# Patient Record
Sex: MALE | Race: WHITE | NOT HISPANIC OR LATINO | URBAN - METROPOLITAN AREA
[De-identification: names, ages, dates, MRNs, and addresses within clinical notes are randomized per-mention and may not be internally consistent; named-entity substitution may affect disease eponyms.]

---

## 2019-06-04 ENCOUNTER — EMERGENCY (EMERGENCY)
Facility: HOSPITAL | Age: 59
LOS: 1 days | Discharge: HOME | End: 2019-06-04
Attending: EMERGENCY MEDICINE | Admitting: EMERGENCY MEDICINE
Payer: COMMERCIAL

## 2019-06-04 VITALS
DIASTOLIC BLOOD PRESSURE: 96 MMHG | SYSTOLIC BLOOD PRESSURE: 162 MMHG | OXYGEN SATURATION: 97 % | HEIGHT: 71 IN | HEART RATE: 64 BPM | RESPIRATION RATE: 18 BRPM | TEMPERATURE: 98 F | WEIGHT: 210.1 LBS

## 2019-06-04 VITALS
RESPIRATION RATE: 16 BRPM | SYSTOLIC BLOOD PRESSURE: 137 MMHG | OXYGEN SATURATION: 97 % | HEART RATE: 71 BPM | TEMPERATURE: 98 F | DIASTOLIC BLOOD PRESSURE: 101 MMHG

## 2019-06-04 DIAGNOSIS — Z98.890 OTHER SPECIFIED POSTPROCEDURAL STATES: Chronic | ICD-10-CM

## 2019-06-04 PROCEDURE — 71250 CT THORAX DX C-: CPT | Mod: 26

## 2019-06-04 PROCEDURE — 71250 CT THORAX DX C-: CPT

## 2019-06-04 PROCEDURE — 99284 EMERGENCY DEPT VISIT MOD MDM: CPT

## 2019-06-04 PROCEDURE — 99284 EMERGENCY DEPT VISIT MOD MDM: CPT | Mod: 25

## 2019-06-04 RX ORDER — OXYCODONE AND ACETAMINOPHEN 5; 325 MG/1; MG/1
2 TABLET ORAL ONCE
Refills: 0 | Status: DISCONTINUED | OUTPATIENT
Start: 2019-06-04 | End: 2019-06-04

## 2019-06-04 RX ADMIN — OXYCODONE AND ACETAMINOPHEN 2 TABLET(S): 5; 325 TABLET ORAL at 02:48

## 2019-06-04 NOTE — ED PROVIDER NOTE - CROS ED [facility] ALL NEG
TRANSFER - OUT REPORT:    Verbal report given to eKndra Murillo RN(name) on [employer]  being transferred to 632(unit) for ordered procedure       Report consisted of patients Situation, Background, Assessment and   Recommendations(SBAR). Time Pre op antibiotic given:none  Anesthesia Stop time: 0760  Juárez Present on Transfer to floor:no  Order for Juárez on Chart:no    Information from the following report(s) Procedure Summary was reviewed with the receiving nurse. Opportunity for questions and clarification was provided. Is the patient on 02? NO       L/Min none       Other none    Is the patient on a monitor? NO    Is the nurse transporting with the patient? YES    Surgical Waiting Area notified of patient's transfer from PACU? YES      The following personal items collected during your admission accompanied patient upon transfer:   Dental Appliance: Dental Appliances: None  Vision: Visual Aid: Contacts, Glasses  Hearing Aid:    Jewelry: Jewelry: Earrings  Clothing: Clothing: None  Other Valuables:  Other Valuables: Contact Lenses, Eyeglasses  Valuables sent to safe:
- - -

## 2019-06-04 NOTE — ED ADULT NURSE NOTE - NSIMPLEMENTINTERV_GEN_ALL_ED
Implemented All Fall Risk Interventions:  [place] to call system. Call bell, personal items and telephone within reach. Instruct patient to call for assistance. Room bathroom lighting operational. Non-slip footwear when patient is off stretcher. Physically safe environment: no spills, clutter or unnecessary equipment. Stretcher in lowest position, wheels locked, appropriate side rails in place. Provide visual cue, wrist band, yellow gown, etc. Monitor gait and stability. Monitor for mental status changes and reorient to person, place, and time. Review medications for side effects contributing to fall risk. Reinforce activity limits and safety measures with patient and family.

## 2019-06-04 NOTE — ED PROVIDER NOTE - OBJECTIVE STATEMENT
60yo male who presents with left rib pain s/p fall. pt tripped and fell on his left side, no head trauma no LOC< pt c/o diffuse left sided pain, worse with breathing no sob, pt took 2 tylenol with no rielief

## 2019-06-04 NOTE — ED ADULT TRIAGE NOTE - CHIEF COMPLAINT QUOTE
patient states" my left ribs- I think I broke them" slipped and fell 3 days ago, c/o pain to the left side of body

## 2019-06-04 NOTE — ED ADULT NURSE NOTE - OBJECTIVE STATEMENT
received pt ambulatory c/o fall denies loc pt evaluated and medicated with percocet 2 tabs po for pain  and taken to ct scan

## 2020-12-29 NOTE — ED ADULT TRIAGE NOTE - BSA (M2)
SW continues to follow Pt's progress. Pt's tentative DC date is for 1/8/21. Team informed that Pt will need a wheelchair at discharge as well as skilled Home Care. Will continue to follow.      Mary Jane Milligan [place]  Case Management  [phone] 2.15

## 2021-11-10 ENCOUNTER — INPATIENT (INPATIENT)
Facility: HOSPITAL | Age: 61
LOS: 1 days | Discharge: HOME | End: 2021-11-12
Payer: COMMERCIAL

## 2021-11-10 ENCOUNTER — OUTPATIENT (OUTPATIENT)
Dept: OUTPATIENT SERVICES | Facility: HOSPITAL | Age: 61
LOS: 1 days | End: 2021-11-10

## 2021-11-10 DIAGNOSIS — Z98.890 OTHER SPECIFIED POSTPROCEDURAL STATES: Chronic | ICD-10-CM

## 2021-11-10 PROCEDURE — 99254 IP/OBS CNSLTJ NEW/EST MOD 60: CPT

## 2021-11-10 PROCEDURE — 74177 CT ABD & PELVIS W/CONTRAST: CPT | Mod: 26

## 2021-11-10 PROCEDURE — 99285 EMERGENCY DEPT VISIT HI MDM: CPT

## 2021-11-10 PROCEDURE — 71045 X-RAY EXAM CHEST 1 VIEW: CPT | Mod: 26

## 2021-11-10 PROCEDURE — 99222 1ST HOSP IP/OBS MODERATE 55: CPT

## 2021-11-10 PROCEDURE — 99053 MED SERV 10PM-8AM 24 HR FAC: CPT

## 2021-11-11 ENCOUNTER — OUTPATIENT (OUTPATIENT)
Dept: OUTPATIENT SERVICES | Facility: HOSPITAL | Age: 61
LOS: 1 days | End: 2021-11-11

## 2021-11-11 DIAGNOSIS — Z98.890 OTHER SPECIFIED POSTPROCEDURAL STATES: Chronic | ICD-10-CM

## 2021-11-11 PROCEDURE — 74018 RADEX ABDOMEN 1 VIEW: CPT | Mod: 26

## 2021-11-11 PROCEDURE — 70030 X-RAY EYE FOR FOREIGN BODY: CPT | Mod: 26,50

## 2021-11-11 PROCEDURE — 74182 MRI ABDOMEN W/CONTRAST: CPT | Mod: 26

## 2021-11-11 PROCEDURE — 71260 CT THORAX DX C+: CPT | Mod: 26

## 2021-11-11 PROCEDURE — 99223 1ST HOSP IP/OBS HIGH 75: CPT

## 2021-11-12 PROCEDURE — 99232 SBSQ HOSP IP/OBS MODERATE 35: CPT

## 2021-11-12 PROCEDURE — 74018 RADEX ABDOMEN 1 VIEW: CPT | Mod: 26

## 2021-11-15 DIAGNOSIS — Z01.818 ENCOUNTER FOR OTHER PREPROCEDURAL EXAMINATION: ICD-10-CM

## 2021-11-15 PROBLEM — Z00.00 ENCOUNTER FOR PREVENTIVE HEALTH EXAMINATION: Noted: 2021-11-15

## 2021-12-03 ENCOUNTER — APPOINTMENT (OUTPATIENT)
Dept: CARDIOLOGY | Facility: CLINIC | Age: 61
End: 2021-12-03

## 2021-12-03 PROBLEM — Z00.00 ENCOUNTER FOR PREVENTIVE HEALTH EXAMINATION: Status: ACTIVE | Noted: 2021-12-03

## 2021-12-08 ENCOUNTER — OUTPATIENT (OUTPATIENT)
Dept: OUTPATIENT SERVICES | Facility: HOSPITAL | Age: 61
LOS: 1 days | End: 2021-12-08
Payer: COMMERCIAL

## 2021-12-08 DIAGNOSIS — C25.9 MALIGNANT NEOPLASM OF PANCREAS, UNSPECIFIED: ICD-10-CM

## 2021-12-08 DIAGNOSIS — Z98.890 OTHER SPECIFIED POSTPROCEDURAL STATES: Chronic | ICD-10-CM

## 2021-12-09 ENCOUNTER — APPOINTMENT (OUTPATIENT)
Dept: HEMATOLOGY ONCOLOGY | Facility: CLINIC | Age: 61
End: 2021-12-09

## 2021-12-09 ENCOUNTER — RESULT REVIEW (OUTPATIENT)
Age: 61
End: 2021-12-09

## 2021-12-09 ENCOUNTER — APPOINTMENT (OUTPATIENT)
Dept: HEMATOLOGY ONCOLOGY | Facility: CLINIC | Age: 61
End: 2021-12-09
Payer: COMMERCIAL

## 2021-12-09 VITALS
BODY MASS INDEX: 36.38 KG/M2 | HEART RATE: 63 BPM | DIASTOLIC BLOOD PRESSURE: 72 MMHG | OXYGEN SATURATION: 97 % | WEIGHT: 229.06 LBS | SYSTOLIC BLOOD PRESSURE: 106 MMHG | TEMPERATURE: 97.5 F | HEIGHT: 66.5 IN

## 2021-12-09 DIAGNOSIS — R59.1 GENERALIZED ENLARGED LYMPH NODES: ICD-10-CM

## 2021-12-09 DIAGNOSIS — R53.83 OTHER FATIGUE: ICD-10-CM

## 2021-12-09 DIAGNOSIS — Z85.07 PERSONAL HISTORY OF MALIGNANT NEOPLASM OF PANCREAS: ICD-10-CM

## 2021-12-09 PROBLEM — I10 ESSENTIAL (PRIMARY) HYPERTENSION: Chronic | Status: ACTIVE | Noted: 2019-06-04

## 2021-12-09 PROBLEM — F41.9 ANXIETY DISORDER, UNSPECIFIED: Chronic | Status: ACTIVE | Noted: 2019-06-04

## 2021-12-09 LAB
ALBUMIN SERPL ELPH-MCNC: 3.8 G/DL — SIGNIFICANT CHANGE UP (ref 3.3–5)
ALP SERPL-CCNC: 116 U/L — SIGNIFICANT CHANGE UP (ref 40–120)
ALT FLD-CCNC: 35 U/L — SIGNIFICANT CHANGE UP (ref 10–45)
ANION GAP SERPL CALC-SCNC: 11 MMOL/L — SIGNIFICANT CHANGE UP (ref 5–17)
APTT BLD: 33.1 SEC — SIGNIFICANT CHANGE UP (ref 27.5–35.5)
AST SERPL-CCNC: 56 U/L — HIGH (ref 10–40)
BASOPHILS # BLD AUTO: 0.14 K/UL — SIGNIFICANT CHANGE UP (ref 0–0.2)
BASOPHILS NFR BLD AUTO: 1.8 % — SIGNIFICANT CHANGE UP (ref 0–2)
BILIRUB SERPL-MCNC: 0.4 MG/DL — SIGNIFICANT CHANGE UP (ref 0.2–1.2)
BUN SERPL-MCNC: 7 MG/DL — SIGNIFICANT CHANGE UP (ref 7–23)
CALCIUM SERPL-MCNC: 9.3 MG/DL — SIGNIFICANT CHANGE UP (ref 8.4–10.5)
CANCER AG19-9 SERPL-ACNC: 13 U/ML — SIGNIFICANT CHANGE UP
CEA SERPL-MCNC: 3.2 NG/ML — SIGNIFICANT CHANGE UP (ref 0–3.8)
CHLORIDE SERPL-SCNC: 100 MMOL/L — SIGNIFICANT CHANGE UP (ref 96–108)
CO2 SERPL-SCNC: 30 MMOL/L — SIGNIFICANT CHANGE UP (ref 22–31)
CREAT SERPL-MCNC: 0.93 MG/DL — SIGNIFICANT CHANGE UP (ref 0.5–1.3)
EOSINOPHIL # BLD AUTO: 0.46 K/UL — SIGNIFICANT CHANGE UP (ref 0–0.5)
EOSINOPHIL NFR BLD AUTO: 6 % — SIGNIFICANT CHANGE UP (ref 0–6)
FERRITIN SERPL-MCNC: 160 NG/ML — SIGNIFICANT CHANGE UP (ref 30–400)
FOLATE SERPL-MCNC: 6 NG/ML — SIGNIFICANT CHANGE UP
GLUCOSE SERPL-MCNC: 99 MG/DL — SIGNIFICANT CHANGE UP (ref 70–99)
HCT VFR BLD CALC: 38.8 % — LOW (ref 39–50)
HGB BLD-MCNC: 13.5 G/DL — SIGNIFICANT CHANGE UP (ref 13–17)
IMM GRANULOCYTES NFR BLD AUTO: 0.4 % — SIGNIFICANT CHANGE UP (ref 0–1.5)
INR BLD: 1.13 RATIO — SIGNIFICANT CHANGE UP (ref 0.88–1.16)
IRON SATN MFR SERPL: 32 % — SIGNIFICANT CHANGE UP (ref 16–55)
IRON SATN MFR SERPL: 83 UG/DL — SIGNIFICANT CHANGE UP (ref 45–165)
LYMPHOCYTES # BLD AUTO: 1.8 K/UL — SIGNIFICANT CHANGE UP (ref 1–3.3)
LYMPHOCYTES # BLD AUTO: 23.4 % — SIGNIFICANT CHANGE UP (ref 13–44)
MCHC RBC-ENTMCNC: 31.5 PG — SIGNIFICANT CHANGE UP (ref 27–34)
MCHC RBC-ENTMCNC: 34.8 GM/DL — SIGNIFICANT CHANGE UP (ref 32–36)
MCV RBC AUTO: 90.4 FL — SIGNIFICANT CHANGE UP (ref 80–100)
MONOCYTES # BLD AUTO: 0.67 K/UL — SIGNIFICANT CHANGE UP (ref 0–0.9)
MONOCYTES NFR BLD AUTO: 8.7 % — SIGNIFICANT CHANGE UP (ref 2–14)
NEUTROPHILS # BLD AUTO: 4.58 K/UL — SIGNIFICANT CHANGE UP (ref 1.8–7.4)
NEUTROPHILS NFR BLD AUTO: 59.7 % — SIGNIFICANT CHANGE UP (ref 43–77)
NRBC # BLD: 0 /100 WBCS — SIGNIFICANT CHANGE UP (ref 0–0)
PLATELET # BLD AUTO: 172 K/UL — SIGNIFICANT CHANGE UP (ref 150–400)
POTASSIUM SERPL-MCNC: 4.5 MMOL/L — SIGNIFICANT CHANGE UP (ref 3.5–5.3)
POTASSIUM SERPL-SCNC: 4.5 MMOL/L — SIGNIFICANT CHANGE UP (ref 3.5–5.3)
PROT SERPL-MCNC: 6.9 G/DL — SIGNIFICANT CHANGE UP (ref 6–8.3)
PROTHROM AB SERPL-ACNC: 13.4 SEC — SIGNIFICANT CHANGE UP (ref 10.6–13.6)
PSA FREE FLD-MCNC: 0.2 NG/ML — SIGNIFICANT CHANGE UP
PSA FREE FLD-MCNC: 38 % — SIGNIFICANT CHANGE UP
PSA SERPL-MCNC: 0.52 NG/ML — SIGNIFICANT CHANGE UP (ref 0–4)
RBC # BLD: 4.29 M/UL — SIGNIFICANT CHANGE UP (ref 4.2–5.8)
RBC # FLD: 12.2 % — SIGNIFICANT CHANGE UP (ref 10.3–14.5)
SODIUM SERPL-SCNC: 141 MMOL/L — SIGNIFICANT CHANGE UP (ref 135–145)
TIBC SERPL-MCNC: 261 UG/DL — SIGNIFICANT CHANGE UP (ref 220–430)
TRANSFERRIN SERPL-MCNC: 216 MG/DL — SIGNIFICANT CHANGE UP (ref 200–360)
UIBC SERPL-MCNC: 178 UG/DL — SIGNIFICANT CHANGE UP (ref 110–370)
VIT B12 SERPL-MCNC: 522 PG/ML — SIGNIFICANT CHANGE UP (ref 232–1245)
WBC # BLD: 7.68 K/UL — SIGNIFICANT CHANGE UP (ref 3.8–10.5)
WBC # FLD AUTO: 7.68 K/UL — SIGNIFICANT CHANGE UP (ref 3.8–10.5)

## 2021-12-09 PROCEDURE — 85610 PROTHROMBIN TIME: CPT

## 2021-12-09 PROCEDURE — 84153 ASSAY OF PSA TOTAL: CPT

## 2021-12-09 PROCEDURE — 36415 COLL VENOUS BLD VENIPUNCTURE: CPT

## 2021-12-09 PROCEDURE — 85027 COMPLETE CBC AUTOMATED: CPT

## 2021-12-09 PROCEDURE — 86301 IMMUNOASSAY TUMOR CA 19-9: CPT

## 2021-12-09 PROCEDURE — 82728 ASSAY OF FERRITIN: CPT

## 2021-12-09 PROCEDURE — 82378 CARCINOEMBRYONIC ANTIGEN: CPT

## 2021-12-09 PROCEDURE — 82746 ASSAY OF FOLIC ACID SERUM: CPT

## 2021-12-09 PROCEDURE — 84154 ASSAY OF PSA FREE: CPT

## 2021-12-09 PROCEDURE — 83550 IRON BINDING TEST: CPT

## 2021-12-09 PROCEDURE — 99215 OFFICE O/P EST HI 40 MIN: CPT

## 2021-12-09 PROCEDURE — 82607 VITAMIN B-12: CPT

## 2021-12-09 PROCEDURE — 80053 COMPREHEN METABOLIC PANEL: CPT

## 2021-12-09 PROCEDURE — 83540 ASSAY OF IRON: CPT

## 2021-12-09 PROCEDURE — 85730 THROMBOPLASTIN TIME PARTIAL: CPT

## 2021-12-09 PROCEDURE — 84466 ASSAY OF TRANSFERRIN: CPT

## 2021-12-09 NOTE — RESULTS/DATA
[FreeTextEntry1] : Mr. Staples presented at age 61 in December 2021 for evaluation of history of pancreatic cancer and new enlarged LN. \par The patient has a medical history of pancreatic ca s/p whipple in 2015 and subsequent ventral hernia repair in 2020, etoh misuse disorder, HTN, anxiety (on lexapro). \par \par Refer to Dr. Muñoz for additional workup of enlarged para-esophageal LN. \par

## 2021-12-09 NOTE — PHYSICAL EXAM
[Fully active, able to carry on all pre-disease performance without restriction] : Status 0 - Fully active, able to carry on all pre-disease performance without restriction [Normal] : affect appropriate [de-identified] : generally well appearing male, pleasant

## 2021-12-09 NOTE — CONSULT LETTER
[Dear  ___] : Dear  [unfilled], [Referral Letter:] : I am referring [unfilled] to you for further evaluation.  My most recent evaluation follows. [Please see my note below.] : Please see my note below. [Referral Closing:] : Thank you very much for seeing this patient.  If you have any questions, please do not hesitate to contact me. [Sincerely,] : Sincerely, [FreeTextEntry2] : Dr. Senthil Muñoz [FreeTextEntry3] : Dr. Lauren Murcia\par

## 2021-12-10 ENCOUNTER — NON-APPOINTMENT (OUTPATIENT)
Age: 61
End: 2021-12-10

## 2021-12-26 ENCOUNTER — EMERGENCY (EMERGENCY)
Facility: HOSPITAL | Age: 61
LOS: 1 days | End: 2021-12-26
Admitting: EMERGENCY MEDICINE
Payer: COMMERCIAL

## 2021-12-26 DIAGNOSIS — Z98.890 OTHER SPECIFIED POSTPROCEDURAL STATES: Chronic | ICD-10-CM

## 2021-12-26 PROCEDURE — 93010 ELECTROCARDIOGRAM REPORT: CPT

## 2021-12-26 PROCEDURE — 99285 EMERGENCY DEPT VISIT HI MDM: CPT

## 2021-12-26 PROCEDURE — 74177 CT ABD & PELVIS W/CONTRAST: CPT | Mod: 26

## 2022-01-17 ENCOUNTER — OUTPATIENT (OUTPATIENT)
Dept: OUTPATIENT SERVICES | Facility: HOSPITAL | Age: 62
LOS: 1 days | End: 2022-01-17

## 2022-01-17 DIAGNOSIS — C25.9 MALIGNANT NEOPLASM OF PANCREAS, UNSPECIFIED: ICD-10-CM

## 2022-01-17 DIAGNOSIS — Z98.890 OTHER SPECIFIED POSTPROCEDURAL STATES: Chronic | ICD-10-CM
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
## 2022-01-18 ENCOUNTER — APPOINTMENT (OUTPATIENT)
Dept: HEMATOLOGY ONCOLOGY | Facility: CLINIC | Age: 62
End: 2022-01-18
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
## 2022-01-24 ENCOUNTER — APPOINTMENT (OUTPATIENT)
Dept: HEMATOLOGY ONCOLOGY | Facility: CLINIC | Age: 62
End: 2022-01-24

## 2022-01-28 ENCOUNTER — APPOINTMENT (OUTPATIENT)
Dept: HEMATOLOGY ONCOLOGY | Facility: CLINIC | Age: 62
End: 2022-01-28

## 2022-07-14 ENCOUNTER — EMERGENCY (EMERGENCY)
Facility: HOSPITAL | Age: 62
LOS: 1 days | Discharge: HOME | End: 2022-07-14
Admitting: EMERGENCY MEDICINE

## 2022-07-14 DIAGNOSIS — F10.129 ALCOHOL ABUSE WITH INTOXICATION, UNSPECIFIED: ICD-10-CM

## 2022-07-14 DIAGNOSIS — Z98.890 OTHER SPECIFIED POSTPROCEDURAL STATES: Chronic | ICD-10-CM

## 2022-07-14 PROCEDURE — 99284 EMERGENCY DEPT VISIT MOD MDM: CPT

## 2022-10-09 ENCOUNTER — INPATIENT (INPATIENT)
Facility: HOSPITAL | Age: 62
LOS: 2 days | Discharge: HOME | DRG: 392 | End: 2022-10-12
Attending: STUDENT IN AN ORGANIZED HEALTH CARE EDUCATION/TRAINING PROGRAM | Admitting: STUDENT IN AN ORGANIZED HEALTH CARE EDUCATION/TRAINING PROGRAM
Payer: COMMERCIAL

## 2022-10-09 VITALS
WEIGHT: 214.95 LBS | TEMPERATURE: 98 F | OXYGEN SATURATION: 98 % | HEIGHT: 71 IN | RESPIRATION RATE: 18 BRPM | DIASTOLIC BLOOD PRESSURE: 91 MMHG | SYSTOLIC BLOOD PRESSURE: 143 MMHG | HEART RATE: 71 BPM

## 2022-10-09 DIAGNOSIS — Z98.890 OTHER SPECIFIED POSTPROCEDURAL STATES: Chronic | ICD-10-CM

## 2022-10-09 LAB
ALBUMIN SERPL ELPH-MCNC: 3.2 G/DL — LOW (ref 3.3–5)
ALP SERPL-CCNC: 83 U/L — SIGNIFICANT CHANGE UP (ref 40–120)
ALT FLD-CCNC: 46 U/L — SIGNIFICANT CHANGE UP (ref 12–78)
ANION GAP SERPL CALC-SCNC: 6 MMOL/L — SIGNIFICANT CHANGE UP (ref 5–17)
AST SERPL-CCNC: 75 U/L — HIGH (ref 15–37)
BASOPHILS # BLD AUTO: 0.11 K/UL — SIGNIFICANT CHANGE UP (ref 0–0.2)
BASOPHILS NFR BLD AUTO: 2.1 % — HIGH (ref 0–2)
BILIRUB SERPL-MCNC: 0.8 MG/DL — SIGNIFICANT CHANGE UP (ref 0.2–1.2)
BUN SERPL-MCNC: 10 MG/DL — SIGNIFICANT CHANGE UP (ref 7–23)
CALCIUM SERPL-MCNC: 8.6 MG/DL — SIGNIFICANT CHANGE UP (ref 8.5–10.1)
CHLORIDE SERPL-SCNC: 112 MMOL/L — HIGH (ref 96–108)
CK MB CFR SERPL CALC: 1.9 NG/ML — SIGNIFICANT CHANGE UP (ref 0–3.6)
CO2 SERPL-SCNC: 31 MMOL/L — SIGNIFICANT CHANGE UP (ref 22–31)
CREAT SERPL-MCNC: 0.85 MG/DL — SIGNIFICANT CHANGE UP (ref 0.5–1.3)
EGFR: 99 ML/MIN/1.73M2 — SIGNIFICANT CHANGE UP
EOSINOPHIL # BLD AUTO: 0.25 K/UL — SIGNIFICANT CHANGE UP (ref 0–0.5)
EOSINOPHIL NFR BLD AUTO: 4.8 % — SIGNIFICANT CHANGE UP (ref 0–6)
ETHANOL SERPL-MCNC: 243 MG/DL — HIGH (ref 0–10)
GLUCOSE SERPL-MCNC: 103 MG/DL — HIGH (ref 70–99)
HCT VFR BLD CALC: 36.9 % — LOW (ref 39–50)
HGB BLD-MCNC: 12.2 G/DL — LOW (ref 13–17)
IMM GRANULOCYTES NFR BLD AUTO: 0.4 % — SIGNIFICANT CHANGE UP (ref 0–0.9)
LIDOCAIN IGE QN: 16 U/L — LOW (ref 73–393)
LYMPHOCYTES # BLD AUTO: 1.63 K/UL — SIGNIFICANT CHANGE UP (ref 1–3.3)
LYMPHOCYTES # BLD AUTO: 31.2 % — SIGNIFICANT CHANGE UP (ref 13–44)
MCHC RBC-ENTMCNC: 31.3 PG — SIGNIFICANT CHANGE UP (ref 27–34)
MCHC RBC-ENTMCNC: 33.1 GM/DL — SIGNIFICANT CHANGE UP (ref 32–36)
MCV RBC AUTO: 94.6 FL — SIGNIFICANT CHANGE UP (ref 80–100)
MONOCYTES # BLD AUTO: 0.36 K/UL — SIGNIFICANT CHANGE UP (ref 0–0.9)
MONOCYTES NFR BLD AUTO: 6.9 % — SIGNIFICANT CHANGE UP (ref 2–14)
NEUTROPHILS # BLD AUTO: 2.86 K/UL — SIGNIFICANT CHANGE UP (ref 1.8–7.4)
NEUTROPHILS NFR BLD AUTO: 54.6 % — SIGNIFICANT CHANGE UP (ref 43–77)
NRBC # BLD: 0 /100 WBCS — SIGNIFICANT CHANGE UP (ref 0–0)
PLATELET # BLD AUTO: 84 K/UL — LOW (ref 150–400)
POTASSIUM SERPL-MCNC: 3.8 MMOL/L — SIGNIFICANT CHANGE UP (ref 3.5–5.3)
POTASSIUM SERPL-SCNC: 3.8 MMOL/L — SIGNIFICANT CHANGE UP (ref 3.5–5.3)
PROT SERPL-MCNC: 7.4 G/DL — SIGNIFICANT CHANGE UP (ref 6–8.3)
RBC # BLD: 3.9 M/UL — LOW (ref 4.2–5.8)
RBC # FLD: 13.2 % — SIGNIFICANT CHANGE UP (ref 10.3–14.5)
SARS-COV-2 RNA SPEC QL NAA+PROBE: SIGNIFICANT CHANGE UP
SODIUM SERPL-SCNC: 149 MMOL/L — HIGH (ref 135–145)
TROPONIN I, HIGH SENSITIVITY RESULT: 15.8 NG/L — SIGNIFICANT CHANGE UP
WBC # BLD: 5.23 K/UL — SIGNIFICANT CHANGE UP (ref 3.8–10.5)
WBC # FLD AUTO: 5.23 K/UL — SIGNIFICANT CHANGE UP (ref 3.8–10.5)

## 2022-10-09 PROCEDURE — 93010 ELECTROCARDIOGRAM REPORT: CPT

## 2022-10-09 PROCEDURE — 99285 EMERGENCY DEPT VISIT HI MDM: CPT

## 2022-10-09 RX ORDER — MORPHINE SULFATE 50 MG/1
4 CAPSULE, EXTENDED RELEASE ORAL ONCE
Refills: 0 | Status: DISCONTINUED | OUTPATIENT
Start: 2022-10-09 | End: 2022-10-09

## 2022-10-09 RX ORDER — FAMOTIDINE 10 MG/ML
20 INJECTION INTRAVENOUS ONCE
Refills: 0 | Status: COMPLETED | OUTPATIENT
Start: 2022-10-09 | End: 2022-10-09

## 2022-10-09 RX ORDER — SODIUM CHLORIDE 9 MG/ML
1000 INJECTION INTRAMUSCULAR; INTRAVENOUS; SUBCUTANEOUS ONCE
Refills: 0 | Status: COMPLETED | OUTPATIENT
Start: 2022-10-09 | End: 2022-10-09

## 2022-10-09 RX ORDER — ONDANSETRON 8 MG/1
4 TABLET, FILM COATED ORAL ONCE
Refills: 0 | Status: COMPLETED | OUTPATIENT
Start: 2022-10-09 | End: 2022-10-09

## 2022-10-09 RX ADMIN — SODIUM CHLORIDE 1000 MILLILITER(S): 9 INJECTION INTRAMUSCULAR; INTRAVENOUS; SUBCUTANEOUS at 20:48

## 2022-10-09 RX ADMIN — MORPHINE SULFATE 4 MILLIGRAM(S): 50 CAPSULE, EXTENDED RELEASE ORAL at 20:48

## 2022-10-09 RX ADMIN — ONDANSETRON 4 MILLIGRAM(S): 8 TABLET, FILM COATED ORAL at 20:49

## 2022-10-09 RX ADMIN — FAMOTIDINE 20 MILLIGRAM(S): 10 INJECTION INTRAVENOUS at 21:26

## 2022-10-09 NOTE — ED ADULT NURSE NOTE - OBJECTIVE STATEMENT
received hand patient at 19:53H. received hand patient at 19:53H.  patient came in ED from home with c/o left rib cage pain / abdominal pain X few weeks. patient reported he had a whipple procedure in one of the hospital in [facility] 2016 and since then it has given him abdominal discomfort. patient denies recent trauma or fall. afebrile. denies nausea and vomiting. non-labored respiration noted. abdomen nondistended but tender on the left upper quadrant area. patient admit to drinking alcohol every day.
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
## 2022-10-09 NOTE — ED PROVIDER NOTE - CHPI ED SYMPTOMS NEG
Problem: Adult Inpatient Plan of Care  Goal: Plan of Care Review  Flowsheets (Taken 12/8/2020 3445)  Progress: improving  Plan of Care Reviewed With: patient  Outcome Summary: PT eval complete. Pt ambulated 15 feet using RW and CGA x2 for safety. Pt able to maintaining NWB on L LE, with gait limited by fatigue. Bed mobility performed with supervision, STS and toilet transfers with CGA x2. Reviewed strict NWB order placed by Dr. Osborn. Pt bleeding through dressing at surgical site, RN notified and present. Recommend pt d/c home with assist when appropriate.   Goal Outcome Evaluation:  Plan of Care Reviewed With: patient  Progress: improving  Outcome Summary: PT eval complete. Pt ambulated 15 feet using RW and CGA x2 for safety. Pt able to maintaining NWB on L LE, with gait limited by fatigue. Bed mobility performed with supervision, STS and toilet transfers with CGA x2. Reviewed strict NWB order placed by Dr. Osborn. Pt bleeding through dressing at surgical site, RN notified and present. Recommend pt d/c home with assist when appropriate.   no abdominal distension

## 2022-10-09 NOTE — ED ADULT NURSE NOTE - SUICIDE SCREENING DEPRESSION
Negative Consent (Nose)/Introductory Paragraph: The rationale for Mohs was explained to the patient and consent was obtained. The risks, benefits and alternatives to therapy were discussed in detail. Specifically, the risks of nasal deformity, changes in the flow of air through the nose, infection, scarring, bleeding, prolonged wound healing, incomplete removal, allergy to anesthesia, nerve injury and recurrence were addressed. Prior to the procedure, the treatment site was clearly identified and confirmed by the patient.
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
## 2022-10-09 NOTE — ED PROVIDER NOTE - NS ED ATTENDING STATEMENT MOD
This was a shared visit with the LOIS. I reviewed and verified the documentation and independently performed the documented:

## 2022-10-09 NOTE — ED PROVIDER NOTE - CONSTITUTIONAL, MLM
Well appearing, awake, alert, oriented to person, place, time/situation and in mod apparent distress. normal...

## 2022-10-10 ENCOUNTER — TRANSCRIPTION ENCOUNTER (OUTPATIENT)
Age: 62
End: 2022-10-10

## 2022-10-10 DIAGNOSIS — R10.9 UNSPECIFIED ABDOMINAL PAIN: ICD-10-CM

## 2022-10-10 DIAGNOSIS — F41.9 ANXIETY DISORDER, UNSPECIFIED: ICD-10-CM

## 2022-10-10 DIAGNOSIS — C25.9 MALIGNANT NEOPLASM OF PANCREAS, UNSPECIFIED: ICD-10-CM

## 2022-10-10 DIAGNOSIS — I10 ESSENTIAL (PRIMARY) HYPERTENSION: ICD-10-CM

## 2022-10-10 DIAGNOSIS — R93.89 ABNORMAL FINDINGS ON DIAGNOSTIC IMAGING OF OTHER SPECIFIED BODY STRUCTURES: ICD-10-CM

## 2022-10-10 DIAGNOSIS — Z29.9 ENCOUNTER FOR PROPHYLACTIC MEASURES, UNSPECIFIED: ICD-10-CM

## 2022-10-10 DIAGNOSIS — Z98.890 OTHER SPECIFIED POSTPROCEDURAL STATES: Chronic | ICD-10-CM

## 2022-10-10 DIAGNOSIS — F10.20 ALCOHOL DEPENDENCE, UNCOMPLICATED: ICD-10-CM

## 2022-10-10 LAB
ALBUMIN SERPL ELPH-MCNC: 3.2 G/DL — LOW (ref 3.3–5)
ALBUMIN SERPL ELPH-MCNC: 3.4 G/DL — SIGNIFICANT CHANGE UP (ref 3.3–5)
ALP SERPL-CCNC: 79 U/L — SIGNIFICANT CHANGE UP (ref 40–120)
ALP SERPL-CCNC: 83 U/L — SIGNIFICANT CHANGE UP (ref 40–120)
ALT FLD-CCNC: 42 U/L — SIGNIFICANT CHANGE UP (ref 12–78)
ALT FLD-CCNC: 45 U/L — SIGNIFICANT CHANGE UP (ref 12–78)
ANION GAP SERPL CALC-SCNC: 12 MMOL/L — SIGNIFICANT CHANGE UP (ref 5–17)
ANION GAP SERPL CALC-SCNC: 13 MMOL/L — SIGNIFICANT CHANGE UP (ref 5–17)
APPEARANCE UR: CLEAR — SIGNIFICANT CHANGE UP
AST SERPL-CCNC: 73 U/L — HIGH (ref 15–37)
AST SERPL-CCNC: 73 U/L — HIGH (ref 15–37)
BILIRUB DIRECT SERPL-MCNC: 0.4 MG/DL — HIGH (ref 0–0.3)
BILIRUB INDIRECT FLD-MCNC: 0.7 MG/DL — SIGNIFICANT CHANGE UP (ref 0.2–1)
BILIRUB SERPL-MCNC: 1.1 MG/DL — SIGNIFICANT CHANGE UP (ref 0.2–1.2)
BILIRUB SERPL-MCNC: 1.3 MG/DL — HIGH (ref 0.2–1.2)
BILIRUB UR-MCNC: NEGATIVE — SIGNIFICANT CHANGE UP
BUN SERPL-MCNC: 8 MG/DL — SIGNIFICANT CHANGE UP (ref 7–23)
BUN SERPL-MCNC: 8 MG/DL — SIGNIFICANT CHANGE UP (ref 7–23)
CALCIUM SERPL-MCNC: 8.2 MG/DL — LOW (ref 8.5–10.1)
CALCIUM SERPL-MCNC: 8.3 MG/DL — LOW (ref 8.5–10.1)
CANCER AG19-9 SERPL-ACNC: 45 U/ML — HIGH
CEA SERPL-MCNC: 3.8 NG/ML — SIGNIFICANT CHANGE UP (ref 0–3.8)
CHLORIDE SERPL-SCNC: 107 MMOL/L — SIGNIFICANT CHANGE UP (ref 96–108)
CHLORIDE SERPL-SCNC: 108 MMOL/L — SIGNIFICANT CHANGE UP (ref 96–108)
CO2 SERPL-SCNC: 23 MMOL/L — SIGNIFICANT CHANGE UP (ref 22–31)
CO2 SERPL-SCNC: 24 MMOL/L — SIGNIFICANT CHANGE UP (ref 22–31)
COLOR SPEC: SIGNIFICANT CHANGE UP
CREAT SERPL-MCNC: 0.71 MG/DL — SIGNIFICANT CHANGE UP (ref 0.5–1.3)
CREAT SERPL-MCNC: 0.79 MG/DL — SIGNIFICANT CHANGE UP (ref 0.5–1.3)
DIFF PNL FLD: ABNORMAL
EGFR: 101 ML/MIN/1.73M2 — SIGNIFICANT CHANGE UP
EGFR: 104 ML/MIN/1.73M2 — SIGNIFICANT CHANGE UP
GLUCOSE SERPL-MCNC: 113 MG/DL — HIGH (ref 70–99)
GLUCOSE SERPL-MCNC: 130 MG/DL — HIGH (ref 70–99)
GLUCOSE UR QL: NEGATIVE — SIGNIFICANT CHANGE UP
HCT VFR BLD CALC: 36 % — LOW (ref 39–50)
HGB BLD-MCNC: 11.9 G/DL — LOW (ref 13–17)
KETONES UR-MCNC: NEGATIVE — SIGNIFICANT CHANGE UP
LEUKOCYTE ESTERASE UR-ACNC: NEGATIVE — SIGNIFICANT CHANGE UP
MAGNESIUM SERPL-MCNC: 1.4 MG/DL — LOW (ref 1.6–2.6)
MCHC RBC-ENTMCNC: 31.7 PG — SIGNIFICANT CHANGE UP (ref 27–34)
MCHC RBC-ENTMCNC: 33.1 GM/DL — SIGNIFICANT CHANGE UP (ref 32–36)
MCV RBC AUTO: 96 FL — SIGNIFICANT CHANGE UP (ref 80–100)
NITRITE UR-MCNC: NEGATIVE — SIGNIFICANT CHANGE UP
NRBC # BLD: 0 /100 WBCS — SIGNIFICANT CHANGE UP (ref 0–0)
PCP SPEC-MCNC: SIGNIFICANT CHANGE UP
PH UR: 5 — SIGNIFICANT CHANGE UP (ref 5–8)
PHOSPHATE SERPL-MCNC: 2.7 MG/DL — SIGNIFICANT CHANGE UP (ref 2.5–4.5)
PLATELET # BLD AUTO: 71 K/UL — LOW (ref 150–400)
POTASSIUM SERPL-MCNC: 3.7 MMOL/L — SIGNIFICANT CHANGE UP (ref 3.5–5.3)
POTASSIUM SERPL-MCNC: 3.9 MMOL/L — SIGNIFICANT CHANGE UP (ref 3.5–5.3)
POTASSIUM SERPL-SCNC: 3.7 MMOL/L — SIGNIFICANT CHANGE UP (ref 3.5–5.3)
POTASSIUM SERPL-SCNC: 3.9 MMOL/L — SIGNIFICANT CHANGE UP (ref 3.5–5.3)
PROT SERPL-MCNC: 7 G/DL — SIGNIFICANT CHANGE UP (ref 6–8.3)
PROT SERPL-MCNC: 7.6 G/DL — SIGNIFICANT CHANGE UP (ref 6–8.3)
PROT UR-MCNC: NEGATIVE — SIGNIFICANT CHANGE UP
RBC # BLD: 3.75 M/UL — LOW (ref 4.2–5.8)
RBC # FLD: 13.2 % — SIGNIFICANT CHANGE UP (ref 10.3–14.5)
SODIUM SERPL-SCNC: 143 MMOL/L — SIGNIFICANT CHANGE UP (ref 135–145)
SODIUM SERPL-SCNC: 144 MMOL/L — SIGNIFICANT CHANGE UP (ref 135–145)
SP GR SPEC: 1.02 — SIGNIFICANT CHANGE UP (ref 1.01–1.02)
UROBILINOGEN FLD QL: NEGATIVE — SIGNIFICANT CHANGE UP
WBC # BLD: 4.7 K/UL — SIGNIFICANT CHANGE UP (ref 3.8–10.5)
WBC # FLD AUTO: 4.7 K/UL — SIGNIFICANT CHANGE UP (ref 3.8–10.5)

## 2022-10-10 PROCEDURE — 71260 CT THORAX DX C+: CPT | Mod: 26

## 2022-10-10 PROCEDURE — 99222 1ST HOSP IP/OBS MODERATE 55: CPT | Mod: GC

## 2022-10-10 PROCEDURE — 74177 CT ABD & PELVIS W/CONTRAST: CPT | Mod: 26,MA

## 2022-10-10 PROCEDURE — 99221 1ST HOSP IP/OBS SF/LOW 40: CPT

## 2022-10-10 RX ORDER — MORPHINE SULFATE 50 MG/1
2 CAPSULE, EXTENDED RELEASE ORAL EVERY 6 HOURS
Refills: 0 | Status: DISCONTINUED | OUTPATIENT
Start: 2022-10-10 | End: 2022-10-12

## 2022-10-10 RX ORDER — SODIUM CHLORIDE 9 MG/ML
1000 INJECTION INTRAMUSCULAR; INTRAVENOUS; SUBCUTANEOUS
Refills: 0 | Status: DISCONTINUED | OUTPATIENT
Start: 2022-10-10 | End: 2022-10-12

## 2022-10-10 RX ORDER — LANOLIN ALCOHOL/MO/W.PET/CERES
3 CREAM (GRAM) TOPICAL AT BEDTIME
Refills: 0 | Status: DISCONTINUED | OUTPATIENT
Start: 2022-10-10 | End: 2022-10-12

## 2022-10-10 RX ORDER — ESCITALOPRAM OXALATE 10 MG/1
0 TABLET, FILM COATED ORAL
Qty: 0 | Refills: 0 | DISCHARGE

## 2022-10-10 RX ORDER — ONDANSETRON 8 MG/1
4 TABLET, FILM COATED ORAL ONCE
Refills: 0 | Status: COMPLETED | OUTPATIENT
Start: 2022-10-10 | End: 2022-10-10

## 2022-10-10 RX ORDER — MAGNESIUM SULFATE 500 MG/ML
1 VIAL (ML) INJECTION ONCE
Refills: 0 | Status: COMPLETED | OUTPATIENT
Start: 2022-10-10 | End: 2022-10-10

## 2022-10-10 RX ORDER — IRBESARTAN 75 MG/1
0 TABLET ORAL
Qty: 0 | Refills: 0 | DISCHARGE

## 2022-10-10 RX ORDER — MORPHINE SULFATE 50 MG/1
4 CAPSULE, EXTENDED RELEASE ORAL EVERY 6 HOURS
Refills: 0 | Status: DISCONTINUED | OUTPATIENT
Start: 2022-10-10 | End: 2022-10-12

## 2022-10-10 RX ORDER — LOSARTAN POTASSIUM 100 MG/1
50 TABLET, FILM COATED ORAL DAILY
Refills: 0 | Status: DISCONTINUED | OUTPATIENT
Start: 2022-10-10 | End: 2022-10-10

## 2022-10-10 RX ORDER — MORPHINE SULFATE 50 MG/1
4 CAPSULE, EXTENDED RELEASE ORAL ONCE
Refills: 0 | Status: DISCONTINUED | OUTPATIENT
Start: 2022-10-10 | End: 2022-10-10

## 2022-10-10 RX ORDER — ACETAMINOPHEN 500 MG
1000 TABLET ORAL ONCE
Refills: 0 | Status: COMPLETED | OUTPATIENT
Start: 2022-10-10 | End: 2022-10-10

## 2022-10-10 RX ORDER — ONDANSETRON 8 MG/1
4 TABLET, FILM COATED ORAL EVERY 8 HOURS
Refills: 0 | Status: DISCONTINUED | OUTPATIENT
Start: 2022-10-10 | End: 2022-10-12

## 2022-10-10 RX ORDER — ACETAMINOPHEN 500 MG
650 TABLET ORAL EVERY 6 HOURS
Refills: 0 | Status: DISCONTINUED | OUTPATIENT
Start: 2022-10-10 | End: 2022-10-12

## 2022-10-10 RX ORDER — FOLIC ACID 0.8 MG
1 TABLET ORAL DAILY
Refills: 0 | Status: DISCONTINUED | OUTPATIENT
Start: 2022-10-10 | End: 2022-10-12

## 2022-10-10 RX ORDER — LOSARTAN POTASSIUM 100 MG/1
50 TABLET, FILM COATED ORAL DAILY
Refills: 0 | Status: DISCONTINUED | OUTPATIENT
Start: 2022-10-10 | End: 2022-10-12

## 2022-10-10 RX ADMIN — MORPHINE SULFATE 4 MILLIGRAM(S): 50 CAPSULE, EXTENDED RELEASE ORAL at 10:35

## 2022-10-10 RX ADMIN — ONDANSETRON 4 MILLIGRAM(S): 8 TABLET, FILM COATED ORAL at 09:26

## 2022-10-10 RX ADMIN — MORPHINE SULFATE 4 MILLIGRAM(S): 50 CAPSULE, EXTENDED RELEASE ORAL at 16:50

## 2022-10-10 RX ADMIN — MORPHINE SULFATE 2 MILLIGRAM(S): 50 CAPSULE, EXTENDED RELEASE ORAL at 21:12

## 2022-10-10 RX ADMIN — Medication 3 MILLIGRAM(S): at 23:37

## 2022-10-10 RX ADMIN — ONDANSETRON 4 MILLIGRAM(S): 8 TABLET, FILM COATED ORAL at 14:40

## 2022-10-10 RX ADMIN — SODIUM CHLORIDE 150 MILLILITER(S): 9 INJECTION INTRAMUSCULAR; INTRAVENOUS; SUBCUTANEOUS at 21:14

## 2022-10-10 RX ADMIN — MORPHINE SULFATE 4 MILLIGRAM(S): 50 CAPSULE, EXTENDED RELEASE ORAL at 05:35

## 2022-10-10 RX ADMIN — MORPHINE SULFATE 4 MILLIGRAM(S): 50 CAPSULE, EXTENDED RELEASE ORAL at 16:35

## 2022-10-10 RX ADMIN — ONDANSETRON 4 MILLIGRAM(S): 8 TABLET, FILM COATED ORAL at 22:01

## 2022-10-10 RX ADMIN — MORPHINE SULFATE 4 MILLIGRAM(S): 50 CAPSULE, EXTENDED RELEASE ORAL at 10:06

## 2022-10-10 RX ADMIN — ONDANSETRON 4 MILLIGRAM(S): 8 TABLET, FILM COATED ORAL at 05:35

## 2022-10-10 RX ADMIN — Medication 100 GRAM(S): at 13:27

## 2022-10-10 RX ADMIN — MORPHINE SULFATE 2 MILLIGRAM(S): 50 CAPSULE, EXTENDED RELEASE ORAL at 21:30

## 2022-10-10 RX ADMIN — Medication 400 MILLIGRAM(S): at 01:50

## 2022-10-10 RX ADMIN — Medication 2 MILLIGRAM(S): at 09:29

## 2022-10-10 NOTE — CONSULT NOTE ADULT - ASSESSMENT
abdominal pain  diarrhea  nausea/vomiting  etoh abuse  h/o pancreatic cancer    CT scan reviewed, no obvious recurrence on this study  will need follow up with his pancreatic surgery group at [facility]  ? role for eus  check GI pcr if diarrhea persist  etoh cessation  had recent colonoscopy, no need to repeat  will follow     I reviewed the overnight course of events on the unit, re-confirming the patient history. I discussed the care with the patient and their family  The plan of care was discussed with the physician assistant and modifications were made to the notation where appropriate.   Differential diagnosis and plan of care discussed with patient after the evaluation  35 minutes spent on total encounter of which more than fifty percent of the encounter was spent counseling and/or coordinating care by the attending physician.  Advanced care planning was discussed with patient and family.  Advanced care planning forms were reviewed and discussed.  Risks, benefits and alternatives of gastroenterologic procedures were discussed in detail and all questions were answered.
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
## 2022-10-10 NOTE — H&P ADULT - ASSESSMENT
63 yo M with PMHx of pancreatic ca s/p whipple in 2015 and subsequent ventral hernia repair in 2020, etoh misuse disorder, HTN, anxiety (on lexapro) presents to the ER complaining of worsening upper abdominal pain for past few weeks, admitted for intractable abd pain and abnormal CT finding   63 yo M with PMHx of UC, pancreatic ca s/p whipple in 2015 and subsequent ventral hernia repair in 2020, etoh misuse disorder, HTN, anxiety, who presents to the ER complaining of worsening upper abdominal pain for past few weeks, admitted for intractable abd pain and abnormal CT finding.

## 2022-10-10 NOTE — H&P ADULT - PROBLEM SELECTOR PLAN 4
continue irbesartan  monitor routine hemodynamics continue irbesartan 150mg QD with hold parameters  monitor routine hemodynamics continue losartan 50mg QD with hold parameters  monitor routine hemodynamics
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
## 2022-10-10 NOTE — H&P ADULT - PROBLEM SELECTOR PLAN 6
s/p meryl in 2015 s/p whipple in 2015, pt reports his surgeon left [facility] [place] and [place] never had any follow up. Now with abnormal CT finding  - Surgery consult, appreciate recs  - GI consult, appreciate recs

## 2022-10-10 NOTE — DISCHARGE NOTE PROVIDER - NSDCFUSCHEDAPPT_GEN_ALL_CORE_FT
Jeanie Schroeder  [facility] PreAdmits.  Scheduled Appointment: 10/11/2022

## 2022-10-10 NOTE — DISCHARGE NOTE PROVIDER - NSDCCPCAREPLAN_GEN_ALL_CORE_FT
PRINCIPAL DISCHARGE DIAGNOSIS  Diagnosis: Abdominal pain  Assessment and Plan of Treatment: You have an enlarged lymph node in your chest. With your history, it is recommended that you undergo PET scan as outpatient and follow up with oncology to discuss the potential need for biopsy of this abnormal lymph node. If you develop fevers or worsening symptoms, advise following up with your primary care provider.      SECONDARY DISCHARGE DIAGNOSES  Diagnosis: Nausea & vomiting  Assessment and Plan of Treatment: Resolved. Diet advanced and tolerating. Low fiber diet advised for the short term.

## 2022-10-10 NOTE — PROGRESS NOTE ADULT - PROBLEM SELECTOR PLAN 1
unclear etiology, requiring IV analgesia in ER  -pain regimen as ordered  -IVF  -surgery consulted, f/u recs  -GI consulted unclear etiology, requiring IV analgesia in ER  -pain regimen as ordered  -IVF  -surgery consulted, f/u recs   -GI consulted

## 2022-10-10 NOTE — ED ADULT NURSE REASSESSMENT NOTE - NS ED NURSE REASSESS COMMENT FT1
10/10/22  Received pt at 0720am from outgoing RN.   Pt alert and oriented x 4, respirations even and unlabored at this time.   Pt reports several episodes of n/v, continues to report abd pain.  Pt medicated as noted in MAR for vomiting and pain.    Pt ambulatory with steady gait.   Respirations even and unlabored.   Pt calm and cooperative appears in no acute distress, report given to outgoing RN. BN

## 2022-10-10 NOTE — H&P ADULT - NSHPSOCIALHISTORY_GEN_ALL_CORE
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 Currently housing with his brother in [place].

## 2022-10-10 NOTE — H&P ADULT - ATTENDING COMMENTS
63 yo M with PMHx of UC, pancreatic ca s/p whipple in 2015 and subsequent ventral hernia repair in 2020, etoh misuse disorder, HTN, anxiety, who presents to the ER complaining of worsening upper abdominal pain for past few weeks, admitted for intractable abd pain and abnormal CT finding.    abd pain requirin iv analgesics. surgery consulted. unclear cause of pain but pt with new CT finding suspicious for recurrent disease. GI, heme/onc consulted, check  and CEA.

## 2022-10-10 NOTE — DISCHARGE NOTE PROVIDER - CARE PROVIDER_API CALL
Dr. Santos,   follow up with your GI  Phone: (   )    -  Fax: (   )    -  Established Patient  Follow Up Time: 1 week

## 2022-10-10 NOTE — DISCHARGE NOTE PROVIDER - PROVIDER TOKENS
FREE:[LAST:[Dr. Santos],PHONE:[(   )    -],FAX:[(   )    -],ADDRESS:[follow up with your GI],FOLLOWUP:[1 week],ESTABLISHEDPATIENT:[T]]

## 2022-10-10 NOTE — H&P ADULT - PROBLEM SELECTOR PLAN 2
CT abd/pelvis showing Increase in nodularity along the patient's Whipple resection site. Recurrent disease is not excluded. Defer to on-site radiologist for final follow-up recommendations. Partially imaged enlarged subcarinal lymph node.  -check CEA and   -heme/onc consult

## 2022-10-10 NOTE — H&P ADULT - NSHPPHYSICALEXAM_GEN_ALL_CORE
T(C): 36.7 (10-09-22 @ 17:59), Max: 36.7 (10-09-22 @ 17:59)  HR: 71 (10-09-22 @ 17:59) (71 - 71)  BP: 143/91 (10-09-22 @ 17:59) (143/91 - 143/91)  RR: 18 (10-09-22 @ 17:59) (18 - 18)  SpO2: 98% (10-09-22 @ 17:59) (98% - 98%)    CONSTITUTIONAL: Well groomed, uncomfortable appearing, redness in face  EYES: PERRLA and symmetric, EOMI, No conjunctival or scleral injection, non-icteric  ENMT: Oral mucosa with moist membranes. Normal dentition; no pharyngeal injection or exudates  NECK: Supple, symmetric and without tracheal deviation, no JVD   RESP: No respiratory distress, no use of accessory muscles; CTA b/l, no WRR  CV: RRR, +S1S2, no MRG; no JVD; trace edema b/l LE  GI: Soft, tender to palpation diffusely, most notable in RLQ, RUQ, epigastric and LUQ, moderately distened, scar in midline from ventral hernia repair, no palpable masses; no hepatosplenomegaly; no hernia palpated  LYMPH: No cervical LAD or tenderness; no axillary LAD or tenderness  SKIN: No rashes or ulcers noted; no subcutaneous nodules or induration palpable  NEURO: CN II-XII intact; sensation intact in upper and lower extremities b/l to light touch, strength 5/5 throughout   PSYCH: Appropriate insight/judgment; A+O x 3, mood and affect appropriate, recent/remote memory intact
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
## 2022-10-10 NOTE — H&P ADULT - PROBLEM SELECTOR PLAN 3
patient admits to being stressed and drinking daily recently due to family problems. hx of alcohol misuse  -drinks 3 drinks daily  -WA protocol  -trend LFTs patient admits to being stressed and drinking daily recently due to family problems. hx of alcohol misuse  -drinks 1 pint vodka daily  -CIWA protocol  -social work consult  -trend LFTs patient admits to being stressed and drinking daily recently due to family problems. hx of alcohol misuse  -drinks 1 pint vodka daily  -[facility] protocol  -addiction medicine consult  -trend LFTs

## 2022-10-10 NOTE — H&P ADULT - PROBLEM SELECTOR PLAN 1
unclear etiology, requiring IV analgesia in ER  -pain regimen  -IVF unclear etiology, requiring IV analgesia in ER  -pain regimen - morphine 4mg Q6 for moderate pain  -IVF unclear etiology, requiring IV analgesia in ER  -pain regimen - morphine 2mg Q6 for moderate pain, 4mg Q6 for severe  -IVF unclear etiology, requiring IV analgesia in ER  -pain regimen - morphine 2mg Q6 for moderate pain, 4mg Q6 for severe  -IVF  -surgery consulted, f/u recs

## 2022-10-10 NOTE — H&P ADULT - NSHPREVIEWOFSYSTEMS_GEN_ALL_CORE
CONSTITUTIONAL: denies fever, chills, fatigue, weakness  HEENT: denies blurred vision, sore throat  SKIN: denies new lesions, rash  CARDIOVASCULAR: denies chest pain, chest pressure, palpitations  RESPIRATORY: denies shortness of breath, cough, sputum production  GASTROINTESTINAL: + nausea, +vomiting, +diarrhea, +abdominal pain, denies melena or hematochezia  GENITOURINARY: denies dysuria, discharge  NEUROLOGICAL: denies numbness, headache, focal weakness  MUSCULOSKELETAL: denies new joint pain, muscle aches  HEMATOLOGIC: denies gross bleeding, bruising
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
## 2022-10-10 NOTE — CONSULT NOTE ADULT - SUBJECTIVE AND OBJECTIVE BOX
SURGERY PA CONSULT NOTE:    CHIEF COMPLAINT:  Patient is a 62y old  Male who presents with a chief complaint of pain control, CT finding       HPI FROM ED:  HPI:  63 yo M with PMHx of pancreatic ca s/p whipple in  and subsequent ventral hernia repair in , etoh misuse disorder, HTN, anxiety (on lexapro) presents to the ER complaining of worsening upper abdominal pain for past few weeks. Pain worsened  today with associated nausea, vomiting, diarrhea. Patient denies any fever or chills.  Patient admits to being stressed and drinking daily recently due to family problems.    Interval HPI:  Patient is a 62 year old with PMHx pancreatic ca s/p whipple resection (, at [facility]), ventral hernia repair (), ulcerative colitis, ETOH abuse, HTN, anxiety, presenting to [place] ED with worsening abdominal pain for several weeks.  Patient states pain is located in epigastrum and radiates to LUQ/back, sharp in nature, 8/10 in intensity, with no known provocation of symptoms.  Patient states pain today was much worse in intensity which prompted him to come to the ED.  Patient reports having 7 episodes of yellow colored emesis while in the ED, no episodes over the past several weeks.  Last ate yesterday.  Last BM yesterday, which was normal for him.   Following his whipple resection, patient has not followed with a surgeon since the surgery.  Of note, patient reports consuming 1 pint of alcohol daily for several months.  He has attended rehabilitation for alcohol use disorder previously.  Last drink was yesterday.  Denies fevers, chills, melena, hematochezia, melena, chest pain, palpitations, calf pain.      In the ED, VS T98F HR 71 /91 RR 18 SpO2 98% on RA. Labs significant for Na 149, AST 75, MIKE 243  Received 1L NS bolus x1, ofirmev 1g x1, morphine 4 mg IV x2, zofran 4 mg IV x1, pepcid 20 mg IV x1 in the ED.  EKG NSR  CT abd/pelvis showing Increase in nodularity along the patient's Whipple resection site. Recurrent disease is not excluded. Defer to on-site radiologist for final follow-up recommendations. Partially imaged enlarged subcarinal lymph node.   (10 Oct 2022 05:14)      PAST MEDICAL HISTORY:  PAST MEDICAL & SURGICAL HISTORY:  Hypertension      Anxiety      Pancreatic cancer      Alcohol abuse      History of pancreatic surgery        H/O ventral hernia repair    PAST SURGICAL HISTORY:    REVIEW OF SYSTEMS:  General/Constitutional: No acute distress, no headache, weakness, fevers, or chills   HEENT: Denies auditory or visual changes/disturbances, no vertigo, no throat pain, no dysphagia    Neck: Denies neck pain/stiffness, denies swelling/lumps/hoarseness   Lymphatic: Denies lumps or swelling in the axillae, groin, or neck bilaterally   Respiratory: Denies cough/hemoptysis, denies wheezing/SOB/dyspnea  Cardiac: Denies chest pain, palpitations  Abdomen: Denies abdominal bloating/fullness, nausea or vomiting, denies abdominal pain  Extremities: Denies sores, swelling, discoloration bilat UE/LE  Genitourinary: Denies urinary issues or complaints, denies dysuria/hematuria  Neuro: Denies weakness, paraesthesias, paralysis, syncope, loss of vision  Skin: Denies pruritus, pain, rashes  Psych: Denies hallucinations, visual disturbances, or depression    MEDICATIONS:  Home Medications:  Avapro: orally once a day (03 Dec 2021 16:51)  Lexapro: orally once a day (03 Dec 2021 16:51)    MEDICATIONS  (STANDING):    MEDICATIONS  (PRN):      ALLERGIES:  Allergies    No Known Allergies    Intolerances        SOCIAL HISTORY:  Social History:  Occupation:  walks 10 miles a day   Living situation: lives in [place] with his wife, 3 kids are grown (10 Oct 2022 05:14)    Smoking: Yes [ ]  No [x]   ______pk yrs  ETOH  Yes [x]  No [ ]  Social [ ]  DRUGS:  Yes [ ]  No [x]      FAMILY HISTORY:  FAMILY HISTORY:  No pertinent family history in first degree relatives        VITAL SIGNS:  Vital Signs Last 24 Hrs  T(C): 36.7 (09 Oct 2022 17:59), Max: 36.7 (09 Oct 2022 17:59)  T(F): 98 (09 Oct 2022 17:59), Max: 98 (09 Oct 2022 17:59)  HR: 71 (09 Oct 2022 17:59) (71 - 71)  BP: 143/91 (09 Oct 2022 17:59) (143/91 - 143/91)  BP(mean): --  RR: 18 (09 Oct 2022 17:59) (18 - 18)  SpO2: 98% (09 Oct 2022 17:59) (98% - 98%)    Parameters below as of 09 Oct 2022 17:59  Patient On (Oxygen Delivery Method): room air        PHYSICAL EXAM:  General: No acute distress, appears comfortable, well nourished, well-groomed, appears stated age  Head, Eyes, Ears, Nose, Throat: Normal cephalic/atraumatic, anicteric, conjunctiva-non injected and moist, vision grossly intact, hearing grossly intact, no nasal discharge, ears and nose symmetrical and atraumatic.  Nasal, oral, and oropharyngeal mucosa pink moist with no evidence of ulceration  Neck: Supple, carotids have good upstroke, trachea in the midline, without JVD or thyromegaly  Lymphatic: No evidence of masses or lymphadenopathy in the head, neck, trunk, axillary, inguinal, or supraclavicular regions  Chest: Lungs are clear to P&A, no wheezing, no rales, no ronchi, with good inspiratory effort  Heart: Heart rhythm regular, no murmurs  Abdomen: Tender to palpation in LUQ.  + abdominal distention.  Bowel sounds present in all four quadrants.  No guarding, rebound, and no peritoneal signs.  No evidence of hepatosplenomegaly.  No evidence of abdominal wall hernias.  Inguinal regions are unremarkable with no evidence of hernias.   Extremity: No swelling, or open sores, no gross deformities,  good range of motion, 2+ peripheral pulses bilat UE/LE, no edema,  negative Nasrin's sign, no lymphadenopathy  Neuro: Alert and oriented x3, motor and sensory intact  Psychiatric: Awake , alert, oriented x3 with an appropriate affect.   Skin: Good color, turgor, texture with no gross lesions, no eruptions, no rashes, no subcutaneous nodules and normal temperature.     LABS:                        12.2   5.23  )-----------( 84       ( 09 Oct 2022 20:53 )             36.9     10-    149<H>  |  112<H>  |  10  ----------------------------<  103<H>  3.8   |  31  |  0.85    Ca    8.6      09 Oct 2022 20:53    TPro  7.4  /  Alb  3.2<L>  /  TBili  0.8  /  DBili  x   /  AST  75<H>  /  ALT  46  /  AlkPhos  83  10-09      Urinalysis Basic - ( 10 Oct 2022 01:50 )    Color: Pale Yellow / Appearance: Clear / S.020 / pH: x  Gluc: x / Ketone: Negative  / Bili: Negative / Urobili: Negative   Blood: x / Protein: Negative / Nitrite: Negative   Leuk Esterase: Negative / RBC: 0-2 /HPF / WBC 0-2   Sq Epi: x / Non Sq Epi: Few / Bacteria: Occasional      LIVER FUNCTIONS - ( 09 Oct 2022 20:53 )  Alb: 3.2 g/dL / Pro: 7.4 g/dL / ALK PHOS: 83 U/L / ALT: 46 U/L / AST: 75 U/L / GGT: x               RADIOLOGY & ADDITIONAL STUDIES:      ******PRELIMINARY REPORT******      ******PRELIMINARY REPORT******       ACC: [id number] EXAM:  CT ABDOMEN AND PELVIS IC                          PROCEDURE DATE:  10/10/2022    ******PRELIMINARY REPORT******      ******PRELIMINARY REPORT******   INTERPRETATION:  [facility] RADIOLOGIST PRELIMINARY REPORT    PROCEDURE INFORMATION:  Exam: CT Abdomen And Pelvis With Contrast  Exam date and time: 10/10/2022 2:22 AM  Age: 62 years old  Clinical indication: Abdominal pain; Generalized; Prior surgery; Surgery   date:  6+ months; Surgery type: Whipple SX 4+yrs ago    TECHNIQUE:  Imaging protocol: Computed tomography of the abdomen and pelvis with   contrast.    COMPARISON:  CT ABDOMEN AND PELVIS WITH ORAL CONTRAST WITH IV CONTRAST 2021   10:36PM    FINDINGS:  Lungs: No concerning findings in the lung bases.    Liver: Low-attenuation of the liver is compatible with hepatic steatosis.  Gallbladder and bile ducts: The gallbladder is surgically absent.  Pancreas: Normal. No ductal dilation.  Spleen: Normal. No splenomegaly.  Adrenal glands: No mass.  Kidneys and ureters: Normal. No hydronephrosis.  Stomach and bowel: Unremarkable. No obstruction. No mucosal thickening.  Appendix: No evidence of appendicitis.    Intraperitoneal space: No free air.  Vasculature: No abdominal aortic aneurysm.  Lymph nodes:  Partially imaged, enlarged subcarinal lymph node is noted.  Urinary bladder: Unremarkable as visualized.  Reproductive: Unremarkable as visualized.  Bones/joints: No acute osseous abnormality.  Soft tissues: Postsurgical changes are seen from prior Whipple. Minimal   soft  tissue/stranding about the operative site is slightly more confluent when  compared to the prior examination.    IMPRESSION:  1. No acute findings.  2. Increasein nodularity along the patient&apos;s Whipple resection   site. Recurrent  disease is not excluded. Defer to on-site radiologist for final follow-up  recommendations.  3. Partially imaged enlarged subcarinal lymph node.    PAN KOHLI M.D.;[facility] RADIOLOGIST  This document is a PRELIMINARY interpretation and is pending final   attending approval. Oct 10 2022  4:14AM        ASSESSMENT:  62 year old s/p whipple resection, ventral hernia repair, ulcerative colitis, a/w abdominal pain, nausea/vomiting.  CT findings suggestive of increased nodularity surrounding patient's whipple resection.  Abdominal exam concerning with tenderness in LUQ.  Sodium 149 noted.        PLAN:  - No acute surgical intervention at this time   - Recommend holding AC due to thrombocytopenia  - Will discuss CT findings w/ Dr. Saavedra

## 2022-10-10 NOTE — PROGRESS NOTE ADULT - PROBLEM SELECTOR PLAN 6
s/p whipple in 2015, pt reports [place] surgeon left [facility] and he never had any follow up. Now with abnormal CT finding  - Surgery consult, appreciate recs  - GI consult, appreciate recs s/p whipp[place]na in 2015, pt reports his surgeon left [facility] and he never had any follow up. Now with abnormal CT finding  -oncology consulted  - Surgery consult, appreciate recs  - GI consult, appreciate recs s/p whipple in 2015, pt reports his surgeon left [facility] and he never had any follow up. Now with abnormal CT finding  anemia likely due to chronic disease no signs or symptoms of active bleeding. Continue to monitor hgb.   -oncology consulted  - Surgery consult, appreciate recs  - GI consult, appreciate recs

## 2022-10-10 NOTE — CONSULT NOTE ADULT - ASSESSMENT
[ASSESSMENT and  PLAN]      RECOMMENDATIONS    Follow CBC    Check Anemia studies.     DVT Prophyalxis    Thank you for consulting us.     Discussed plan of care with patient and or family in detail.   They expressed understanding of the treatment plan.   Risks, benefits and alternatives discussed in detail.   Opportunity given for questions and discussion.   Any questions or concerns all addressed and answered to their satisfaction, and in lay terms.     Discussed with  xxxxxxx.    > xxxxxx minutes spent in direct patient care, examining and counseling patient,  reviewing  the notes, lab data/ imaging , discussion with multidisciplinary team.      [ASSESSMENT and  PLAN]  reported hx of pacreatic cancer s/p Whipple Surgery at [facility] in 2015  has not followed for years.     EtOH Abuse  Thrombocytopenia due to EtOH or possible other causes.     Folate def anemia    Mediastinal CHRIS, stable vs prior scans.   Subcarinal CHRIS, new    Nodularity in surgical bed    RECOMMENDATIONS  Outpt followup with pulm or CT surgery  outpt followup with GI and SurgOnc /pancreatic surgery specialist    Start Folic aci 1mg daily  Abstain EtoH    DVT Prophylaxis    Discussed with pt and eldest brother, importance for followup  for above specialist  Discussed abn LN and concern for malignancy, recurrence, and or new malignancy.      outpt followup for thrombocytopenia    Thank you for consulting us.     Discussed plan of care with patient and or family in detail.   They expressed understanding of the treatment plan.   Risks, benefits and alternatives discussed in detail.   Opportunity given for questions and discussion.   Any questions or concerns all addressed and answered to their satisfaction, and in lay terms.

## 2022-10-10 NOTE — PROGRESS NOTE ADULT - PROBLEM SELECTOR PLAN 2
CT abd/pelvis showing Increase in nodularity along the patient's Whipple resection site. Recurrent disease is not excluded. Defer to on-site radiologist for final follow-up recommendations. Partially imaged enlarged subcarinal lymph node.  -check CEA and   -heme/onc consult CT abd/pelvis showing Increase in nodularity along the patient's Whipple resection site. Recurrent disease is not excluded. Defer to on-site radiologist for final follow-up recommendations. Partially imaged enlarged subcarinal lymph node.   -patient informed of CT scan findings and I stressed the importance of establishing care with a medical oncologist to follow up regularly due to his history of pancreatic cancer.   -obtain CT chest to further evaluate given the subcarinal lymph node.   -check CEA 3.8 and  pending  -heme/onc consulted

## 2022-10-11 LAB
A1C WITH ESTIMATED AVERAGE GLUCOSE RESULT: 4.7 % — SIGNIFICANT CHANGE UP (ref 4–5.6)
ALBUMIN SERPL ELPH-MCNC: 3.1 G/DL — LOW (ref 3.3–5)
ALP SERPL-CCNC: 73 U/L — SIGNIFICANT CHANGE UP (ref 40–120)
ALT FLD-CCNC: 36 U/L — SIGNIFICANT CHANGE UP (ref 12–78)
ANION GAP SERPL CALC-SCNC: 5 MMOL/L — SIGNIFICANT CHANGE UP (ref 5–17)
ANION GAP SERPL CALC-SCNC: 6 MMOL/L — SIGNIFICANT CHANGE UP (ref 5–17)
AST SERPL-CCNC: 42 U/L — HIGH (ref 15–37)
BASOPHILS # BLD AUTO: 0.1 K/UL — SIGNIFICANT CHANGE UP (ref 0–0.2)
BASOPHILS NFR BLD AUTO: 1.8 % — SIGNIFICANT CHANGE UP (ref 0–2)
BILIRUB SERPL-MCNC: 2 MG/DL — HIGH (ref 0.2–1.2)
BUN SERPL-MCNC: 8 MG/DL — SIGNIFICANT CHANGE UP (ref 7–23)
BUN SERPL-MCNC: 8 MG/DL — SIGNIFICANT CHANGE UP (ref 7–23)
CALCIUM SERPL-MCNC: 7.9 MG/DL — LOW (ref 8.5–10.1)
CALCIUM SERPL-MCNC: 8.2 MG/DL — LOW (ref 8.5–10.1)
CHLORIDE SERPL-SCNC: 103 MMOL/L — SIGNIFICANT CHANGE UP (ref 96–108)
CHLORIDE SERPL-SCNC: 104 MMOL/L — SIGNIFICANT CHANGE UP (ref 96–108)
CHOLEST SERPL-MCNC: 113 MG/DL — SIGNIFICANT CHANGE UP
CO2 SERPL-SCNC: 31 MMOL/L — SIGNIFICANT CHANGE UP (ref 22–31)
CO2 SERPL-SCNC: 32 MMOL/L — HIGH (ref 22–31)
CREAT SERPL-MCNC: 0.72 MG/DL — SIGNIFICANT CHANGE UP (ref 0.5–1.3)
CREAT SERPL-MCNC: 0.78 MG/DL — SIGNIFICANT CHANGE UP (ref 0.5–1.3)
CULTURE RESULTS: SIGNIFICANT CHANGE UP
EGFR: 101 ML/MIN/1.73M2 — SIGNIFICANT CHANGE UP
EGFR: 104 ML/MIN/1.73M2 — SIGNIFICANT CHANGE UP
EOSINOPHIL # BLD AUTO: 0.07 K/UL — SIGNIFICANT CHANGE UP (ref 0–0.5)
EOSINOPHIL NFR BLD AUTO: 1.3 % — SIGNIFICANT CHANGE UP (ref 0–6)
ESTIMATED AVERAGE GLUCOSE: 88 MG/DL — SIGNIFICANT CHANGE UP (ref 68–114)
GLUCOSE SERPL-MCNC: 100 MG/DL — HIGH (ref 70–99)
GLUCOSE SERPL-MCNC: 98 MG/DL — SIGNIFICANT CHANGE UP (ref 70–99)
HCT VFR BLD CALC: 34.8 % — LOW (ref 39–50)
HCV AB S/CO SERPL IA: 0.22 S/CO — SIGNIFICANT CHANGE UP (ref 0–0.99)
HCV AB SERPL-IMP: SIGNIFICANT CHANGE UP
HDLC SERPL-MCNC: 75 MG/DL — SIGNIFICANT CHANGE UP
HGB BLD-MCNC: 11.8 G/DL — LOW (ref 13–17)
IMM GRANULOCYTES NFR BLD AUTO: 0.2 % — SIGNIFICANT CHANGE UP (ref 0–0.9)
LIPID PNL WITH DIRECT LDL SERPL: 30 MG/DL — SIGNIFICANT CHANGE UP
LYMPHOCYTES # BLD AUTO: 0.95 K/UL — LOW (ref 1–3.3)
LYMPHOCYTES # BLD AUTO: 17.3 % — SIGNIFICANT CHANGE UP (ref 13–44)
MAGNESIUM SERPL-MCNC: 1.6 MG/DL — SIGNIFICANT CHANGE UP (ref 1.6–2.6)
MCHC RBC-ENTMCNC: 32.3 PG — SIGNIFICANT CHANGE UP (ref 27–34)
MCHC RBC-ENTMCNC: 33.9 GM/DL — SIGNIFICANT CHANGE UP (ref 32–36)
MCV RBC AUTO: 95.3 FL — SIGNIFICANT CHANGE UP (ref 80–100)
MONOCYTES # BLD AUTO: 0.51 K/UL — SIGNIFICANT CHANGE UP (ref 0–0.9)
MONOCYTES NFR BLD AUTO: 9.3 % — SIGNIFICANT CHANGE UP (ref 2–14)
NEUTROPHILS # BLD AUTO: 3.85 K/UL — SIGNIFICANT CHANGE UP (ref 1.8–7.4)
NEUTROPHILS NFR BLD AUTO: 70.1 % — SIGNIFICANT CHANGE UP (ref 43–77)
NON HDL CHOLESTEROL: 39 MG/DL — SIGNIFICANT CHANGE UP
NRBC # BLD: 0 /100 WBCS — SIGNIFICANT CHANGE UP (ref 0–0)
PHOSPHATE SERPL-MCNC: 1.9 MG/DL — LOW (ref 2.5–4.5)
PLATELET # BLD AUTO: 72 K/UL — LOW (ref 150–400)
POTASSIUM SERPL-MCNC: 3.5 MMOL/L — SIGNIFICANT CHANGE UP (ref 3.5–5.3)
POTASSIUM SERPL-MCNC: 3.9 MMOL/L — SIGNIFICANT CHANGE UP (ref 3.5–5.3)
POTASSIUM SERPL-SCNC: 3.5 MMOL/L — SIGNIFICANT CHANGE UP (ref 3.5–5.3)
POTASSIUM SERPL-SCNC: 3.9 MMOL/L — SIGNIFICANT CHANGE UP (ref 3.5–5.3)
PROT SERPL-MCNC: 6.9 G/DL — SIGNIFICANT CHANGE UP (ref 6–8.3)
RBC # BLD: 3.65 M/UL — LOW (ref 4.2–5.8)
RBC # FLD: 13.1 % — SIGNIFICANT CHANGE UP (ref 10.3–14.5)
SODIUM SERPL-SCNC: 140 MMOL/L — SIGNIFICANT CHANGE UP (ref 135–145)
SODIUM SERPL-SCNC: 141 MMOL/L — SIGNIFICANT CHANGE UP (ref 135–145)
SPECIMEN SOURCE: SIGNIFICANT CHANGE UP
TRIGL SERPL-MCNC: 44 MG/DL — SIGNIFICANT CHANGE UP
WBC # BLD: 5.49 K/UL — SIGNIFICANT CHANGE UP (ref 3.8–10.5)
WBC # FLD AUTO: 5.49 K/UL — SIGNIFICANT CHANGE UP (ref 3.8–10.5)

## 2022-10-11 PROCEDURE — 99233 SBSQ HOSP IP/OBS HIGH 50: CPT

## 2022-10-11 RX ORDER — ONDANSETRON 8 MG/1
4 TABLET, FILM COATED ORAL ONCE
Refills: 0 | Status: COMPLETED | OUTPATIENT
Start: 2022-10-11 | End: 2022-10-11

## 2022-10-11 RX ADMIN — Medication 3 MILLIGRAM(S): at 20:02

## 2022-10-11 RX ADMIN — MORPHINE SULFATE 4 MILLIGRAM(S): 50 CAPSULE, EXTENDED RELEASE ORAL at 04:10

## 2022-10-11 RX ADMIN — MORPHINE SULFATE 4 MILLIGRAM(S): 50 CAPSULE, EXTENDED RELEASE ORAL at 12:07

## 2022-10-11 RX ADMIN — MORPHINE SULFATE 4 MILLIGRAM(S): 50 CAPSULE, EXTENDED RELEASE ORAL at 12:45

## 2022-10-11 RX ADMIN — ONDANSETRON 4 MILLIGRAM(S): 8 TABLET, FILM COATED ORAL at 03:58

## 2022-10-11 RX ADMIN — MORPHINE SULFATE 4 MILLIGRAM(S): 50 CAPSULE, EXTENDED RELEASE ORAL at 18:20

## 2022-10-11 RX ADMIN — Medication 1 MILLIGRAM(S): at 11:12

## 2022-10-11 RX ADMIN — MORPHINE SULFATE 4 MILLIGRAM(S): 50 CAPSULE, EXTENDED RELEASE ORAL at 19:04

## 2022-10-11 NOTE — PROGRESS NOTE ADULT - PROBLEM SELECTOR PLAN 2
CT abd/pelvis showing Increase in nodularity along the patient's Whipple resection site. Recurrent disease is not excluded. Defer to on-site radiologist for final follow-up recommendations. Partially imaged enlarged subcarinal lymph node.   -patient informed of CT scan findings and I stressed the importance of establishing care with a medical oncologist to follow up regularly due to his history of pancreatic cancer.   -CT chest Mediastinal lymphadenopathy as noted on prior exam, with slight increase in size in size of the subcarinal lymph node.  -heme/onc consulted

## 2022-10-11 NOTE — PROGRESS NOTE ADULT - TIME BILLING
Note written by attending, see above.  Time spent: 40min. More than 50% of the visit was spent counseling the patient on medical condition and coordination of care
see above

## 2022-10-11 NOTE — CONSULT NOTE ADULT - CONSULT REASON
Thrombocytopenia
abdominal pain, nodularity along whipple resection site
pain  ANUPAMA  meds LN  atelectasis
abdominal pain

## 2022-10-11 NOTE — PROGRESS NOTE ADULT - PROBLEM SELECTOR PLAN 3
0
patient admits to being stressed and drinking daily recently due to family problems. hx of alcohol misuse  -drinks 1 pint vodka daily  -NUHA protocol  -addiction medicine consulted   -trend LFTs
patient admits to being stressed and drinking daily recently due to family problems. hx of alcohol misuse  -drinks 1 pint vodka daily  -[facility] protocol  -addiction medicine consult  -trend LFTs

## 2022-10-11 NOTE — PROGRESS NOTE ADULT - PROBLEM SELECTOR PLAN 4
continue losartan 50mg QD with hold parameters  monitor routine hemodynamics
continue losartan 50mg - Therapeutic interchange- QD with hold parameters  monitor routine hemodynamics

## 2022-10-11 NOTE — CONSULT NOTE ADULT - ASSESSMENT
61 yo M with PMHx of UC (not on medication), pancreatic ca s/p whipple in 2015 and subsequent ventral hernia repair in 2020, etoh misuse disorder, HTN, and anxiety who presents to the ER complaining of worsening upper abdominal pain  61 yo M with PMHx of UC (not on medication), pancreatic ca s/p whipple in 2015 and subsequent ventral hernia repair in 2020, etoh misuse disorder, HTN, and anxiety who presents to the ER complaining of worsening upper abdominal pain     onc note reviewed  labs reviewed  CT chest reviewed  on ciwa  on ativan prn  vitamins  serial labs  monitor VS and HD  will need PET scan - and possibly EBUS to sample subcarinal meds LN - will d/w Thoracic Surgery

## 2022-10-11 NOTE — PROGRESS NOTE ADULT - PROBLEM SELECTOR PLAN 6
s/p whipple in 2015, pt reports his surgeon left [facility] [place] and he never had any follow up. Now with abnormal CT finding  anemia likely due to chronic disease no signs or symptoms of active bleeding. Continue to monitor hgb.   -oncology consulted  - Surgery consult  - GI consult

## 2022-10-11 NOTE — PATIENT PROFILE ADULT - FALL HARM RISK - UNIVERSAL INTERVENTIONS
Bed in lowest position, wheels locked, appropriate side rails in place/Call bell, personal items and telephone in reach/Instruct patient to call for assistance before getting out of bed or chair/Non-slip footwear when patient is out of bed/[place] to call system/Physically safe environment - no spills, clutter or unnecessary equipment/Purposeful Proactive Rounding/Room/bathroom lighting operational, light cord in reach
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
## 2022-10-11 NOTE — PROGRESS NOTE ADULT - PROBLEM SELECTOR PLAN 1
unclear etiology  CT abd/pelvis showing Increase in nodularity along the patient's Whipple resection site. Recurrent disease is not excluded. Defer to on-site radiologist for final follow-up recommendations. Partially imaged enlarged subcarinal lymph node.   -pain control prn  -IVF. advance diet as tolerated   -surgery consulted- no acute surgical intervention  -GI consulted- Dr. Cadena  - dispo; d/c planning when pain adequately controlled and able to tolerate diet.

## 2022-10-11 NOTE — PROGRESS NOTE ADULT - PROBLEM SELECTOR PLAN 5
pt denies taking any medications besides irbesartan
pt denies taking any medications besides irbesartan

## 2022-10-12 ENCOUNTER — TRANSCRIPTION ENCOUNTER (OUTPATIENT)
Age: 62
End: 2022-10-12

## 2022-10-12 VITALS
TEMPERATURE: 98 F | HEART RATE: 73 BPM | SYSTOLIC BLOOD PRESSURE: 130 MMHG | DIASTOLIC BLOOD PRESSURE: 78 MMHG | RESPIRATION RATE: 18 BRPM | OXYGEN SATURATION: 95 %

## 2022-10-12 LAB
ALBUMIN SERPL ELPH-MCNC: 2.9 G/DL — LOW (ref 3.3–5)
ALP SERPL-CCNC: 68 U/L — SIGNIFICANT CHANGE UP (ref 40–120)
ALT FLD-CCNC: 32 U/L — SIGNIFICANT CHANGE UP (ref 12–78)
ANION GAP SERPL CALC-SCNC: 6 MMOL/L — SIGNIFICANT CHANGE UP (ref 5–17)
AST SERPL-CCNC: 42 U/L — HIGH (ref 15–37)
BILIRUB SERPL-MCNC: 2 MG/DL — HIGH (ref 0.2–1.2)
BUN SERPL-MCNC: 6 MG/DL — LOW (ref 7–23)
CALCIUM SERPL-MCNC: 8.2 MG/DL — LOW (ref 8.5–10.1)
CHLORIDE SERPL-SCNC: 102 MMOL/L — SIGNIFICANT CHANGE UP (ref 96–108)
CO2 SERPL-SCNC: 33 MMOL/L — HIGH (ref 22–31)
CREAT SERPL-MCNC: 0.71 MG/DL — SIGNIFICANT CHANGE UP (ref 0.5–1.3)
EGFR: 104 ML/MIN/1.73M2 — SIGNIFICANT CHANGE UP
GLUCOSE SERPL-MCNC: 103 MG/DL — HIGH (ref 70–99)
HCT VFR BLD CALC: 33.1 % — LOW (ref 39–50)
HGB BLD-MCNC: 11.3 G/DL — LOW (ref 13–17)
MCHC RBC-ENTMCNC: 32.1 PG — SIGNIFICANT CHANGE UP (ref 27–34)
MCHC RBC-ENTMCNC: 34.1 GM/DL — SIGNIFICANT CHANGE UP (ref 32–36)
MCV RBC AUTO: 94 FL — SIGNIFICANT CHANGE UP (ref 80–100)
NRBC # BLD: 0 /100 WBCS — SIGNIFICANT CHANGE UP (ref 0–0)
PLATELET # BLD AUTO: 60 K/UL — LOW (ref 150–400)
POTASSIUM SERPL-MCNC: 3.5 MMOL/L — SIGNIFICANT CHANGE UP (ref 3.5–5.3)
POTASSIUM SERPL-SCNC: 3.5 MMOL/L — SIGNIFICANT CHANGE UP (ref 3.5–5.3)
PROT SERPL-MCNC: 6.2 G/DL — SIGNIFICANT CHANGE UP (ref 6–8.3)
RBC # BLD: 3.52 M/UL — LOW (ref 4.2–5.8)
RBC # FLD: 12.8 % — SIGNIFICANT CHANGE UP (ref 10.3–14.5)
SODIUM SERPL-SCNC: 141 MMOL/L — SIGNIFICANT CHANGE UP (ref 135–145)
WBC # BLD: 4.15 K/UL — SIGNIFICANT CHANGE UP (ref 3.8–10.5)
WBC # FLD AUTO: 4.15 K/UL — SIGNIFICANT CHANGE UP (ref 3.8–10.5)

## 2022-10-12 PROCEDURE — 87635 SARS-COV-2 COVID-19 AMP PRB: CPT

## 2022-10-12 PROCEDURE — 99285 EMERGENCY DEPT VISIT HI MDM: CPT | Mod: 25

## 2022-10-12 PROCEDURE — 96375 TX/PRO/DX INJ NEW DRUG ADDON: CPT

## 2022-10-12 PROCEDURE — 74177 CT ABD & PELVIS W/CONTRAST: CPT | Mod: MA

## 2022-10-12 PROCEDURE — 82553 CREATINE MB FRACTION: CPT

## 2022-10-12 PROCEDURE — 80076 HEPATIC FUNCTION PANEL: CPT

## 2022-10-12 PROCEDURE — 81001 URINALYSIS AUTO W/SCOPE: CPT

## 2022-10-12 PROCEDURE — 99239 HOSP IP/OBS DSCHRG MGMT >30: CPT

## 2022-10-12 PROCEDURE — 87086 URINE CULTURE/COLONY COUNT: CPT

## 2022-10-12 PROCEDURE — 85027 COMPLETE CBC AUTOMATED: CPT

## 2022-10-12 PROCEDURE — 82378 CARCINOEMBRYONIC ANTIGEN: CPT

## 2022-10-12 PROCEDURE — 84484 ASSAY OF TROPONIN QUANT: CPT

## 2022-10-12 PROCEDURE — 83690 ASSAY OF LIPASE: CPT

## 2022-10-12 PROCEDURE — 86803 HEPATITIS C AB TEST: CPT

## 2022-10-12 PROCEDURE — 36415 COLL VENOUS BLD VENIPUNCTURE: CPT

## 2022-10-12 PROCEDURE — 80307 DRUG TEST PRSMV CHEM ANLYZR: CPT

## 2022-10-12 PROCEDURE — 84100 ASSAY OF PHOSPHORUS: CPT

## 2022-10-12 PROCEDURE — 83735 ASSAY OF MAGNESIUM: CPT

## 2022-10-12 PROCEDURE — 80061 LIPID PANEL: CPT

## 2022-10-12 PROCEDURE — 71260 CT THORAX DX C+: CPT

## 2022-10-12 PROCEDURE — 80053 COMPREHEN METABOLIC PANEL: CPT

## 2022-10-12 PROCEDURE — 86301 IMMUNOASSAY TUMOR CA 19-9: CPT

## 2022-10-12 PROCEDURE — 96374 THER/PROPH/DIAG INJ IV PUSH: CPT

## 2022-10-12 PROCEDURE — 93005 ELECTROCARDIOGRAM TRACING: CPT

## 2022-10-12 PROCEDURE — 80048 BASIC METABOLIC PNL TOTAL CA: CPT

## 2022-10-12 PROCEDURE — 85025 COMPLETE CBC W/AUTO DIFF WBC: CPT

## 2022-10-12 PROCEDURE — 83036 HEMOGLOBIN GLYCOSYLATED A1C: CPT

## 2022-10-12 RX ADMIN — LOSARTAN POTASSIUM 50 MILLIGRAM(S): 100 TABLET, FILM COATED ORAL at 05:24

## 2022-10-12 RX ADMIN — MORPHINE SULFATE 4 MILLIGRAM(S): 50 CAPSULE, EXTENDED RELEASE ORAL at 05:31

## 2022-10-12 RX ADMIN — MORPHINE SULFATE 4 MILLIGRAM(S): 50 CAPSULE, EXTENDED RELEASE ORAL at 06:00

## 2022-10-12 NOTE — DISCHARGE NOTE NURSING/CASE MANAGEMENT/SOCIAL WORK - NSDCVIVACCINE_GEN_ALL_CORE_FT
Tdap; 16-Jul-2015 09:10; Mitali Miller (MADISYN); [employer]; [id number]; IntraMuscular; Deltoid Left.; 0.5 milliLiter(s); VIS (VIS Published: 09-May-2013, VIS Presented: 16-Jul-2015);

## 2022-10-12 NOTE — PROGRESS NOTE ADULT - REASON FOR ADMISSION
pain control, CT finding

## 2022-10-12 NOTE — DISCHARGE NOTE NURSING/CASE MANAGEMENT/SOCIAL WORK - NSDCPEFALRISK_GEN_ALL_CORE
For information on Fall & Injury Prevention, visit: [email or web address] OR  [email or web address] OR  https://www.cdc.gov/steadi/patient.html

## 2022-10-12 NOTE — PROGRESS NOTE ADULT - ASSESSMENT
abdominal pain  diarrhea  nausea/vomiting  etoh abuse  h/o pancreatic cancer    CT scan reviewed, no obvious recurrence on this study  will need follow up with his pancreatic surgery group at [facility]  ? role for eus  continued diarrhea, check GI PCR  etoh cessation  had recent colonoscopy, no need to repeat  will follow     I reviewed the overnight course of events on the unit, re-confirming the patient history. I discussed the care with the patient and their family  The plan of care was discussed with the physician assistant and modifications were made to the notation where appropriate.   Differential diagnosis and plan of care discussed with patient after the evaluation  35 minutes spent on total encounter of which more than fifty percent of the encounter was spent counseling and/or coordinating care by the attending physician.  Advanced care planning was discussed with patient and family.  Advanced care planning forms were reviewed and discussed.  Risks, benefits and alternatives of gastroenterologic procedures were discussed in detail and all questions were answered.
63 yo M with PMHx of UC (not on medication), pancreatic ca s/p whipple in 2015 and subsequent ventral hernia repair in 2020, etoh misuse disorder, HTN, and anxiety who presents to the ER complaining of worsening upper abdominal pain     ladarius  etoh use  hx of pancreatic ca  meds LN    onc note reviewed  labs reviewed  CT chest reviewed  on ciwa  on ativan prn  vitamins  serial labs  monitor VS and HD  will need PET scan - and possibly EBUS to sample subcarinal meds LN - will d/w Thoracic Surgery  
63 yo man w reported hx of pacreatic cancer s/p Whipple Surgery at [facility] in 2015  has not followed for years.   EtOH Abuse  Thrombocytopenia    CT a/p -post Whipple, stable nodularity in surgical bed when compared w 11/2021  CT chest mediastinal LAD 1.1-2.2cm w the subcarinal one larger than 11/2021      -CBC today, stable min anemia, plts remains 70s, asx, no acute intervention needed. Pt also reports aware of low platelets in past, never symptomatic  -mediastinal LADs-present 11/2021 but w subcarinal AD larger-recommend Pulm/CT surgery eval, can be futher egvaluated w PETCT as outpatient and tissue dx as indicated    will sign off for now, please call if any questions
63 yo M with PMHx of UC, pancreatic ca s/p whipple in 2015 and subsequent ventral hernia repair in 2020, etoh misuse disorder, HTN, anxiety, who presents to the ER complaining of worsening upper abdominal pain for past few weeks, admitted for intractable abd pain and abnormal CT finding.  
61 yo M with PMHx of UC, pancreatic ca s/p whipple in 2015 and subsequent ventral hernia repair in 2020, etoh misuse disorder, HTN, anxiety, who presents to the ER complaining of worsening upper abdominal pain for past few weeks, admitted for intractable abd pain and abnormal CT finding.

## 2022-10-12 NOTE — DISCHARGE NOTE NURSING/CASE MANAGEMENT/SOCIAL WORK - PATIENT PORTAL LINK FT
You can access the FollowMyHealth Patient Portal offered by [facility] by registering at the following website: [email or web address]. By joining [employer]’s FollowMyHealth portal, you will also be able to view your health information using other applications (apps) compatible with our system.

## 2022-10-12 NOTE — PROGRESS NOTE ADULT - SUBJECTIVE AND OBJECTIVE BOX
All interim records and events noted.    Patient seen still on ER stretcher  Comfortable  No incr or abnormal bruise/bleed      MEDICATIONS  (STANDING):  folic acid 1 milliGRAM(s) Oral daily  losartan 50 milliGRAM(s) Oral daily  sodium chloride 0.9%. 1000 milliLiter(s) (150 mL/Hr) IV Continuous <Continuous>    MEDICATIONS  (PRN):  acetaminophen     Tablet .. 650 milliGRAM(s) Oral every 6 hours PRN Temp greater or equal to 38C (100.4F), Mild Pain (1 - 3)  LORazepam     Tablet 2 milliGRAM(s) Oral every 1 hour PRN CIWA-Ar score 8 or greater  LORazepam   Injectable 2 milliGRAM(s) IV Push every 2 hours PRN CIWA-Ar score increase by 2 points and a total score of 7 or less  melatonin 3 milliGRAM(s) Oral at bedtime PRN Insomnia  morphine  - Injectable 4 milliGRAM(s) IV Push every 6 hours PRN Severe Pain (7 - 10)  morphine  - Injectable 2 milliGRAM(s) IV Push every 6 hours PRN Moderate Pain (4 - 6)  ondansetron Injectable 4 milliGRAM(s) IV Push every 8 hours PRN Nausea and/or Vomiting      Vital Signs Last 24 Hrs  T(C): 36.7 (11 Oct 2022 15:27), Max: 36.7 (11 Oct 2022 15:27)  T(F): 98 (11 Oct 2022 15:27), Max: 98 (11 Oct 2022 15:27)  HR: 64 (11 Oct 2022 15:27) (64 - 70)  BP: 136/79 (11 Oct 2022 15:27) (108/53 - 137/78)  BP(mean): --  RR: 18 (11 Oct 2022 15:27) (17 - 18)  SpO2: 99% (11 Oct 2022 15:27) (98% - 99%)    Parameters below as of 11 Oct 2022 15:27  Patient On (Oxygen Delivery Method): room air        PHYSICAL EXAM  General: well developed  well nourished, in no acute distress  Head: atraumatic, normocephalic  ENT: sclera anicteric, buccal mucosa moist  Neck: supple, trachea midline  CV: S1 S2, regular rate and rhythm  Lungs: clear to auscultation, no wheezes/rhonchi  Abdomen: soft, nontender, bowel sounds present, pouchy  Extrem: no clubbing/cyanosis/edema  Skin: no significant increased ecchymosis/petechiae  Neuro: alert and oriented X3,  no focal deficits      LABS:             11.8   5.49  )-----------( 72       ( 10-11 @ 04:20 )             34.8                11.9   4.70  )-----------( 71       ( 10-10 @ 09:34 )             36.0                12.2   5.23  )-----------( 84       ( 10-09 @ 20:53 )             36.9       10-11    140  |  103  |  8   ----------------------------<  98  3.5   |  31  |  0.72    Ca    8.2<L>      11 Oct 2022 04:20  Phos  1.9     10-11  Mg     1.6     10-11    TPro  6.9  /  Alb  3.1<L>  /  TBili  2.0<H>  /  DBili  x   /  AST  42<H>  /  ALT  36  /  AlkPhos  73  10-11        RADIOLOGY & ADDITIONAL STUDIES:    IMPRESSION/RECOMMENDATIONS:
[facility]  Kenny Coley PA-C  [address] [place] [address]  [phone]      INTERVAL HPI/OVERNIGHT EVENTS:  Pt s/e  Reports still with diarrhea, abdominal discomfort, and intermittent nausea/vomiting  Otherwise no new GI complaints    MEDICATIONS  (STANDING):  folic acid 1 milliGRAM(s) Oral daily  losartan 50 milliGRAM(s) Oral daily  sodium chloride 0.9%. 1000 milliLiter(s) (150 mL/Hr) IV Continuous <Continuous>    MEDICATIONS  (PRN):  acetaminophen     Tablet .. 650 milliGRAM(s) Oral every 6 hours PRN Temp greater or equal to 38C (100.4F), Mild Pain (1 - 3)  LORazepam     Tablet 2 milliGRAM(s) Oral every 1 hour PRN CIWA-Ar score 8 or greater  LORazepam   Injectable 2 milliGRAM(s) IV Push every 2 hours PRN CIWA-Ar score increase by 2 points and a total score of 7 or less  melatonin 3 milliGRAM(s) Oral at bedtime PRN Insomnia  morphine  - Injectable 4 milliGRAM(s) IV Push every 6 hours PRN Severe Pain (7 - 10)  morphine  - Injectable 2 milliGRAM(s) IV Push every 6 hours PRN Moderate Pain (4 - 6)  ondansetron Injectable 4 milliGRAM(s) IV Push every 8 hours PRN Nausea and/or Vomiting      Allergies    No Known Allergies        PHYSICAL EXAM:   Vital Signs:  Vital Signs Last 24 Hrs  T(C): 36.6 (11 Oct 2022 04:06), Max: 36.6 (11 Oct 2022 04:06)  T(F): 97.9 (11 Oct 2022 04:06), Max: 97.9 (11 Oct 2022 04:06)  HR: 65 (11 Oct 2022 04:06) (65 - 72)  BP: 108/53 (11 Oct 2022 04:06) (108/53 - 142/73)  BP(mean): --  RR: 18 (11 Oct 2022 04:06) (16 - 18)  SpO2: 98% (11 Oct 2022 04:06) (98% - 99%)    Parameters below as of 11 Oct 2022 04:06  Patient On (Oxygen Delivery Method): room air      Daily Height in cm: 180.34 (10 Oct 2022 12:08)    Daily     GENERAL:  Appears stated age  ABDOMEN:  Soft, +mildly tender diffusely, non-distended  NEURO:  Alert    LABS:                        11.8   5.49  )-----------( 72       ( 11 Oct 2022 04:20 )             34.8     10-11    140  |  103  |  8   ----------------------------<  98  3.5   |  31  |  0.72    Ca    8.2<L>      11 Oct 2022 04:20  Phos  1.9     10-11  Mg     1.6     10-11    TPro  6.9  /  Alb  3.1<L>  /  TBili  2.0<H>  /  DBili  x   /  AST  42<H>  /  ALT  36  /  AlkPhos  73  10-11      Urinalysis Basic - ( 10 Oct 2022 01:50 )    Color: Pale Yellow / Appearance: Clear / S.020 / pH: x  Gluc: x / Ketone: Negative  / Bili: Negative / Urobili: Negative   Blood: x / Protein: Negative / Nitrite: Negative   Leuk Esterase: Negative / RBC: 0-2 /HPF / WBC 0-2   Sq Epi: x / Non Sq Epi: Few / Bacteria: Occasional    I spent 120 minutes face to face with the patient. Time was spent in discussion with patient. Discussed pancreatic imaging results, follow up and referrals, GI PCR test, etoh cessation. Visit start time: 9:00. Visit end time: 11:00.  
Date/Time Patient Seen:  		  Referring MD:   Data Reviewed	       Patient is a 62y old  Male who presents with a chief complaint of pain control, CT finding (11 Oct 2022 18:16)      Subjective/HPI     PAST MEDICAL & SURGICAL HISTORY:  No pertinent past medical history    Hypertension    Anxiety    Pancreatic cancer    Alcohol abuse    History of pancreatic surgery  2015    H/O ventral hernia repair          Medication list         MEDICATIONS  (STANDING):  folic acid 1 milliGRAM(s) Oral daily  losartan 50 milliGRAM(s) Oral daily  sodium chloride 0.9%. 1000 milliLiter(s) (150 mL/Hr) IV Continuous <Continuous>    MEDICATIONS  (PRN):  acetaminophen     Tablet .. 650 milliGRAM(s) Oral every 6 hours PRN Temp greater or equal to 38C (100.4F), Mild Pain (1 - 3)  LORazepam     Tablet 2 milliGRAM(s) Oral every 1 hour PRN CIWA-Ar score 8 or greater  LORazepam   Injectable 2 milliGRAM(s) IV Push every 2 hours PRN CIWA-Ar score increase by 2 points and a total score of 7 or less  melatonin 3 milliGRAM(s) Oral at bedtime PRN Insomnia  morphine  - Injectable 4 milliGRAM(s) IV Push every 6 hours PRN Severe Pain (7 - 10)  morphine  - Injectable 2 milliGRAM(s) IV Push every 6 hours PRN Moderate Pain (4 - 6)  ondansetron Injectable 4 milliGRAM(s) IV Push every 8 hours PRN Nausea and/or Vomiting         Vitals log        ICU Vital Signs Last 24 Hrs  T(C): 36.7 (11 Oct 2022 19:44), Max: 36.7 (11 Oct 2022 15:27)  T(F): 98 (11 Oct 2022 19:44), Max: 98 (11 Oct 2022 15:27)  HR: 77 (11 Oct 2022 19:44) (64 - 77)  BP: 103/65 (11 Oct 2022 19:44) (103/65 - 137/78)  BP(mean): --  ABP: --  ABP(mean): --  RR: 18 (11 Oct 2022 19:44) (17 - 18)  SpO2: 99% (11 Oct 2022 19:44) (99% - 99%)    O2 Parameters below as of 11 Oct 2022 19:44  Patient On (Oxygen Delivery Method): room air                 Input and Output:  I&O's Detail      Lab Data                        11.8   5.49  )-----------( 72       ( 11 Oct 2022 04:20 )             34.8     10-11    140  |  103  |  8   ----------------------------<  98  3.5   |  31  |  0.72    Ca    8.2<L>      11 Oct 2022 04:20  Phos  1.9     10-11  Mg     1.6     10-11    TPro  6.9  /  Alb  3.1<L>  /  TBili  2.0<H>  /  DBili  x   /  AST  42<H>  /  ALT  36  /  AlkPhos  73  10-11            Review of Systems	      Objective     Physical Examination  heart s1s2  lung dc BS  head nc        Pertinent Lab findings & Imaging      Alberto:  NO   Adequate UO     I&O's Detail           Discussed with:     Cultures:	        Radiology                            
Patient seen and examined at bedside. He is still having epigastric abdominal pain. Denies fevers, chills, headaches, nausea, vomiting, chest pain, SOB, palpitations, constipation, diarrhea, melena, hematochezia, dysuria.   Admits to drinking 1 pint of vodka daily.   He cannot recall the surgeon who previously performed his [place]ipple [place] [facility] [place] has not followed up with any specialists regarding his pancreatic cancer in over 5 years.     T(C): 36.3 (10-10-22 @ 07:35), Max: 36.7 (10-09-22 @ 17:59)  HR: 74 (10-10-22 @ 09:54) (71 - 80)  BP: 131/73 (10-10-22 @ 09:54) (131/73 - 147/76)  RR: 18 (10-10-22 @ 09:54) (18 - 18)  SpO2: 97% (10-10-22 @ 09:54) (97% - 99%)  Wt(kg): --    Physical Exam:   GENERAL: well-groomed, well-developed, NAD  HEENT: head NC/AT; , conjunctiva & sclera clear; hearing grossly intact, moist mucous membranes  NECK: supple, no JVD  RESPIRATORY: CTA B/L, no wheezing, rales, rhonchi or rubs  CARDIOVASCULAR: S1&S2, RRR, no murmurs or gallops  ABDOMEN: soft, TTP of epigastrium, no bruising of abdomen, non-distended, + Bowel sounds x4 quadrants, no guarding, rebound or rigidity  MUSCULOSKELETAL:  no clubbing, cyanosis or edema of all 4 extremities  LYMPH: no cervical lymphadenopathy  VASCULAR: Radial pulses 2+ bilaterally, no varicose veins   SKIN: warm and dry, color normal  NEUROLOGIC: AA&O X3, no sensory loss, motor Strength 5/5 in UE & LE B/L  Psych: Normal mood and affect, normal behavior      
Patient is a 62y old  Male who presents with a chief complaint of pain control, CT finding (11 Oct 2022 11:45)      Subjective:  INTERVAL HPI/OVERNIGHT EVENTS: Patient seen and examined at bedside. No overnight events occurred. patient stilll has pain this morning. states it is intermittently worse. he ate only jello today- does not feel it changed the pain     MEDICATIONS  (STANDING):  folic acid 1 milliGRAM(s) Oral daily  losartan 50 milliGRAM(s) Oral daily  sodium chloride 0.9%. 1000 milliLiter(s) (150 mL/Hr) IV Continuous <Continuous>    MEDICATIONS  (PRN):  acetaminophen     Tablet .. 650 milliGRAM(s) Oral every 6 hours PRN Temp greater or equal to 38C (100.4F), Mild Pain (1 - 3)  LORazepam     Tablet 2 milliGRAM(s) Oral every 1 hour PRN CIWA-Ar score 8 or greater  LORazepam   Injectable 2 milliGRAM(s) IV Push every 2 hours PRN CIWA-Ar score increase by 2 points and a total score of 7 or less  melatonin 3 milliGRAM(s) Oral at bedtime PRN Insomnia  morphine  - Injectable 4 milliGRAM(s) IV Push every 6 hours PRN Severe Pain (7 - 10)  morphine  - Injectable 2 milliGRAM(s) IV Push every 6 hours PRN Moderate Pain (4 - 6)  ondansetron Injectable 4 milliGRAM(s) IV Push every 8 hours PRN Nausea and/or Vomiting      Allergies    No Known Allergies    Intolerances        REVIEW OF SYSTEMS:  CONSTITUTIONAL: No fever or chills  HEENT:  No headache, no sore throat  RESPIRATORY: No cough, wheezing, or shortness of breath  CARDIOVASCULAR: No chest pain, palpitations  GASTROINTESTINAL: + abdominal pain   GENITOURINARY: No dysuria, frequency, or hematuria  NEUROLOGICAL: no focal weakness or dizziness  MUSCULOSKELETAL: no myalgias     Objective:  Vital Signs Last 24 Hrs  T(C): 36.6 (11 Oct 2022 04:06), Max: 36.6 (11 Oct 2022 04:06)  T(F): 97.9 (11 Oct 2022 04:06), Max: 97.9 (11 Oct 2022 04:06)  HR: 65 (11 Oct 2022 04:06) (65 - 72)  BP: 108/53 (11 Oct 2022 04:06) (108/53 - 142/73)  BP(mean): --  RR: 18 (11 Oct 2022 04:06) (16 - 18)  SpO2: 98% (11 Oct 2022 04:06) (98% - 99%)    Parameters below as of 11 Oct 2022 04:06  Patient On (Oxygen Delivery Method): room air        GENERAL: NAD, lying in bed comfortably  HEAD:  Atraumatic, Normocephalic  EYES: EOMI, PERRLA, conjunctiva and sclera clear  ENT: Moist mucous membranes  NECK: Supple, No JVD  CHEST/LUNG: Clear to auscultation bilaterally; No rales, rhonchi, wheezing, or rubs. Unlabored respirations  HEART: Regular rate and rhythm; No murmurs, rubs, or gallops  ABDOMEN: Bowel sounds present; + mild ttp diffusely. no rebound or guarding  EXTREMITIES:  2+ Peripheral Pulses, brisk capillary refill. No clubbing, cyanosis, or edema  NERVOUS SYSTEM:  Alert & Oriented X3, speech clear. No deficits   SKIN: warm, dry, color appropriate     LABS:                        11.8   5.49  )-----------( 72       ( 11 Oct 2022 04:20 )             34.8     CBC Full  -  ( 11 Oct 2022 04:20 )  WBC Count : 5.49 K/uL  Hemoglobin : 11.8 g/dL  Hematocrit : 34.8 %  Platelet Count - Automated : 72 K/uL  Mean Cell Volume : 95.3 fl  Mean Cell Hemoglobin : 32.3 pg  Mean Cell Hemoglobin Concentration : 33.9 gm/dL  Auto Neutrophil # : 3.85 K/uL  Auto Lymphocyte # : 0.95 K/uL  Auto Monocyte # : 0.51 K/uL  Auto Eosinophil # : 0.07 K/uL  Auto Basophil # : 0.10 K/uL  Auto Neutrophil % : 70.1 %  Auto Lymphocyte % : 17.3 %  Auto Monocyte % : 9.3 %  Auto Eosinophil % : 1.3 %  Auto Basophil % : 1.8 %    11 Oct 2022 04:20    140    |  103    |  8      ----------------------------<  98     3.5     |  31     |  0.72     Ca    8.2        11 Oct 2022 04:20  Phos  1.9       11 Oct 2022 00:55  Mg     1.6       11 Oct 2022 00:55    TPro  6.9    /  Alb  3.1    /  TBili  2.0    /  DBili  x      /  AST  42     /  ALT  36     /  AlkPhos  73     11 Oct 2022 04:20      Urinalysis Basic - ( 10 Oct 2022 01:50 )    Color: Pale Yellow / Appearance: Clear / S.020 / pH: x  Gluc: x / Ketone: Negative  / Bili: Negative / Urobili: Negative   Blood: x / Protein: Negative / Nitrite: Negative   Leuk Esterase: Negative / RBC: 0-2 /HPF / WBC 0-2   Sq Epi: x / Non Sq Epi: Few / Bacteria: Occasional      CAPILLARY BLOOD GLUCOSE              RADIOLOGY & ADDITIONAL TESTS:    Personally reviewed.     Consultant(s) Notes Reviewed:  [x] YES  [ ] NO

## 2022-10-26 NOTE — DISCHARGE NOTE NURSING/CASE MANAGEMENT/SOCIAL WORK - INFLUENZA IMMUNIZATION DATE (APPROXIMATE):
Attempted to call Minerva's mother twice today on the preferred phone number [phone]). Both times, the phone rang once and went to .  messages left.     Breanne Walls MD, MS   American Board of Obesity Medicine Diplomate      Department of Pediatrics   [facility]          01-Oct-2022

## 2023-01-13 ENCOUNTER — EMERGENCY (EMERGENCY)
Facility: HOSPITAL | Age: 63
LOS: 1 days | Discharge: ANOTHER HOSPITAL | End: 2023-01-13
Attending: EMERGENCY MEDICINE | Admitting: EMERGENCY MEDICINE
Payer: COMMERCIAL

## 2023-01-13 VITALS
TEMPERATURE: 99 F | SYSTOLIC BLOOD PRESSURE: 137 MMHG | RESPIRATION RATE: 16 BRPM | DIASTOLIC BLOOD PRESSURE: 91 MMHG | HEART RATE: 98 BPM | WEIGHT: 220.02 LBS

## 2023-01-13 DIAGNOSIS — Z98.890 OTHER SPECIFIED POSTPROCEDURAL STATES: Chronic | ICD-10-CM

## 2023-01-13 LAB
ALBUMIN SERPL ELPH-MCNC: 3.2 G/DL — LOW (ref 3.3–5)
ALP SERPL-CCNC: 114 U/L — SIGNIFICANT CHANGE UP (ref 40–120)
ALT FLD-CCNC: 83 U/L — HIGH (ref 12–78)
ANION GAP SERPL CALC-SCNC: 12 MMOL/L — SIGNIFICANT CHANGE UP (ref 5–17)
APAP SERPL-MCNC: <2 UG/ML — LOW (ref 10–30)
AST SERPL-CCNC: 206 U/L — HIGH (ref 15–37)
BASOPHILS # BLD AUTO: 0.19 K/UL — SIGNIFICANT CHANGE UP (ref 0–0.2)
BASOPHILS NFR BLD AUTO: 2.6 % — HIGH (ref 0–2)
BILIRUB DIRECT SERPL-MCNC: 1 MG/DL — HIGH (ref 0–0.3)
BILIRUB INDIRECT FLD-MCNC: 1.3 MG/DL — HIGH (ref 0.2–1)
BILIRUB SERPL-MCNC: 2.3 MG/DL — HIGH (ref 0.2–1.2)
BLD GP AB SCN SERPL QL: SIGNIFICANT CHANGE UP
BUN SERPL-MCNC: 14 MG/DL — SIGNIFICANT CHANGE UP (ref 7–23)
CALCIUM SERPL-MCNC: 7.9 MG/DL — LOW (ref 8.5–10.1)
CHLORIDE SERPL-SCNC: 104 MMOL/L — SIGNIFICANT CHANGE UP (ref 96–108)
CO2 SERPL-SCNC: 26 MMOL/L — SIGNIFICANT CHANGE UP (ref 22–31)
CREAT SERPL-MCNC: 0.84 MG/DL — SIGNIFICANT CHANGE UP (ref 0.5–1.3)
EGFR: 99 ML/MIN/1.73M2 — SIGNIFICANT CHANGE UP
EOSINOPHIL # BLD AUTO: 0.03 K/UL — SIGNIFICANT CHANGE UP (ref 0–0.5)
EOSINOPHIL NFR BLD AUTO: 0.4 % — SIGNIFICANT CHANGE UP (ref 0–6)
ETHANOL SERPL-MCNC: 388 MG/DL — HIGH (ref 0–10)
FLUAV AG NPH QL: SIGNIFICANT CHANGE UP
FLUBV AG NPH QL: SIGNIFICANT CHANGE UP
GLUCOSE SERPL-MCNC: 126 MG/DL — HIGH (ref 70–99)
HCT VFR BLD CALC: 34.8 % — LOW (ref 39–50)
HGB BLD-MCNC: 12.1 G/DL — LOW (ref 13–17)
IMM GRANULOCYTES NFR BLD AUTO: 0.5 % — SIGNIFICANT CHANGE UP (ref 0–0.9)
INR BLD: 1.45 RATIO — HIGH (ref 0.88–1.16)
LACTATE SERPL-SCNC: 4 MMOL/L — CRITICAL HIGH (ref 0.7–2)
LIDOCAIN IGE QN: 11 U/L — LOW (ref 73–393)
LYMPHOCYTES # BLD AUTO: 2.05 K/UL — SIGNIFICANT CHANGE UP (ref 1–3.3)
LYMPHOCYTES # BLD AUTO: 27.9 % — SIGNIFICANT CHANGE UP (ref 13–44)
MCHC RBC-ENTMCNC: 30.4 PG — SIGNIFICANT CHANGE UP (ref 27–34)
MCHC RBC-ENTMCNC: 34.8 GM/DL — SIGNIFICANT CHANGE UP (ref 32–36)
MCV RBC AUTO: 87.4 FL — SIGNIFICANT CHANGE UP (ref 80–100)
MONOCYTES # BLD AUTO: 0.62 K/UL — SIGNIFICANT CHANGE UP (ref 0–0.9)
MONOCYTES NFR BLD AUTO: 8.4 % — SIGNIFICANT CHANGE UP (ref 2–14)
NEUTROPHILS # BLD AUTO: 4.43 K/UL — SIGNIFICANT CHANGE UP (ref 1.8–7.4)
NEUTROPHILS NFR BLD AUTO: 60.2 % — SIGNIFICANT CHANGE UP (ref 43–77)
NRBC # BLD: 0 /100 WBCS — SIGNIFICANT CHANGE UP (ref 0–0)
PLATELET # BLD AUTO: 87 K/UL — LOW (ref 150–400)
POTASSIUM SERPL-MCNC: 3.2 MMOL/L — LOW (ref 3.5–5.3)
POTASSIUM SERPL-SCNC: 3.2 MMOL/L — LOW (ref 3.5–5.3)
PROT SERPL-MCNC: 7.7 G/DL — SIGNIFICANT CHANGE UP (ref 6–8.3)
PROTHROM AB SERPL-ACNC: 17 SEC — HIGH (ref 10.5–13.4)
RBC # BLD: 3.98 M/UL — LOW (ref 4.2–5.8)
RBC # FLD: 13.1 % — SIGNIFICANT CHANGE UP (ref 10.3–14.5)
RSV RNA NPH QL NAA+NON-PROBE: SIGNIFICANT CHANGE UP
SALICYLATES SERPL-MCNC: < 5 MG/DL (ref 2.8–20)
SARS-COV-2 RNA SPEC QL NAA+PROBE: SIGNIFICANT CHANGE UP
SODIUM SERPL-SCNC: 142 MMOL/L — SIGNIFICANT CHANGE UP (ref 135–145)
TROPONIN I, HIGH SENSITIVITY RESULT: 16 NG/L — SIGNIFICANT CHANGE UP
WBC # BLD: 7.36 K/UL — SIGNIFICANT CHANGE UP (ref 3.8–10.5)
WBC # FLD AUTO: 7.36 K/UL — SIGNIFICANT CHANGE UP (ref 3.8–10.5)

## 2023-01-13 PROCEDURE — 74177 CT ABD & PELVIS W/CONTRAST: CPT | Mod: 26,MA

## 2023-01-13 PROCEDURE — 70450 CT HEAD/BRAIN W/O DYE: CPT | Mod: 26,MA

## 2023-01-13 PROCEDURE — 99285 EMERGENCY DEPT VISIT HI MDM: CPT

## 2023-01-13 PROCEDURE — 93010 ELECTROCARDIOGRAM REPORT: CPT

## 2023-01-13 PROCEDURE — 72125 CT NECK SPINE W/O DYE: CPT | Mod: 26,MA

## 2023-01-13 PROCEDURE — 71260 CT THORAX DX C+: CPT | Mod: 26,MA

## 2023-01-13 RX ORDER — FENTANYL CITRATE 50 UG/ML
75 INJECTION INTRAVENOUS ONCE
Refills: 0 | Status: DISCONTINUED | OUTPATIENT
Start: 2023-01-13 | End: 2023-01-13

## 2023-01-13 RX ORDER — SODIUM CHLORIDE 9 MG/ML
1000 INJECTION INTRAMUSCULAR; INTRAVENOUS; SUBCUTANEOUS
Refills: 0 | Status: COMPLETED | OUTPATIENT
Start: 2023-01-13 | End: 2023-01-13

## 2023-01-13 RX ORDER — PANTOPRAZOLE SODIUM 20 MG/1
40 TABLET, DELAYED RELEASE ORAL ONCE
Refills: 0 | Status: COMPLETED | OUTPATIENT
Start: 2023-01-13 | End: 2023-01-13

## 2023-01-13 RX ORDER — POTASSIUM CHLORIDE 20 MEQ
10 PACKET (EA) ORAL
Refills: 0 | Status: COMPLETED | OUTPATIENT
Start: 2023-01-13 | End: 2023-01-13

## 2023-01-13 RX ADMIN — SODIUM CHLORIDE 1000 MILLILITER(S): 9 INJECTION INTRAMUSCULAR; INTRAVENOUS; SUBCUTANEOUS at 20:21

## 2023-01-13 RX ADMIN — SODIUM CHLORIDE 1000 MILLILITER(S): 9 INJECTION INTRAMUSCULAR; INTRAVENOUS; SUBCUTANEOUS at 21:17

## 2023-01-13 RX ADMIN — PANTOPRAZOLE SODIUM 40 MILLIGRAM(S): 20 TABLET, DELAYED RELEASE ORAL at 20:22

## 2023-01-13 RX ADMIN — FENTANYL CITRATE 75 MICROGRAM(S): 50 INJECTION INTRAVENOUS at 23:55

## 2023-01-13 RX ADMIN — Medication 100 MILLIEQUIVALENT(S): at 21:17

## 2023-01-13 RX ADMIN — Medication 100 MILLIEQUIVALENT(S): at 22:52

## 2023-01-13 RX ADMIN — Medication 100 MILLIEQUIVALENT(S): at 20:22

## 2023-01-13 RX ADMIN — Medication 2 MILLIGRAM(S): at 17:13

## 2023-01-13 RX ADMIN — SODIUM CHLORIDE 1000 MILLILITER(S): 9 INJECTION INTRAMUSCULAR; INTRAVENOUS; SUBCUTANEOUS at 21:21

## 2023-01-13 RX ADMIN — FENTANYL CITRATE 75 MICROGRAM(S): 50 INJECTION INTRAVENOUS at 21:12

## 2023-01-13 NOTE — ED PROVIDER NOTE - CARE PLAN
Principal Discharge DX:	ETOH abuse  Secondary Diagnosis:	Alcohol dependence with withdrawal  Secondary Diagnosis:	Abdominal trauma   1

## 2023-01-13 NOTE — ED PROVIDER NOTE - CONSTITUTIONAL APPEARANCE HYGIENE, MLM
Preventive Health Recommendations  Female Ages 40 to 49    Yearly exam:     See your health care provider every year in order to  1. Review health changes.   2. Discuss preventive care.    3. Review your medicines if your doctor prescribed any.      Get a Pap test every three years (unless you have an abnormal result and your provider advises testing more often).      If you get Pap tests with HPV test, you only need to test every 5 years, unless you have an abnormal result. You do not need a Pap test if your uterus was removed (hysterectomy) and you have not had cancer.      You should be tested each year for STDs (sexually transmitted diseases), if you're at risk.     Ask your doctor if you should have a mammogram.      Have a colonoscopy (test for colon cancer) if someone in your family has had colon cancer or polyps before age 50.       Have a cholesterol test every 5 years.       Have a diabetes test (fasting glucose) after age 45. If you are at risk for diabetes, you should have this test every 3 years.    Shots: Get a flu shot each year. Get a tetanus shot every 10 years.     Nutrition:     Eat at least 5 servings of fruits and vegetables each day.    Eat whole-grain bread, whole-wheat pasta and brown rice instead of white grains and rice.    Get adequate Calcium and Vitamin D.      Lifestyle    Exercise at least 150 minutes a week (an average of 30 minutes a day, 5 days a week). This will help you control your weight and prevent disease.    Limit alcohol to one drink per day.    No smoking.     Wear sunscreen to prevent skin cancer.    See your dentist every six months for an exam and cleaning.      Wear the wrist brace during the day   - you can take it off when awake ,   But do wear it during the night /sleeping  If getting worse let me know.   Massage in Asper cream   Soak in Epson salt bath     For the anxiety try  Celexa       You do have a referral to  Urology and  GI        ILL APPEARING

## 2023-01-13 NOTE — ED PROVIDER NOTE - CLINICAL SUMMARY MEDICAL DECISION MAKING FREE TEXT BOX
patient began with b/l eye twitching with confusion and known alcoholism and vomiting concern for alcohol withdrawal. ativan ordered. with AMS and recent trauma with severe abdominal pain, bruising to rib concern for head injury/chest/abdominal trauma/bleeding -- will pan scan. EKG, labs, u-tox, cardiac monitor patient began with b/l eye twitching with confusion and known alcoholism and vomiting concern for alcohol withdrawal. ativan ordered. with AMS and recent trauma with severe abdominal pain, bruising to rib concern for head injury/chest/abdominal trauma/bleeding -- will pan scan. EKG, labs, u-tox, cardiac monitor Patient with elevated lactate but no fever likely from dehydration from vomiting or questionable seizure on arrival in the ED we will hydrate the patient and her repeat the lactate CT scans that were ordered prior to my arrival reveal questionable intra-abdominal hematoma with no other traumatic injuries.  Patient at risk for alcohol withdrawal but is currently stable we will transfer the patient to [facility] trauma service for further evaluation of the abdominal injuries and treatment of alcohol withdrawal symptoms

## 2023-01-13 NOTE — ED PROVIDER NOTE - OBJECTIVE STATEMENT
Patient came into the ED with his brother for evaluation of vomiting and abdominal pain. patient in the waiting room became unresponsive. history from the brother. Brother states patient is an alcoholic and drinks daily. fell, hitting his head 2 days ago and has been c/o abdominal pain. he found him with lots of vomit all over his house today and he has been c/o abdominal pain and he was concerned he had internal bleeding. patient was last in alcohol rehab in september. brother knows he had an alcoholic drink earlier today. brother states in the waiting room his left eye was twitching. never occurred previously.

## 2023-01-13 NOTE — ED ADULT TRIAGE NOTE - CHIEF COMPLAINT QUOTE
pt fell at home and had syncopable even a couple of days ago-  pt brother states he most likely from his drinking which pt states he has a problem.  pt last drink was today- which was vodka- possible withdrawal from alcohol along with hematoma to head

## 2023-01-13 NOTE — ED ADULT TRIAGE NOTE - NS ED TRIAGE AVPU SCALE
Patient mom dropped off forms to be completed for .  Patient forms in mailbox at [place]. Please call mom when completed. Patient mom needs them if possible by next week.    Alert-The patient is alert, awake and responds to voice. The patient is oriented to time, place, and person. The triage nurse is able to obtain subjective information.

## 2023-01-13 NOTE — ED PROVIDER NOTE - PROGRESS NOTE DETAILS
patient began with b/l eye twitching with confusion and known alcoholism and vomiting concern for alcohol withdrawal. ativan ordered. with AMS and recent trauma with severe abdominal pain, bruising to rib concern for head injury/chest/abdominal trauma/bleeding -- will pan scan.

## 2023-01-14 ENCOUNTER — INPATIENT (INPATIENT)
Facility: HOSPITAL | Age: 63
LOS: 5 days | Discharge: HOME | DRG: 914 | End: 2023-01-20
Attending: SURGERY | Admitting: SURGERY
Payer: COMMERCIAL

## 2023-01-14 VITALS — HEART RATE: 84 BPM

## 2023-01-14 VITALS
TEMPERATURE: 97 F | HEART RATE: 84 BPM | SYSTOLIC BLOOD PRESSURE: 147 MMHG | OXYGEN SATURATION: 98 % | RESPIRATION RATE: 17 BRPM | DIASTOLIC BLOOD PRESSURE: 82 MMHG

## 2023-01-14 DIAGNOSIS — Z98.890 OTHER SPECIFIED POSTPROCEDURAL STATES: Chronic | ICD-10-CM

## 2023-01-14 DIAGNOSIS — T14.8XXA OTHER INJURY OF UNSPECIFIED BODY REGION, INITIAL ENCOUNTER: ICD-10-CM

## 2023-01-14 PROBLEM — C25.9 MALIGNANT NEOPLASM OF PANCREAS, UNSPECIFIED: Chronic | Status: ACTIVE | Noted: 2022-10-10

## 2023-01-14 PROBLEM — F10.10 ALCOHOL ABUSE, UNCOMPLICATED: Chronic | Status: ACTIVE | Noted: 2022-10-10

## 2023-01-14 LAB
A1C WITH ESTIMATED AVERAGE GLUCOSE RESULT: 4.6 % — SIGNIFICANT CHANGE UP (ref 4–5.6)
ALBUMIN SERPL ELPH-MCNC: 2.9 G/DL — LOW (ref 3.3–5)
ALBUMIN SERPL ELPH-MCNC: 3 G/DL — LOW (ref 3.3–5)
ALBUMIN SERPL ELPH-MCNC: 3.1 G/DL — LOW (ref 3.3–5)
ALBUMIN SERPL ELPH-MCNC: 3.1 G/DL — LOW (ref 3.3–5)
ALBUMIN SERPL ELPH-MCNC: 3.2 G/DL — LOW (ref 3.3–5)
ALP SERPL-CCNC: 103 U/L — SIGNIFICANT CHANGE UP (ref 40–120)
ALP SERPL-CCNC: 104 U/L — SIGNIFICANT CHANGE UP (ref 40–120)
ALP SERPL-CCNC: 106 U/L — SIGNIFICANT CHANGE UP (ref 40–120)
ALP SERPL-CCNC: 93 U/L — SIGNIFICANT CHANGE UP (ref 40–120)
ALP SERPL-CCNC: 94 U/L — SIGNIFICANT CHANGE UP (ref 40–120)
ALT FLD-CCNC: 49 U/L — HIGH (ref 10–45)
ALT FLD-CCNC: 50 U/L — HIGH (ref 10–45)
ALT FLD-CCNC: 55 U/L — HIGH (ref 10–45)
ALT FLD-CCNC: 57 U/L — HIGH (ref 10–45)
ALT FLD-CCNC: 59 U/L — HIGH (ref 10–45)
ANION GAP SERPL CALC-SCNC: 11 MMOL/L — SIGNIFICANT CHANGE UP (ref 5–17)
ANION GAP SERPL CALC-SCNC: 13 MMOL/L — SIGNIFICANT CHANGE UP (ref 5–17)
ANION GAP SERPL CALC-SCNC: 15 MMOL/L — SIGNIFICANT CHANGE UP (ref 5–17)
ANION GAP SERPL CALC-SCNC: 17 MMOL/L — SIGNIFICANT CHANGE UP (ref 5–17)
ANION GAP SERPL CALC-SCNC: 18 MMOL/L — HIGH (ref 5–17)
APAP SERPL-MCNC: <15 UG/ML — SIGNIFICANT CHANGE UP (ref 10–30)
APTT BLD: 34 SEC — SIGNIFICANT CHANGE UP (ref 27.5–35.5)
APTT BLD: 36.6 SEC — HIGH (ref 27.5–35.5)
AST SERPL-CCNC: 105 U/L — HIGH (ref 10–40)
AST SERPL-CCNC: 114 U/L — HIGH (ref 10–40)
AST SERPL-CCNC: 132 U/L — HIGH (ref 10–40)
AST SERPL-CCNC: 146 U/L — HIGH (ref 10–40)
AST SERPL-CCNC: 147 U/L — HIGH (ref 10–40)
BASE EXCESS BLDV CALC-SCNC: -0.8 MMOL/L — SIGNIFICANT CHANGE UP (ref -2–3)
BASE EXCESS BLDV CALC-SCNC: -5.1 MMOL/L — LOW (ref -2–3)
BASE EXCESS BLDV CALC-SCNC: -6.2 MMOL/L — LOW (ref -2–3)
BASE EXCESS BLDV CALC-SCNC: 1.2 MMOL/L — SIGNIFICANT CHANGE UP (ref -2–3)
BASE EXCESS BLDV CALC-SCNC: 1.3 MMOL/L — SIGNIFICANT CHANGE UP (ref -2–3)
BASOPHILS # BLD AUTO: 0.11 K/UL — SIGNIFICANT CHANGE UP (ref 0–0.2)
BASOPHILS NFR BLD AUTO: 2.3 % — HIGH (ref 0–2)
BILIRUB SERPL-MCNC: 2.4 MG/DL — HIGH (ref 0.2–1.2)
BILIRUB SERPL-MCNC: 3.2 MG/DL — HIGH (ref 0.2–1.2)
BILIRUB SERPL-MCNC: 3.4 MG/DL — HIGH (ref 0.2–1.2)
BILIRUB SERPL-MCNC: 4.2 MG/DL — HIGH (ref 0.2–1.2)
BILIRUB SERPL-MCNC: 5.2 MG/DL — HIGH (ref 0.2–1.2)
BLD GP AB SCN SERPL QL: NEGATIVE — SIGNIFICANT CHANGE UP
BUN SERPL-MCNC: 10 MG/DL — SIGNIFICANT CHANGE UP (ref 7–23)
BUN SERPL-MCNC: 11 MG/DL — SIGNIFICANT CHANGE UP (ref 7–23)
BUN SERPL-MCNC: 9 MG/DL — SIGNIFICANT CHANGE UP (ref 7–23)
CA-I SERPL-SCNC: 1.01 MMOL/L — LOW (ref 1.15–1.33)
CA-I SERPL-SCNC: 1.02 MMOL/L — LOW (ref 1.15–1.33)
CA-I SERPL-SCNC: 1.11 MMOL/L — LOW (ref 1.15–1.33)
CA-I SERPL-SCNC: 1.12 MMOL/L — LOW (ref 1.15–1.33)
CA-I SERPL-SCNC: 1.14 MMOL/L — LOW (ref 1.15–1.33)
CALCIUM SERPL-MCNC: 7.5 MG/DL — LOW (ref 8.4–10.5)
CALCIUM SERPL-MCNC: 7.7 MG/DL — LOW (ref 8.4–10.5)
CALCIUM SERPL-MCNC: 7.7 MG/DL — LOW (ref 8.4–10.5)
CALCIUM SERPL-MCNC: 7.8 MG/DL — LOW (ref 8.4–10.5)
CALCIUM SERPL-MCNC: 8 MG/DL — LOW (ref 8.4–10.5)
CHLORIDE BLDV-SCNC: 100 MMOL/L — SIGNIFICANT CHANGE UP (ref 96–108)
CHLORIDE BLDV-SCNC: 104 MMOL/L — SIGNIFICANT CHANGE UP (ref 96–108)
CHLORIDE BLDV-SCNC: 99 MMOL/L — SIGNIFICANT CHANGE UP (ref 96–108)
CHLORIDE SERPL-SCNC: 101 MMOL/L — SIGNIFICANT CHANGE UP (ref 96–108)
CHLORIDE SERPL-SCNC: 102 MMOL/L — SIGNIFICANT CHANGE UP (ref 96–108)
CHLORIDE SERPL-SCNC: 103 MMOL/L — SIGNIFICANT CHANGE UP (ref 96–108)
CO2 BLDV-SCNC: 20 MMOL/L — LOW (ref 22–26)
CO2 BLDV-SCNC: 21 MMOL/L — LOW (ref 22–26)
CO2 BLDV-SCNC: 26 MMOL/L — SIGNIFICANT CHANGE UP (ref 22–26)
CO2 BLDV-SCNC: 27 MMOL/L — HIGH (ref 22–26)
CO2 BLDV-SCNC: 27 MMOL/L — HIGH (ref 22–26)
CO2 SERPL-SCNC: 19 MMOL/L — LOW (ref 22–31)
CO2 SERPL-SCNC: 20 MMOL/L — LOW (ref 22–31)
CO2 SERPL-SCNC: 22 MMOL/L — SIGNIFICANT CHANGE UP (ref 22–31)
CO2 SERPL-SCNC: 24 MMOL/L — SIGNIFICANT CHANGE UP (ref 22–31)
CO2 SERPL-SCNC: 24 MMOL/L — SIGNIFICANT CHANGE UP (ref 22–31)
CREAT SERPL-MCNC: 0.62 MG/DL — SIGNIFICANT CHANGE UP (ref 0.5–1.3)
CREAT SERPL-MCNC: 0.63 MG/DL — SIGNIFICANT CHANGE UP (ref 0.5–1.3)
CREAT SERPL-MCNC: 0.64 MG/DL — SIGNIFICANT CHANGE UP (ref 0.5–1.3)
CREAT SERPL-MCNC: 0.67 MG/DL — SIGNIFICANT CHANGE UP (ref 0.5–1.3)
CREAT SERPL-MCNC: 0.67 MG/DL — SIGNIFICANT CHANGE UP (ref 0.5–1.3)
EGFR: 106 ML/MIN/1.73M2 — SIGNIFICANT CHANGE UP
EGFR: 106 ML/MIN/1.73M2 — SIGNIFICANT CHANGE UP
EGFR: 107 ML/MIN/1.73M2 — SIGNIFICANT CHANGE UP
EGFR: 108 ML/MIN/1.73M2 — SIGNIFICANT CHANGE UP
EGFR: 108 ML/MIN/1.73M2 — SIGNIFICANT CHANGE UP
EOSINOPHIL # BLD AUTO: 0.03 K/UL — SIGNIFICANT CHANGE UP (ref 0–0.5)
EOSINOPHIL NFR BLD AUTO: 0.6 % — SIGNIFICANT CHANGE UP (ref 0–6)
ESTIMATED AVERAGE GLUCOSE: 85 MG/DL — SIGNIFICANT CHANGE UP (ref 68–114)
ETHANOL SERPL-MCNC: 175 MG/DL — HIGH (ref 0–10)
ETHANOL SERPL-MCNC: 88 MG/DL — HIGH (ref 0–10)
GAS PNL BLDV: 135 MMOL/L — LOW (ref 136–145)
GAS PNL BLDV: 135 MMOL/L — LOW (ref 136–145)
GAS PNL BLDV: 138 MMOL/L — SIGNIFICANT CHANGE UP (ref 136–145)
GAS PNL BLDV: 140 MMOL/L — SIGNIFICANT CHANGE UP (ref 136–145)
GAS PNL BLDV: 140 MMOL/L — SIGNIFICANT CHANGE UP (ref 136–145)
GAS PNL BLDV: SIGNIFICANT CHANGE UP
GLUCOSE BLDV-MCNC: 141 MG/DL — HIGH (ref 70–99)
GLUCOSE BLDV-MCNC: 148 MG/DL — HIGH (ref 70–99)
GLUCOSE BLDV-MCNC: 88 MG/DL — SIGNIFICANT CHANGE UP (ref 70–99)
GLUCOSE BLDV-MCNC: 95 MG/DL — SIGNIFICANT CHANGE UP (ref 70–99)
GLUCOSE BLDV-MCNC: 97 MG/DL — SIGNIFICANT CHANGE UP (ref 70–99)
GLUCOSE SERPL-MCNC: 107 MG/DL — HIGH (ref 70–99)
GLUCOSE SERPL-MCNC: 141 MG/DL — HIGH (ref 70–99)
GLUCOSE SERPL-MCNC: 156 MG/DL — HIGH (ref 70–99)
GLUCOSE SERPL-MCNC: 93 MG/DL — SIGNIFICANT CHANGE UP (ref 70–99)
GLUCOSE SERPL-MCNC: 99 MG/DL — SIGNIFICANT CHANGE UP (ref 70–99)
HCO3 BLDV-SCNC: 19 MMOL/L — LOW (ref 22–29)
HCO3 BLDV-SCNC: 20 MMOL/L — LOW (ref 22–29)
HCO3 BLDV-SCNC: 24 MMOL/L — SIGNIFICANT CHANGE UP (ref 22–29)
HCO3 BLDV-SCNC: 26 MMOL/L — SIGNIFICANT CHANGE UP (ref 22–29)
HCO3 BLDV-SCNC: 26 MMOL/L — SIGNIFICANT CHANGE UP (ref 22–29)
HCT VFR BLD CALC: 28 % — LOW (ref 39–50)
HCT VFR BLD CALC: 30.4 % — LOW (ref 39–50)
HCT VFR BLD CALC: 30.8 % — LOW (ref 39–50)
HCT VFR BLDA CALC: 29 % — LOW (ref 39–51)
HCT VFR BLDA CALC: 30 % — LOW (ref 39–51)
HCT VFR BLDA CALC: 32 % — LOW (ref 39–51)
HCT VFR BLDA CALC: 33 % — LOW (ref 39–51)
HCT VFR BLDA CALC: 33 % — LOW (ref 39–51)
HGB BLD CALC-MCNC: 10.6 G/DL — LOW (ref 12.6–17.4)
HGB BLD CALC-MCNC: 10.9 G/DL — LOW (ref 12.6–17.4)
HGB BLD CALC-MCNC: 11 G/DL — LOW (ref 12.6–17.4)
HGB BLD CALC-MCNC: 9.7 G/DL — LOW (ref 12.6–17.4)
HGB BLD CALC-MCNC: 9.9 G/DL — LOW (ref 12.6–17.4)
HGB BLD-MCNC: 10.6 G/DL — LOW (ref 13–17)
HGB BLD-MCNC: 10.8 G/DL — LOW (ref 13–17)
HGB BLD-MCNC: 9.5 G/DL — LOW (ref 13–17)
HOROWITZ INDEX BLDV+IHG-RTO: 21 — SIGNIFICANT CHANGE UP
IMM GRANULOCYTES NFR BLD AUTO: 0.9 % — SIGNIFICANT CHANGE UP (ref 0–0.9)
INR BLD: 1.49 RATIO — HIGH (ref 0.88–1.16)
INR BLD: 1.55 RATIO — HIGH (ref 0.88–1.16)
LACTATE BLDV-MCNC: 0.9 MMOL/L — SIGNIFICANT CHANGE UP (ref 0.5–2)
LACTATE BLDV-MCNC: 1.3 MMOL/L — SIGNIFICANT CHANGE UP (ref 0.5–2)
LACTATE BLDV-MCNC: 3.4 MMOL/L — HIGH (ref 0.5–2)
LACTATE BLDV-MCNC: 4.6 MMOL/L — CRITICAL HIGH (ref 0.5–2)
LACTATE BLDV-MCNC: 4.7 MMOL/L — CRITICAL HIGH (ref 0.5–2)
LACTATE SERPL-SCNC: 3 MMOL/L — HIGH (ref 0.7–2)
LIDOCAIN IGE QN: 4 U/L — LOW (ref 7–60)
LYMPHOCYTES # BLD AUTO: 0.87 K/UL — LOW (ref 1–3.3)
LYMPHOCYTES # BLD AUTO: 18.5 % — SIGNIFICANT CHANGE UP (ref 13–44)
MAGNESIUM SERPL-MCNC: 1.3 MG/DL — LOW (ref 1.6–2.6)
MAGNESIUM SERPL-MCNC: 1.7 MG/DL — SIGNIFICANT CHANGE UP (ref 1.6–2.6)
MAGNESIUM SERPL-MCNC: 1.8 MG/DL — SIGNIFICANT CHANGE UP (ref 1.6–2.6)
MAGNESIUM SERPL-MCNC: 2.2 MG/DL — SIGNIFICANT CHANGE UP (ref 1.6–2.6)
MAGNESIUM SERPL-MCNC: 2.5 MG/DL — SIGNIFICANT CHANGE UP (ref 1.6–2.6)
MCHC RBC-ENTMCNC: 30.8 PG — SIGNIFICANT CHANGE UP (ref 27–34)
MCHC RBC-ENTMCNC: 30.9 PG — SIGNIFICANT CHANGE UP (ref 27–34)
MCHC RBC-ENTMCNC: 31.1 PG — SIGNIFICANT CHANGE UP (ref 27–34)
MCHC RBC-ENTMCNC: 33.9 GM/DL — SIGNIFICANT CHANGE UP (ref 32–36)
MCHC RBC-ENTMCNC: 34.9 GM/DL — SIGNIFICANT CHANGE UP (ref 32–36)
MCHC RBC-ENTMCNC: 35.1 GM/DL — SIGNIFICANT CHANGE UP (ref 32–36)
MCV RBC AUTO: 88 FL — SIGNIFICANT CHANGE UP (ref 80–100)
MCV RBC AUTO: 89.1 FL — SIGNIFICANT CHANGE UP (ref 80–100)
MCV RBC AUTO: 90.9 FL — SIGNIFICANT CHANGE UP (ref 80–100)
MONOCYTES # BLD AUTO: 0.44 K/UL — SIGNIFICANT CHANGE UP (ref 0–0.9)
MONOCYTES NFR BLD AUTO: 9.4 % — SIGNIFICANT CHANGE UP (ref 2–14)
NEUTROPHILS # BLD AUTO: 3.21 K/UL — SIGNIFICANT CHANGE UP (ref 1.8–7.4)
NEUTROPHILS NFR BLD AUTO: 68.3 % — SIGNIFICANT CHANGE UP (ref 43–77)
NRBC # BLD: 0 /100 WBCS — SIGNIFICANT CHANGE UP (ref 0–0)
PCO2 BLDV: 35 MMHG — LOW (ref 42–55)
PCO2 BLDV: 36 MMHG — LOW (ref 42–55)
PCO2 BLDV: 40 MMHG — LOW (ref 42–55)
PCO2 BLDV: 41 MMHG — LOW (ref 42–55)
PCO2 BLDV: 41 MMHG — LOW (ref 42–55)
PH BLDV: 7.34 — SIGNIFICANT CHANGE UP (ref 7.32–7.43)
PH BLDV: 7.35 — SIGNIFICANT CHANGE UP (ref 7.32–7.43)
PH BLDV: 7.38 — SIGNIFICANT CHANGE UP (ref 7.32–7.43)
PH BLDV: 7.41 — SIGNIFICANT CHANGE UP (ref 7.32–7.43)
PH BLDV: 7.42 — SIGNIFICANT CHANGE UP (ref 7.32–7.43)
PHOSPHATE SERPL-MCNC: 1.9 MG/DL — LOW (ref 2.5–4.5)
PHOSPHATE SERPL-MCNC: 2.3 MG/DL — LOW (ref 2.5–4.5)
PHOSPHATE SERPL-MCNC: 2.8 MG/DL — SIGNIFICANT CHANGE UP (ref 2.5–4.5)
PHOSPHATE SERPL-MCNC: 3.6 MG/DL — SIGNIFICANT CHANGE UP (ref 2.5–4.5)
PLATELET # BLD AUTO: 35 K/UL — LOW (ref 150–400)
PLATELET # BLD AUTO: 48 K/UL — LOW (ref 150–400)
PLATELET # BLD AUTO: 56 K/UL — LOW (ref 150–400)
PO2 BLDV: 42 MMHG — SIGNIFICANT CHANGE UP (ref 25–45)
PO2 BLDV: 62 MMHG — HIGH (ref 25–45)
PO2 BLDV: 69 MMHG — HIGH (ref 25–45)
PO2 BLDV: 78 MMHG — HIGH (ref 25–45)
PO2 BLDV: 80 MMHG — HIGH (ref 25–45)
POTASSIUM BLDV-SCNC: 3.3 MMOL/L — LOW (ref 3.5–5.1)
POTASSIUM BLDV-SCNC: 3.4 MMOL/L — LOW (ref 3.5–5.1)
POTASSIUM BLDV-SCNC: 4 MMOL/L — SIGNIFICANT CHANGE UP (ref 3.5–5.1)
POTASSIUM BLDV-SCNC: 4 MMOL/L — SIGNIFICANT CHANGE UP (ref 3.5–5.1)
POTASSIUM BLDV-SCNC: 4.5 MMOL/L — SIGNIFICANT CHANGE UP (ref 3.5–5.1)
POTASSIUM SERPL-MCNC: 3.4 MMOL/L — LOW (ref 3.5–5.3)
POTASSIUM SERPL-MCNC: 3.4 MMOL/L — LOW (ref 3.5–5.3)
POTASSIUM SERPL-MCNC: 4 MMOL/L — SIGNIFICANT CHANGE UP (ref 3.5–5.3)
POTASSIUM SERPL-MCNC: 4.1 MMOL/L — SIGNIFICANT CHANGE UP (ref 3.5–5.3)
POTASSIUM SERPL-MCNC: 4.4 MMOL/L — SIGNIFICANT CHANGE UP (ref 3.5–5.3)
POTASSIUM SERPL-SCNC: 3.4 MMOL/L — LOW (ref 3.5–5.3)
POTASSIUM SERPL-SCNC: 3.4 MMOL/L — LOW (ref 3.5–5.3)
POTASSIUM SERPL-SCNC: 4 MMOL/L — SIGNIFICANT CHANGE UP (ref 3.5–5.3)
POTASSIUM SERPL-SCNC: 4.1 MMOL/L — SIGNIFICANT CHANGE UP (ref 3.5–5.3)
POTASSIUM SERPL-SCNC: 4.4 MMOL/L — SIGNIFICANT CHANGE UP (ref 3.5–5.3)
PROT SERPL-MCNC: 6.1 G/DL — SIGNIFICANT CHANGE UP (ref 6–8.3)
PROT SERPL-MCNC: 6.1 G/DL — SIGNIFICANT CHANGE UP (ref 6–8.3)
PROT SERPL-MCNC: 6.5 G/DL — SIGNIFICANT CHANGE UP (ref 6–8.3)
PROT SERPL-MCNC: 6.5 G/DL — SIGNIFICANT CHANGE UP (ref 6–8.3)
PROT SERPL-MCNC: 6.7 G/DL — SIGNIFICANT CHANGE UP (ref 6–8.3)
PROTHROM AB SERPL-ACNC: 17.2 SEC — HIGH (ref 10.5–13.4)
PROTHROM AB SERPL-ACNC: 18.1 SEC — HIGH (ref 10.5–13.4)
RBC # BLD: 3.08 M/UL — LOW (ref 4.2–5.8)
RBC # BLD: 3.41 M/UL — LOW (ref 4.2–5.8)
RBC # BLD: 3.5 M/UL — LOW (ref 4.2–5.8)
RBC # FLD: 13.2 % — SIGNIFICANT CHANGE UP (ref 10.3–14.5)
RBC # FLD: 13.3 % — SIGNIFICANT CHANGE UP (ref 10.3–14.5)
RBC # FLD: 13.3 % — SIGNIFICANT CHANGE UP (ref 10.3–14.5)
RH IG SCN BLD-IMP: POSITIVE — SIGNIFICANT CHANGE UP
SALICYLATES SERPL-MCNC: <2 MG/DL — LOW (ref 15–30)
SAO2 % BLDV: 73.1 % — SIGNIFICANT CHANGE UP (ref 67–88)
SAO2 % BLDV: 94.5 % — HIGH (ref 67–88)
SAO2 % BLDV: 96.3 % — HIGH (ref 67–88)
SAO2 % BLDV: 97.2 % — HIGH (ref 67–88)
SAO2 % BLDV: 97.3 % — HIGH (ref 67–88)
SODIUM SERPL-SCNC: 136 MMOL/L — SIGNIFICANT CHANGE UP (ref 135–145)
SODIUM SERPL-SCNC: 138 MMOL/L — SIGNIFICANT CHANGE UP (ref 135–145)
SODIUM SERPL-SCNC: 138 MMOL/L — SIGNIFICANT CHANGE UP (ref 135–145)
SODIUM SERPL-SCNC: 139 MMOL/L — SIGNIFICANT CHANGE UP (ref 135–145)
SODIUM SERPL-SCNC: 140 MMOL/L — SIGNIFICANT CHANGE UP (ref 135–145)
WBC # BLD: 4.66 K/UL — SIGNIFICANT CHANGE UP (ref 3.8–10.5)
WBC # BLD: 4.7 K/UL — SIGNIFICANT CHANGE UP (ref 3.8–10.5)
WBC # BLD: 5.26 K/UL — SIGNIFICANT CHANGE UP (ref 3.8–10.5)
WBC # FLD AUTO: 4.66 K/UL — SIGNIFICANT CHANGE UP (ref 3.8–10.5)
WBC # FLD AUTO: 4.7 K/UL — SIGNIFICANT CHANGE UP (ref 3.8–10.5)
WBC # FLD AUTO: 5.26 K/UL — SIGNIFICANT CHANGE UP (ref 3.8–10.5)

## 2023-01-14 PROCEDURE — 96376 TX/PRO/DX INJ SAME DRUG ADON: CPT | Mod: XU

## 2023-01-14 PROCEDURE — 86901 BLOOD TYPING SEROLOGIC RH(D): CPT

## 2023-01-14 PROCEDURE — 72125 CT NECK SPINE W/O DYE: CPT | Mod: MA

## 2023-01-14 PROCEDURE — 85610 PROTHROMBIN TIME: CPT

## 2023-01-14 PROCEDURE — 70450 CT HEAD/BRAIN W/O DYE: CPT | Mod: MA

## 2023-01-14 PROCEDURE — 84484 ASSAY OF TROPONIN QUANT: CPT

## 2023-01-14 PROCEDURE — 96375 TX/PRO/DX INJ NEW DRUG ADDON: CPT | Mod: XU

## 2023-01-14 PROCEDURE — 83605 ASSAY OF LACTIC ACID: CPT

## 2023-01-14 PROCEDURE — 99285 EMERGENCY DEPT VISIT HI MDM: CPT | Mod: 25

## 2023-01-14 PROCEDURE — 93005 ELECTROCARDIOGRAM TRACING: CPT

## 2023-01-14 PROCEDURE — 74177 CT ABD & PELVIS W/CONTRAST: CPT | Mod: MA

## 2023-01-14 PROCEDURE — 86850 RBC ANTIBODY SCREEN: CPT

## 2023-01-14 PROCEDURE — 80076 HEPATIC FUNCTION PANEL: CPT

## 2023-01-14 PROCEDURE — 99291 CRITICAL CARE FIRST HOUR: CPT

## 2023-01-14 PROCEDURE — 80307 DRUG TEST PRSMV CHEM ANLYZR: CPT

## 2023-01-14 PROCEDURE — 85025 COMPLETE CBC W/AUTO DIFF WBC: CPT

## 2023-01-14 PROCEDURE — 71260 CT THORAX DX C+: CPT | Mod: MA

## 2023-01-14 PROCEDURE — 83690 ASSAY OF LIPASE: CPT

## 2023-01-14 PROCEDURE — 96374 THER/PROPH/DIAG INJ IV PUSH: CPT | Mod: XU

## 2023-01-14 PROCEDURE — 86900 BLOOD TYPING SEROLOGIC ABO: CPT

## 2023-01-14 PROCEDURE — 36415 COLL VENOUS BLD VENIPUNCTURE: CPT

## 2023-01-14 PROCEDURE — 80048 BASIC METABOLIC PNL TOTAL CA: CPT

## 2023-01-14 PROCEDURE — 72131 CT LUMBAR SPINE W/O DYE: CPT | Mod: 26,QQ

## 2023-01-14 PROCEDURE — 87637 SARSCOV2&INF A&B&RSV AMP PRB: CPT

## 2023-01-14 PROCEDURE — 82962 GLUCOSE BLOOD TEST: CPT

## 2023-01-14 RX ORDER — THIAMINE MONONITRATE (VIT B1) 100 MG
100 TABLET ORAL DAILY
Refills: 0 | Status: DISCONTINUED | OUTPATIENT
Start: 2023-01-14 | End: 2023-01-14

## 2023-01-14 RX ORDER — FOLIC ACID 0.8 MG
1 TABLET ORAL DAILY
Refills: 0 | Status: DISCONTINUED | OUTPATIENT
Start: 2023-01-15 | End: 2023-01-18

## 2023-01-14 RX ORDER — POTASSIUM CHLORIDE 20 MEQ
10 PACKET (EA) ORAL
Refills: 0 | Status: COMPLETED | OUTPATIENT
Start: 2023-01-14 | End: 2023-01-14

## 2023-01-14 RX ORDER — SODIUM CHLORIDE 9 MG/ML
1000 INJECTION INTRAMUSCULAR; INTRAVENOUS; SUBCUTANEOUS ONCE
Refills: 0 | Status: COMPLETED | OUTPATIENT
Start: 2023-01-14 | End: 2023-01-14

## 2023-01-14 RX ORDER — THIAMINE MONONITRATE (VIT B1) 100 MG
100 TABLET ORAL ONCE
Refills: 0 | Status: COMPLETED | OUTPATIENT
Start: 2023-01-14 | End: 2023-01-14

## 2023-01-14 RX ORDER — CALCIUM GLUCONATE 100 MG/ML
2 VIAL (ML) INTRAVENOUS ONCE
Refills: 0 | Status: COMPLETED | OUTPATIENT
Start: 2023-01-14 | End: 2023-01-14

## 2023-01-14 RX ORDER — TETANUS TOXOID, REDUCED DIPHTHERIA TOXOID AND ACELLULAR PERTUSSIS VACCINE, ADSORBED 5; 2.5; 8; 8; 2.5 [IU]/.5ML; [IU]/.5ML; UG/.5ML; UG/.5ML; UG/.5ML
0.5 SUSPENSION INTRAMUSCULAR ONCE
Refills: 0 | Status: COMPLETED | OUTPATIENT
Start: 2023-01-14 | End: 2023-01-14

## 2023-01-14 RX ORDER — DIAZEPAM 5 MG
10 TABLET ORAL ONCE
Refills: 0 | Status: DISCONTINUED | OUTPATIENT
Start: 2023-01-14 | End: 2023-01-14

## 2023-01-14 RX ORDER — PHYTONADIONE (VIT K1) 5 MG
10 TABLET ORAL EVERY 24 HOURS
Refills: 0 | Status: COMPLETED | OUTPATIENT
Start: 2023-01-14 | End: 2023-01-16

## 2023-01-14 RX ORDER — CHLORHEXIDINE GLUCONATE 213 G/1000ML
1 SOLUTION TOPICAL DAILY
Refills: 0 | Status: DISCONTINUED | OUTPATIENT
Start: 2023-01-14 | End: 2023-01-20

## 2023-01-14 RX ORDER — FOLIC ACID 0.8 MG
1 TABLET ORAL ONCE
Refills: 0 | Status: COMPLETED | OUTPATIENT
Start: 2023-01-14 | End: 2023-01-14

## 2023-01-14 RX ORDER — THIAMINE MONONITRATE (VIT B1) 100 MG
1 TABLET ORAL DAILY
Refills: 0 | Status: DISCONTINUED | OUTPATIENT
Start: 2023-01-14 | End: 2023-01-14

## 2023-01-14 RX ORDER — HYDROMORPHONE HYDROCHLORIDE 2 MG/ML
0.5 INJECTION INTRAMUSCULAR; INTRAVENOUS; SUBCUTANEOUS EVERY 4 HOURS
Refills: 0 | Status: DISCONTINUED | OUTPATIENT
Start: 2023-01-14 | End: 2023-01-18

## 2023-01-14 RX ORDER — FOLIC ACID 0.8 MG
1 TABLET ORAL DAILY
Refills: 0 | Status: DISCONTINUED | OUTPATIENT
Start: 2023-01-15 | End: 2023-01-14

## 2023-01-14 RX ORDER — SODIUM CHLORIDE 9 MG/ML
1000 INJECTION, SOLUTION INTRAVENOUS
Refills: 0 | Status: DISCONTINUED | OUTPATIENT
Start: 2023-01-14 | End: 2023-01-15

## 2023-01-14 RX ORDER — POTASSIUM PHOSPHATE, MONOBASIC POTASSIUM PHOSPHATE, DIBASIC 236; 224 MG/ML; MG/ML
30 INJECTION, SOLUTION INTRAVENOUS ONCE
Refills: 0 | Status: COMPLETED | OUTPATIENT
Start: 2023-01-14 | End: 2023-01-14

## 2023-01-14 RX ORDER — THIAMINE MONONITRATE (VIT B1) 100 MG
100 TABLET ORAL DAILY
Refills: 0 | Status: DISCONTINUED | OUTPATIENT
Start: 2023-01-15 | End: 2023-01-18

## 2023-01-14 RX ORDER — HYDROMORPHONE HYDROCHLORIDE 2 MG/ML
0.5 INJECTION INTRAMUSCULAR; INTRAVENOUS; SUBCUTANEOUS ONCE
Refills: 0 | Status: DISCONTINUED | OUTPATIENT
Start: 2023-01-14 | End: 2023-01-14

## 2023-01-14 RX ORDER — MAGNESIUM SULFATE 500 MG/ML
2 VIAL (ML) INJECTION ONCE
Refills: 0 | Status: COMPLETED | OUTPATIENT
Start: 2023-01-14 | End: 2023-01-14

## 2023-01-14 RX ORDER — HYDROMORPHONE HYDROCHLORIDE 2 MG/ML
0.25 INJECTION INTRAMUSCULAR; INTRAVENOUS; SUBCUTANEOUS ONCE
Refills: 0 | Status: DISCONTINUED | OUTPATIENT
Start: 2023-01-14 | End: 2023-01-14

## 2023-01-14 RX ORDER — SODIUM CHLORIDE 9 MG/ML
1000 INJECTION, SOLUTION INTRAVENOUS
Refills: 0 | Status: DISCONTINUED | OUTPATIENT
Start: 2023-01-14 | End: 2023-01-14

## 2023-01-14 RX ORDER — SODIUM CHLORIDE 9 MG/ML
500 INJECTION, SOLUTION INTRAVENOUS ONCE
Refills: 0 | Status: COMPLETED | OUTPATIENT
Start: 2023-01-14 | End: 2023-01-14

## 2023-01-14 RX ORDER — ONDANSETRON 8 MG/1
4 TABLET, FILM COATED ORAL ONCE
Refills: 0 | Status: COMPLETED | OUTPATIENT
Start: 2023-01-14 | End: 2023-01-14

## 2023-01-14 RX ORDER — MAGNESIUM SULFATE 500 MG/ML
2 VIAL (ML) INJECTION
Refills: 0 | Status: COMPLETED | OUTPATIENT
Start: 2023-01-14 | End: 2023-01-14

## 2023-01-14 RX ORDER — ONDANSETRON 8 MG/1
4 TABLET, FILM COATED ORAL EVERY 6 HOURS
Refills: 0 | Status: DISCONTINUED | OUTPATIENT
Start: 2023-01-14 | End: 2023-01-20

## 2023-01-14 RX ORDER — SODIUM CHLORIDE 9 MG/ML
1000 INJECTION, SOLUTION INTRAVENOUS ONCE
Refills: 0 | Status: COMPLETED | OUTPATIENT
Start: 2023-01-14 | End: 2023-01-14

## 2023-01-14 RX ADMIN — HYDROMORPHONE HYDROCHLORIDE 0.5 MILLIGRAM(S): 2 INJECTION INTRAMUSCULAR; INTRAVENOUS; SUBCUTANEOUS at 19:57

## 2023-01-14 RX ADMIN — TETANUS TOXOID, REDUCED DIPHTHERIA TOXOID AND ACELLULAR PERTUSSIS VACCINE, ADSORBED 0.5 MILLILITER(S): 5; 2.5; 8; 8; 2.5 SUSPENSION INTRAMUSCULAR at 04:21

## 2023-01-14 RX ADMIN — POTASSIUM PHOSPHATE, MONOBASIC POTASSIUM PHOSPHATE, DIBASIC 83.33 MILLIMOLE(S): 236; 224 INJECTION, SOLUTION INTRAVENOUS at 13:07

## 2023-01-14 RX ADMIN — Medication 1 MILLIGRAM(S): at 14:27

## 2023-01-14 RX ADMIN — HYDROMORPHONE HYDROCHLORIDE 0.5 MILLIGRAM(S): 2 INJECTION INTRAMUSCULAR; INTRAVENOUS; SUBCUTANEOUS at 06:24

## 2023-01-14 RX ADMIN — Medication 1 MILLIGRAM(S): at 22:00

## 2023-01-14 RX ADMIN — ONDANSETRON 4 MILLIGRAM(S): 8 TABLET, FILM COATED ORAL at 22:02

## 2023-01-14 RX ADMIN — SODIUM CHLORIDE 125 MILLILITER(S): 9 INJECTION, SOLUTION INTRAVENOUS at 11:48

## 2023-01-14 RX ADMIN — Medication 100 MILLIEQUIVALENT(S): at 11:04

## 2023-01-14 RX ADMIN — Medication 200 GRAM(S): at 08:15

## 2023-01-14 RX ADMIN — ONDANSETRON 4 MILLIGRAM(S): 8 TABLET, FILM COATED ORAL at 07:30

## 2023-01-14 RX ADMIN — Medication 255 MILLIMOLE(S): at 20:47

## 2023-01-14 RX ADMIN — Medication 100 MILLIEQUIVALENT(S): at 09:33

## 2023-01-14 RX ADMIN — Medication 25 GRAM(S): at 11:54

## 2023-01-14 RX ADMIN — Medication 100 MILLIGRAM(S): at 04:19

## 2023-01-14 RX ADMIN — Medication 25 GRAM(S): at 04:28

## 2023-01-14 RX ADMIN — Medication 102 MILLIGRAM(S): at 13:51

## 2023-01-14 RX ADMIN — HYDROMORPHONE HYDROCHLORIDE 0.5 MILLIGRAM(S): 2 INJECTION INTRAMUSCULAR; INTRAVENOUS; SUBCUTANEOUS at 09:26

## 2023-01-14 RX ADMIN — SODIUM CHLORIDE 1000 MILLILITER(S): 9 INJECTION, SOLUTION INTRAVENOUS at 11:50

## 2023-01-14 RX ADMIN — HYDROMORPHONE HYDROCHLORIDE 0.5 MILLIGRAM(S): 2 INJECTION INTRAMUSCULAR; INTRAVENOUS; SUBCUTANEOUS at 09:56

## 2023-01-14 RX ADMIN — Medication 25 GRAM(S): at 13:51

## 2023-01-14 RX ADMIN — ONDANSETRON 4 MILLIGRAM(S): 8 TABLET, FILM COATED ORAL at 09:42

## 2023-01-14 RX ADMIN — Medication 1 MILLIGRAM(S): at 04:18

## 2023-01-14 RX ADMIN — SODIUM CHLORIDE 1000 MILLILITER(S): 9 INJECTION INTRAMUSCULAR; INTRAVENOUS; SUBCUTANEOUS at 04:22

## 2023-01-14 RX ADMIN — Medication 2 MILLIGRAM(S): at 11:00

## 2023-01-14 RX ADMIN — SODIUM CHLORIDE 1000 MILLILITER(S): 9 INJECTION, SOLUTION INTRAVENOUS at 08:34

## 2023-01-14 RX ADMIN — HYDROMORPHONE HYDROCHLORIDE 0.5 MILLIGRAM(S): 2 INJECTION INTRAMUSCULAR; INTRAVENOUS; SUBCUTANEOUS at 20:12

## 2023-01-14 RX ADMIN — Medication 10 MILLIGRAM(S): at 02:48

## 2023-01-14 RX ADMIN — SODIUM CHLORIDE 125 MILLILITER(S): 9 INJECTION, SOLUTION INTRAVENOUS at 19:55

## 2023-01-14 RX ADMIN — SODIUM CHLORIDE 125 MILLILITER(S): 9 INJECTION, SOLUTION INTRAVENOUS at 08:34

## 2023-01-14 RX ADMIN — HYDROMORPHONE HYDROCHLORIDE 0.5 MILLIGRAM(S): 2 INJECTION INTRAMUSCULAR; INTRAVENOUS; SUBCUTANEOUS at 04:24

## 2023-01-14 RX ADMIN — CHLORHEXIDINE GLUCONATE 1 APPLICATION(S): 213 SOLUTION TOPICAL at 11:53

## 2023-01-14 RX ADMIN — Medication 25 GRAM(S): at 09:24

## 2023-01-14 RX ADMIN — Medication 100 MILLIEQUIVALENT(S): at 08:16

## 2023-01-14 NOTE — H&P ADULT - NSHPPHYSICALEXAM_GEN_ALL_CORE
Primary Survey:  A - airway intact  B - bilateral breath sounds and good chest rise  C - initial BP: 123/61 (01-14-23 @ 03:57) , HR: 80 (01-14-23 @ 03:57) , palpable pulses in all extremities  D - GCS 15 on arrival  Exposure obtained      Secondary Survey:  General: NAD, intoxicated  HEENT: Normocephalic, EOMI, PEERLA, small abrasion above R eyebrow  Neck: Soft, midline trachea  Chest: No chest wall tenderness.   Cardiac: Regular rate  Respiratory: Bilateral breath sounds, clear and equal bilaterally  Abdomen: Soft, non-distended, tender to palpation in midline periumbilically and suprapubic region, no rebound or guarding, not peritoneal  Pelvis: Stable, non-tender  Ext: palp radial b/l UE, motor and sensory grossly intact in all 4 extremities, abrasion on R knee, no tenderness to palpation in upper and lower extremities  Back: no TTP, no palpable stepoff/deformity

## 2023-01-14 NOTE — ED PROVIDER NOTE - NS ED ROS FT
GENERAL: No fever or chills  EYES: No change in vision  HEENT: No trouble swallowing or speaking  CARDIAC: No chest pain  PULMONARY: No cough or SOB  GI: +abd pain  : No changes in urination  SKIN: No rashes  NEURO: No headache, no numbness  MSK: +back pain  Otherwise as HPI or negative.

## 2023-01-14 NOTE — ED PROVIDER NOTE - ATTENDING CONTRIBUTION TO CARE
Attending MD Cortes:  I personally have seen and examined this patient. I have performed a substantive portion of the visit including all aspects of the medical decision making.  Resident note reviewed and agree on plan of care and except where noted.      63-year-old gentleman with a history of pancreatic cancer status post Whipple in 2018, alcohol use disorder currently drinking daily presents as a transfer from outside hospital for evaluation of an abnormal abdominal CT in the setting of a fall down several stairs yesterday.  The patient states that he fell because he lost his balance.  He did not lose consciousness.  He has pain in the mid abdomen and back.  Patient underwent a CT of the head C-spine and torso at the outside hospital which was notable for segmental mural thickening of the sigmoid colon with edema of the adjacent mesentery may be due to contusion to the sigmoid colon/mesentery.  Patient also had presacral soft tissue density that may represent hematoma.  The patient received IV Ativan for active alcohol withdrawal and fentanyl for pain.    Vital signs on arrival nonactionable, patient with systolic blood pressure 150.  He is awake and alert, no apparent distress, normal sensorium, slightly tremulous.  Heart sounds regular, clear lung sounds anteriorly.  Abdomen is soft, diffusely tender with rebound and guarding.  Extremities warm and well-perfused.  Moves all extremities spontaneously without difficulty.    External records reviewed from outside hospital to note CT findings, lab work was also notable for an elevated lactate of 4.  Transaminitis and hypoalbuminemia.  Hypokalemic as well to 3.2.    Case was discussed with trauma surgery and consult placed, they will see patient.  We will continue management of patient's alcohol withdrawal with IV benzodiazepines.  IV thiamine and folate.  Preoperative lab work.  Definitive management of CT findings will be in accordance with trauma surgery recommendations.        *The above represents an initial assessment/impression. Please refer to progress notes for potential changes in patient clinical course*

## 2023-01-14 NOTE — ED ADULT NURSE REASSESSMENT NOTE - GASTROINTESTINAL ASSESSMENT
83 yom with hx of thoracic aneurysm repair 3 years ago and another abdominal aneurysm being monitored, stable at 4cm, last imaging 3 months ago. Pt complaining of left sided back pain worse with movement for two day, no urinary sxs. Pt was lifting heavy bricks for work day prior to pain. Pt has had hx of similar pain but CTs negative for kidney stone. Pt took Alleve with some relief and then Valium with improvement. Now 1/10 pain. Pt is well appearing, no distress, clear lungs, normal cardiac, abd soft with no tn, no CVAT, no edema, pulses strong bilaterally. Pt's pain is likely MSK given history but must r/o AAA, will get labs and CT, no further pain meds needed at this time.
- - -
- - -

## 2023-01-14 NOTE — ED ADULT NURSE REASSESSMENT NOTE - SKIN INTEGRITY
mid forehead bruise with swelling, right arm ecchymosis
mid forehead bruising with discoloration, b/l arm ecchymosis

## 2023-01-14 NOTE — H&P ADULT - HISTORY OF PRESENT ILLNESS
Patient is a 63 year old male with hx of alcohol use and pancreatic cancer s/p whipple presenting from OSH ([place]) for trauma evaluation s/p fall down 12 stairs. Patient fell down stairs, hit is head, but denies LOC. Was intoxicated during fall. has histoyr of requiring hospitalizations of alcohol withdrawal. Complains of abdominal pain.    On CT imaging at [place], patient has intraabdominal hematoma and concerns for sigmoid/mesenteric contusion.    Unable to obtain ROS, PMHx, home meds 2/2 to patient's intoxicated status. Patient is a 63 year old male with hx of alcohol use and pancreatic cancer s/p whipple presenting from OSH ([place]) for trauma evaluation s/p fall down 12 stairs. Patient fell down stairs, hit is head, but denies LOC. Was intoxicated during fall. has history of requiring hospitalizations of alcohol withdrawal. Complains of abdominal pain.    On CT imaging at [place], patient has intraabdominal hematoma and concerns for sigmoid/mesenteric contusion.    Unable to obtain ROS, PMHx, home meds 2/2 to patient's intoxicated status.

## 2023-01-14 NOTE — ED ADULT NURSE NOTE - OBJECTIVE STATEMENT
Pt is a 63y male BIBEMS as a transfer from [place] w/ b/l Peripheral IVs in L+R extremities, to [facility] ED as a trauma surgery transfer. As per EMS report pt came to the ED for evaluation post fall, pt endorses stumbling over his feet and falling after drinking alcohol, upon arrival to [place] pt was given Ativan, unknown dose, as he was having ETOH withdrawal seizure. Pt endorses drinking alcohol everyday, states he has about 2 drinks and his last drink was @1200 before coming to the ED. Pt was transferred here as there was evidence of bowel hematoma. PMH of ETOH abuse, Anxiety, HTN, Pancreatic cancer and PSH ventral hernia repair and pancreatic surgery 2015. PT is A&Ox3 currently endorsing HA and 8/10 bk pain, noticeable ecchymosis behind L ear. Pt denies any vison changes, SOB, difficulty breathing, CP, palpitations, abd pain, fever/chills, d/c. Pt ambulates independently at baseline w/o any difficulties.

## 2023-01-14 NOTE — H&P ADULT - ASSESSMENT
63 year old male presenting s/p fall down 12 stairs.    Injuries identified:  - Presacral hematoma  - Sigmoid/mesenteric contusion    Plan:  - Admit to Trauma Surgery on floor, Dr. Soto  - Serial abdominal exams  - Multimodal pain control  - CIWA protocol  - Tertiary exam in 24-48 hours    Discussed with surgical critical care fellow, Dr. Rodriguez, on behalf of trauma surgery attending on call, Dr. Soto.      ACS/Trauma Surgery  [id number] 63 year old male presenting s/p fall down 12 stairs.    Injuries identified:  - Presacral hematoma  - Sigmoid/mesenteric contusion    Plan:  - Admit to Trauma Surgery in SICU, Dr. Soto  - Serial abdominal exams  - Multimodal pain control  - CIWA protocol  - Tertiary exam in 24-48 hours  - Appreciate excellent SICU care    Discussed with surgical critical care fellow, Dr. Rodriguez, on behalf of trauma surgery attending on call, Dr. Soto.      ACS/Trauma Surgery  [id number]

## 2023-01-14 NOTE — PATIENT PROFILE ADULT - NSPROPTRIGHTBILLOFRIGHTS_GEN_A_NUR
Nutrition Problem #1: Moderate malnutrition, In context of chronic illness  Intervention: Food and/or Nutrient Delivery: Modify Current Diet  Nutritional Goals: Pt to meet % of est'd needs daily via PO patient

## 2023-01-14 NOTE — H&P ADULT - NSHPLABSRESULTS_GEN_ALL_CORE
10.8   4.70  )-----------( 56       ( 14 Jan 2023 03:20 )             30.8     01-14    140  |  103  |  11  ----------------------------<  99  3.4<L>   |  22  |  0.67    Ca    7.5<L>      14 Jan 2023 03:20  Mg     1.3     01-14    TPro  6.5  /  Alb  3.1<L>  /  TBili  2.4<H>  /  DBili  x   /  AST  146<H>  /  ALT  59<H>  /  AlkPhos  104  01-14    PT/INR - ( 14 Jan 2023 03:20 )   PT: 18.1 sec;   INR: 1.55 ratio         PTT - ( 14 Jan 2023 03:20 )  PTT:36.6 sec      IMAGING;  < from: CT Abdomen and Pelvis w/ IV Cont (01.13.23 @ 19:34) >    IMPRESSION:  Apparent new mild segmental mural thickening of the sigmoid colon with   edema of the adjacent mesentery may be due to contusion to the sigmoid   colon/mesentery.    Presacral soft tissue density measuring 3.8 x 3.8 cm with a Hounsfield   unit of 74 may represent a hematoma in the setting of trauma. Follow-up   CT may be pursued in 4 weeks to ensure resolution and to rule out   metastasis.    No acute bony fracture is identified.    No CT evidence for an acute intrathoracic injury.    Stable enlarged mediastinal lymph nodes.

## 2023-01-14 NOTE — ED PROVIDER NOTE - CLINICAL SUMMARY MEDICAL DECISION MAKING FREE TEXT BOX
Lawson Polanco MD (PGY-3): The patient is a 63y Male with pmhx of alcohol abuse/withdrawal and pancreatic CA s/p whipple presenting as transfer from [place] for possible intraabdominal hematoma. Additionally concerned for worsening alcohol withdrawal. Will also evaluate for lumbar fracture as pt is endorsing midline TTP at L5 region. ekg, CT lumbar spine, labs, thiamine, folic acid, valium, magnesium (for increased QTc), pain control, tetanus, IVF, surgery consult. Will be admitted.

## 2023-01-14 NOTE — CONSULT NOTE ADULT - SUBJECTIVE AND OBJECTIVE BOX
HISTORY OF PRESENT ILLNESS:  Mr. Staples is a 63 year old male with hx of alcohol use and pancreatic cancer s/p whipple presenting from OSH ([place]) for trauma evaluation s/p fall down 12 stairs. Patient fell down stairs, + HS, but denies LOC. (-) AC. Was intoxicated during fall. Of note, has history of ETOH withdrawal requiring hospitalization.     On CT imaging at [place], patient found to have intraabdominal hematoma w/ concerns for sigmoid/mesenteric contusion, 3.8x3.8 presacral hematoma, w/o evidence of acute fracture in the thorax or pelvis.     CTH negative for acute hemorrhage. CT C spine negative for acute fractures/dislocation.     On arrival to [facility] ED, patient with 1 episode of non bloody, bilious emesis, small volume. In transit, patient agitated with tremors concerning for alcohol withdrawal and DT's. Given Ativan and Fentanyl.     SICU consulted i/s/o alcohol withdrawal.    PAST MEDICAL HISTORY: No pertinent past medical history    Hypertension    Anxiety    Pancreatic cancer    Alcohol abuse        PAST SURGICAL HISTORY: History of pancreatic surgery    H/O ventral hernia repair        HOME MEDICATIONS:    ALLERGIES: No Known Allergies      FAMILY HISTORY: No pertinent family history in first degree relatives        SOCIAL HISTORY:    REVIEW OF SYSTEMS:    VITAL SIGNS:  ICU Vital Signs Last 24 Hrs  T(C): 37.5 (14 Jan 2023 07:35), Max: 37.5 (14 Jan 2023 07:35)  T(F): 99.5 (14 Jan 2023 07:35), Max: 99.5 (14 Jan 2023 07:35)  HR: 91 (14 Jan 2023 07:35) (73 - 98)  BP: 131/86 (14 Jan 2023 07:35) (110/71 - 150/82)  BP(mean): 102 (14 Jan 2023 07:35) (100 - 105)  ABP: --  ABP(mean): --  RR: 25 (14 Jan 2023 07:35) (14 - 25)  SpO2: 98% (14 Jan 2023 07:35) (96% - 100%)    O2 Parameters below as of 14 Jan 2023 07:35  Patient On (Oxygen Delivery Method): room air            PHYSICAL EXAMINATION:  General - well-nourished, no acute distress  Neuro - awake, alert, oriented x4, no acute focal deficits, b/l UE tremulous  HEENT - normocephalic, ecchymosis with small non bleeding laceration mid forehead, PERRLA, moist mucous membranes  Lungs - clear to auscultation bilaterally   Heart - regular rate and rhythm   Abdomen - soft, nondistended, TTP in epigastrium and in b/l LQ, voluntary guarding, no rebound tenderness   Extremities - all four extremities are warm & pink with 2+ pulses, strength 5/5, sensation intact    LABS:                          10.8   4.70  )-----------( 56       ( 14 Jan 2023 03:20 )             30.8       01-14    140  |  103  |  11  ----------------------------<  99  3.4<L>   |  22  |  0.67    Ca    7.5<L>      14 Jan 2023 03:20  Mg     1.3     01-14    TPro  6.5  /  Alb  3.1<L>  /  TBili  2.4<H>  /  DBili  x   /  AST  146<H>  /  ALT  59<H>  /  AlkPhos  104  01-14      PT/INR - ( 14 Jan 2023 03:20 )   PT: 18.1 sec;   INR: 1.55 ratio         PTT - ( 14 Jan 2023 03:20 )  PTT:36.6 sec    ABG - ( 14 Jan 2023 00:10 )  pH: x     /  pCO2: x     /  pO2: x     / HCO3: x     / Base Excess: x     /  SaO2: x       Lactate: 3.0                VBG - ( 14 Jan 2023 02:48 )  pH: 7.38  /  pCO2: 41    /  pO2: 42    / HCO3: 24    / Base Excess: -0.8  /  SaO2: 73.1   Lactate: 3.4                RECENT CULTURES:      CAPILLARY BLOOD GLUCOSE      POCT Blood Glucose.: 120 mg/dL (13 Jan 2023 16:57)      IMAGING STUDIES:

## 2023-01-14 NOTE — CONSULT NOTE ADULT - CRITICAL CARE ATTENDING COMMENT
CT imaging at [place] revealed an intraabdominal hematoma w/ concerns for sigmoid/mesenteric contusion and a 3.8x3.8 presacral hematoma without associated fx. In transit, patient agitated with tremors concerning for alcohol withdrawal and DT's. Pt received Ativan and Fentanyl with good effect. SICU consulted for management of acute alcohol withdrawal.    A/p  Neuro: 	hx ETOH abuse and EtOH withdrawal  	CIWA  	Multimodal pain control    CVS:	Lactic acidosis due to EtOH/Dehydration  	CAD/HTN  	Hold home ARB  	Fluid bolus give  	Continue cardiac monitoring    Pulm:	Atelectasis  	ISP    GI:	Abdominal trauma  	Mesenteric contusion  	Presacral hematoma  	H/o pancreatic cancer  	Place NGT for emesis  	Serial abdominal exams  	Consider repeat CT abd    :	Hypokalemia, Hypomagnesemia, and hypophosphatemia  	Replace and monitor lytes  	  Heme:	Acute blood loss anemia  	S/p resuscitation  	H/h stable  	Continue to monitor    ID:	Cx for fever

## 2023-01-14 NOTE — ED PROVIDER NOTE - OBJECTIVE STATEMENT
The patient is a 63y Male with pmhx of alcohol abuse/withdrawal and pancreatic CA s/p whipple presenting as transfer from [place] for intraabdominal hematoma. Pt fell down stairs yesterday, said that he slipped, lost his balance, and fell. Pt hit his head, but denies LOC. Patient came to [place] ED with his brother for evaluation of vomiting and abdominal pain. Pt drinks daily, has required hospitalized for alcohol withdrawal. Patient was last in alcohol rehab in September. At [place], pt had CT head/c-spine/chest/abd/pelvis done. CT abd/pelvis showed segmental mural thickening of the sigmoid colon with edema of the adjacent mesentery, which may be due to contusion to the sigmoid colon/mesentery. A presacral soft tissue density was also seen that may represent a hematoma. Pt received IV ativan for alcohol withdrawal as well as fentanyl for pain control. Pt still endorsing epigastric/shwetha-umbilical abd pain as well as lower back pain. Pt is somewhat of a poor historian, but is A&Ox3. NKDA. The patient is a 63y Male with pmhx of alcohol abuse/withdrawal and pancreatic CA s/p whipple presenting as transfer from [place] for intraabdominal hematoma. Pt fell down stairs yesterday, said that he slipped, lost his balance, and fell. Pt hit his head, but denies LOC. Patient came to [place] ED with his brother for evaluation of vomiting and abdominal pain. Pt drinks daily, has required hospitalized for alcohol withdrawal. Patient was last in alcohol rehab in September. At [place], pt had CT head/c-spine/chest/abd/pelvis done. CT abd/pelvis showed segmental mural thickening of the sigmoid colon with edema of the adjacent mesentery, which may be due to contusion to the sigmoid colon/mesentery. A presacral soft tissue density was also seen that may represent a hematoma. Pt received IV ativan for alcohol withdrawal as well as fentanyl for pain control. Pt still endorsing epigastric/shwetha-umbilical abd pain as well as lower back pain. Pt is somewhat of a poor historian, but is A&Ox3. Not on an AC. NKDA. The patient is a 63y Male with pmhx of alcohol abuse/withdrawal and pancreatic CA s/p whipple presenting as transfer from [place] for possible intraabdominal hematoma. Pt fell down stairs yesterday, said that he slipped, lost his balance, and fell. Pt hit his head, but denies LOC. Patient came to [place] ED with his brother for evaluation of vomiting and abdominal pain. Pt drinks daily, has required hospitalized for alcohol withdrawal. Patient was last in alcohol rehab in September. At [place], pt had CT head/c-spine/chest/abd/pelvis done. CT abd/pelvis showed segmental mural thickening of the sigmoid colon with edema of the adjacent mesentery, which may be due to contusion to the sigmoid colon/mesentery. A presacral soft tissue density was also seen that may represent a hematoma. Pt received IV ativan for alcohol withdrawal as well as fentanyl for pain control. Pt still endorsing epigastric/shwetha-umbilical abd pain as well as lower back pain. Pt is somewhat of a poor historian, but is A&Ox3. Not on an AC. NKDA.

## 2023-01-14 NOTE — PATIENT PROFILE ADULT - FALL HARM RISK - HARM RISK INTERVENTIONS
Assistance with ambulation/Assistance OOB with selected safe patient handling equipment/Communicate Risk of Fall with Harm to all staff/Discuss with provider need for PT consult/Monitor for mental status changes/Monitor gait and stability/Reinforce activity limits and safety measures with patient and family/Tailored Fall Risk Interventions/Toileting schedule using arm’s reach rule for commode and bathroom/Use of alarms - bed, chair and/or voice tab/Visual Cue: Yellow wristband and red socks/Bed in lowest position, wheels locked, appropriate side rails in place/Call bell, personal items and telephone in reach/Instruct patient to call for assistance before getting out of bed or chair/Non-slip footwear when patient is out of bed/[place] to call system/Physically safe environment - no spills, clutter or unnecessary equipment/Purposeful Proactive Rounding/Room/bathroom lighting operational, light cord in reach

## 2023-01-14 NOTE — CONSULT NOTE ADULT - ASSESSMENT
Mr. Staples is a 63 year old male with hx of HTN, alcohol use and pancreatic cancer s/p whipple presenting from OSH ([place]) for trauma evaluation s/p fall down 12 stairs. + HS, denies LOC. (-) AC. Intoxicated during fall. Of note, has history of ETOH withdrawal requiring hospitalization. On CT imaging at [place], patient found to have intraabdominal hematoma w/ concerns for sigmoid/mesenteric contusion, 3.8x3.8 presacral hematoma, w/o evidence of acute fracture in the thorax or pelvis. In transit, patient agitated with tremors concerning for alcohol withdrawal and DT's. Given Ativan and Fentanyl. SICU consulted i/s/o alcohol withdrawal.    Plan:     Neuro: hx ETOH abuse, + HS after fall but CTH negative for hemorrhage  -CIWA  -Pain control PRN, requires serial exams     Respiratory: No active issues or hx of pulmonary disease  -RA  -Incentive spirometry     Cardiovascular: Hx HTN  -ARB @ Home   -Will restart as needed  -MAP>65    GI: Hx Pancreatic Ca s/p whipple, now w CT cf sigmoid colon/mesenteric  Mr. Staples is a 63 year old male with hx of HTN, alcohol use and pancreatic cancer s/p whipple presenting from OSH ([place]) for trauma evaluation s/p fall down 12 stairs. + HS, denies LOC. (-) AC. Intoxicated during fall. Of note, has history of ETOH withdrawal requiring hospitalization. On CT imaging at [place], patient found to have intraabdominal hematoma w/ concerns for sigmoid/mesenteric contusion, 3.8x3.8 presacral hematoma, w/o evidence of acute fracture in the thorax or pelvis. In transit, patient agitated with tremors concerning for alcohol withdrawal and DT's. Given Ativan and Fentanyl. SICU consulted i/s/o alcohol withdrawal.    Plan:     Neuro: hx ETOH abuse, + HS after fall but CTH negative for hemorrhage  -CIWA  -Pain control PRN, requires serial exams     Respiratory: No active issues or hx of pulmonary disease  -RA  -Incentive spirometry     Cardiovascular: Hx HTN  -ARB @ Home   -Will restart as needed  -MAP>65    GI: Hx Pancreatic Ca s/p whipple, now w CT cf sigmoid colon/mesenteric contusion, 2 episodes of Non bloody, bilious Emesis since fall  -NPO except meds  -NGT if emesis again  -Zofran PRN  -Serial exams, exam before pain meds    :  Mr. Staples is a 63 year old male with hx of HTN, alcohol use and pancreatic cancer s/p whipple presenting from OSH ([place]) for trauma evaluation s/p fall down 12 stairs. + HS, denies LOC. (-) AC. Intoxicated during fall. Of note, has history of ETOH withdrawal requiring hospitalization. On CT imaging at [place], patient found to have intraabdominal hematoma w/ concerns for sigmoid/mesenteric contusion, 3.8x3.8 presacral hematoma, w/o evidence of acute fracture in the thorax or pelvis. In transit, patient agitated with tremors concerning for alcohol withdrawal and DT's. Given Ativan and Fentanyl. SICU consulted i/s/o alcohol withdrawal.    Plan:     Neuro: hx ETOH abuse, + HS after fall but CTH negative for hemorrhage  -CIWA  -Pain control PRN, requires serial exams     Respiratory: No active issues or hx of pulmonary disease  -RA  -Incentive spirometry     Cardiovascular: Hx HTN  -ARB @ Home   -Will restart as needed  -MAP>65    GI: Hx Pancreatic Ca s/p whipple, now w CT cf sigmoid colon/mesenteric contusion, 2 episodes of Non bloody, bilious Emesis since fall  -NPO except meds  -NGT if emesis again  -Zofran PRN  -Serial exams, exam before pain meds    :   -LR @ 125  -Voiding  -Replete lytes PRN    Heme:   -Vitamin K 10 x 3 days for INR 1.5  -CBC q8  -[employer] VTE ppx i/s/o presacral hematoma and slightly downtrending h/h  -SCD for mechanical VTE ppx    ID:   -Afebrile, normal WBC  -Monitor off Abx    Endocrine:   -Thiamine, Folate, MV    Lines/Tubes  -PIV    Code Status:   -Full Code    Dispo:   -SICU    [id number]

## 2023-01-14 NOTE — ED ADULT NURSE REASSESSMENT NOTE - NSIMPLEMENTINTERV_GEN_ALL_ED
Implemented All Fall Risk Interventions:  [place] to call system. Call bell, personal items and telephone within reach. Instruct patient to call for assistance. Room bathroom lighting operational. Non-slip footwear when patient is off stretcher. Physically safe environment: no spills, clutter or unnecessary equipment. Stretcher in lowest position, wheels locked, appropriate side rails in place. Provide visual cue, wrist band, yellow gown, etc. Monitor gait and stability. Monitor for mental status changes and reorient to person, place, and time. Review medications for side effects contributing to fall risk. Reinforce activity limits and safety measures with patient and family.
Implemented All Fall Risk Interventions:  [place] to call system. Call bell, personal items and telephone within reach. Instruct patient to call for assistance. Room bathroom lighting operational. Non-slip footwear when patient is off stretcher. Physically safe environment: no spills, clutter or unnecessary equipment. Stretcher in lowest position, wheels locked, appropriate side rails in place. Provide visual cue, wrist band, yellow gown, etc. Monitor gait and stability. Monitor for mental status changes and reorient to person, place, and time. Review medications for side effects contributing to fall risk. Reinforce activity limits and safety measures with patient and family.

## 2023-01-14 NOTE — ED ADULT NURSE NOTE - NSIMPLEMENTINTERV_GEN_ALL_ED
Implemented All Fall Risk Interventions:  [place] to call system. Call bell, personal items and telephone within reach. Instruct patient to call for assistance. Room bathroom lighting operational. Non-slip footwear when patient is off stretcher. Physically safe environment: no spills, clutter or unnecessary equipment. Stretcher in lowest position, wheels locked, appropriate side rails in place. Provide visual cue, wrist band, yellow gown, etc. Monitor gait and stability. Monitor for mental status changes and reorient to person, place, and time. Review medications for side effects contributing to fall risk. Reinforce activity limits and safety measures with patient and family.

## 2023-01-14 NOTE — ED PROVIDER NOTE - PHYSICAL EXAMINATION
GEN - NAD, appears mildly intoxicated, A&Ox3  HEAD - Small abrasion above R eyebrow  EYES - PERRL, EOMI  ENT - Airway patent, mucous membranes moist, mild tongue fasciculations seen  PULMONARY - CTA b/l, symmetric breath sounds, no W/R/R  CHEST - No chest wall tenderness  CARDIAC - +S1S2, RRR, no M/G/R, no JVD  ABDOMEN - ND, TTP in periumbilical area and suprapubic region, soft, no guarding, no rebound, no masses, no rigidity  BACK - No CVA TTP, midline TTP at L5  PELVIS - Stable, non-tender  EXTREMITIES - Mild tremors seen in b/l UE. 2+ radial pulses b/l in UE, motor and sensory grossly intact in all 4 extremities, abrasion on R knee, no tenderness to palpation in upper and lower extremities.  NEUROLOGIC - Alert, speech clear, no focal deficits, CN II-XII grossly intact, strength and sensation grossly intact  PSYCH - Normal mood/affect, normal insight

## 2023-01-14 NOTE — ED ADULT NURSE REASSESSMENT NOTE - SYMPTOMS
Patient with end-stage COPD, candidate for hospice, patient agreed to be evaluated either in the hospital or shortly after discharge. [facility] notified about plans for hospice care and follow up within 48 h post discharge. Patient usually desaturates during ambulation to the bathroom: advised to use condom cath or bedside urinal.  We are available for follow up on Saturday 3/30/19 and can request hospice at that time.
none
back pain

## 2023-01-15 LAB
ALBUMIN SERPL ELPH-MCNC: 2.9 G/DL — LOW (ref 3.3–5)
ALBUMIN SERPL ELPH-MCNC: 2.9 G/DL — LOW (ref 3.3–5)
ALP SERPL-CCNC: 90 U/L — SIGNIFICANT CHANGE UP (ref 40–120)
ALP SERPL-CCNC: 90 U/L — SIGNIFICANT CHANGE UP (ref 40–120)
ALT FLD-CCNC: 44 U/L — SIGNIFICANT CHANGE UP (ref 10–45)
ALT FLD-CCNC: 45 U/L — SIGNIFICANT CHANGE UP (ref 10–45)
ANION GAP SERPL CALC-SCNC: 12 MMOL/L — SIGNIFICANT CHANGE UP (ref 5–17)
ANION GAP SERPL CALC-SCNC: 9 MMOL/L — SIGNIFICANT CHANGE UP (ref 5–17)
APTT BLD: 33.9 SEC — SIGNIFICANT CHANGE UP (ref 27.5–35.5)
AST SERPL-CCNC: 83 U/L — HIGH (ref 10–40)
AST SERPL-CCNC: 91 U/L — HIGH (ref 10–40)
BASE EXCESS BLDV CALC-SCNC: 3.9 MMOL/L — HIGH (ref -2–3)
BASE EXCESS BLDV CALC-SCNC: 5.3 MMOL/L — HIGH (ref -2–3)
BILIRUB SERPL-MCNC: 5.4 MG/DL — HIGH (ref 0.2–1.2)
BILIRUB SERPL-MCNC: 5.7 MG/DL — HIGH (ref 0.2–1.2)
BUN SERPL-MCNC: 10 MG/DL — SIGNIFICANT CHANGE UP (ref 7–23)
BUN SERPL-MCNC: 8 MG/DL — SIGNIFICANT CHANGE UP (ref 7–23)
CA-I SERPL-SCNC: 1.11 MMOL/L — LOW (ref 1.15–1.33)
CA-I SERPL-SCNC: 1.11 MMOL/L — LOW (ref 1.15–1.33)
CALCIUM SERPL-MCNC: 7.6 MG/DL — LOW (ref 8.4–10.5)
CALCIUM SERPL-MCNC: 7.7 MG/DL — LOW (ref 8.4–10.5)
CHLORIDE BLDV-SCNC: 100 MMOL/L — SIGNIFICANT CHANGE UP (ref 96–108)
CHLORIDE BLDV-SCNC: 98 MMOL/L — SIGNIFICANT CHANGE UP (ref 96–108)
CHLORIDE SERPL-SCNC: 100 MMOL/L — SIGNIFICANT CHANGE UP (ref 96–108)
CHLORIDE SERPL-SCNC: 97 MMOL/L — SIGNIFICANT CHANGE UP (ref 96–108)
CO2 BLDV-SCNC: 30 MMOL/L — HIGH (ref 22–26)
CO2 BLDV-SCNC: 31 MMOL/L — HIGH (ref 22–26)
CO2 SERPL-SCNC: 25 MMOL/L — SIGNIFICANT CHANGE UP (ref 22–31)
CO2 SERPL-SCNC: 27 MMOL/L — SIGNIFICANT CHANGE UP (ref 22–31)
CREAT SERPL-MCNC: 0.61 MG/DL — SIGNIFICANT CHANGE UP (ref 0.5–1.3)
CREAT SERPL-MCNC: 0.61 MG/DL — SIGNIFICANT CHANGE UP (ref 0.5–1.3)
EGFR: 109 ML/MIN/1.73M2 — SIGNIFICANT CHANGE UP
EGFR: 109 ML/MIN/1.73M2 — SIGNIFICANT CHANGE UP
GAS PNL BLDV: 130 MMOL/L — LOW (ref 136–145)
GAS PNL BLDV: 133 MMOL/L — LOW (ref 136–145)
GAS PNL BLDV: SIGNIFICANT CHANGE UP
GLUCOSE BLDV-MCNC: 103 MG/DL — HIGH (ref 70–99)
GLUCOSE BLDV-MCNC: 105 MG/DL — HIGH (ref 70–99)
GLUCOSE SERPL-MCNC: 107 MG/DL — HIGH (ref 70–99)
GLUCOSE SERPL-MCNC: 113 MG/DL — HIGH (ref 70–99)
HCO3 BLDV-SCNC: 28 MMOL/L — SIGNIFICANT CHANGE UP (ref 22–29)
HCO3 BLDV-SCNC: 30 MMOL/L — HIGH (ref 22–29)
HCT VFR BLD CALC: 26.8 % — LOW (ref 39–50)
HCT VFR BLD CALC: 29 % — LOW (ref 39–50)
HCT VFR BLDA CALC: 29 % — LOW (ref 39–51)
HCT VFR BLDA CALC: 31 % — LOW (ref 39–51)
HGB BLD CALC-MCNC: 10.3 G/DL — LOW (ref 12.6–17.4)
HGB BLD CALC-MCNC: 9.7 G/DL — LOW (ref 12.6–17.4)
HGB BLD-MCNC: 9.2 G/DL — LOW (ref 13–17)
HGB BLD-MCNC: 9.7 G/DL — LOW (ref 13–17)
HOROWITZ INDEX BLDV+IHG-RTO: 21 — SIGNIFICANT CHANGE UP
HOROWITZ INDEX BLDV+IHG-RTO: 21 — SIGNIFICANT CHANGE UP
INR BLD: 1.42 RATIO — HIGH (ref 0.88–1.16)
INR BLD: 1.53 RATIO — HIGH (ref 0.88–1.16)
LACTATE BLDV-MCNC: 0.9 MMOL/L — SIGNIFICANT CHANGE UP (ref 0.5–2)
LACTATE BLDV-MCNC: 0.9 MMOL/L — SIGNIFICANT CHANGE UP (ref 0.5–2)
MAGNESIUM SERPL-MCNC: 1.9 MG/DL — SIGNIFICANT CHANGE UP (ref 1.6–2.6)
MAGNESIUM SERPL-MCNC: 2 MG/DL — SIGNIFICANT CHANGE UP (ref 1.6–2.6)
MCHC RBC-ENTMCNC: 30.4 PG — SIGNIFICANT CHANGE UP (ref 27–34)
MCHC RBC-ENTMCNC: 30.9 PG — SIGNIFICANT CHANGE UP (ref 27–34)
MCHC RBC-ENTMCNC: 33.4 GM/DL — SIGNIFICANT CHANGE UP (ref 32–36)
MCHC RBC-ENTMCNC: 34.3 GM/DL — SIGNIFICANT CHANGE UP (ref 32–36)
MCV RBC AUTO: 89.9 FL — SIGNIFICANT CHANGE UP (ref 80–100)
MCV RBC AUTO: 90.9 FL — SIGNIFICANT CHANGE UP (ref 80–100)
NRBC # BLD: 0 /100 WBCS — SIGNIFICANT CHANGE UP (ref 0–0)
NRBC # BLD: 0 /100 WBCS — SIGNIFICANT CHANGE UP (ref 0–0)
PCO2 BLDV: 42 MMHG — SIGNIFICANT CHANGE UP (ref 42–55)
PCO2 BLDV: 43 MMHG — SIGNIFICANT CHANGE UP (ref 42–55)
PH BLDV: 7.44 — HIGH (ref 7.32–7.43)
PH BLDV: 7.45 — HIGH (ref 7.32–7.43)
PHOSPHATE SERPL-MCNC: 2.2 MG/DL — LOW (ref 2.5–4.5)
PHOSPHATE SERPL-MCNC: 2.9 MG/DL — SIGNIFICANT CHANGE UP (ref 2.5–4.5)
PLATELET # BLD AUTO: 31 K/UL — LOW (ref 150–400)
PLATELET # BLD AUTO: 33 K/UL — LOW (ref 150–400)
PO2 BLDV: 54 MMHG — HIGH (ref 25–45)
PO2 BLDV: 60 MMHG — HIGH (ref 25–45)
POTASSIUM BLDV-SCNC: 3.7 MMOL/L — SIGNIFICANT CHANGE UP (ref 3.5–5.1)
POTASSIUM BLDV-SCNC: 4.2 MMOL/L — SIGNIFICANT CHANGE UP (ref 3.5–5.1)
POTASSIUM SERPL-MCNC: 3.7 MMOL/L — SIGNIFICANT CHANGE UP (ref 3.5–5.3)
POTASSIUM SERPL-MCNC: 4.1 MMOL/L — SIGNIFICANT CHANGE UP (ref 3.5–5.3)
POTASSIUM SERPL-SCNC: 3.7 MMOL/L — SIGNIFICANT CHANGE UP (ref 3.5–5.3)
POTASSIUM SERPL-SCNC: 4.1 MMOL/L — SIGNIFICANT CHANGE UP (ref 3.5–5.3)
PROT SERPL-MCNC: 5.9 G/DL — LOW (ref 6–8.3)
PROT SERPL-MCNC: 6.1 G/DL — SIGNIFICANT CHANGE UP (ref 6–8.3)
PROTHROM AB SERPL-ACNC: 16.5 SEC — HIGH (ref 10.5–13.4)
PROTHROM AB SERPL-ACNC: 17.7 SEC — HIGH (ref 10.5–13.4)
RBC # BLD: 2.98 M/UL — LOW (ref 4.2–5.8)
RBC # BLD: 3.19 M/UL — LOW (ref 4.2–5.8)
RBC # FLD: 12.8 % — SIGNIFICANT CHANGE UP (ref 10.3–14.5)
RBC # FLD: 13.2 % — SIGNIFICANT CHANGE UP (ref 10.3–14.5)
SAO2 % BLDV: 85.3 % — SIGNIFICANT CHANGE UP (ref 67–88)
SAO2 % BLDV: 93.2 % — HIGH (ref 67–88)
SODIUM SERPL-SCNC: 134 MMOL/L — LOW (ref 135–145)
SODIUM SERPL-SCNC: 136 MMOL/L — SIGNIFICANT CHANGE UP (ref 135–145)
WBC # BLD: 3.71 K/UL — LOW (ref 3.8–10.5)
WBC # BLD: 4.27 K/UL — SIGNIFICANT CHANGE UP (ref 3.8–10.5)
WBC # FLD AUTO: 3.71 K/UL — LOW (ref 3.8–10.5)
WBC # FLD AUTO: 4.27 K/UL — SIGNIFICANT CHANGE UP (ref 3.8–10.5)

## 2023-01-15 PROCEDURE — 99232 SBSQ HOSP IP/OBS MODERATE 35: CPT

## 2023-01-15 PROCEDURE — 99233 SBSQ HOSP IP/OBS HIGH 50: CPT

## 2023-01-15 RX ORDER — OXYCODONE HYDROCHLORIDE 5 MG/1
5 TABLET ORAL EVERY 4 HOURS
Refills: 0 | Status: DISCONTINUED | OUTPATIENT
Start: 2023-01-15 | End: 2023-01-16

## 2023-01-15 RX ORDER — SODIUM CHLORIDE 9 MG/ML
1000 INJECTION, SOLUTION INTRAVENOUS
Refills: 0 | Status: DISCONTINUED | OUTPATIENT
Start: 2023-01-15 | End: 2023-01-16

## 2023-01-15 RX ORDER — MAGNESIUM SULFATE 500 MG/ML
2 VIAL (ML) INJECTION ONCE
Refills: 0 | Status: COMPLETED | OUTPATIENT
Start: 2023-01-15 | End: 2023-01-15

## 2023-01-15 RX ORDER — LOSARTAN POTASSIUM 100 MG/1
50 TABLET, FILM COATED ORAL DAILY
Refills: 0 | Status: DISCONTINUED | OUTPATIENT
Start: 2023-01-15 | End: 2023-01-20

## 2023-01-15 RX ORDER — POTASSIUM PHOSPHATE, MONOBASIC POTASSIUM PHOSPHATE, DIBASIC 236; 224 MG/ML; MG/ML
30 INJECTION, SOLUTION INTRAVENOUS ONCE
Refills: 0 | Status: COMPLETED | OUTPATIENT
Start: 2023-01-15 | End: 2023-01-15

## 2023-01-15 RX ORDER — INFLUENZA VIRUS VACCINE 15; 15; 15; 15 UG/.5ML; UG/.5ML; UG/.5ML; UG/.5ML
0.5 SUSPENSION INTRAMUSCULAR ONCE
Refills: 0 | Status: DISCONTINUED | OUTPATIENT
Start: 2023-01-15 | End: 2023-01-20

## 2023-01-15 RX ORDER — OXYCODONE HYDROCHLORIDE 5 MG/1
5 TABLET ORAL ONCE
Refills: 0 | Status: DISCONTINUED | OUTPATIENT
Start: 2023-01-15 | End: 2023-01-15

## 2023-01-15 RX ADMIN — HYDROMORPHONE HYDROCHLORIDE 0.5 MILLIGRAM(S): 2 INJECTION INTRAMUSCULAR; INTRAVENOUS; SUBCUTANEOUS at 02:43

## 2023-01-15 RX ADMIN — HYDROMORPHONE HYDROCHLORIDE 0.5 MILLIGRAM(S): 2 INJECTION INTRAMUSCULAR; INTRAVENOUS; SUBCUTANEOUS at 02:28

## 2023-01-15 RX ADMIN — OXYCODONE HYDROCHLORIDE 5 MILLIGRAM(S): 5 TABLET ORAL at 22:29

## 2023-01-15 RX ADMIN — Medication 50 MILLIGRAM(S): at 17:27

## 2023-01-15 RX ADMIN — HYDROMORPHONE HYDROCHLORIDE 0.5 MILLIGRAM(S): 2 INJECTION INTRAMUSCULAR; INTRAVENOUS; SUBCUTANEOUS at 19:21

## 2023-01-15 RX ADMIN — Medication 100 MILLIGRAM(S): at 13:20

## 2023-01-15 RX ADMIN — HYDROMORPHONE HYDROCHLORIDE 0.5 MILLIGRAM(S): 2 INJECTION INTRAMUSCULAR; INTRAVENOUS; SUBCUTANEOUS at 14:41

## 2023-01-15 RX ADMIN — ONDANSETRON 4 MILLIGRAM(S): 8 TABLET, FILM COATED ORAL at 10:48

## 2023-01-15 RX ADMIN — Medication 102 MILLIGRAM(S): at 13:20

## 2023-01-15 RX ADMIN — HYDROMORPHONE HYDROCHLORIDE 0.5 MILLIGRAM(S): 2 INJECTION INTRAMUSCULAR; INTRAVENOUS; SUBCUTANEOUS at 06:43

## 2023-01-15 RX ADMIN — SODIUM CHLORIDE 50 MILLILITER(S): 9 INJECTION, SOLUTION INTRAVENOUS at 19:06

## 2023-01-15 RX ADMIN — OXYCODONE HYDROCHLORIDE 5 MILLIGRAM(S): 5 TABLET ORAL at 21:59

## 2023-01-15 RX ADMIN — HYDROMORPHONE HYDROCHLORIDE 0.5 MILLIGRAM(S): 2 INJECTION INTRAMUSCULAR; INTRAVENOUS; SUBCUTANEOUS at 10:48

## 2023-01-15 RX ADMIN — Medication 1 MILLIGRAM(S): at 17:27

## 2023-01-15 RX ADMIN — OXYCODONE HYDROCHLORIDE 5 MILLIGRAM(S): 5 TABLET ORAL at 14:20

## 2023-01-15 RX ADMIN — HYDROMORPHONE HYDROCHLORIDE 0.25 MILLIGRAM(S): 2 INJECTION INTRAMUSCULAR; INTRAVENOUS; SUBCUTANEOUS at 00:09

## 2023-01-15 RX ADMIN — POTASSIUM PHOSPHATE, MONOBASIC POTASSIUM PHOSPHATE, DIBASIC 83.33 MILLIMOLE(S): 236; 224 INJECTION, SOLUTION INTRAVENOUS at 05:32

## 2023-01-15 RX ADMIN — HYDROMORPHONE HYDROCHLORIDE 0.5 MILLIGRAM(S): 2 INJECTION INTRAMUSCULAR; INTRAVENOUS; SUBCUTANEOUS at 19:06

## 2023-01-15 RX ADMIN — CHLORHEXIDINE GLUCONATE 1 APPLICATION(S): 213 SOLUTION TOPICAL at 13:20

## 2023-01-15 RX ADMIN — OXYCODONE HYDROCHLORIDE 5 MILLIGRAM(S): 5 TABLET ORAL at 13:20

## 2023-01-15 RX ADMIN — HYDROMORPHONE HYDROCHLORIDE 0.25 MILLIGRAM(S): 2 INJECTION INTRAMUSCULAR; INTRAVENOUS; SUBCUTANEOUS at 00:24

## 2023-01-15 RX ADMIN — HYDROMORPHONE HYDROCHLORIDE 0.5 MILLIGRAM(S): 2 INJECTION INTRAMUSCULAR; INTRAVENOUS; SUBCUTANEOUS at 11:03

## 2023-01-15 RX ADMIN — OXYCODONE HYDROCHLORIDE 5 MILLIGRAM(S): 5 TABLET ORAL at 17:36

## 2023-01-15 RX ADMIN — Medication 255 MILLIMOLE(S): at 14:40

## 2023-01-15 RX ADMIN — SODIUM CHLORIDE 50 MILLILITER(S): 9 INJECTION, SOLUTION INTRAVENOUS at 14:40

## 2023-01-15 RX ADMIN — HYDROMORPHONE HYDROCHLORIDE 0.5 MILLIGRAM(S): 2 INJECTION INTRAMUSCULAR; INTRAVENOUS; SUBCUTANEOUS at 14:56

## 2023-01-15 RX ADMIN — OXYCODONE HYDROCHLORIDE 5 MILLIGRAM(S): 5 TABLET ORAL at 16:34

## 2023-01-15 RX ADMIN — HYDROMORPHONE HYDROCHLORIDE 0.5 MILLIGRAM(S): 2 INJECTION INTRAMUSCULAR; INTRAVENOUS; SUBCUTANEOUS at 06:58

## 2023-01-15 RX ADMIN — Medication 50 MILLIGRAM(S): at 23:03

## 2023-01-15 RX ADMIN — Medication 25 GRAM(S): at 03:49

## 2023-01-15 NOTE — CHART NOTE - NSCHARTNOTEFT_GEN_A_CORE
TRAUMA SERVICE TERTIARY EXAM    Date of TTS: 1/15/23                             Time: 17:15  Admit Date: 1/14/23                             Trauma Activation: none  Admit GCS: 14 E- 4   V- 5  M- 5    HPI:  Patient is a 63 year old male with hx of alcohol use and pancreatic cancer s/p whipple presenting from OSH ([facility]) for trauma evaluation s/p fall down 12 stairs. Patient fell down stairs, hit is head, but denies LOC. Was intoxicated during fall. has history of requiring hospitalizations of alcohol withdrawal. Complains of abdominal pain.    On CT imaging at [place], patient has intraabdominal hematoma and concerns for sigmoid/mesenteric contusion.    Unable to obtain ROS, PMHx, home meds 2/2 to patient's intoxicated status. (14 Jan 2023 06:02)      PAST MEDICAL & SURGICAL HISTORY:  Hypertension      Anxiety      Pancreatic cancer      Alcohol abuse      History of pancreatic surgery  2015      H/O ventral hernia repair        [  ] No significant past history as reviewed with the patient and family    PRIMARY SURVEY:  A - airway intact  B - bilateral breath sounds and good chest rise  C - initial BP: 123/61 (01-14-23 @ 03:57) , HR: 80 (01-14-23 @ 03:57) , palpable pulses in all extremities  D - GCS 15 on arrival  Exposure obtained    SECONDARY SURVEY:   General: NAD, intoxicated  HEENT: Normocephalic, EOMI, PEERLA, small abrasion above R eyebrow  Neck: Soft, midline trachea  Chest: No chest wall tenderness.   Cardiac: Regular rate  Respiratory: Bilateral breath sounds, clear and equal bilaterally  Abdomen: Soft, non-distended, tender to palpation in midline periumbilically and suprapubic region, no rebound or guarding, not peritoneal  Pelvis: Stable, non-tender  Ext: palp radial b/l UE, motor and sensory grossly intact in all 4 extremities, abrasion on R knee, no tenderness to palpation in upper and lower extremities  Back: no TTP, no palpable stepoff/deformity    TERTIARY SURVEY:   GEN: resting comfortably in bed, in NAD  HEENT: normocephalic, non-tender to palpation, small abrasion with ecchymosis over right eyebrow, no step-offs palpated  NECK: no JVD, non-tender to palpation at posterior midline, no pain with flexion, extension, and bilateral neck rotation  CHEST: non-tender to palpation across clavicles and b/l anterior ribs  BACK: non-tender to palpation along cervical, thoracic spine midline and b/l posterior ribs; TTP along flanks of lumbar spine, no palpable step-offs or hematomas  ABD: soft, non-distended, non-tender to palpation in all quadrants without rebound tenderness or guarding  LUE: non-tender to palpation across upper and lower arm, 5/5  strength, fingers warm, well-perfused, full ROM in shoulder, elbow, wrist, and fingers, palpable radial + ulnar pulses  RUE: non-tender to palpation across upper and lower arm, 5/5  strength, fingers warm, well-perfused, full ROM in shoulder, elbow, wrist, and fingers, palpable radial + ulnar pulses  LLE: non-tender to palpation across upper and lower leg; full ROM in hip, knee, ankle, and toes, 5/5 dorsiflexion + plantarflexion, palpable DP + PT pulses; warm, well-perfused  RLE: non-tender to palpation across upper and lower leg; full ROM in hip, knee, ankle, and toes, 5/5 dorsiflexion + plantarflexion, palpable DP + PT pulses; warm, well-perfused  NEURO: AAOx4, no focal neuro deficits; CN II-IX grossly intact     Medications (inpatient): chlordiazePOXIDE 50 milliGRAM(s) Oral every 6 hours  chlordiazePOXIDE   Oral   chlorhexidine 2% Cloths 1 Application(s) Topical daily  dextrose 5% + sodium chloride 0.45% 1000 milliLiter(s) IV Continuous <Continuous>  folic acid Injectable 1 milliGRAM(s) IV Push daily  phytonadione  IVPB 10 milliGRAM(s) IV Intermittent every 24 hours  thiamine Injectable 100 milliGRAM(s) IV Push daily    Medications (PRN):HYDROmorphone  Injectable 0.5 milliGRAM(s) IV Push every 4 hours PRN  LORazepam   Injectable 1 milliGRAM(s) IV Push every 2 hours PRN  ondansetron Injectable 4 milliGRAM(s) IV Push every 6 hours PRN  oxyCODONE    IR 5 milliGRAM(s) Oral every 4 hours PRN    Allergies: No Known Allergies  (Intolerances: )    Vital Signs Last 24 Hrs  T(C): 36.8 (15 Salas 2023 11:00), Max: 37.1 (15 Salas 2023 03:00)  T(F): 98.2 (15 Salas 2023 11:00), Max: 98.8 (15 Salas 2023 03:00)  HR: 80 (15 Salas 2023 17:00) (74 - 119)  BP: 131/73 (15 Salas 2023 17:00) (112/60 - 164/88)  BP(mean): 95 (15 Salas 2023 17:00) (79 - 125)  RR: 13 (15 Salas 2023 17:00) (10 - 23)  SpO2: 93% (15 Salas 2023 17:00) (93% - 98%)    Parameters below as of 15 Salas 2023 11:00  Patient On (Oxygen Delivery Method): room air      Drug Dosing Weight  Height (cm): 180.3 (14 Jan 2023 07:35)  Weight (kg): 91.5 (14 Jan 2023 07:35)  BMI (kg/m2): 28.1 (14 Jan 2023 07:35)  BSA (m2): 2.12 (14 Jan 2023 07:35)                          9.7    4.27  )-----------( 33       ( 15 Salas 2023 12:44 )             29.0     01-15    134<L>  |  97  |  8   ----------------------------<  107<H>  4.1   |  25  |  0.61    Ca    7.7<L>      15 Salas 2023 12:44  Phos  2.9     01-15  Mg     2.0     01-15    TPro  6.1  /  Alb  2.9<L>  /  TBili  5.4<H>  /  DBili  x   /  AST  83<H>  /  ALT  44  /  AlkPhos  90  01-15    PT/INR - ( 15 Salas 2023 12:44 )   PT: 16.5 sec;   INR: 1.42 ratio         PTT - ( 15 Salas 2023 02:30 )  PTT:33.9 sec      List Operative and Interventional Radiological Procedures:     Consults (Date):  [  ] Neurosurgery   [  ] Orthopedics  [  ] Plastics  [  ] Urology  [  ] PM&R  [  ] Social Work    RADIOLOGICAL FINDINGS REVIEW:  CXR: none    Pelvis Films: none    C-Spine Films: none    T/L/S Spine Films: none    Extremity Films: none    Brain CT: Moderate atrophy and small vessel white matter ischemic   changes. No hemorrhage.    Cervical spine CT: Mild degenerative changes. No acute fractures or   dislocations.      Chest/ABD/Pelvis CT: Apparent new mild segmental mural thickening of the sigmoid colon with edema of the adjacent mesentery may be due to contusion to the sigmoid colon/mesentery.  Presacral soft tissue density measuring 3.8 x 3.8 cm with a Hounsfield unit of 74 may represent a hematoma in the setting of trauma. Follow-up   CT may be pursued in 4 weeks to ensure resolution and to rule out metastasis.    No acute bony fracture is identified. No CT evidence for an acute intrathoracic injury. Stable enlarged mediastinal lymph nodes.      ASSESSMENT:   63 year old male presenting s/p fall down 12 stairs recovering in the SICU for alcohol withdrawal monitoring.    PLAN:   - Serial abdominal exams  - Multimodal pain control  - CIWA protocol  - Pt not to be transferred to floor until not given ativan >24hr and CIWA<8    Known Injuries:   - Presacral hematoma  - Sigmoid/mesenteric contusion

## 2023-01-15 NOTE — PROGRESS NOTE ADULT - ASSESSMENT
63 year old male presenting s/p fall down 12 stairs recovering in the SICU for alcohol withdrawal monitoring    Injuries identified:  - Presacral hematoma  - Sigmoid/mesenteric contusion    Plan:  - Serial abdominal exams  - Multimodal pain control  - CIWA protocol  - Tertiary exam today vs tomorrow  - appreciate excellent SICU care      ACS/Trauma Surgery  [id number]

## 2023-01-15 NOTE — PROGRESS NOTE ADULT - SUBJECTIVE AND OBJECTIVE BOX
24 HOUR EVENTS:  -Admit to SICU  -Non bloody, bilious emesis x4  -NGT attempted, pt combative and w mild hemoptysis-Varices suspected, NGT placement aborted   -Lactate 4.7-->1.3  -ETOH withdrawal  -Ativan x2  -1:1, dc'd o/n    SUBJECTIVE/ROS:  [ ] A ten-point review of systems was otherwise negative except as noted.  [ ] Due to altered mental status/intubation, subjective information were not able to be obtained from the patient. History was obtained, to the extent possible, from review of the chart and collateral sources of information.      NEURO  RASS:  -1   GCS:  15   CAM ICU:  Exam: awake, alert, oriented  Meds: HYDROmorphone  Injectable 0.5 milliGRAM(s) IV Push every 4 hours PRN breakthrough pain  LORazepam   Injectable 1 milliGRAM(s) IV Push every 2 hours PRN CIWA> 8  ondansetron Injectable 4 milliGRAM(s) IV Push every 6 hours PRN Nausea and/or Vomiting    [x] Adequacy of sedation and pain control has been assessed and adjusted      RESPIRATORY  RR: 12 (01-15-23 @ 04:00) (10 - 39)  SpO2: 96% (01-15-23 @ 04:00) (93% - 99%)  Wt(kg): --  Exam: unlabored, clear to auscultation bilaterally  Mechanical Ventilation:   ABG - ( 14 Jan 2023 00:10 )  pH: x     /  pCO2: x     /  pO2: x     / HCO3: x     / Base Excess: x     /  SaO2: x       Lactate: 3.0          CARDIOVASCULAR  HR: 74 (01-15-23 @ 04:00) (74 - 119)  BP: 112/60 (01-15-23 @ 04:00) (112/60 - 169/95)  BP(mean): 79 (01-15-23 @ 04:00) (79 - 125)  ABP: --  ABP(mean): --  Wt(kg): --  CVP(cm H2O): --  VBG - ( 15 Salas 2023 02:19 )  pH: 7.44  /  pCO2: 42    /  pO2: 60    / HCO3: 28    / Base Excess: 3.9   /  SaO2: 93.2   Lactate: 0.9                Exam: regular rate and rhythm  Cardiac Rhythm: sinus  Perfusion     [x]Adequate   [ ]Inadequate  Mentation   [x]Normal       [ ]Reduced  Extremities  [x]Warm         [ ]Cool  Volume Status [ ]Hypervolemic [x]Euvolemic [ ]Hypovolemic  Meds:       GI/NUTRITION  Exam: soft, nondistended, tender in epigastrium and b/l LQ, no rebound, voluntary guarding  Diet: NPO      GENITOURINARY  I&O's Detail    01-14 @ 07:01  -  01-15 @ 04:58  --------------------------------------------------------  IN:    IV PiggyBack: 350 mL    IV PiggyBack: 499.8 mL    IV PiggyBack: 525 mL    Lactated Ringers: 2375 mL    Lactated Ringers Bolus: 1500 mL  Total IN: 5249.8 mL    OUT:    Voided (mL): 1825 mL  Total OUT: 1825 mL    Total NET: 3424.8 mL        Weight (kg): 91.5 (01-14 @ 07:35)  01-15    136  |  100  |  10  ----------------------------<  113<H>  3.7   |  27  |  0.61    Ca    7.6<L>      15 Salas 2023 02:30  Phos  2.2     01-15  Mg     1.9     01-15    TPro  5.9<L>  /  Alb  2.9<L>  /  TBili  5.7<H>  /  DBili  x   /  AST  91<H>  /  ALT  45  /  AlkPhos  90  01-15    [ ] Dominguez catheter, indication: N/A  Meds: folic acid Injectable 1 milliGRAM(s) IV Push daily  lactated ringers. 1000 milliLiter(s) IV Continuous <Continuous>  phytonadione  IVPB 10 milliGRAM(s) IV Intermittent every 24 hours  potassium phosphate IVPB 30 milliMole(s) IV Intermittent once  thiamine Injectable 100 milliGRAM(s) IV Push daily        HEMATOLOGIC  Meds:   [x] VTE Prophylaxis                        9.2    3.71  )-----------( 31       ( 15 Salas 2023 02:30 )             26.8     PT/INR - ( 15 Salas 2023 02:30 )   PT: 17.7 sec;   INR: 1.53 ratio         PTT - ( 15 Salas 2023 02:30 )  PTT:33.9 sec  Transfusion     [ ] PRBC   [ ] Platelets   [ ] FFP   [ ] Cryoprecipitate      INFECTIOUS DISEASES  WBC Count: 3.71 K/uL (01-15 @ 02:30)  WBC Count: 5.26 K/uL (01-14 @ 17:18)  WBC Count: 4.66 K/uL (01-14 @ 08:14)    RECENT CULTURES:    Meds:       ENDOCRINE  CAPILLARY BLOOD GLUCOSE        Meds:       ACCESS DEVICES:  [x ] Peripheral IV  [ ] Central Venous Line	[ ] R	[ ] L	[ ] IJ	[ ] Fem	[ ] SC	Placed:   [ ] Arterial Line		[ ] R	[ ] L	[ ] Fem	[ ] Rad	[ ] Ax	Placed:   [ ] PICC:					[ ] Mediport  [ ] Urinary Catheter, Date Placed:   [x] Necessity of urinary, arterial, and venous catheters discussed    OTHER MEDICATIONS:  chlorhexidine 2% Cloths 1 Application(s) Topical daily      CODE STATUS:      IMAGING:

## 2023-01-15 NOTE — PROGRESS NOTE ADULT - SUBJECTIVE AND OBJECTIVE BOX
Trauma Surgery Progress Note  Patient is a 63y old  Male who presents with a chief complaint of Trauma - s/p fall down 12 stairs (15 Salas 2023 04:58)      INTERVAL EVENTS:  -Admit to SICU  -Non bloody, bilious emesis x4  -NGT attempted, pt combative and w mild hemoptysis-Varices suspected, NGT placement aborted   -Lactate 4.7-->1.3  -ETOH withdrawal  -Ativan x2  -1:1, dc'd o/n    SUBJECTIVE: Patient seen and examined at bedside with surgical team. Abdominal pain improved, reports persistent back pain.    OBJECTIVE:    Vital Signs Last 24 Hrs  T(C): 36.7 (15 Salas 2023 07:00), Max: 37.1 (15 Salas 2023 03:00)  T(F): 98 (15 Salas 2023 07:00), Max: 98.8 (15 Salas 2023 03:00)  HR: 103 (15 Salas 2023 10:00) (74 - 119)  BP: 164/88 (15 Salas 2023 10:00) (112/60 - 164/88)  BP(mean): 115 (15 Salas 2023 10:00) (79 - 125)  RR: 11 (15 Salas 2023 10:00) (10 - 39)  SpO2: 95% (15 Salas 2023 10:00) (93% - 98%)    Parameters below as of 14 Jan 2023 19:00  Patient On (Oxygen Delivery Method): room air    I&O's Detail    14 Jan 2023 07:01  -  15 Salas 2023 07:00  --------------------------------------------------------  IN:    IV PiggyBack: 499.8 mL    IV PiggyBack: 550 mL    IV PiggyBack: 599.9 mL    Lactated Ringers: 2750 mL    Lactated Ringers Bolus: 1500 mL  Total IN: 5899.7 mL    OUT:    Voided (mL): 2075 mL  Total OUT: 2075 mL    Total NET: 3824.7 mL      15 Salas 2023 07:01  -  15 Salas 2023 11:38  --------------------------------------------------------  IN:    Lactated Ringers: 250 mL  Total IN: 250 mL    OUT:    Voided (mL): 250 mL  Total OUT: 250 mL    Total NET: 0 mL      MEDICATIONS  (STANDING):  chlorhexidine 2% Cloths 1 Application(s) Topical daily  dextrose 5% + sodium chloride 0.45% 1000 milliLiter(s) (50 mL/Hr) IV Continuous <Continuous>  folic acid Injectable 1 milliGRAM(s) IV Push daily  phytonadione  IVPB 10 milliGRAM(s) IV Intermittent every 24 hours  thiamine Injectable 100 milliGRAM(s) IV Push daily    MEDICATIONS  (PRN):  HYDROmorphone  Injectable 0.5 milliGRAM(s) IV Push every 4 hours PRN breakthrough pain  LORazepam   Injectable 1 milliGRAM(s) IV Push every 2 hours PRN CIWA> 8  ondansetron Injectable 4 milliGRAM(s) IV Push every 6 hours PRN Nausea and/or Vomiting  oxyCODONE    IR 5 milliGRAM(s) Oral every 4 hours PRN Moderate Pain /severe pain      PHYSICAL EXAM:  General: NAD, resting comfortably in bed  HEENT: Normocephalic, small abrasion above R eyebrow  Respiratory: equal chest wall expansion bilaterally   Abdomen: Soft, non-distended, mildly tender to palpation in midline periumbilically and suprapubic region, no rebound or guarding  Ext: motor and sensory grossly intact in all 4 extremities, abrasion on R knee. b/l tremor in his hands    LABS:                        9.2    3.71  )-----------( 31       ( 15 Salas 2023 02:30 )             26.8     01-15    136  |  100  |  10  ----------------------------<  113<H>  3.7   |  27  |  0.61    Ca    7.6<L>      15 Salas 2023 02:30  Phos  2.2     01-15  Mg     1.9     01-15    TPro  5.9<L>  /  Alb  2.9<L>  /  TBili  5.7<H>  /  DBili  x   /  AST  91<H>  /  ALT  45  /  AlkPhos  90  01-15    PT/INR - ( 15 Salas 2023 02:30 )   PT: 17.7 sec;   INR: 1.53 ratio         PTT - ( 15 Salas 2023 02:30 )  PTT:33.9 sec  LIVER FUNCTIONS - ( 15 Salas 2023 02:30 )  Alb: 2.9 g/dL / Pro: 5.9 g/dL / ALK PHOS: 90 U/L / ALT: 45 U/L / AST: 91 U/L / GGT: x                 IMAGING:

## 2023-01-15 NOTE — PROGRESS NOTE ADULT - ASSESSMENT
Mr. Staples is a 63 year old male with hx of HTN, alcohol use and pancreatic cancer s/p whipple presenting from OSH ([place]) for trauma evaluation s/p fall down 12 stairs. + HS, denies LOC. (-) AC. Intoxicated during fall. Of note, has history of ETOH withdrawal requiring hospitalization. On CT imaging at [place], patient found to have intraabdominal hematoma w/ concerns for sigmoid/mesenteric contusion, 3.8x3.8 presacral hematoma, w/o evidence of acute fracture in the thorax or pelvis. In transit, patient agitated with tremors concerning for alcohol withdrawal and DT's. Given Ativan and Fentanyl. SICU consulted i/s/o alcohol withdrawal.    Plan:     Neuro: hx ETOH abuse, + HS after fall but CTH negative for hemorrhage  -CIWA  -Ativan 1mg q2 PRN CIWA>8  -Pain control PRN, requires serial exams     Respiratory: No active issues or hx of pulmonary disease  -RA  -Incentive spirometry     Cardiovascular: Hx HTN  -ARB @ Home   -Will restart as needed  -MAP>65  -Lactate cleared    GI: Hx Pancreatic Ca s/p whipple, now w CT cf sigmoid colon/mesenteric contusion, 4 episodes of Non bloody, bilious Emesis since fall  -NPO except meds  -NGT attempted, aborted d/t combativeness and hemoptysis cf possible varices  -Zofran PRN  -Serial exams, exam before pain meds    :   -LR @ 125  -Voiding  -Replete lytes PRN    Heme:   -Vitamin K 10 x 3 days for INR 1.5  -CBC q8  -[employer] VTE ppx i/s/o presacral hematoma and slightly downtrending h/h  -SCD for mechanical VTE ppx  -F/U Thrombocytopenia     ID:   -Afebrile, normal WBC  -Monitor off Abx    Endocrine:   -Thiamine, Folate     Lines/Tubes  -PIV    Code Status:   -Full Code    Dispo:   -SICU    [id number]

## 2023-01-16 LAB
ALBUMIN SERPL ELPH-MCNC: 2.8 G/DL — LOW (ref 3.3–5)
ALBUMIN SERPL ELPH-MCNC: 3 G/DL — LOW (ref 3.3–5)
ALP SERPL-CCNC: 79 U/L — SIGNIFICANT CHANGE UP (ref 40–120)
ALP SERPL-CCNC: 84 U/L — SIGNIFICANT CHANGE UP (ref 40–120)
ALT FLD-CCNC: 36 U/L — SIGNIFICANT CHANGE UP (ref 10–45)
ALT FLD-CCNC: 45 U/L — SIGNIFICANT CHANGE UP (ref 10–45)
ANION GAP SERPL CALC-SCNC: 10 MMOL/L — SIGNIFICANT CHANGE UP (ref 5–17)
ANION GAP SERPL CALC-SCNC: 11 MMOL/L — SIGNIFICANT CHANGE UP (ref 5–17)
APPEARANCE UR: ABNORMAL
APTT BLD: 34.7 SEC — SIGNIFICANT CHANGE UP (ref 27.5–35.5)
APTT BLD: 35.7 SEC — HIGH (ref 27.5–35.5)
AST SERPL-CCNC: 67 U/L — HIGH (ref 10–40)
AST SERPL-CCNC: 97 U/L — HIGH (ref 10–40)
BACTERIA # UR AUTO: ABNORMAL
BASE EXCESS BLDV CALC-SCNC: 5 MMOL/L — HIGH (ref -2–3)
BASE EXCESS BLDV CALC-SCNC: 5.1 MMOL/L — HIGH (ref -2–3)
BILIRUB SERPL-MCNC: 3.9 MG/DL — HIGH (ref 0.2–1.2)
BILIRUB SERPL-MCNC: 4.7 MG/DL — HIGH (ref 0.2–1.2)
BILIRUB UR-MCNC: NEGATIVE — SIGNIFICANT CHANGE UP
BUN SERPL-MCNC: 5 MG/DL — LOW (ref 7–23)
BUN SERPL-MCNC: 7 MG/DL — SIGNIFICANT CHANGE UP (ref 7–23)
CA-I SERPL-SCNC: 1.05 MMOL/L — LOW (ref 1.15–1.33)
CA-I SERPL-SCNC: 1.07 MMOL/L — LOW (ref 1.15–1.33)
CALCIUM SERPL-MCNC: 7.8 MG/DL — LOW (ref 8.4–10.5)
CALCIUM SERPL-MCNC: 7.9 MG/DL — LOW (ref 8.4–10.5)
CHLORIDE BLDV-SCNC: 94 MMOL/L — LOW (ref 96–108)
CHLORIDE BLDV-SCNC: 95 MMOL/L — LOW (ref 96–108)
CHLORIDE SERPL-SCNC: 93 MMOL/L — LOW (ref 96–108)
CHLORIDE SERPL-SCNC: 93 MMOL/L — LOW (ref 96–108)
CO2 BLDV-SCNC: 31 MMOL/L — HIGH (ref 22–26)
CO2 BLDV-SCNC: 31 MMOL/L — HIGH (ref 22–26)
CO2 SERPL-SCNC: 25 MMOL/L — SIGNIFICANT CHANGE UP (ref 22–31)
CO2 SERPL-SCNC: 27 MMOL/L — SIGNIFICANT CHANGE UP (ref 22–31)
COLOR SPEC: YELLOW — SIGNIFICANT CHANGE UP
CREAT SERPL-MCNC: 0.62 MG/DL — SIGNIFICANT CHANGE UP (ref 0.5–1.3)
CREAT SERPL-MCNC: 0.73 MG/DL — SIGNIFICANT CHANGE UP (ref 0.5–1.3)
DIFF PNL FLD: ABNORMAL
EGFR: 103 ML/MIN/1.73M2 — SIGNIFICANT CHANGE UP
EGFR: 108 ML/MIN/1.73M2 — SIGNIFICANT CHANGE UP
EPI CELLS # UR: 0 /HPF — SIGNIFICANT CHANGE UP
GAS PNL BLDV: 126 MMOL/L — LOW (ref 136–145)
GAS PNL BLDV: 128 MMOL/L — LOW (ref 136–145)
GAS PNL BLDV: SIGNIFICANT CHANGE UP
GLUCOSE BLDV-MCNC: 103 MG/DL — HIGH (ref 70–99)
GLUCOSE BLDV-MCNC: 99 MG/DL — SIGNIFICANT CHANGE UP (ref 70–99)
GLUCOSE SERPL-MCNC: 110 MG/DL — HIGH (ref 70–99)
GLUCOSE SERPL-MCNC: 114 MG/DL — HIGH (ref 70–99)
GLUCOSE UR QL: NEGATIVE — SIGNIFICANT CHANGE UP
HCO3 BLDV-SCNC: 30 MMOL/L — HIGH (ref 22–29)
HCO3 BLDV-SCNC: 30 MMOL/L — HIGH (ref 22–29)
HCT VFR BLD CALC: 26.9 % — LOW (ref 39–50)
HCT VFR BLD CALC: 28.4 % — LOW (ref 39–50)
HCT VFR BLDA CALC: 29 % — LOW (ref 39–51)
HCT VFR BLDA CALC: 31 % — LOW (ref 39–51)
HGB BLD CALC-MCNC: 10.2 G/DL — LOW (ref 12.6–17.4)
HGB BLD CALC-MCNC: 9.8 G/DL — LOW (ref 12.6–17.4)
HGB BLD-MCNC: 9.5 G/DL — LOW (ref 13–17)
HGB BLD-MCNC: 9.9 G/DL — LOW (ref 13–17)
HOROWITZ INDEX BLDV+IHG-RTO: 21 — SIGNIFICANT CHANGE UP
HOROWITZ INDEX BLDV+IHG-RTO: 21 — SIGNIFICANT CHANGE UP
HYALINE CASTS # UR AUTO: 1 /LPF — SIGNIFICANT CHANGE UP (ref 0–2)
INR BLD: 1.45 RATIO — HIGH (ref 0.88–1.16)
INR BLD: 1.58 RATIO — HIGH (ref 0.88–1.16)
KETONES UR-MCNC: ABNORMAL
LACTATE BLDV-MCNC: 1 MMOL/L — SIGNIFICANT CHANGE UP (ref 0.5–2)
LACTATE BLDV-MCNC: 1.2 MMOL/L — SIGNIFICANT CHANGE UP (ref 0.5–2)
LEUKOCYTE ESTERASE UR-ACNC: ABNORMAL
MAGNESIUM SERPL-MCNC: 1.6 MG/DL — SIGNIFICANT CHANGE UP (ref 1.6–2.6)
MAGNESIUM SERPL-MCNC: 1.7 MG/DL — SIGNIFICANT CHANGE UP (ref 1.6–2.6)
MCHC RBC-ENTMCNC: 31.4 PG — SIGNIFICANT CHANGE UP (ref 27–34)
MCHC RBC-ENTMCNC: 31.5 PG — SIGNIFICANT CHANGE UP (ref 27–34)
MCHC RBC-ENTMCNC: 34.9 GM/DL — SIGNIFICANT CHANGE UP (ref 32–36)
MCHC RBC-ENTMCNC: 35.3 GM/DL — SIGNIFICANT CHANGE UP (ref 32–36)
MCV RBC AUTO: 89.1 FL — SIGNIFICANT CHANGE UP (ref 80–100)
MCV RBC AUTO: 90.2 FL — SIGNIFICANT CHANGE UP (ref 80–100)
NITRITE UR-MCNC: NEGATIVE — SIGNIFICANT CHANGE UP
NRBC # BLD: 0 /100 WBCS — SIGNIFICANT CHANGE UP (ref 0–0)
NRBC # BLD: 0 /100 WBCS — SIGNIFICANT CHANGE UP (ref 0–0)
PCO2 BLDV: 44 MMHG — SIGNIFICANT CHANGE UP (ref 42–55)
PCO2 BLDV: 45 MMHG — SIGNIFICANT CHANGE UP (ref 42–55)
PH BLDV: 7.43 — SIGNIFICANT CHANGE UP (ref 7.32–7.43)
PH BLDV: 7.44 — HIGH (ref 7.32–7.43)
PH UR: 6.5 — SIGNIFICANT CHANGE UP (ref 5–8)
PHOSPHATE SERPL-MCNC: 2.2 MG/DL — LOW (ref 2.5–4.5)
PHOSPHATE SERPL-MCNC: 2.3 MG/DL — LOW (ref 2.5–4.5)
PLATELET # BLD AUTO: 70 K/UL — LOW (ref 150–400)
PLATELET # BLD AUTO: 84 K/UL — LOW (ref 150–400)
PO2 BLDV: 53 MMHG — HIGH (ref 25–45)
PO2 BLDV: 56 MMHG — HIGH (ref 25–45)
POTASSIUM BLDV-SCNC: 4.4 MMOL/L — SIGNIFICANT CHANGE UP (ref 3.5–5.1)
POTASSIUM BLDV-SCNC: 5.7 MMOL/L — HIGH (ref 3.5–5.1)
POTASSIUM SERPL-MCNC: 4.4 MMOL/L — SIGNIFICANT CHANGE UP (ref 3.5–5.3)
POTASSIUM SERPL-MCNC: 4.6 MMOL/L — SIGNIFICANT CHANGE UP (ref 3.5–5.3)
POTASSIUM SERPL-SCNC: 4.4 MMOL/L — SIGNIFICANT CHANGE UP (ref 3.5–5.3)
POTASSIUM SERPL-SCNC: 4.6 MMOL/L — SIGNIFICANT CHANGE UP (ref 3.5–5.3)
PROT SERPL-MCNC: 6.2 G/DL — SIGNIFICANT CHANGE UP (ref 6–8.3)
PROT SERPL-MCNC: 6.6 G/DL — SIGNIFICANT CHANGE UP (ref 6–8.3)
PROT UR-MCNC: NEGATIVE — SIGNIFICANT CHANGE UP
PROTHROM AB SERPL-ACNC: 16.9 SEC — HIGH (ref 10.5–13.4)
PROTHROM AB SERPL-ACNC: 18.2 SEC — HIGH (ref 10.5–13.4)
RBC # BLD: 3.02 M/UL — LOW (ref 4.2–5.8)
RBC # BLD: 3.15 M/UL — LOW (ref 4.2–5.8)
RBC # FLD: 12.7 % — SIGNIFICANT CHANGE UP (ref 10.3–14.5)
RBC # FLD: 13 % — SIGNIFICANT CHANGE UP (ref 10.3–14.5)
RBC CASTS # UR COMP ASSIST: 2 /HPF — SIGNIFICANT CHANGE UP (ref 0–4)
SAO2 % BLDV: 89.1 % — HIGH (ref 67–88)
SAO2 % BLDV: 91.2 % — HIGH (ref 67–88)
SODIUM SERPL-SCNC: 129 MMOL/L — LOW (ref 135–145)
SODIUM SERPL-SCNC: 130 MMOL/L — LOW (ref 135–145)
SP GR SPEC: 1.01 — SIGNIFICANT CHANGE UP (ref 1.01–1.02)
UROBILINOGEN FLD QL: ABNORMAL
WBC # BLD: 5.27 K/UL — SIGNIFICANT CHANGE UP (ref 3.8–10.5)
WBC # BLD: 6.48 K/UL — SIGNIFICANT CHANGE UP (ref 3.8–10.5)
WBC # FLD AUTO: 5.27 K/UL — SIGNIFICANT CHANGE UP (ref 3.8–10.5)
WBC # FLD AUTO: 6.48 K/UL — SIGNIFICANT CHANGE UP (ref 3.8–10.5)
WBC UR QL: 23 /HPF — HIGH (ref 0–5)

## 2023-01-16 PROCEDURE — 99232 SBSQ HOSP IP/OBS MODERATE 35: CPT

## 2023-01-16 RX ORDER — MAGNESIUM SULFATE 500 MG/ML
2 VIAL (ML) INJECTION ONCE
Refills: 0 | Status: COMPLETED | OUTPATIENT
Start: 2023-01-16 | End: 2023-01-16

## 2023-01-16 RX ORDER — ENOXAPARIN SODIUM 100 MG/ML
40 INJECTION SUBCUTANEOUS EVERY 24 HOURS
Refills: 0 | Status: DISCONTINUED | OUTPATIENT
Start: 2023-01-16 | End: 2023-01-20

## 2023-01-16 RX ORDER — CALCIUM GLUCONATE 100 MG/ML
2 VIAL (ML) INTRAVENOUS ONCE
Refills: 0 | Status: COMPLETED | OUTPATIENT
Start: 2023-01-16 | End: 2023-01-16

## 2023-01-16 RX ORDER — CEFTRIAXONE 500 MG/1
1000 INJECTION, POWDER, FOR SOLUTION INTRAMUSCULAR; INTRAVENOUS EVERY 24 HOURS
Refills: 0 | Status: DISCONTINUED | OUTPATIENT
Start: 2023-01-16 | End: 2023-01-18

## 2023-01-16 RX ORDER — OXYCODONE HYDROCHLORIDE 5 MG/1
5 TABLET ORAL EVERY 4 HOURS
Refills: 0 | Status: DISCONTINUED | OUTPATIENT
Start: 2023-01-16 | End: 2023-01-17

## 2023-01-16 RX ORDER — LOPERAMIDE HCL 2 MG
2 TABLET ORAL ONCE
Refills: 0 | Status: COMPLETED | OUTPATIENT
Start: 2023-01-16 | End: 2023-01-16

## 2023-01-16 RX ORDER — LIDOCAINE 4 G/100G
1 CREAM TOPICAL EVERY 24 HOURS
Refills: 0 | Status: DISCONTINUED | OUTPATIENT
Start: 2023-01-16 | End: 2023-01-20

## 2023-01-16 RX ORDER — OXYCODONE HYDROCHLORIDE 5 MG/1
2.5 TABLET ORAL EVERY 4 HOURS
Refills: 0 | Status: DISCONTINUED | OUTPATIENT
Start: 2023-01-16 | End: 2023-01-17

## 2023-01-16 RX ADMIN — Medication 200 GRAM(S): at 23:09

## 2023-01-16 RX ADMIN — Medication 102 MILLIGRAM(S): at 12:40

## 2023-01-16 RX ADMIN — HYDROMORPHONE HYDROCHLORIDE 0.5 MILLIGRAM(S): 2 INJECTION INTRAMUSCULAR; INTRAVENOUS; SUBCUTANEOUS at 13:52

## 2023-01-16 RX ADMIN — LOSARTAN POTASSIUM 50 MILLIGRAM(S): 100 TABLET, FILM COATED ORAL at 05:00

## 2023-01-16 RX ADMIN — HYDROMORPHONE HYDROCHLORIDE 0.5 MILLIGRAM(S): 2 INJECTION INTRAMUSCULAR; INTRAVENOUS; SUBCUTANEOUS at 04:45

## 2023-01-16 RX ADMIN — HYDROMORPHONE HYDROCHLORIDE 0.5 MILLIGRAM(S): 2 INJECTION INTRAMUSCULAR; INTRAVENOUS; SUBCUTANEOUS at 10:17

## 2023-01-16 RX ADMIN — LIDOCAINE 1 PATCH: 4 CREAM TOPICAL at 03:41

## 2023-01-16 RX ADMIN — Medication 100 MILLIGRAM(S): at 12:40

## 2023-01-16 RX ADMIN — OXYCODONE HYDROCHLORIDE 5 MILLIGRAM(S): 5 TABLET ORAL at 22:12

## 2023-01-16 RX ADMIN — HYDROMORPHONE HYDROCHLORIDE 0.5 MILLIGRAM(S): 2 INJECTION INTRAMUSCULAR; INTRAVENOUS; SUBCUTANEOUS at 19:46

## 2023-01-16 RX ADMIN — Medication 2 MILLIGRAM(S): at 03:10

## 2023-01-16 RX ADMIN — SODIUM CHLORIDE 50 MILLILITER(S): 9 INJECTION, SOLUTION INTRAVENOUS at 09:15

## 2023-01-16 RX ADMIN — Medication 25 GRAM(S): at 23:48

## 2023-01-16 RX ADMIN — ENOXAPARIN SODIUM 40 MILLIGRAM(S): 100 INJECTION SUBCUTANEOUS at 12:40

## 2023-01-16 RX ADMIN — Medication 255 MILLIMOLE(S): at 04:09

## 2023-01-16 RX ADMIN — OXYCODONE HYDROCHLORIDE 5 MILLIGRAM(S): 5 TABLET ORAL at 21:42

## 2023-01-16 RX ADMIN — LIDOCAINE 1 PATCH: 4 CREAM TOPICAL at 08:35

## 2023-01-16 RX ADMIN — CHLORHEXIDINE GLUCONATE 1 APPLICATION(S): 213 SOLUTION TOPICAL at 12:41

## 2023-01-16 RX ADMIN — HYDROMORPHONE HYDROCHLORIDE 0.5 MILLIGRAM(S): 2 INJECTION INTRAMUSCULAR; INTRAVENOUS; SUBCUTANEOUS at 05:00

## 2023-01-16 RX ADMIN — HYDROMORPHONE HYDROCHLORIDE 0.5 MILLIGRAM(S): 2 INJECTION INTRAMUSCULAR; INTRAVENOUS; SUBCUTANEOUS at 09:15

## 2023-01-16 RX ADMIN — Medication 1 MILLIGRAM(S): at 13:05

## 2023-01-16 RX ADMIN — Medication 50 MILLIGRAM(S): at 05:01

## 2023-01-16 RX ADMIN — OXYCODONE HYDROCHLORIDE 5 MILLIGRAM(S): 5 TABLET ORAL at 12:40

## 2023-01-16 RX ADMIN — CEFTRIAXONE 100 MILLIGRAM(S): 500 INJECTION, POWDER, FOR SOLUTION INTRAMUSCULAR; INTRAVENOUS at 14:23

## 2023-01-16 RX ADMIN — SODIUM CHLORIDE 50 MILLILITER(S): 9 INJECTION, SOLUTION INTRAVENOUS at 17:37

## 2023-01-16 RX ADMIN — OXYCODONE HYDROCHLORIDE 5 MILLIGRAM(S): 5 TABLET ORAL at 18:35

## 2023-01-16 RX ADMIN — OXYCODONE HYDROCHLORIDE 5 MILLIGRAM(S): 5 TABLET ORAL at 17:38

## 2023-01-16 RX ADMIN — HYDROMORPHONE HYDROCHLORIDE 0.5 MILLIGRAM(S): 2 INJECTION INTRAMUSCULAR; INTRAVENOUS; SUBCUTANEOUS at 00:04

## 2023-01-16 RX ADMIN — Medication 200 GRAM(S): at 00:40

## 2023-01-16 RX ADMIN — HYDROMORPHONE HYDROCHLORIDE 0.5 MILLIGRAM(S): 2 INJECTION INTRAMUSCULAR; INTRAVENOUS; SUBCUTANEOUS at 00:20

## 2023-01-16 RX ADMIN — Medication 50 MILLIGRAM(S): at 12:39

## 2023-01-16 RX ADMIN — Medication 50 MILLIGRAM(S): at 21:11

## 2023-01-16 RX ADMIN — OXYCODONE HYDROCHLORIDE 5 MILLIGRAM(S): 5 TABLET ORAL at 13:51

## 2023-01-16 RX ADMIN — Medication 25 GRAM(S): at 02:06

## 2023-01-16 RX ADMIN — HYDROMORPHONE HYDROCHLORIDE 0.5 MILLIGRAM(S): 2 INJECTION INTRAMUSCULAR; INTRAVENOUS; SUBCUTANEOUS at 14:22

## 2023-01-16 RX ADMIN — SODIUM CHLORIDE 50 MILLILITER(S): 9 INJECTION, SOLUTION INTRAVENOUS at 00:40

## 2023-01-16 RX ADMIN — OXYCODONE HYDROCHLORIDE 5 MILLIGRAM(S): 5 TABLET ORAL at 03:08

## 2023-01-16 RX ADMIN — OXYCODONE HYDROCHLORIDE 5 MILLIGRAM(S): 5 TABLET ORAL at 02:38

## 2023-01-16 RX ADMIN — HYDROMORPHONE HYDROCHLORIDE 0.5 MILLIGRAM(S): 2 INJECTION INTRAMUSCULAR; INTRAVENOUS; SUBCUTANEOUS at 20:05

## 2023-01-16 RX ADMIN — LIDOCAINE 1 PATCH: 4 CREAM TOPICAL at 13:51

## 2023-01-16 NOTE — PROGRESS NOTE ADULT - ASSESSMENT
ASSESSMENT:  Patient is a 62 y/o Male with a PMH of HTN, Alcohol use ( prior hospitalization 2/2 alcohol withdrawal), and pancreatic cancer s/p whipple presenting as a transfer from [facility] for a trauma evaluation after falling down 12 stairs. Patient admits to HS, denies LOC, is not currently on AC, and notes he has had bilious emesis since Fall 2022. CT imaging from OSH reported an intraabdominal hematoma with concerns for sigmoid/mesenteric contusion and a presacral hematoma. Imaging was unremarkable for an acute fracture in the thorax or pelvis. Hospital course c/b agitation and tremors requiring Ativan and Fentanyl IVP. Patient transferred to [facility] SICU for further monitoring and management of alcohol withdrawal.     PLAN:  Neuro: Hx ETOH abuse, + HS after fall   - Multimodal pain control w/ Oxy/Dilaudid prn   - CT Head Negative for ICH   - CIWA Protocol with Ativan 1mg q2hr prn for CIWA > 8   - Continue Librium Taper   - Folic Acid and Thiamine     Resp:  - Maintain SpO2 > 92%   - Out of bed to chair, ambulate as tolerated, and incentive spirometry to prevent atelectasis    CV: Hx HTN   - Maintain goal MAP > 65 mmHg  - Start Losartan 50mg qd ( equivalent to home ARB)     GI: Hx Pancreatic Cancer s/p Whipple   - CLD, will advance as tolerated   - NGT attempted, aborted 2/2 combative and hemoptysis 2/2 poss varices   - Zofran prn ( s/p 4 episodes of NB emesis)   - CT Abd/Pelvis reports sigmoid/mesenteric contusion       Renal:  - D5 1/2 NS @ 50mL/hr   - Monitor I&Os and electrolytes w/ repletions as necessary    Heme:  - Monitor CBC and coags  - [employer] VTE prophylaxis 2/2 presacral hematoma with slightly downtrending H/H  - SCDs for mechanical VTE ppx  - Vit K x3 days for INR of 1.5    ID:   - Monitor WBC, temperature, and procalcitonin  - Monitor off antibiotics     Endo: HgbA1C 4.6% ( 1/14)   - Monitor glucose on BMP    Code Status: Full Code   Disposition: SICU  ASSESSMENT:  Patient is a 64 y/o Male with a PMH of HTN, Alcohol use ( prior hospitalization 2/2 alcohol withdrawal), and pancreatic cancer s/p whipple presenting as a transfer from [facility] for a trauma evaluation after falling down 12 stairs. Patient admits to HS, denies LOC, is not currently on AC, and notes he has had bilious emesis since Fall 2022. CT imaging from OSH reported an intraabdominal hematoma with concerns for sigmoid/mesenteric contusion and a presacral hematoma. Imaging was unremarkable for an acute fracture in the thorax or pelvis. Hospital course c/b agitation and tremors requiring Ativan and Fentanyl IVP. Patient transferred to [facility] SICU for further monitoring and management of alcohol withdrawal.     PLAN:  Neuro: Hx ETOH abuse, + HS after fall   - Multimodal pain control w/ Oxy/Dilaudid prn   - CT Head Negative for ICH   - CIWA Protocol with Ativan 1mg q2hr prn for CIWA > 8   - Continue Librium Taper   - Folic Acid and Thiamine     Resp:  - Maintain SpO2 > 92%   - Out of bed to chair, ambulate as tolerated, and incentive spirometry to prevent atelectasis    CV: Hx HTN   - Maintain goal MAP > 65 mmHg  - Start Losartan 50mg qd ( equivalent to home ARB)     GI: Hx Pancreatic Cancer s/p Whipple   - CLD, will advance as tolerated   - NGT attempted, aborted 2/2 combative and hemoptysis 2/2 poss varices   - Zofran prn ( s/p 4 episodes of NB emesis)   - CT Abd/Pelvis reports sigmoid/mesenteric contusion       Renal:  - D5 1/2 NS @ 50mL/hr   - Monitor I&Os and electrolytes w/ repletions as necessary    Heme:  - Monitor CBC and coags  - Will restart Lovenox 40mg QD  - SCDs for mechanical VTE ppx  - Vit K x3 days for INR of 1.5    ID:   - Monitor WBC, temperature, and procalcitonin  - UA (+) for leuk esterase, WBCs - will send urine culture  -Will start 5 days CTX    Endo: HgbA1C 4.6% ( 1/14)   - Monitor glucose on BMP    Code Status: Full Code   Disposition: SICU  ASSESSMENT:  Patient is a 62 y/o Male with a PMH of HTN, Alcohol use ( prior hospitalization 2/2 alcohol withdrawal), and pancreatic cancer s/p whipple presenting as a transfer from [facility] for a trauma evaluation after falling down 12 stairs. Patient admits to HS, denies LOC, is not currently on AC, and notes he has had bilious emesis since Fall 2022. CT imaging from OSH reported an intraabdominal hematoma with concerns for sigmoid/mesenteric contusion and a presacral hematoma. Imaging was unremarkable for an acute fracture in the thorax or pelvis. Hospital course c/b agitation and tremors requiring Ativan and Fentanyl IVP. Patient transferred to [facility] SICU for further monitoring and management of alcohol withdrawal.     PLAN:  Neuro: Hx ETOH abuse, + HS after fall   - Multimodal pain control w/ Oxy/Dilaudid prn   - CT Head Negative for ICH   - CIWA Protocol with Ativan 1mg q2hr prn for CIWA > 8   - Continue Librium Taper   - Folic Acid and Thiamine     Resp:  - Maintain SpO2 > 92%   - Out of bed to chair, ambulate as tolerated, and incentive spirometry to prevent atelectasis    CV: Hx HTN   - Maintain goal MAP > 65 mmHg  - Start Losartan 50mg qd ( equivalent to home ARB)     GI: Hx Pancreatic Cancer s/p Whipple   - CLD, will advance as tolerated   - NGT attempted, aborted 2/2 combative and hemoptysis 2/2 poss varices   - Zofran prn ( s/p 4 episodes of NB emesis)   - CT Abd/Pelvis reports sigmoid/mesenteric contusion       Renal:  - D5 1/2 NS @ 50mL/hr   - Monitor I&Os and electrolytes w/ repletions as necessary    Heme:  - Monitor CBC and coags  - Will restart Lovenox 40mg QD  - SCDs for mechanical VTE ppx  - Vit K x3 days for INR of 1.5    ID:   - Monitor WBC, temperature, and procalcitonin  - UA (+) for leuk esterase, WBCs - will send urine culture  -Will start 5 days CTX    Endo: HgbA1C 4.6% ( 1/14)   - Monitor glucose on BMP    Code Status: Full Code   Disposition: listed for floor

## 2023-01-16 NOTE — PROGRESS NOTE ADULT - SUBJECTIVE AND OBJECTIVE BOX
SICU Daily Progress Note  =====================================================  Interval/Overnight Events:     - Restarted home Losartan  - advanced to a CLD     HPI:  Patient is a 62 y/o Male with a PMH of HTN, Alcohol use ( prior hospitalization 2/2 alcohol withdrawal), and pancreatic cancer s/p whipple presenting as a transfer from [facility] for a trauma evaluation after falling down 12 stairs. Patient admits to HS, denies LOC, and is not currently on AC. CT imaging from OSH reported an intraabdominal hematoma with concerns for sigmoid/mesenteric contusion and a presacral hematoma. Imaging was unremarkable for an acute fracture in the thorax or pelvis. Hospital course c/b agitation and tremors requiring Ativan and Fentanyl IVP. Patient transferred to [facility] SICU for further monitoring and management of alcohol withdrawal.     Allergies: No Known Allergies      MEDICATIONS:   --------------------------------------------------------------------------------------  Neurologic Medications  chlordiazePOXIDE   Oral   chlordiazePOXIDE 50 milliGRAM(s) Oral every 6 hours  chlordiazePOXIDE 50 milliGRAM(s) Oral every 8 hours  HYDROmorphone  Injectable 0.5 milliGRAM(s) IV Push every 4 hours PRN breakthrough pain  LORazepam   Injectable 1 milliGRAM(s) IV Push every 2 hours PRN CIWA> 8  ondansetron Injectable 4 milliGRAM(s) IV Push every 6 hours PRN Nausea and/or Vomiting  oxyCODONE    IR 5 milliGRAM(s) Oral every 4 hours PRN Moderate Pain /severe pain    Respiratory Medications    Cardiovascular Medications  losartan 50 milliGRAM(s) Oral daily    Gastrointestinal Medications  dextrose 5% + sodium chloride 0.45% 1000 milliLiter(s) IV Continuous <Continuous>  folic acid Injectable 1 milliGRAM(s) IV Push daily  phytonadione  IVPB 10 milliGRAM(s) IV Intermittent every 24 hours  sodium phosphate 30 milliMole(s)/500 mL IVPB 30 milliMole(s) IV Intermittent once  thiamine Injectable 100 milliGRAM(s) IV Push daily    Genitourinary Medications    Hematologic/Oncologic Medications  influenza   Vaccine 0.5 milliLiter(s) IntraMuscular once    Antimicrobial/Immunologic Medications    Endocrine/Metabolic Medications    Topical/Other Medications  chlorhexidine 2% Cloths 1 Application(s) Topical daily    --------------------------------------------------------------------------------------    VITAL SIGNS, INS/OUTS (last 24 hours):  --------------------------------------------------------------------------------------  T(C): 37 (01-15-23 @ 22:00), Max: 37.1 (01-15-23 @ 03:00)  HR: 86 (01-16-23 @ 02:00) (74 - 104)  BP: 100/51 (01-16-23 @ 02:00) (100/51 - 164/88)  RR: 16 (01-16-23 @ 02:00) (11 - 24)  SpO2: 94% (01-16-23 @ 02:00) (92% - 97%)    01-14-23 @ 07:01  -  01-15-23 @ 07:00  --------------------------------------------------------  IN: 5899.7 mL / OUT: 2075 mL / NET: 3824.7 mL    01-15-23 @ 07:01 - 01-16-23 @ 02:23  --------------------------------------------------------  IN: 1420 mL / OUT: 1306 mL / NET: 114 mL      --------------------------------------------------------------------------------------    EXAM   GENERAL:   NAD, well-groomed, well-developed  HEAD:    Atraumatic, Normocephalic  EYES:   EOMI, 2mm PERRLA, conjunctiva and sclera clear  ENMT:   No oropharyngeal exudates, erythema or lesions,  Moist mucous membranes  NECK:   Supple, no cervical lymphadenopathy, no JVD  NERVOUS SYSTEM:   Alert & Oriented X3, CN II-XII intact, Moves all extremities  ; Upper extremities 5 /5; Lower extremities 5/5, full sensation to light touch; tremor at the fingertips   CHEST/LUNG:   utilizing  2L NC Breath sounds bilaterally without crackles, rhonchi, wheezes, rubs   HEART:   cardiac monitor NSR   ; S1/S2 without murmurs, without rubs, or gallops.  ABDOMEN:   Soft, Nontender, Nondistended; Bowel sounds present, Bladder non distended, non palpable  EXTREMITIES:   Pulses palpable radial pulses 2+ bilat, DP/PT 1+/1+ bilat, without clubbing, cyanosis. Digits warm to touch with good cap refill < 3 secs  SKIN:   warm, dry, intact, normal color, no rash or abnormal lesions, without palpable nodes      LABS  --------------------------------------------------------------------------------------                        9.9    5.27  )-----------( 84       ( 16 Jan 2023 00:16 )             28.4   01-16    130<L>  |  93<L>  |  7   ----------------------------<  110<H>  4.6   |  27  |  0.62    Ca    7.8<L>      16 Jan 2023 00:16  Phos  2.3     01-16  Mg     1.7     01-16    TPro  6.6  /  Alb  3.0<L>  /  TBili  4.7<H>  /  DBili  x   /  AST  97<H>  /  ALT  45  /  AlkPhos  84  01-16  PT/INR - ( 16 Jan 2023 00:16 )   PT: 16.9 sec;   INR: 1.45 ratio         PTT - ( 16 Jan 2023 00:16 )  PTT:35.7 sec  --------------------------------------------------------------------------------------

## 2023-01-16 NOTE — SBIRT NOTE ADULT - NSSBIRTALCNOACTINTDET_GEN_A_CORE
LMSW consulted to met with patient for SBIRT. As per patient, he consumes alcohol 2 days a week and approximately 2-3 vodka cocktails. Patient reports he was a heavy drinker in the past but no longer is. Patient reports he was in alcohol rehab “1 or 3 years ago”. Patient is unable to recall name of facility. Patient declined resources for alcohol use. LMSW to remain available.

## 2023-01-16 NOTE — PROGRESS NOTE ADULT - SUBJECTIVE AND OBJECTIVE BOX
TEAM TRAUMA Surgery Daily Progress Note  =====================================================    INTERVAL EVENTS:  - Restarted home Losartan  - advanced to a CLD     SUBJECTIVE: Patient seen and examined at bedside with surgical team. Abdominal pain improved, reports persistent back pain.    --------------------------------------------------------------------------------------  OBJECTIVE:    VITAL SIGNS:  Vital Signs Last 24 Hrs  T(C): 37 (2023 07:00), Max: 37 (15 Salas 2023 23:00)  T(F): 98.6 (2023 07:00), Max: 98.6 (15 Salas 2023 23:00)  HR: 87 (2023 10:00) (80 - 117)  BP: 121/63 (2023 10:00) (100/51 - 156/80)  BP(mean): 87 (2023 10:00) (71 - 112)  RR: 15 (2023 10:00) (12 - 24)  SpO2: 96% (2023 10:00) (91% - 97%)    Parameters below as of 2023 07:00  Patient On (Oxygen Delivery Method): room air      --------------------------------------------------------------------------------------    EXAM:  General: NAD, resting comfortably in bed  HEENT: Normocephalic, small abrasion above R eyebrow  Respiratory: equal chest wall expansion bilaterally   Abdomen: Soft, non-distended, mildly tender to palpation in midline periumbilically and suprapubic region, no rebound or guarding  Ext: motor and sensory grossly intact in all 4 extremities, abrasion on R knee. b/l tremor in his hands    --------------------------------------------------------------------------------------    LABS:                        9.9    5.27  )-----------( 84       ( 2023 00:16 )             28.4     -16    130<L>  |  93<L>  |  7   ----------------------------<  110<H>  4.6   |  27  |  0.62    Ca    7.8<L>      2023 00:16  Phos  2.3       Mg     1.7         TPro  6.6  /  Alb  3.0<L>  /  TBili  4.7<H>  /  DBili  x   /  AST  97<H>  /  ALT  45  /  AlkPhos  84  16    PT/INR - ( 2023 00:16 )   PT: 16.9 sec;   INR: 1.45 ratio         PTT - ( 2023 00:16 )  PTT:35.7 sec  Urinalysis Basic - ( 2023 06:23 )    Color: Yellow / Appearance: Slightly Turbid / S.014 / pH: x  Gluc: x / Ketone: Small  / Bili: Negative / Urobili: 6 mg/dL   Blood: x / Protein: Negative / Nitrite: Negative   Leuk Esterase: Large / RBC: 2 /hpf / WBC 23 /HPF   Sq Epi: x / Non Sq Epi: 0 /hpf / Bacteria: Many        --------------------------------------------------------------------------------------    INS AND OUTS:    01-15-23 @ 07:23 @ 07:00  --------------------------------------------------------  IN: 2064.9 mL / OUT: 1607 mL / NET: 457.9 mL    23 @ 07:23 @ 11:30  --------------------------------------------------------  IN: 150 mL / OUT: 400 mL / NET: -250 mL      --------------------------------------------------------------------------------------    MEDICATIONS:  MEDICATIONS  (STANDING):  chlordiazePOXIDE   Oral   chlordiazePOXIDE 50 milliGRAM(s) Oral every 6 hours  chlordiazePOXIDE 50 milliGRAM(s) Oral every 8 hours  chlorhexidine 2% Cloths 1 Application(s) Topical daily  dextrose 5% + sodium chloride 0.45% 1000 milliLiter(s) (50 mL/Hr) IV Continuous <Continuous>  enoxaparin Injectable 40 milliGRAM(s) SubCutaneous every 24 hours  folic acid Injectable 1 milliGRAM(s) IV Push daily  influenza   Vaccine 0.5 milliLiter(s) IntraMuscular once  lidocaine   4% Patch 1 Patch Transdermal every 24 hours  losartan 50 milliGRAM(s) Oral daily  phytonadione  IVPB 10 milliGRAM(s) IV Intermittent every 24 hours  thiamine Injectable 100 milliGRAM(s) IV Push daily    MEDICATIONS  (PRN):  HYDROmorphone  Injectable 0.5 milliGRAM(s) IV Push every 4 hours PRN breakthrough pain  LORazepam   Injectable 1 milliGRAM(s) IV Push every 2 hours PRN CIWA> 8  ondansetron Injectable 4 milliGRAM(s) IV Push every 6 hours PRN Nausea and/or Vomiting  oxyCODONE    IR 5 milliGRAM(s) Oral every 4 hours PRN Moderate Pain /severe pain    --------------------------------------------------------------------------------------   TEAM TRAUMA Surgery Daily Progress Note  =====================================================    INTERVAL EVENTS:  - Restarted home Losartan  - advanced to a CLD   - Continue Librium Taper     SUBJECTIVE: Patient seen and examined at bedside with surgical team. Abdominal pain improved, reports persistent back pain.    --------------------------------------------------------------------------------------  OBJECTIVE:    VITAL SIGNS:  Vital Signs Last 24 Hrs  T(C): 37 (2023 07:00), Max: 37 (15 Salas 2023 23:00)  T(F): 98.6 (2023 07:00), Max: 98.6 (15 Salas 2023 23:00)  HR: 87 (2023 10:00) (80 - 117)  BP: 121/63 (2023 10:00) (100/51 - 156/80)  BP(mean): 87 (2023 10:00) (71 - 112)  RR: 15 (2023 10:00) (12 - 24)  SpO2: 96% (2023 10:00) (91% - 97%)    Parameters below as of 2023 07:00  Patient On (Oxygen Delivery Method): room air      --------------------------------------------------------------------------------------    EXAM:  General: NAD, resting comfortably in bed  HEENT: Normocephalic, small abrasion above R eyebrow  Respiratory: equal chest wall expansion bilaterally   Abdomen: Soft, non-distended, mildly tender to palpation in midline periumbilically and suprapubic region, no rebound or guarding  Ext: motor and sensory grossly intact in all 4 extremities, abrasion on R knee. b/l tremor in his hands    --------------------------------------------------------------------------------------    LABS:                        9.9    5.27  )-----------( 84       ( 2023 00:16 )             28.4     -    130<L>  |  93<L>  |  7   ----------------------------<  110<H>  4.6   |  27  |  0.62    Ca    7.8<L>      2023 00:16  Phos  2.3       Mg     1.7         TPro  6.6  /  Alb  3.0<L>  /  TBili  4.7<H>  /  DBili  x   /  AST  97<H>  /  ALT  45  /  AlkPhos  84      PT/INR - ( 2023 00:16 )   PT: 16.9 sec;   INR: 1.45 ratio         PTT - ( 2023 00:16 )  PTT:35.7 sec  Urinalysis Basic - ( 2023 06:23 )    Color: Yellow / Appearance: Slightly Turbid / S.014 / pH: x  Gluc: x / Ketone: Small  / Bili: Negative / Urobili: 6 mg/dL   Blood: x / Protein: Negative / Nitrite: Negative   Leuk Esterase: Large / RBC: 2 /hpf / WBC 23 /HPF   Sq Epi: x / Non Sq Epi: 0 /hpf / Bacteria: Many        --------------------------------------------------------------------------------------    INS AND OUTS:    01-15-23 @ 07:01  -  23 @ 07:00  --------------------------------------------------------  IN: 2064.9 mL / OUT: 1607 mL / NET: 457.9 mL    23 @ 07:23 @ 11:30  --------------------------------------------------------  IN: 150 mL / OUT: 400 mL / NET: -250 mL      --------------------------------------------------------------------------------------    MEDICATIONS:  MEDICATIONS  (STANDING):  chlordiazePOXIDE   Oral   chlordiazePOXIDE 50 milliGRAM(s) Oral every 6 hours  chlordiazePOXIDE 50 milliGRAM(s) Oral every 8 hours  chlorhexidine 2% Cloths 1 Application(s) Topical daily  dextrose 5% + sodium chloride 0.45% 1000 milliLiter(s) (50 mL/Hr) IV Continuous <Continuous>  enoxaparin Injectable 40 milliGRAM(s) SubCutaneous every 24 hours  folic acid Injectable 1 milliGRAM(s) IV Push daily  influenza   Vaccine 0.5 milliLiter(s) IntraMuscular once  lidocaine   4% Patch 1 Patch Transdermal every 24 hours  losartan 50 milliGRAM(s) Oral daily  phytonadione  IVPB 10 milliGRAM(s) IV Intermittent every 24 hours  thiamine Injectable 100 milliGRAM(s) IV Push daily    MEDICATIONS  (PRN):  HYDROmorphone  Injectable 0.5 milliGRAM(s) IV Push every 4 hours PRN breakthrough pain  LORazepam   Injectable 1 milliGRAM(s) IV Push every 2 hours PRN CIWA> 8  ondansetron Injectable 4 milliGRAM(s) IV Push every 6 hours PRN Nausea and/or Vomiting  oxyCODONE    IR 5 milliGRAM(s) Oral every 4 hours PRN Moderate Pain /severe pain    --------------------------------------------------------------------------------------

## 2023-01-16 NOTE — PROGRESS NOTE ADULT - ASSESSMENT
63 year old male presenting s/p fall down 12 stairs recovering in the SICU for alcohol withdrawal monitoring    Injuries identified:  - Presacral hematoma  - Sigmoid/mesenteric contusion    Plan:  - Serial abdominal exams  - Multimodal pain control  - CIWA protocol  - Tertiary exam today  - appreciate excellent SICU care      ACS/Trauma Surgery  [id number] 63 year old male presenting s/p fall down 12 stairs recovering in the SICU for alcohol withdrawal monitoring    Injuries identified:  - Presacral hematoma  - Sigmoid/mesenteric contusion    Plan:  - Serial abdominal exams  - Multimodal pain control  - CIWA protocol  - Tertiary exam done  - appreciate excellent SICU care      ACS/Trauma Surgery  [id number]

## 2023-01-16 NOTE — PROGRESS NOTE ADULT - TIME BILLING
I saw and evaluated patient and agree with above note  not signs of alcohol withdraw on my exam  stabilizing from hemorrhage perspective  on oral diet  anticipate floor transfer

## 2023-01-17 DIAGNOSIS — R82.90 UNSPECIFIED ABNORMAL FINDINGS IN URINE: ICD-10-CM

## 2023-01-17 DIAGNOSIS — T14.8XXA OTHER INJURY OF UNSPECIFIED BODY REGION, INITIAL ENCOUNTER: ICD-10-CM

## 2023-01-17 DIAGNOSIS — I10 ESSENTIAL (PRIMARY) HYPERTENSION: ICD-10-CM

## 2023-01-17 DIAGNOSIS — Z29.9 ENCOUNTER FOR PROPHYLACTIC MEASURES, UNSPECIFIED: ICD-10-CM

## 2023-01-17 DIAGNOSIS — M54.9 DORSALGIA, UNSPECIFIED: ICD-10-CM

## 2023-01-17 DIAGNOSIS — F10.939 ALCOHOL USE, UNSPECIFIED WITH WITHDRAWAL, UNSPECIFIED: ICD-10-CM

## 2023-01-17 DIAGNOSIS — R74.01 ELEVATION OF LEVELS OF LIVER TRANSAMINASE LEVELS: ICD-10-CM

## 2023-01-17 LAB
ALBUMIN SERPL ELPH-MCNC: 2.7 G/DL — LOW (ref 3.3–5)
ALP SERPL-CCNC: 85 U/L — SIGNIFICANT CHANGE UP (ref 40–120)
ALT FLD-CCNC: 32 U/L — SIGNIFICANT CHANGE UP (ref 10–45)
ANION GAP SERPL CALC-SCNC: 9 MMOL/L — SIGNIFICANT CHANGE UP (ref 5–17)
AST SERPL-CCNC: 43 U/L — HIGH (ref 10–40)
BILIRUB SERPL-MCNC: 3.4 MG/DL — HIGH (ref 0.2–1.2)
BUN SERPL-MCNC: 5 MG/DL — LOW (ref 7–23)
CALCIUM SERPL-MCNC: 8.2 MG/DL — LOW (ref 8.4–10.5)
CHLORIDE SERPL-SCNC: 94 MMOL/L — LOW (ref 96–108)
CO2 SERPL-SCNC: 28 MMOL/L — SIGNIFICANT CHANGE UP (ref 22–31)
CREAT SERPL-MCNC: 0.73 MG/DL — SIGNIFICANT CHANGE UP (ref 0.5–1.3)
EGFR: 103 ML/MIN/1.73M2 — SIGNIFICANT CHANGE UP
GLUCOSE SERPL-MCNC: 118 MG/DL — HIGH (ref 70–99)
MAGNESIUM SERPL-MCNC: 2 MG/DL — SIGNIFICANT CHANGE UP (ref 1.6–2.6)
PHOSPHATE SERPL-MCNC: 4.4 MG/DL — SIGNIFICANT CHANGE UP (ref 2.5–4.5)
POTASSIUM SERPL-MCNC: 3.5 MMOL/L — SIGNIFICANT CHANGE UP (ref 3.5–5.3)
POTASSIUM SERPL-SCNC: 3.5 MMOL/L — SIGNIFICANT CHANGE UP (ref 3.5–5.3)
PROT SERPL-MCNC: 5.9 G/DL — LOW (ref 6–8.3)
SODIUM SERPL-SCNC: 131 MMOL/L — LOW (ref 135–145)

## 2023-01-17 PROCEDURE — 99232 SBSQ HOSP IP/OBS MODERATE 35: CPT

## 2023-01-17 PROCEDURE — 99223 1ST HOSP IP/OBS HIGH 75: CPT

## 2023-01-17 RX ORDER — POTASSIUM CHLORIDE 20 MEQ
10 PACKET (EA) ORAL
Refills: 0 | Status: COMPLETED | OUTPATIENT
Start: 2023-01-17 | End: 2023-01-17

## 2023-01-17 RX ORDER — OXYCODONE HYDROCHLORIDE 5 MG/1
5 TABLET ORAL EVERY 4 HOURS
Refills: 0 | Status: DISCONTINUED | OUTPATIENT
Start: 2023-01-17 | End: 2023-01-18

## 2023-01-17 RX ORDER — OXYCODONE HYDROCHLORIDE 5 MG/1
10 TABLET ORAL EVERY 4 HOURS
Refills: 0 | Status: DISCONTINUED | OUTPATIENT
Start: 2023-01-17 | End: 2023-01-18

## 2023-01-17 RX ADMIN — Medication 50 MILLIGRAM(S): at 05:02

## 2023-01-17 RX ADMIN — LIDOCAINE 1 PATCH: 4 CREAM TOPICAL at 05:03

## 2023-01-17 RX ADMIN — HYDROMORPHONE HYDROCHLORIDE 0.5 MILLIGRAM(S): 2 INJECTION INTRAMUSCULAR; INTRAVENOUS; SUBCUTANEOUS at 11:01

## 2023-01-17 RX ADMIN — HYDROMORPHONE HYDROCHLORIDE 0.5 MILLIGRAM(S): 2 INJECTION INTRAMUSCULAR; INTRAVENOUS; SUBCUTANEOUS at 22:15

## 2023-01-17 RX ADMIN — Medication 100 MILLIEQUIVALENT(S): at 05:35

## 2023-01-17 RX ADMIN — OXYCODONE HYDROCHLORIDE 5 MILLIGRAM(S): 5 TABLET ORAL at 04:35

## 2023-01-17 RX ADMIN — CEFTRIAXONE 100 MILLIGRAM(S): 500 INJECTION, POWDER, FOR SOLUTION INTRAMUSCULAR; INTRAVENOUS at 13:28

## 2023-01-17 RX ADMIN — LOSARTAN POTASSIUM 50 MILLIGRAM(S): 100 TABLET, FILM COATED ORAL at 05:02

## 2023-01-17 RX ADMIN — HYDROMORPHONE HYDROCHLORIDE 0.5 MILLIGRAM(S): 2 INJECTION INTRAMUSCULAR; INTRAVENOUS; SUBCUTANEOUS at 22:45

## 2023-01-17 RX ADMIN — OXYCODONE HYDROCHLORIDE 5 MILLIGRAM(S): 5 TABLET ORAL at 08:33

## 2023-01-17 RX ADMIN — Medication 1 MILLIGRAM(S): at 13:28

## 2023-01-17 RX ADMIN — OXYCODONE HYDROCHLORIDE 5 MILLIGRAM(S): 5 TABLET ORAL at 09:12

## 2023-01-17 RX ADMIN — LIDOCAINE 1 PATCH: 4 CREAM TOPICAL at 16:32

## 2023-01-17 RX ADMIN — OXYCODONE HYDROCHLORIDE 10 MILLIGRAM(S): 5 TABLET ORAL at 20:48

## 2023-01-17 RX ADMIN — OXYCODONE HYDROCHLORIDE 10 MILLIGRAM(S): 5 TABLET ORAL at 13:03

## 2023-01-17 RX ADMIN — LIDOCAINE 1 PATCH: 4 CREAM TOPICAL at 08:30

## 2023-01-17 RX ADMIN — Medication 100 MILLIEQUIVALENT(S): at 06:38

## 2023-01-17 RX ADMIN — HYDROMORPHONE HYDROCHLORIDE 0.5 MILLIGRAM(S): 2 INJECTION INTRAMUSCULAR; INTRAVENOUS; SUBCUTANEOUS at 05:38

## 2023-01-17 RX ADMIN — OXYCODONE HYDROCHLORIDE 5 MILLIGRAM(S): 5 TABLET ORAL at 04:05

## 2023-01-17 RX ADMIN — HYDROMORPHONE HYDROCHLORIDE 0.5 MILLIGRAM(S): 2 INJECTION INTRAMUSCULAR; INTRAVENOUS; SUBCUTANEOUS at 09:35

## 2023-01-17 RX ADMIN — Medication 100 MILLIEQUIVALENT(S): at 04:32

## 2023-01-17 RX ADMIN — ENOXAPARIN SODIUM 40 MILLIGRAM(S): 100 INJECTION SUBCUTANEOUS at 13:03

## 2023-01-17 RX ADMIN — Medication 100 MILLIGRAM(S): at 13:03

## 2023-01-17 RX ADMIN — HYDROMORPHONE HYDROCHLORIDE 0.5 MILLIGRAM(S): 2 INJECTION INTRAMUSCULAR; INTRAVENOUS; SUBCUTANEOUS at 06:00

## 2023-01-17 RX ADMIN — CHLORHEXIDINE GLUCONATE 1 APPLICATION(S): 213 SOLUTION TOPICAL at 13:08

## 2023-01-17 RX ADMIN — OXYCODONE HYDROCHLORIDE 10 MILLIGRAM(S): 5 TABLET ORAL at 15:34

## 2023-01-17 RX ADMIN — OXYCODONE HYDROCHLORIDE 10 MILLIGRAM(S): 5 TABLET ORAL at 20:18

## 2023-01-17 RX ADMIN — Medication 50 MILLIGRAM(S): at 13:03

## 2023-01-17 RX ADMIN — Medication 255 MILLIMOLE(S): at 00:50

## 2023-01-17 NOTE — PROGRESS NOTE ADULT - SUBJECTIVE AND OBJECTIVE BOX
SICU Daily Progress Note  =====================================================  Interval/Overnight Events:     - Advanced to a Regular Diet   - IVL   - Started Lovenox for chemical VTE ppx   - UA +, sent Urine Cx and started Ceftriaxone    HPI:  Patient is a 64 y/o Male with a PMH of HTN, Alcohol use ( prior hospitalization 2/2 alcohol withdrawal), and pancreatic cancer s/p whipple presenting as a transfer from [facility] for a trauma evaluation after falling down 12 stairs. Patient admits to HS, denies LOC, and is not currently on AC. CT imaging from OSH reported an intraabdominal hematoma with concerns for sigmoid/mesenteric contusion and a presacral hematoma. Imaging was unremarkable for an acute fracture in the thorax or pelvis. Hospital course c/b agitation and tremors requiring Ativan and Fentanyl IVP. Patient transferred to [facility] SICU for further monitoring and management of alcohol withdrawal.       Allergies: No Known Allergies      MEDICATIONS:   --------------------------------------------------------------------------------------  Neurologic Medications  chlordiazePOXIDE   Oral   chlordiazePOXIDE 50 milliGRAM(s) Oral every 8 hours  HYDROmorphone  Injectable 0.5 milliGRAM(s) IV Push every 4 hours PRN breakthrough pain  LORazepam   Injectable 1 milliGRAM(s) IV Push every 2 hours PRN CIWA> 8  ondansetron Injectable 4 milliGRAM(s) IV Push every 6 hours PRN Nausea and/or Vomiting  oxyCODONE    IR 5 milliGRAM(s) Oral every 4 hours PRN Severe Pain (7 - 10)  oxyCODONE    IR 2.5 milliGRAM(s) Oral every 4 hours PRN Moderate Pain (4 - 6)    Respiratory Medications    Cardiovascular Medications  losartan 50 milliGRAM(s) Oral daily    Gastrointestinal Medications  folic acid Injectable 1 milliGRAM(s) IV Push daily  thiamine Injectable 100 milliGRAM(s) IV Push daily    Genitourinary Medications    Hematologic/Oncologic Medications  enoxaparin Injectable 40 milliGRAM(s) SubCutaneous every 24 hours  influenza   Vaccine 0.5 milliLiter(s) IntraMuscular once    Antimicrobial/Immunologic Medications  cefTRIAXone   IVPB 1000 milliGRAM(s) IV Intermittent every 24 hours    Endocrine/Metabolic Medications    Topical/Other Medications  chlorhexidine 2% Cloths 1 Application(s) Topical daily  lidocaine   4% Patch 1 Patch Transdermal every 24 hours    --------------------------------------------------------------------------------------    VITAL SIGNS, INS/OUTS (last 24 hours):  --------------------------------------------------------------------------------------  T(C): 37.2 (23 @ 23:00), Max: 37.2 (23 @ 19:00)  HR: 78 (23 @ 01:00) (78 - 117)  BP: 97/54 (23 @ 01:00) (97/54 - 142/70)  RR: 11 (23 @ 01:00) (11 - 24)  SpO2: 92% (23 @ 01:00) (91% - 97%)    01-15-23 @ 07:  -  23 @ 07:00  --------------------------------------------------------  IN: 2064.9 mL / OUT: 1607 mL / NET: 457.9 mL    23:01  23 @ 01:06  --------------------------------------------------------  IN: 1290 mL / OUT: 1000 mL / NET: 290 mL      --------------------------------------------------------------------------------------    EXAM   GENERAL:   NAD, well-groomed, well-developed  HEAD:    Atraumatic, Normocephalic  EYES:   EOMI, 2mm PERRLA, conjunctiva and sclera clear  ENMT:   No oropharyngeal exudates, erythema or lesions,  Moist mucous membranes  NECK:   Supple, no cervical lymphadenopathy, no JVD  NERVOUS SYSTEM:   Alert & Oriented X3, CN II-XII intact, Moves all extremities  ; Upper extremities 5 /5; Lower extremities 5/5, full sensation to light touch; tremor at the fingertips   CHEST/LUNG:   utilizing 2L NC Breath sounds bilaterally without crackles, rhonchi, wheezes, rubs   HEART:   cardiac monitor NSR   ; S1/S2 without murmurs, without rubs, or gallops.  ABDOMEN:   Soft, Nontender, Nondistended; Bowel sounds present, Bladder non distended, non palpable  EXTREMITIES:   Pulses palpable radial pulses 2+ bilat, DP/PT 1+/1+ bilat, without clubbing, cyanosis. Digits warm to touch with good cap refill < 3 secs  SKIN:   warm, dry, intact, normal color, no rash or abnormal lesions, without palpable nodes      LABS  --------------------------------------------------------------------------------------                        9.5    6.48  )-----------( 70       ( 2023 22:49 )             26.9   -    129<L>  |  93<L>  |  5<L>  ----------------------------<  114<H>  4.4   |  25  |  0.73    Ca    7.9<L>      2023 22:49  Phos  2.2       Mg     1.6         TPro  6.2  /  Alb  2.8<L>  /  TBili  3.9<H>  /  DBili  x   /  AST  67<H>  /  ALT  36  /  AlkPhos  79    Urinalysis Basic - ( 2023 06:23 )    Color: Yellow / Appearance: Slightly Turbid / S.014 / pH: x  Gluc: x / Ketone: Small  / Bili: Negative / Urobili: 6 mg/dL   Blood: x / Protein: Negative / Nitrite: Negative   Leuk Esterase: Large / RBC: 2 /hpf / WBC 23 /HPF   Sq Epi: x / Non Sq Epi: 0 /hpf / Bacteria: Many    PT/INR - ( 2023 22:49 )   PT: 18.2 sec;   INR: 1.58 ratio         PTT - ( 2023 22:49 )  PTT:34.7 sec  --------------------------------------------------------------------------------------

## 2023-01-17 NOTE — PROGRESS NOTE ADULT - ASSESSMENT
ASSESSMENT:  Patient is a 64 y/o Male with a PMH of HTN, Alcohol use ( prior hospitalization 2/2 alcohol withdrawal), and pancreatic cancer s/p whipple presenting as a transfer from [facility] for a trauma evaluation after falling down 12 stairs. Patient admits to HS, denies LOC, is not currently on AC, and notes he has had bilious emesis since Fall 2022. CT imaging from OSH reported an intraabdominal hematoma with concerns for sigmoid/mesenteric contusion and a presacral hematoma. Imaging was unremarkable for an acute fracture in the thorax or pelvis. Hospital course c/b agitation and tremors requiring Ativan and Fentanyl IVP. Patient transferred to [facility] SICU for further monitoring and management of alcohol withdrawal.     PLAN:  Neuro: Hx ETOH abuse, + HS after fall   - Multimodal pain control w/ Oxy/Dilaudid prn   - CT Head Negative for ICH   - CIWA Protocol with Ativan 1mg q2hr prn for CIWA > 8   - Continue Librium Taper   - Folic Acid and Thiamine     Resp:  - Maintain SpO2 > 92%   - Out of bed to chair, ambulate as tolerated, and incentive spirometry to prevent atelectasis    CV: Hx HTN   - Maintain goal MAP > 65 mmHg  - Start Losartan 50mg qd ( equivalent to home ARB)     GI: Hx Pancreatic Cancer s/p Whipple   - Tolerating Regular diet   - Zofran prn ( s/p 4 episodes of NB emesis)   - CT Abd/Pelvis reports sigmoid/mesenteric contusion       Renal:  - IVL  - Monitor I&Os and electrolytes w/ repletions as necessary    Heme:  - Monitor CBC and coags  - Will restart Lovenox 40mg QD  - SCDs for mechanical VTE ppx  - s/p Vit K x3 days for INR of 1.5 ( 1/14 -1/16)    ID:   - Monitor WBC, temperature, and procalcitonin  - UA (+), will f/u UCx  - start Ceftriaxone for 3 day course ( 1/16 - 1/18)     Endo: HgbA1C 4.6% ( 1/14)   - Monitor glucose on BMP    Code Status: Full Code   Disposition: listed for floor   ASSESSMENT:  Patient is a 64 y/o Male with a PMH of HTN, Alcohol use ( prior hospitalization 2/2 alcohol withdrawal), and pancreatic cancer s/p whipple presenting as a transfer from [facility] for a trauma evaluation after falling down 12 stairs. Patient admits to HS, denies LOC, is not currently on AC, and notes he has had bilious emesis since Fall 2022. CT imaging from OSH reported an intraabdominal hematoma with concerns for sigmoid/mesenteric contusion and a presacral hematoma. Imaging was unremarkable for an acute fracture in the thorax or pelvis. Hospital course c/b agitation and tremors requiring Ativan and Fentanyl IVP. Patient transferred to [facility] SICU for further monitoring and management of alcohol withdrawal.     PLAN:  Neuro: Hx ETOH abuse, + HS after fall   - Multimodal pain control w/ Oxy/Dilaudid prn   - CT Head Negative for ICH   - CIWA Protocol with Ativan 1mg q2hr prn for CIWA > 8   - Continue Librium Taper   - Folic Acid and Thiamine     Resp:  - Maintain SpO2 > 92%, requires 1L NC while sleeping  - Out of bed to chair, ambulate as tolerated, and incentive spirometry to prevent atelectasis    CV: Hx HTN   - Maintain goal MAP > 65 mmHg  - Start Losartan 50mg qd ( equivalent to home ARB)     GI: Hx Pancreatic Cancer s/p Whipple   - Tolerating Regular diet   - Zofran prn ( s/p 4 episodes of NB emesis)   - CT Abd/Pelvis reports sigmoid/mesenteric contusion       Renal:  - IVL  - Monitor I&Os and electrolytes w/ repletions as necessary    Heme:  - Monitor CBC and coags  - Will restart Lovenox 40mg QD  - SCDs for mechanical VTE ppx  - s/p Vit K x3 days for INR of 1.5 ( 1/14 -1/16)    ID:   - Monitor WBC, temperature, and procalcitonin  - UA (+), will f/u UCx  - start Ceftriaxone for 3 day course ( 1/16 - 1/18)     Endo: HgbA1C 4.6% ( 1/14)   - Monitor glucose on BMP    Code Status: Full Code   Disposition: listed for floor

## 2023-01-17 NOTE — CONSULT NOTE ADULT - SUBJECTIVE AND OBJECTIVE BOX
Chief Complaint:    HPI:  Patient is a 63 year old male with hx of alcohol use and pancreatic cancer s/p whipple presenting from OSH ([place]) for trauma evaluation s/p fall down 12 stairs. Patient fell down stairs, hit is head, but denies LOC. Was intoxicated during fall. has history of requiring hospitalizations of alcohol withdrawal. Complains of abdominal pain.    On CT imaging at [facility], patient has intraabdominal hematoma and concerns for sigmoid/mesenteric contusion.    Interval history: Patient seen and examined. States feeling fine other than chronic back pain. Denies any nausea, vomiting, diarrhea. No hallucinations. Denies any tremors.     PAST MEDICAL & SURGICAL HISTORY:  Hypertension  Anxiety  Pancreatic cancer  Alcohol abuse  History of pancreatic surgery  2015  H/O ventral hernia repair      Review of Systems:   CONSTITUTIONAL: No fever or chills   EYES: No eye pain or discharge.  ENMT:  No sinus or throat pain  NECK: No pain or stiffness  RESPIRATORY: No cough, wheezing, chills or hemoptysis; No shortness of breath  CARDIOVASCULAR: No chest pain, palpitations, dizziness, or leg swelling  GASTROINTESTINAL: No abdominal or epigastric pain. No nausea, vomiting, or hematemesis; No diarrhea or constipation.   GENITOURINARY: No dysuria or incontinence  NEUROLOGICAL: No headaches or tremors  SKIN: No rashes.  MUSCULOSKELETAL: No joint pain or swelling; chronic back pain   PSYCHIATRIC: No depression or anxiety  HEME/LYMPH: No easy bruising or bleeding gums      Allergies  No Known Allergies    Social History: Denies any smoking. States drinking alcohol once in a while. Denies any illicit drug use.     FAMILY HISTORY: No significant family history.     Home Medications:  irbesartan 150 mg oral tablet: 1 tab(s) orally once a day (10 Oct 2022 06:06)      MEDICATIONS  (STANDING):  cefTRIAXone   IVPB 1000 milliGRAM(s) IV Intermittent every 24 hours  chlordiazePOXIDE   Oral   chlordiazePOXIDE 50 milliGRAM(s) Oral every 8 hours  chlorhexidine 2% Cloths 1 Application(s) Topical daily  enoxaparin Injectable 40 milliGRAM(s) SubCutaneous every 24 hours  folic acid Injectable 1 milliGRAM(s) IV Push daily  influenza   Vaccine 0.5 milliLiter(s) IntraMuscular once  lidocaine   4% Patch 1 Patch Transdermal every 24 hours  losartan 50 milliGRAM(s) Oral daily  thiamine Injectable 100 milliGRAM(s) IV Push daily    MEDICATIONS  (PRN):  HYDROmorphone  Injectable 0.5 milliGRAM(s) IV Push every 4 hours PRN breakthrough pain  LORazepam   Injectable 1 milliGRAM(s) IV Push every 2 hours PRN CIWA> 8  ondansetron Injectable 4 milliGRAM(s) IV Push every 6 hours PRN Nausea and/or Vomiting  oxyCODONE    IR 10 milliGRAM(s) Oral every 4 hours PRN Severe Pain (7 - 10)  oxyCODONE    IR 5 milliGRAM(s) Oral every 4 hours PRN Moderate Pain (4 - 6)      I&O's Summary    2023 07:01  -  2023 07:00  --------------------------------------------------------  IN: 1960 mL / OUT: 1550 mL / NET: 410 mL        PHYSICAL EXAM:  Vital Signs Last 24 Hrs  T(C): 36.8 (2023 11:00), Max: 37.2 (2023 19:00)  T(F): 98.2 (2023 11:00), Max: 99 (2023 23:00)  HR: 98 (2023 11:00) (73 - 108)  BP: 127/71 (2023 11:00) (97/54 - 140/86)  BP(mean): 93 (2023 11:00) (67 - 106)  RR: 13 (2023 11:00) (11 - 23)  SpO2: 96% (2023 11:00) (88% - 98%)    Parameters below as of 2023 11:00  Patient On (Oxygen Delivery Method): room air      GENERAL: NAD, well-developed  HEAD:  Atraumatic, Normocephalic  EYES: EOMI, PERRLA, conjunctiva and sclera clear  NECK: Supple, No JVD  CHEST/LUNG: Clear to auscultation bilaterally; No wheeze  HEART: Regular rate and rhythm; No murmurs, rubs, or gallops  ABDOMEN: Soft, Nontender, Nondistended; Bowel sounds present  EXTREMITIES:  2+ Peripheral Pulses, No clubbing, cyanosis, or edema  PSYCH: AAOx3  NEUROLOGY: non-focal  SKIN: No rashes or lesions    LABS:                        9.5    6.48  )-----------( 70       ( 2023 22:49 )             26.9         131<L>  |  94<L>  |  5<L>  ----------------------------<  118<H>  3.5   |  28  |  0.73    Ca    8.2<L>      2023 03:53  Phos  4.4       Mg     2.0         TPro  5.9<L>  /  Alb  2.7<L>  /  TBili  3.4<H>  /  DBili  x   /  AST  43<H>  /  ALT  32  /  AlkPhos  85      PT/INR - ( 2023 22:49 )   PT: 18.2 sec;   INR: 1.58 ratio         PTT - ( 2023 22:49 )  PTT:34.7 sec      Urinalysis Basic - ( 2023 06:23 )    Color: Yellow / Appearance: Slightly Turbid / S.014 / pH: x  Gluc: x / Ketone: Small  / Bili: Negative / Urobili: 6 mg/dL   Blood: x / Protein: Negative / Nitrite: Negative   Leuk Esterase: Large / RBC: 2 /hpf / WBC 23 /HPF   Sq Epi: x / Non Sq Epi: 0 /hpf / Bacteria: Many      RADIOLOGY & ADDITIONAL TESTS:  Imaging Personally Reviewed:  EKG Personally Reviewed:  Consultant(s) Notes Reviewed:    Care Discussed with Consultants/Other Providers:   Chief Complaint: fall    HPI:  Patient is a 63 year old male with hx of alcohol use and pancreatic cancer s/p whipple presenting from OSH ([place]) for trauma evaluation s/p fall down 12 stairs. Patient fell down stairs, hit his head, but denies LOC. Was intoxicated during fall. Has history of requiring hospitalizations of alcohol withdrawal. Complains of abdominal pain.    On CT imaging at [facility], patient has intraabdominal hematoma and concerns for sigmoid/mesenteric contusion.    Interval history: Patient seen and examined. States feeling fine other than chronic back pain. Denies any nausea, vomiting, diarrhea. No hallucinations. Denies any tremors.     PAST MEDICAL & SURGICAL HISTORY:  Hypertension  Anxiety  Pancreatic cancer  Alcohol abuse  History of pancreatic surgery    H/O ventral hernia repair      Review of Systems:   CONSTITUTIONAL: No fever or chills   EYES: No eye pain or discharge.  ENMT:  No sinus or throat pain  NECK: No pain or stiffness  RESPIRATORY: No cough, wheezing, chills or hemoptysis; No shortness of breath  CARDIOVASCULAR: No chest pain, palpitations, dizziness, or leg swelling  GASTROINTESTINAL: No abdominal or epigastric pain. No nausea, vomiting, or hematemesis; No diarrhea or constipation.   GENITOURINARY: No dysuria or incontinence  NEUROLOGICAL: No headaches or tremors  SKIN: No rashes.  MUSCULOSKELETAL: No joint pain or swelling; chronic back pain   PSYCHIATRIC: No depression or anxiety  HEME/LYMPH: No easy bruising or bleeding gums      Allergies  No Known Allergies    Social History: Denies any smoking. States drinking alcohol once in a while. Denies any illicit drug use.     FAMILY HISTORY: No significant family history.     Home Medications:  irbesartan 150 mg oral tablet: 1 tab(s) orally once a day (10 Oct 2022 06:06)      MEDICATIONS  (STANDING):  cefTRIAXone   IVPB 1000 milliGRAM(s) IV Intermittent every 24 hours  chlordiazePOXIDE   Oral   chlordiazePOXIDE 50 milliGRAM(s) Oral every 8 hours  chlorhexidine 2% Cloths 1 Application(s) Topical daily  enoxaparin Injectable 40 milliGRAM(s) SubCutaneous every 24 hours  folic acid Injectable 1 milliGRAM(s) IV Push daily  influenza   Vaccine 0.5 milliLiter(s) IntraMuscular once  lidocaine   4% Patch 1 Patch Transdermal every 24 hours  losartan 50 milliGRAM(s) Oral daily  thiamine Injectable 100 milliGRAM(s) IV Push daily    MEDICATIONS  (PRN):  HYDROmorphone  Injectable 0.5 milliGRAM(s) IV Push every 4 hours PRN breakthrough pain  LORazepam   Injectable 1 milliGRAM(s) IV Push every 2 hours PRN CIWA> 8  ondansetron Injectable 4 milliGRAM(s) IV Push every 6 hours PRN Nausea and/or Vomiting  oxyCODONE    IR 10 milliGRAM(s) Oral every 4 hours PRN Severe Pain (7 - 10)  oxyCODONE    IR 5 milliGRAM(s) Oral every 4 hours PRN Moderate Pain (4 - 6)      I&O's Summary    2023 07:01  -  2023 07:00  --------------------------------------------------------  IN: 1960 mL / OUT: 1550 mL / NET: 410 mL      PHYSICAL EXAM:  Vital Signs Last 24 Hrs  T(C): 36.8 (2023 11:00), Max: 37.2 (2023 19:00)  T(F): 98.2 (2023 11:00), Max: 99 (2023 23:00)  HR: 98 (2023 11:00) (73 - 108)  BP: 127/71 (2023 11:00) (97/54 - 140/86)  BP(mean): 93 (2023 11:00) (67 - 106)  RR: 13 (2023 11:00) (11 - 23)  SpO2: 96% (2023 11:00) (88% - 98%)    Parameters below as of 2023 11:00  Patient On (Oxygen Delivery Method): room air      GENERAL: NAD, well-developed  HEAD: Atraumatic, Normocephalic  EYES: PERRLA, conjunctiva and sclera clear  NECK: Supple, no JVD  CHEST/LUNG: Clear to auscultation bilaterally; No wheeze  HEART: Regular rate and rhythm; No murmurs   ABDOMEN: Soft, tenderness to palpation, Nondistended; Bowel sounds present  EXTREMITIES: No clubbing, cyanosis, or edema  PSYCH: AAOx3  NEUROLOGY: no gross deficits noted   SKIN: No rashes     LABS:                        9.5    6.48  )-----------( 70       ( 2023 22:49 )             26.9         131<L>  |  94<L>  |  5<L>  ----------------------------<  118<H>  3.5   |  28  |  0.73    Ca    8.2<L>      2023 03:53  Phos  4.4       Mg     2.0         TPro  5.9<L>  /  Alb  2.7<L>  /  TBili  3.4<H>  /  DBili  x   /  AST  43<H>  /  ALT  32  /  AlkPhos  85      PT/INR - ( 2023 22:49 )   PT: 18.2 sec;   INR: 1.58 ratio         PTT - ( 2023 22:49 )  PTT:34.7 sec      Urinalysis Basic - ( 2023 06:23 )    Color: Yellow / Appearance: Slightly Turbid / S.014 / pH: x  Gluc: x / Ketone: Small  / Bili: Negative / Urobili: 6 mg/dL   Blood: x / Protein: Negative / Nitrite: Negative   Leuk Esterase: Large / RBC: 2 /hpf / WBC 23 /HPF   Sq Epi: x / Non Sq Epi: 0 /hpf / Bacteria: Many      RADIOLOGY & ADDITIONAL TESTS:  Imaging Personally Reviewed:  EKG Personally Reviewed:  Consultant(s) Notes Reviewed:    Care Discussed with Consultants/Other Providers:

## 2023-01-17 NOTE — PROGRESS NOTE ADULT - SUBJECTIVE AND OBJECTIVE BOX
TEAM TRAUMA Surgery Daily Progress Note  =====================================================    INTERVAL EVENTS  - Advanced to a Regular Diet   - IVL   - Started Lovenox for chemical VTE ppx   - UA +, sent Urine Cx and started Ceftriaxone    SUBJECTIVE: Patient seen and examined at bedside on AM rounds. Patient reports that they're feeling well except for their chronic back pain. Abd pain improved, denies any fevers, chills, nausea, or vomiting.    --------------------------------------------------------------------------------------  OBJECTIVE:    VITAL SIGNS:  Vital Signs Last 24 Hrs  T(C): 36.8 (2023 11:00), Max: 37.2 (2023 19:00)  T(F): 98.2 (2023 11:00), Max: 99 (2023 23:00)  HR: 98 (2023 11:00) (73 - 108)  BP: 127/71 (2023 11:00) (97/54 - 140/86)  BP(mean): 93 (2023 11:00) (67 - 106)  RR: 13 (2023 11:00) (11 - 23)  SpO2: 96% (2023 11:00) (88% - 98%)    Parameters below as of 2023 11:00  Patient On (Oxygen Delivery Method): room air      --------------------------------------------------------------------------------------    EXAM:  General: NAD, resting in the chair at an angle, off of his back  HEENT: Normocephalic, small abrasion above R eyebrow  Respiratory: equal chest wall expansion bilaterally   Abdomen: Soft, non-distended, mildly tender to palpation in midline periumbilically and suprapubic region, no rebound or guarding  Ext: motor and sensory grossly intact in all 4 extremities, abrasion on R knee.    --------------------------------------------------------------------------------------    LABS:                        9.5    6.48  )-----------( 70       ( 2023 22:49 )             26.9     01-17    131<L>  |  94<L>  |  5<L>  ----------------------------<  118<H>  3.5   |  28  |  0.73    Ca    8.2<L>      2023 03:53  Phos  4.4       Mg     2.0         TPro  5.9<L>  /  Alb  2.7<L>  /  TBili  3.4<H>  /  DBili  x   /  AST  43<H>  /  ALT  32  /  AlkPhos  85  -17    PT/INR - ( 2023 22:49 )   PT: 18.2 sec;   INR: 1.58 ratio         PTT - ( 2023 22:49 )  PTT:34.7 sec  Urinalysis Basic - ( 2023 06:23 )    Color: Yellow / Appearance: Slightly Turbid / S.014 / pH: x  Gluc: x / Ketone: Small  / Bili: Negative / Urobili: 6 mg/dL   Blood: x / Protein: Negative / Nitrite: Negative   Leuk Esterase: Large / RBC: 2 /hpf / WBC 23 /HPF   Sq Epi: x / Non Sq Epi: 0 /hpf / Bacteria: Many      --------------------------------------------------------------------------------------    INS AND OUTS:    23 @ 07:01  -  23 @ 07:00  --------------------------------------------------------  IN: 1960 mL / OUT: 1550 mL / NET: 410 mL      --------------------------------------------------------------------------------------    MEDICATIONS:  MEDICATIONS  (STANDING):  cefTRIAXone   IVPB 1000 milliGRAM(s) IV Intermittent every 24 hours  chlordiazePOXIDE   Oral   chlordiazePOXIDE 50 milliGRAM(s) Oral every 8 hours  chlorhexidine 2% Cloths 1 Application(s) Topical daily  enoxaparin Injectable 40 milliGRAM(s) SubCutaneous every 24 hours  folic acid Injectable 1 milliGRAM(s) IV Push daily  influenza   Vaccine 0.5 milliLiter(s) IntraMuscular once  lidocaine   4% Patch 1 Patch Transdermal every 24 hours  losartan 50 milliGRAM(s) Oral daily  thiamine Injectable 100 milliGRAM(s) IV Push daily    MEDICATIONS  (PRN):  HYDROmorphone  Injectable 0.5 milliGRAM(s) IV Push every 4 hours PRN breakthrough pain  LORazepam   Injectable 1 milliGRAM(s) IV Push every 2 hours PRN CIWA> 8  ondansetron Injectable 4 milliGRAM(s) IV Push every 6 hours PRN Nausea and/or Vomiting  oxyCODONE    IR 10 milliGRAM(s) Oral every 4 hours PRN Severe Pain (7 - 10)  oxyCODONE    IR 5 milliGRAM(s) Oral every 4 hours PRN Moderate Pain (4 - 6)    --------------------------------------------------------------------------------------

## 2023-01-17 NOTE — CONSULT NOTE ADULT - PROBLEM SELECTOR RECOMMENDATION 9
- noted to have BAL of 175 on admission   - currently on librium taper  - continue with taper to complete course; ativan PRN for CIWA >8   - monitor CIWA   - continue with folic acid and thiamine supplementation; consider adding multivitamin   - social work follow up

## 2023-01-17 NOTE — CONSULT NOTE ADULT - PROBLEM SELECTOR RECOMMENDATION 3
- noted to have elevated LFTs during admission   - likely in setting of alcohol use   - LFTs now downtrending   - monitor LFTs

## 2023-01-17 NOTE — PROGRESS NOTE ADULT - ASSESSMENT
63 year old male presenting s/p fall down 12 stairs recovering in the SICU for alcohol withdrawal monitoring.    Injuries identified:  - Presacral hematoma  - Sigmoid/mesenteric contusion    Plan:  - Serial abdominal exams  - Multimodal pain control  - CIWA protocol  - Librium Taper  - Tertiary exam done  - appreciate excellent SICU care      ACS/Trauma Surgery  [id number]

## 2023-01-17 NOTE — CONSULT NOTE ADULT - PROBLEM SELECTOR RECOMMENDATION 2
- noted to have positive UA on 1/16   - started on CTX  - continue with CTX; f/u urine cx   - if urine cx sensitive to CTX, can consider total 5 day course of abx

## 2023-01-17 NOTE — CONSULT NOTE ADULT - ASSESSMENT
Patient is a 63 year old male with hx of alcohol use and pancreatic cancer s/p whipple presenting from OS ([place]) for trauma evaluation s/p fall down 12 stairs. Admitted to SICU service.

## 2023-01-17 NOTE — CONSULT NOTE ADULT - PROBLEM SELECTOR RECOMMENDATION 5
- CT lumbar spine noted from earlier this admission with no vertebral body fracture or dislocation.  - continue with pain control as needed for chronic back pain

## 2023-01-17 NOTE — CONSULT NOTE ADULT - PROBLEM SELECTOR RECOMMENDATION 4
- concern for intraabdominal hematoma and for sigmoid/mesenteric contusion on imaging from OSH   - Hgb currently stable   - continue to monitor for now   - monitor CBC   - further care per primary team

## 2023-01-18 LAB
ANION GAP SERPL CALC-SCNC: 8 MMOL/L — SIGNIFICANT CHANGE UP (ref 5–17)
BUN SERPL-MCNC: 8 MG/DL — SIGNIFICANT CHANGE UP (ref 7–23)
CALCIUM SERPL-MCNC: 8.3 MG/DL — LOW (ref 8.4–10.5)
CHLORIDE SERPL-SCNC: 96 MMOL/L — SIGNIFICANT CHANGE UP (ref 96–108)
CO2 SERPL-SCNC: 28 MMOL/L — SIGNIFICANT CHANGE UP (ref 22–31)
CREAT SERPL-MCNC: 0.82 MG/DL — SIGNIFICANT CHANGE UP (ref 0.5–1.3)
CULTURE RESULTS: SIGNIFICANT CHANGE UP
EGFR: 99 ML/MIN/1.73M2 — SIGNIFICANT CHANGE UP
GLUCOSE SERPL-MCNC: 155 MG/DL — HIGH (ref 70–99)
HCT VFR BLD CALC: 27.5 % — LOW (ref 39–50)
HGB BLD-MCNC: 9.1 G/DL — LOW (ref 13–17)
MAGNESIUM SERPL-MCNC: 1.7 MG/DL — SIGNIFICANT CHANGE UP (ref 1.6–2.6)
MCHC RBC-ENTMCNC: 31 PG — SIGNIFICANT CHANGE UP (ref 27–34)
MCHC RBC-ENTMCNC: 33.1 GM/DL — SIGNIFICANT CHANGE UP (ref 32–36)
MCV RBC AUTO: 93.5 FL — SIGNIFICANT CHANGE UP (ref 80–100)
NRBC # BLD: 0 /100 WBCS — SIGNIFICANT CHANGE UP (ref 0–0)
PHOSPHATE SERPL-MCNC: 2.2 MG/DL — LOW (ref 2.5–4.5)
PLATELET # BLD AUTO: 51 K/UL — LOW (ref 150–400)
POTASSIUM SERPL-MCNC: 3.6 MMOL/L — SIGNIFICANT CHANGE UP (ref 3.5–5.3)
POTASSIUM SERPL-SCNC: 3.6 MMOL/L — SIGNIFICANT CHANGE UP (ref 3.5–5.3)
RBC # BLD: 2.94 M/UL — LOW (ref 4.2–5.8)
RBC # FLD: 13.2 % — SIGNIFICANT CHANGE UP (ref 10.3–14.5)
SODIUM SERPL-SCNC: 132 MMOL/L — LOW (ref 135–145)
SPECIMEN SOURCE: SIGNIFICANT CHANGE UP
WBC # BLD: 7.35 K/UL — SIGNIFICANT CHANGE UP (ref 3.8–10.5)
WBC # FLD AUTO: 7.35 K/UL — SIGNIFICANT CHANGE UP (ref 3.8–10.5)

## 2023-01-18 PROCEDURE — 99233 SBSQ HOSP IP/OBS HIGH 50: CPT

## 2023-01-18 PROCEDURE — 99232 SBSQ HOSP IP/OBS MODERATE 35: CPT

## 2023-01-18 RX ORDER — THIAMINE MONONITRATE (VIT B1) 100 MG
100 TABLET ORAL DAILY
Refills: 0 | Status: DISCONTINUED | OUTPATIENT
Start: 2023-01-18 | End: 2023-01-20

## 2023-01-18 RX ORDER — CIPROFLOXACIN LACTATE 400MG/40ML
250 VIAL (ML) INTRAVENOUS
Refills: 0 | Status: DISCONTINUED | OUTPATIENT
Start: 2023-01-18 | End: 2023-01-20

## 2023-01-18 RX ORDER — POTASSIUM CHLORIDE 20 MEQ
40 PACKET (EA) ORAL ONCE
Refills: 0 | Status: COMPLETED | OUTPATIENT
Start: 2023-01-18 | End: 2023-01-18

## 2023-01-18 RX ORDER — ACETAMINOPHEN 500 MG
975 TABLET ORAL EVERY 6 HOURS
Refills: 0 | Status: DISCONTINUED | OUTPATIENT
Start: 2023-01-18 | End: 2023-01-20

## 2023-01-18 RX ORDER — MAGNESIUM SULFATE 500 MG/ML
2 VIAL (ML) INJECTION ONCE
Refills: 0 | Status: COMPLETED | OUTPATIENT
Start: 2023-01-18 | End: 2023-01-18

## 2023-01-18 RX ORDER — TRAMADOL HYDROCHLORIDE 50 MG/1
25 TABLET ORAL EVERY 8 HOURS
Refills: 0 | Status: DISCONTINUED | OUTPATIENT
Start: 2023-01-18 | End: 2023-01-20

## 2023-01-18 RX ORDER — FOLIC ACID 0.8 MG
1 TABLET ORAL DAILY
Refills: 0 | Status: DISCONTINUED | OUTPATIENT
Start: 2023-01-18 | End: 2023-01-20

## 2023-01-18 RX ADMIN — Medication 100 MILLIGRAM(S): at 12:04

## 2023-01-18 RX ADMIN — Medication 1 MILLIGRAM(S): at 18:51

## 2023-01-18 RX ADMIN — Medication 50 MILLIGRAM(S): at 18:50

## 2023-01-18 RX ADMIN — ENOXAPARIN SODIUM 40 MILLIGRAM(S): 100 INJECTION SUBCUTANEOUS at 12:04

## 2023-01-18 RX ADMIN — CHLORHEXIDINE GLUCONATE 1 APPLICATION(S): 213 SOLUTION TOPICAL at 12:08

## 2023-01-18 RX ADMIN — Medication 85 MILLIMOLE(S): at 22:42

## 2023-01-18 RX ADMIN — Medication 50 MILLIGRAM(S): at 05:23

## 2023-01-18 RX ADMIN — OXYCODONE HYDROCHLORIDE 10 MILLIGRAM(S): 5 TABLET ORAL at 03:20

## 2023-01-18 RX ADMIN — Medication 975 MILLIGRAM(S): at 23:52

## 2023-01-18 RX ADMIN — TRAMADOL HYDROCHLORIDE 25 MILLIGRAM(S): 50 TABLET ORAL at 18:51

## 2023-01-18 RX ADMIN — HYDROMORPHONE HYDROCHLORIDE 0.5 MILLIGRAM(S): 2 INJECTION INTRAMUSCULAR; INTRAVENOUS; SUBCUTANEOUS at 04:10

## 2023-01-18 RX ADMIN — OXYCODONE HYDROCHLORIDE 10 MILLIGRAM(S): 5 TABLET ORAL at 02:47

## 2023-01-18 RX ADMIN — Medication 1 TABLET(S): at 18:50

## 2023-01-18 RX ADMIN — OXYCODONE HYDROCHLORIDE 10 MILLIGRAM(S): 5 TABLET ORAL at 12:10

## 2023-01-18 RX ADMIN — LIDOCAINE 1 PATCH: 4 CREAM TOPICAL at 08:07

## 2023-01-18 RX ADMIN — HYDROMORPHONE HYDROCHLORIDE 0.5 MILLIGRAM(S): 2 INJECTION INTRAMUSCULAR; INTRAVENOUS; SUBCUTANEOUS at 04:40

## 2023-01-18 RX ADMIN — Medication 975 MILLIGRAM(S): at 18:52

## 2023-01-18 RX ADMIN — LIDOCAINE 1 PATCH: 4 CREAM TOPICAL at 21:38

## 2023-01-18 RX ADMIN — Medication 40 MILLIEQUIVALENT(S): at 18:51

## 2023-01-18 RX ADMIN — Medication 250 MILLIGRAM(S): at 20:39

## 2023-01-18 RX ADMIN — Medication 25 GRAM(S): at 18:50

## 2023-01-18 RX ADMIN — Medication 100 MILLIGRAM(S): at 18:50

## 2023-01-18 RX ADMIN — LIDOCAINE 1 PATCH: 4 CREAM TOPICAL at 05:24

## 2023-01-18 NOTE — PROGRESS NOTE ADULT - ASSESSMENT
63 year old male presenting s/p fall down 12 stairs recovering in the SICU for alcohol withdrawal monitoring.  Downgraded on 1/17/23.    Injuries identified:  - Presacral hematoma  - Sigmoid/mesenteric contusion    Plan:  - Serial abdominal exams  - Multimodal pain control  - CIWA protocol  - Librium Taper  - Tertiary exam done  - PT eval      ACS/Trauma Surgery  [id number]

## 2023-01-18 NOTE — PROGRESS NOTE ADULT - SUBJECTIVE AND OBJECTIVE BOX
[facility] Division of Hospital Medicine  Ranjith Joshi MD  Available via MS Teams  Pager: [phone]    SUBJECTIVE / OVERNIGHT EVENTS: No events overnight. Feels generally OK. Reports chronic R lower back pain that he has had for about 20 years. Abdominal pain is mild. Tolerating diet. Able to ambulate. Denies tremors, headache, blurred vision, or nausea.    MEDICATIONS  (STANDING):  cefTRIAXone   IVPB 1000 milliGRAM(s) IV Intermittent every 24 hours  chlordiazePOXIDE   Oral   chlordiazePOXIDE 50 milliGRAM(s) Oral every 12 hours  chlorhexidine 2% Cloths 1 Application(s) Topical daily  enoxaparin Injectable 40 milliGRAM(s) SubCutaneous every 24 hours  folic acid Injectable 1 milliGRAM(s) IV Push daily  influenza   Vaccine 0.5 milliLiter(s) IntraMuscular once  lidocaine   4% Patch 1 Patch Transdermal every 24 hours  losartan 50 milliGRAM(s) Oral daily  magnesium sulfate  IVPB 2 Gram(s) IV Intermittent once  potassium chloride    Tablet ER 40 milliEquivalent(s) Oral once  sodium phosphate 30 milliMole(s)/500 mL IVPB 30 milliMole(s) IV Intermittent once  thiamine Injectable 100 milliGRAM(s) IV Push daily    MEDICATIONS  (PRN):  HYDROmorphone  Injectable 0.5 milliGRAM(s) IV Push every 4 hours PRN breakthrough pain  ondansetron Injectable 4 milliGRAM(s) IV Push every 6 hours PRN Nausea and/or Vomiting  oxyCODONE    IR 10 milliGRAM(s) Oral every 4 hours PRN Severe Pain (7 - 10)  oxyCODONE    IR 5 milliGRAM(s) Oral every 4 hours PRN Moderate Pain (4 - 6)      I&O's Summary    17 Jan 2023 07:01  -  18 Jan 2023 07:00  --------------------------------------------------------  IN: 440 mL / OUT: 0 mL / NET: 440 mL    18 Jan 2023 07:01  -  18 Jan 2023 14:53  --------------------------------------------------------  IN: 700 mL / OUT: 50 mL / NET: 650 mL        PHYSICAL EXAM:  Vital Signs Last 24 Hrs  T(C): 37.6 (18 Jan 2023 13:47), Max: 37.6 (18 Jan 2023 13:47)  T(F): 99.7 (18 Jan 2023 13:47), Max: 99.7 (18 Jan 2023 13:47)  HR: 87 (18 Jan 2023 13:47) (84 - 102)  BP: 103/66 (18 Jan 2023 13:47) (90/61 - 112/62)  BP(mean): 70 (17 Jan 2023 15:30) (70 - 70)  RR: 18 (18 Jan 2023 13:47) (14 - 25)  SpO2: 97% (18 Jan 2023 13:47) (94% - 98%)    Parameters below as of 18 Jan 2023 13:47  Patient On (Oxygen Delivery Method): room air      CONSTITUTIONAL: NAD, well-developed, well-groomed  EYES: PERRL; conjunctiva and sclera clear  ENMT: Moist oral mucosa, no pharyngeal injection or exudates  NECK: Supple, no palpable masses; no thyromegaly  RESPIRATORY: Normal respiratory effort; lungs are clear to auscultation bilaterally  CARDIOVASCULAR: Regular rate and rhythm, normal S1 and S2, no murmur/rub/gallop; No lower extremity edema; Peripheral pulses are 2+ bilaterally  ABDOMEN: Nontender to palpation, normoactive bowel sounds, no rebound/guarding; No hepatosplenomegaly  MUSCULOSKELETAL:  No clubbing or cyanosis of digits; no joint swelling or tenderness to palpation  PSYCH: A+O to person, place, and time; affect appropriate  NEUROLOGY: CN 2-12 are intact and symmetric; no gross sensory deficits; foot plantar/dorsiflexors 5/5 b/l. Sensation intact to light touch. Hip flexors 5/5 b/l.  SKIN: No rashes; no palpable lesions    LABS:                        9.1    7.35  )-----------( 51       ( 18 Jan 2023 11:29 )             27.5     01-18    132<L>  |  96  |  8   ----------------------------<  155<H>  3.6   |  28  |  0.82    Ca    8.3<L>      18 Jan 2023 11:32  Phos  2.2     01-18  Mg     1.7     01-18    TPro  5.9<L>  /  Alb  2.7<L>  /  TBili  3.4<H>  /  DBili  x   /  AST  43<H>  /  ALT  32  /  AlkPhos  85  01-17    PT/INR - ( 16 Jan 2023 22:49 )   PT: 18.2 sec;   INR: 1.58 ratio         PTT - ( 16 Jan 2023 22:49 )  PTT:34.7 sec          Culture - Urine (collected 16 Jan 2023 14:13)  Source: Clean Catch Clean Catch (Midstream)  Final Report (18 Jan 2023 14:31):    >100,000 CFU/ml Coag Negative Staphylococcus "Susceptibilities not    performed"      COVID-19 PCR: NotDetec (09 Oct 2022 20:53)      RADIOLOGY & ADDITIONAL TESTS:  New Imaging Personally Reviewed Today: No new studies    COORDINATION OF CARE:  Consultant Communication and Details of Discussion (where applicable): Dr. Bray regarding management of alcohol withdrawal

## 2023-01-18 NOTE — PROGRESS NOTE ADULT - SUBJECTIVE AND OBJECTIVE BOX
SURGERY DAILY PROGRESS NOTE:         SUBJECTIVE/ROS: Patient feels seen and examined at bedside. No events overnight.     MEDICATIONS  (STANDING):  cefTRIAXone   IVPB 1000 milliGRAM(s) IV Intermittent every 24 hours  chlordiazePOXIDE   Oral   chlordiazePOXIDE 50 milliGRAM(s) Oral every 12 hours  chlorhexidine 2% Cloths 1 Application(s) Topical daily  enoxaparin Injectable 40 milliGRAM(s) SubCutaneous every 24 hours  folic acid Injectable 1 milliGRAM(s) IV Push daily  influenza   Vaccine 0.5 milliLiter(s) IntraMuscular once  lidocaine   4% Patch 1 Patch Transdermal every 24 hours  losartan 50 milliGRAM(s) Oral daily  thiamine Injectable 100 milliGRAM(s) IV Push daily    MEDICATIONS  (PRN):  HYDROmorphone  Injectable 0.5 milliGRAM(s) IV Push every 4 hours PRN breakthrough pain  ondansetron Injectable 4 milliGRAM(s) IV Push every 6 hours PRN Nausea and/or Vomiting  oxyCODONE    IR 10 milliGRAM(s) Oral every 4 hours PRN Severe Pain (7 - 10)  oxyCODONE    IR 5 milliGRAM(s) Oral every 4 hours PRN Moderate Pain (4 - 6)      OBJECTIVE:    Vital Signs Last 24 Hrs  T(C): 36.8 (18 Jan 2023 05:45), Max: 37.3 (17 Jan 2023 15:54)  T(F): 98.2 (18 Jan 2023 05:45), Max: 99.1 (17 Jan 2023 15:54)  HR: 84 (18 Jan 2023 05:45) (84 - 105)  BP: 107/67 (18 Jan 2023 05:45) (90/61 - 131/72)  BP(mean): 70 (17 Jan 2023 15:30) (66 - 108)  RR: 18 (18 Jan 2023 05:45) (12 - 25)  SpO2: 97% (18 Jan 2023 05:45) (93% - 98%)    Parameters below as of 18 Jan 2023 05:45  Patient On (Oxygen Delivery Method): room air            I&O's Detail    17 Jan 2023 07:01  -  18 Jan 2023 07:00  --------------------------------------------------------  IN:    Oral Fluid: 440 mL  Total IN: 440 mL    OUT:  Total OUT: 0 mL    Total NET: 440 mL          Daily     Daily     LABS:                        9.5    6.48  )-----------( 70       ( 16 Jan 2023 22:49 )             26.9     01-17    131<L>  |  94<L>  |  5<L>  ----------------------------<  118<H>  3.5   |  28  |  0.73    Ca    8.2<L>      17 Jan 2023 03:53  Phos  4.4     01-17  Mg     2.0     01-17    TPro  5.9<L>  /  Alb  2.7<L>  /  TBili  3.4<H>  /  DBili  x   /  AST  43<H>  /  ALT  32  /  AlkPhos  85  01-17    PT/INR - ( 16 Jan 2023 22:49 )   PT: 18.2 sec;   INR: 1.58 ratio         PTT - ( 16 Jan 2023 22:49 )  PTT:34.7 sec      Physical Exam:   General: NAD  CV: +S1, S2  Respiratory: no distress  Abdomen: soft, nontender, nondistended  Ext: no edema

## 2023-01-18 NOTE — PHYSICAL THERAPY INITIAL EVALUATION ADULT - PERTINENT HX OF CURRENT PROBLEM, REHAB EVAL
62 y/o Male with a PMH of HTN, Alcohol use ( prior hospitalization 2/2 alcohol withdrawal), and pancreatic cancer s/p whipple presenting as a transfer from [facility] for a trauma evaluation after falling down 12 stairs. Patient admits to HS, denies LOC, is not currently on AC, and notes he has had bilious emesis since Fall 2022. 1/13: Brain CT: Moderate atrophy and small vessel white matter ischemic changes. No hemorrhage. Cervical spine CT: Mild degenerative changes. No acute fractures or   dislocations. CT Chest/Abd and Pelvis: Apparent new mild segmental mural thickening of the sigmoid colon with edema of the adjacent mesentery may be due to contusion to the sigmoid colon/mesentery. Presacral soft tissue density measuring 3.8 x 3.8 cm with a Hounsfield unit of 74 may represent a hematoma in the setting of trauma. Follow-up CT may be pursued in 4 weeks to ensure resolution and to rule out metastasis. No acute bony fracture is identified. No CT evidence for an acute intrathoracic injury. Stable enlarged mediastinal lymph nodes. 1/14 CT Lumbar Spine: No vertebral body fracture or dislocation.

## 2023-01-18 NOTE — PHYSICAL THERAPY INITIAL EVALUATION ADULT - ADDITIONAL COMMENTS
Pt lives in a private home with his brother, 12 steps to enter and one flight inside to bedroom. Pt was independent with all functional mobility and ADLs prior to admission without AD.

## 2023-01-18 NOTE — PROGRESS NOTE ADULT - ASSESSMENT
Patient is a 63 year old male with hx of alcohol use and pancreatic cancer s/p whipple presenting from OS ([place]) for trauma evaluation s/p fall down 12 stairs. Admitted to SICU service.

## 2023-01-18 NOTE — PHYSICAL THERAPY INITIAL EVALUATION ADULT - PLANNED THERAPY INTERVENTIONS, PT EVAL
GOAL: Pt will negotiate up/down 12 steps with handrail ascending independently in 2 weeks/balance training/bed mobility training/gait training/strengthening/transfer training

## 2023-01-19 ENCOUNTER — TRANSCRIPTION ENCOUNTER (OUTPATIENT)
Age: 63
End: 2023-01-19

## 2023-01-19 LAB
ANION GAP SERPL CALC-SCNC: 10 MMOL/L — SIGNIFICANT CHANGE UP (ref 5–17)
BUN SERPL-MCNC: 9 MG/DL — SIGNIFICANT CHANGE UP (ref 7–23)
CALCIUM SERPL-MCNC: 8.5 MG/DL — SIGNIFICANT CHANGE UP (ref 8.4–10.5)
CHLORIDE SERPL-SCNC: 98 MMOL/L — SIGNIFICANT CHANGE UP (ref 96–108)
CO2 SERPL-SCNC: 27 MMOL/L — SIGNIFICANT CHANGE UP (ref 22–31)
CREAT SERPL-MCNC: 0.82 MG/DL — SIGNIFICANT CHANGE UP (ref 0.5–1.3)
EGFR: 99 ML/MIN/1.73M2 — SIGNIFICANT CHANGE UP
GLUCOSE SERPL-MCNC: 113 MG/DL — HIGH (ref 70–99)
HCT VFR BLD CALC: 27.1 % — LOW (ref 39–50)
HGB BLD-MCNC: 8.9 G/DL — LOW (ref 13–17)
MAGNESIUM SERPL-MCNC: 1.9 MG/DL — SIGNIFICANT CHANGE UP (ref 1.6–2.6)
MCHC RBC-ENTMCNC: 30.9 PG — SIGNIFICANT CHANGE UP (ref 27–34)
MCHC RBC-ENTMCNC: 32.8 GM/DL — SIGNIFICANT CHANGE UP (ref 32–36)
MCV RBC AUTO: 94.1 FL — SIGNIFICANT CHANGE UP (ref 80–100)
NRBC # BLD: 0 /100 WBCS — SIGNIFICANT CHANGE UP (ref 0–0)
PHOSPHATE SERPL-MCNC: 3.9 MG/DL — SIGNIFICANT CHANGE UP (ref 2.5–4.5)
PLATELET # BLD AUTO: 64 K/UL — LOW (ref 150–400)
POTASSIUM SERPL-MCNC: 4.1 MMOL/L — SIGNIFICANT CHANGE UP (ref 3.5–5.3)
POTASSIUM SERPL-SCNC: 4.1 MMOL/L — SIGNIFICANT CHANGE UP (ref 3.5–5.3)
RBC # BLD: 2.88 M/UL — LOW (ref 4.2–5.8)
RBC # FLD: 13.3 % — SIGNIFICANT CHANGE UP (ref 10.3–14.5)
SARS-COV-2 RNA SPEC QL NAA+PROBE: SIGNIFICANT CHANGE UP
SODIUM SERPL-SCNC: 135 MMOL/L — SIGNIFICANT CHANGE UP (ref 135–145)
WBC # BLD: 5.09 K/UL — SIGNIFICANT CHANGE UP (ref 3.8–10.5)
WBC # FLD AUTO: 5.09 K/UL — SIGNIFICANT CHANGE UP (ref 3.8–10.5)

## 2023-01-19 PROCEDURE — 99232 SBSQ HOSP IP/OBS MODERATE 35: CPT

## 2023-01-19 PROCEDURE — 99222 1ST HOSP IP/OBS MODERATE 55: CPT

## 2023-01-19 RX ORDER — ACETAMINOPHEN 500 MG
3 TABLET ORAL
Qty: 0 | Refills: 0 | DISCHARGE
Start: 2023-01-19

## 2023-01-19 RX ORDER — LIDOCAINE 4 G/100G
1 CREAM TOPICAL
Qty: 30 | Refills: 0
Start: 2023-01-19 | End: 2023-02-17

## 2023-01-19 RX ADMIN — Medication 100 MILLIGRAM(S): at 11:58

## 2023-01-19 RX ADMIN — LOSARTAN POTASSIUM 50 MILLIGRAM(S): 100 TABLET, FILM COATED ORAL at 08:26

## 2023-01-19 RX ADMIN — TRAMADOL HYDROCHLORIDE 25 MILLIGRAM(S): 50 TABLET ORAL at 13:28

## 2023-01-19 RX ADMIN — Medication 975 MILLIGRAM(S): at 17:54

## 2023-01-19 RX ADMIN — CHLORHEXIDINE GLUCONATE 1 APPLICATION(S): 213 SOLUTION TOPICAL at 12:00

## 2023-01-19 RX ADMIN — Medication 975 MILLIGRAM(S): at 00:52

## 2023-01-19 RX ADMIN — Medication 50 MILLIGRAM(S): at 17:24

## 2023-01-19 RX ADMIN — TRAMADOL HYDROCHLORIDE 25 MILLIGRAM(S): 50 TABLET ORAL at 21:26

## 2023-01-19 RX ADMIN — TRAMADOL HYDROCHLORIDE 25 MILLIGRAM(S): 50 TABLET ORAL at 13:58

## 2023-01-19 RX ADMIN — Medication 975 MILLIGRAM(S): at 04:35

## 2023-01-19 RX ADMIN — Medication 975 MILLIGRAM(S): at 11:58

## 2023-01-19 RX ADMIN — ENOXAPARIN SODIUM 40 MILLIGRAM(S): 100 INJECTION SUBCUTANEOUS at 11:58

## 2023-01-19 RX ADMIN — Medication 250 MILLIGRAM(S): at 17:24

## 2023-01-19 RX ADMIN — Medication 1 MILLIGRAM(S): at 11:58

## 2023-01-19 RX ADMIN — Medication 975 MILLIGRAM(S): at 04:42

## 2023-01-19 RX ADMIN — LIDOCAINE 1 PATCH: 4 CREAM TOPICAL at 04:34

## 2023-01-19 RX ADMIN — Medication 250 MILLIGRAM(S): at 04:35

## 2023-01-19 RX ADMIN — Medication 1 TABLET(S): at 11:58

## 2023-01-19 RX ADMIN — TRAMADOL HYDROCHLORIDE 25 MILLIGRAM(S): 50 TABLET ORAL at 22:15

## 2023-01-19 RX ADMIN — LIDOCAINE 1 PATCH: 4 CREAM TOPICAL at 16:00

## 2023-01-19 RX ADMIN — Medication 975 MILLIGRAM(S): at 17:24

## 2023-01-19 RX ADMIN — TRAMADOL HYDROCHLORIDE 25 MILLIGRAM(S): 50 TABLET ORAL at 04:00

## 2023-01-19 RX ADMIN — TRAMADOL HYDROCHLORIDE 25 MILLIGRAM(S): 50 TABLET ORAL at 02:52

## 2023-01-19 RX ADMIN — Medication 975 MILLIGRAM(S): at 12:28

## 2023-01-19 RX ADMIN — LIDOCAINE 1 PATCH: 4 CREAM TOPICAL at 06:53

## 2023-01-19 NOTE — PROGRESS NOTE ADULT - ATTENDING COMMENTS
ATTENDING ATTESTATION:    63M history of ETOH dependence and pancreatic cancer s/p whipple s/p fall down 12 stairs with following findings:  - mild stranding of sigmoid colon  - presacral hematoma without fracture    Required ativan overnight for possible ETOH withdrawal  Attempted NGT placement for nausea/vomiting, unable to place  Lactate downtrended to normal    Abd soft, NT/ND    Tbili up to 5.7 from 2.4 on admission  Other LFTs downtrending, alk phos normal    A/P: S/p fall down stairs. Abdominal exam is improved, less worried about sigmoid mesenteric injury. May be developing early signs of ETOH withdrawal.   - NPO given nausea and vomiting  - trend LFTs, suspect is acute on chronic liver injury in setting of trauma, MELD 16  - NUHA ativan prn  - appreciate SICU care      I have seen and examined this patient with the residents/APCs. I have reviewed all new labs, imaging and reports. I have participated in formulating the plan, and have read and agree with the history, ROS, exam, assessment and plan as stated above.    Total time spent in the care of this patient today (excluding critical care, teaching & procedures): 35 minutes    Over 50% of the total time was spent in discussion and coordination of care with consulting services, dietary and rehab services.    Rubina Anderson MD  Acute Care Surgery
seen and examined 01-19-23 @ 1201    tolerating regular diet w/o nausea or vomiting  +flatus  +BM on 1/17    soft / NT / ND  mild sacral tenderness      S4-5 sacral fracture  small presacral hematoma  -pain control    sigmoid colon/mesocolon contusion  -no evidence of compromised viability of sigmoid colon, so no intervention needed    alcohol withdrawal  -complete Librium taper today    dispo  -PT is recommending acute rehab, but patient desires discharge home      I reviewed his labs.
Pt is a 63 year old male who presents to [facility] s/p fall down stairs. CT imaging at [place] revealed an intraabdominal hematoma w/ concerns for sigmoid/mesenteric contusion and a 3.8x3.8 presacral hematoma without associated fx. In transit, patient agitated with tremors concerning for alcohol withdrawal and DT's. Pt received Ativan and Fentanyl with good effect. SICU consulted for management of acute alcohol withdrawal.    A/p  Neuro: 	hx ETOH abuse and EtOH withdrawal  	CIWA  	Multimodal pain control    CVS:	Resolution of Lactic acidosis  	CAD/HTN  	Resume home ARB  	Continue cardiac monitoring    Pulm:	Atelectasis  	ISP    GI:	Abdominal trauma  	Mesenteric contusion  	Presacral hematoma  	H/o pancreatic cancer  	Return of bowel fcn  	F/u primary team to adv diet    :	Hypervolemia  	Lasix given                Heplock IVF  	  Heme:	Acute blood loss anemia  	S/p resuscitation  	H/h stable                Chronic thrombocytopenia  	Continue to monitor    ID:	Cx for fever.

## 2023-01-19 NOTE — CONSULT NOTE ADULT - ATTENDING COMMENTS
Seen and examined- agree w above note 63 year old male with hx of alcohol use and pancreatic cancer s/p whipple presenting from OSH ([place]) for trauma evaluation s/p fall down 12 stairs.  Patient would benefit from acute rehab- he prefers to go home- explained recommendation and risks associated with going home without supervision at this time. .

## 2023-01-19 NOTE — OCCUPATIONAL THERAPY INITIAL EVALUATION ADULT - PERTINENT HX OF CURRENT PROBLEM, REHAB EVAL
62 y/o Male with a PMH of HTN, Alcohol use ( prior hospitalization 2/2 alcohol withdrawal), and pancreatic cancer s/p whipple presenting as a transfer from [facility] for a trauma evaluation after falling down 12 stairs. Patient admits to HS, denies LOC, is not currently on AC, and notes he has had bilious emesis since Fall 2022. 1/13: Brain CT: Moderate atrophy and small vessel white matter ischemic changes. No hemorrhage. Cervical spine CT: Mild degenerative changes. No acute fractures or   dislocations. CT Chest/Abd and Pelvis: Apparent new mild segmental mural thickening of the sigmoid colon with edema of the adjacent mesentery may be due to contusion to the sigmoid colon/mesentery. Presacral soft tissue density measuring 3.8 x 3.8 cm with a Hounsfield unit of 74 may represent a hematoma in the setting of trauma. Follow-up CT may be pursued in 4 weeks to ensure resolution and to rule out metastasis. No acute bony fracture is identified. No CT evidence for an acute intrathoracic injury. Stable enlarged mediastinal lymph nodes. 1/14 CT Lumbar Spine: No vertebral body fracture or dislocation.

## 2023-01-19 NOTE — DISCHARGE NOTE PROVIDER - CARE PROVIDER_API CALL
Jerzy Bray (MD)  Surgery; Surgical Critical Care  [address], [address]  [place] [address]  Phone: [phone]  Fax: [phone]  Follow Up Time: Routine  
Abdomen soft, nontender, nondistended, bowel sounds present in all 4 quadrants.

## 2023-01-19 NOTE — CONSULT NOTE ADULT - SUBJECTIVE AND OBJECTIVE BOX
Mr. Staples is a 63 year old male with hx of alcohol use and pancreatic cancer s/p whipple presenting from OSH ([place]) for trauma evaluation s/p fall down 12 stairs. Patient fell down stairs, + HS, but denies LOC. (-) AC. Was intoxicated during fall. Of note, has history of ETOH withdrawal requiring hospitalization. On CT imaging at [place], patient found to have intraabdominal hematoma w/ concerns for sigmoid/mesenteric contusion, 3.8x3.8 presacral hematoma, w/o evidence of acute fracture in the thorax or pelvis. CTH negative for acute hemorrhage. CT C spine negative for acute fractures/dislocation. On arrival to [facility] ED, patient with 1 episode of non bloody, bilious emesis, small volume. In transit, patient agitated with tremors concerning for alcohol withdrawal and DT's. Given Ativan and Fentanyl. Patient admitted to SICU for alcohol withdrawal. In SICU, patient noted to have non bloody bilious emesis x4- an NGT attempted however aborted. Patient placed on 1:1 initially and then discontinued. Home meds were restarted, and on 1/16 patient placed on CLD and then advanced to regular diet. CIWA was continued. Due to U/A being positive, a urine culture was sent and patient started on ceftriaxone. Due to hematoma noted, patients h/h monitored which remained stable. A physical therapy eval was performed and determined patient needed acute rehab.      PAST MEDICAL & SURGICAL HISTORY  No pertinent past medical history    Hypertension    Anxiety    Pancreatic cancer    Alcohol abuse    History of pancreatic surgery    H/O ventral hernia repair    SOCIAL HISTORY  Smoking - denies   EtOH - admitted for alochol intoxication  Marital Status -       FUNCTIONAL HISTORY  Previous Functional Status:  Independent in ambulation, ADL's, transfers prior to hospitalization    Current Functional Status:  Bed mobility: Supervision  Transfers: Contact Guard   Gait / ambulation: CG 1 person assist 15ft w/ RW      FAMILY HISTORY   No pertinent family history in first degree relatives        MEDICATIONS   acetaminophen     Tablet .. 975 milliGRAM(s) Oral every 6 hours  chlordiazePOXIDE   Oral   chlordiazePOXIDE 50 milliGRAM(s) Oral every 24 hours  chlorhexidine 2% Cloths 1 Application(s) Topical daily  ciprofloxacin     Tablet 250 milliGRAM(s) Oral two times a day  enoxaparin Injectable 40 milliGRAM(s) SubCutaneous every 24 hours  folic acid 1 milliGRAM(s) Oral daily  influenza   Vaccine 0.5 milliLiter(s) IntraMuscular once  lidocaine   4% Patch 1 Patch Transdermal every 24 hours  losartan 50 milliGRAM(s) Oral daily  multivitamin 1 Tablet(s) Oral daily  ondansetron Injectable 4 milliGRAM(s) IV Push every 6 hours PRN  thiamine 100 milliGRAM(s) Oral daily  traMADol 25 milliGRAM(s) Oral every 8 hours PRN      ALLERGIES  No Known Allergies      VITALS  T(C): 36.6 (01-19-23 @ 03:55), Max: 37.6 (01-18-23 @ 13:47)  HR: 67 (01-19-23 @ 03:55) (67 - 91)  BP: 103/67 (01-19-23 @ 03:55) (100/59 - 116/78)  RR: 18 (01-19-23 @ 03:55) (18 - 18)  SpO2: 100% (01-19-23 @ 03:55) (95% - 100%)  Wt(kg): --    PHYSICAL EXAM  Constitutional - NAD, Comfortable  HEENT - NCAT, EOMI  Neck - Supple, No limited ROM  Chest - CTA bilaterally, No wheeze, No rhonchi, No crackles  Cardiovascular - RRR, S1S2, No murmurs  Abdomen - BS+, Soft, NTND  Extremities - No C/C/E, No calf tenderness   Neurologic Exam -                    Cognitive - Awake, Alert, AAO to self, place, date, year, situation     Communication - Fluent, No dysarthria, no aphasia     Cranial Nerves - CN 2-12 intact     Motor - No focal deficits      Sensory - Intact to LT     Reflexes - DTR Intact, No primitive reflexive  Psychiatric - Mood stable, Affect WNL    RECENT LABS/IMAGING  CBC Full  -  ( 19 Jan 2023 07:01 )  WBC Count : 5.09 K/uL  RBC Count : 2.88 M/uL  Hemoglobin : 8.9 g/dL  Hematocrit : 27.1 %  Platelet Count - Automated : 64 K/uL  Mean Cell Volume : 94.1 fl  Mean Cell Hemoglobin : 30.9 pg  Mean Cell Hemoglobin Concentration : 32.8 gm/dL  Auto Neutrophil # : x  Auto Lymphocyte # : x  Auto Monocyte # : x  Auto Eosinophil # : x  Auto Basophil # : x  Auto Neutrophil % : x  Auto Lymphocyte % : x  Auto Monocyte % : x  Auto Eosinophil % : x  Auto Basophil % : x    01-19    135  |  98  |  9   ----------------------------<  113<H>  4.1   |  27  |  0.82    Ca    8.5      19 Jan 2023 06:56  Phos  3.9     01-19  Mg     1.9     01-19         HPI:     Mr. Staples is a 63 year old male with hx of alcohol use and pancreatic cancer s/p whipple presenting from OSH ([place]) for trauma evaluation s/p fall down 12 stairs. Patient fell down stairs, + HS, but denies LOC. (-) AC. Was intoxicated during fall. Of note, has history of ETOH withdrawal requiring hospitalization. On CT imaging at [place], patient found to have intraabdominal hematoma w/ concerns for sigmoid/mesenteric contusion, 3.8x3.8 presacral hematoma, w/o evidence of acute fracture in the thorax or pelvis. CTH negative for acute hemorrhage. CT C spine negative for acute fractures/dislocation. On arrival to [facility] ED, patient with 1 episode of non bloody, bilious emesis, small volume. In transit, patient agitated with tremors concerning for alcohol withdrawal and DT's. Given Ativan and Fentanyl. Patient admitted to SICU for alcohol withdrawal. In SICU, patient noted to have non bloody bilious emesis x4- an NGT attempted however aborted. Patient placed on 1:1 initially and then discontinued. Home meds were restarted, and on 1/16 patient placed on CLD and then advanced to regular diet. CIWA was continued. Due to U/A being positive, a urine culture was sent and patient started on ceftriaxone. Due to hematoma noted, patients h/h monitored which remained stable. A physical therapy eval was performed and determined patient needed acute rehab.      Patient does not wish to go to acute rehab and wants to go back home. Patient has persistent lower back but denies abdominal pain, N/V or headache .Patient being transitioned to PO Cipro for UTI. Denies dysuria or urgency. Denies any other acute primary complaints .     REVIEW OF SYSTEMS  Constitutional: No fever, No Chills, No fatigue  Pulm: No cough,  No shortness of breath  Cardio: No chest pain, No palpitations  GI:  No abdominal pain, No nausea, No vomiting, No diarrhea, No constipation, No incontinence, Last Bowel Movement yesterday  : No dysuria, No frequency, No hematuria, No incontinence, UTI currently PO ciprofloxacin 250mg BID   Neuro: No headaches, No memory loss, No loss of strength, No numbness, No tremors  MSK: + lower back pain, No joint pain, No joint swelling, No neck pain        PAST MEDICAL & SURGICAL HISTORY  No pertinent past medical history    Hypertension    Anxiety    Pancreatic cancer    Alcohol abuse    History of pancreatic surgery    H/O ventral hernia repair    SOCIAL HISTORY  Smoking - denies   EtOH - admitted for alochol intoxication  Marital Status -  but currently living with brother   Patient is a retired . Retired in 2018.   Entrance into house has 2 steps. 2 sets of stairs (10 and 12 steps)      FUNCTIONAL HISTORY  Previous Functional Status:  Independent in ambulation, ADL's, transfers prior to hospitalization    Current Functional Status:  Bed mobility: Supervision  Transfers: Contact Guard   Gait / ambulation: CG 1 person assist 15ft w/ RW      FAMILY HISTORY   No pertinent family history in first degree relatives        MEDICATIONS   acetaminophen     Tablet .. 975 milliGRAM(s) Oral every 6 hours  chlordiazePOXIDE   Oral   chlordiazePOXIDE 50 milliGRAM(s) Oral every 24 hours  chlorhexidine 2% Cloths 1 Application(s) Topical daily  ciprofloxacin     Tablet 250 milliGRAM(s) Oral two times a day  enoxaparin Injectable 40 milliGRAM(s) SubCutaneous every 24 hours  folic acid 1 milliGRAM(s) Oral daily  influenza   Vaccine 0.5 milliLiter(s) IntraMuscular once  lidocaine   4% Patch 1 Patch Transdermal every 24 hours  losartan 50 milliGRAM(s) Oral daily  multivitamin 1 Tablet(s) Oral daily  ondansetron Injectable 4 milliGRAM(s) IV Push every 6 hours PRN  thiamine 100 milliGRAM(s) Oral daily  traMADol 25 milliGRAM(s) Oral every 8 hours PRN      ALLERGIES  No Known Allergies      VITALS  T(C): 36.6 (01-19-23 @ 03:55), Max: 37.6 (01-18-23 @ 13:47)  HR: 67 (01-19-23 @ 03:55) (67 - 91)  BP: 103/67 (01-19-23 @ 03:55) (100/59 - 116/78)  RR: 18 (01-19-23 @ 03:55) (18 - 18)  SpO2: 100% (01-19-23 @ 03:55) (95% - 100%)  Wt(kg): --    PHYSICAL EXAM  Constitutional - NAD, Comfortable  HEENT - NCAT, EOMI  Neck - Supple, No limited ROM  Chest - CTA bilaterally, No wheeze, No rhonchi, No crackles  Cardiovascular - RRR, S1S2, No murmurs  Abdomen - BS+, Soft, NTND  Extremities - No C/C/E, No calf tenderness   Neurologic Exam -                    Cognitive - Awake, Alert, AAO to self, place, date, year, situation     Communication - Fluent, No dysarthria, no aphasia     Cranial Nerves - CN 2-12 intact     Motor - No focal deficits  RUE/LUE - Shab, EF/EE, WF/WE 5/5   RLE/LLE - HF/HE, KF/KE, PF/DF 5/5      Sensory - Intact to LT     Reflexes - DTR Intact, No primitive reflexive  Psychiatric - Mood stable, Affect WNL    RECENT LABS/IMAGING  CBC Full  -  ( 19 Jan 2023 07:01 )  WBC Count : 5.09 K/uL  RBC Count : 2.88 M/uL  Hemoglobin : 8.9 g/dL  Hematocrit : 27.1 %  Platelet Count - Automated : 64 K/uL  Mean Cell Volume : 94.1 fl  Mean Cell Hemoglobin : 30.9 pg  Mean Cell Hemoglobin Concentration : 32.8 gm/dL  Auto Neutrophil # : x  Auto Lymphocyte # : x  Auto Monocyte # : x  Auto Eosinophil # : x  Auto Basophil # : x  Auto Neutrophil % : x  Auto Lymphocyte % : x  Auto Monocyte % : x  Auto Eosinophil % : x  Auto Basophil % : x    01-19    135  |  98  |  9   ----------------------------<  113<H>  4.1   |  27  |  0.82    Ca    8.5      19 Jan 2023 06:56  Phos  3.9     01-19  Mg     1.9     01-19         HPI:     Mr. Staples is a 63 year old male with hx of alcohol use and pancreatic cancer s/p whipple presenting from OSH ([place]) for trauma evaluation s/p fall down 12 stairs. Patient fell down 12 stairs striking head, but denies LOC. Patient not receiving AC. Patient was intoxicated and has hx of  ETOH withdrawal requiring hospitalization. On CT imaging at [place], patient found to have intraabdominal hematoma w/ concerns for sigmoid/mesenteric contusion, 3.8x3.8 presacral hematoma with no evidence of acute fracture in the thorax or pelvis. CTH negative for acute hemorrhage. CT C spine negative for acute fractures/dislocation. During transfer to [facility] patinet was agitated with tremors concerning for alcohol withdrawal and DT's. Patient received ativan and fentanyl. On arrival to [facility] ED patient had an episode of billous emesis.  Patient is receiving course of ceftriaxone for management of UTI with plan to transition to PO ciprofloxacin. CIWA continued.     Patient does not wish to go to acute rehab at this time and wants to go back home. Patient has persistent lower back but denies abdominal pain, N/V or headache .Patient being transitioned to PO Cipro for UTI. Denies dysuria or urgency. Denies any other acute primary complaints .     REVIEW OF SYSTEMS  Constitutional: No fever, No Chills, No fatigue  Pulm: No cough,  No shortness of breath  Cardio: No chest pain, No palpitations  GI:  No abdominal pain, No nausea, No vomiting, No diarrhea, No constipation, No incontinence, Last Bowel Movement yesterday  : No dysuria, No frequency, No hematuria, No incontinence, UTI currently PO ciprofloxacin 250mg BID   Neuro: No headaches, No memory loss, No loss of strength, No numbness, No tremors  MSK: + lower back pain, No joint pain, No joint swelling, No neck pain        PAST MEDICAL & SURGICAL HISTORY  No pertinent past medical history    Hypertension    Anxiety    Pancreatic cancer    Alcohol abuse    History of pancreatic surgery    H/O ventral hernia repair    SOCIAL HISTORY  Smoking - denies   EtOH - admitted for alochol intoxication  Marital Status -  but currently living with brother   Patient is a retired . Retired in 2018.   Entrance into house has 2 steps. 2 sets of stairs (10 and 12 steps)      FUNCTIONAL HISTORY  Previous Functional Status:  Independent in ambulation, ADL's, transfers prior to hospitalization    Current Functional Status:  Bed mobility: Supervision  Transfers: Contact Guard   Gait / ambulation: CG 1 person assist 15ft w/ RW      FAMILY HISTORY   No pertinent family history in first degree relatives        MEDICATIONS   acetaminophen     Tablet .. 975 milliGRAM(s) Oral every 6 hours  chlordiazePOXIDE   Oral   chlordiazePOXIDE 50 milliGRAM(s) Oral every 24 hours  chlorhexidine 2% Cloths 1 Application(s) Topical daily  ciprofloxacin     Tablet 250 milliGRAM(s) Oral two times a day  enoxaparin Injectable 40 milliGRAM(s) SubCutaneous every 24 hours  folic acid 1 milliGRAM(s) Oral daily  influenza   Vaccine 0.5 milliLiter(s) IntraMuscular once  lidocaine   4% Patch 1 Patch Transdermal every 24 hours  losartan 50 milliGRAM(s) Oral daily  multivitamin 1 Tablet(s) Oral daily  ondansetron Injectable 4 milliGRAM(s) IV Push every 6 hours PRN  thiamine 100 milliGRAM(s) Oral daily  traMADol 25 milliGRAM(s) Oral every 8 hours PRN      ALLERGIES  No Known Allergies      VITALS  T(C): 36.6 (01-19-23 @ 03:55), Max: 37.6 (01-18-23 @ 13:47)  HR: 67 (01-19-23 @ 03:55) (67 - 91)  BP: 103/67 (01-19-23 @ 03:55) (100/59 - 116/78)  RR: 18 (01-19-23 @ 03:55) (18 - 18)  SpO2: 100% (01-19-23 @ 03:55) (95% - 100%)  Wt(kg): --    PHYSICAL EXAM  Constitutional - NAD, Comfortable  HEENT - NCAT, EOMI  Neck - Supple, No limited ROM  Chest - CTA bilaterally, No wheeze, No rhonchi, No crackles  Cardiovascular - RRR, S1S2, No murmurs  Abdomen - BS+, Soft, NTND  Extremities - No C/C/E, No calf tenderness   Neurologic Exam -                    Cognitive - Awake, Alert, AAO to self, place, date, year, situation     Communication - Fluent, No dysarthria, no aphasia     Cranial Nerves - CN 2-12 intact     Motor - No focal deficits  RUE/LUE - Shab, EF/EE, WF/WE 5/5   RLE/LLE - HF/HE, KF/KE, PF/DF 5/5      Sensory - Intact to LT     Reflexes - DTR Intact, No primitive reflexive  Psychiatric - Mood stable, Affect WNL    RECENT LABS/IMAGING  CBC Full  -  ( 19 Jan 2023 07:01 )  WBC Count : 5.09 K/uL  RBC Count : 2.88 M/uL  Hemoglobin : 8.9 g/dL  Hematocrit : 27.1 %  Platelet Count - Automated : 64 K/uL  Mean Cell Volume : 94.1 fl  Mean Cell Hemoglobin : 30.9 pg  Mean Cell Hemoglobin Concentration : 32.8 gm/dL  Auto Neutrophil # : x  Auto Lymphocyte # : x  Auto Monocyte # : x  Auto Eosinophil # : x  Auto Basophil # : x  Auto Neutrophil % : x  Auto Lymphocyte % : x  Auto Monocyte % : x  Auto Eosinophil % : x  Auto Basophil % : x    01-19    135  |  98  |  9   ----------------------------<  113<H>  4.1   |  27  |  0.82    Ca    8.5      19 Jan 2023 06:56  Phos  3.9     01-19  Mg     1.9     01-19

## 2023-01-19 NOTE — DISCHARGE NOTE PROVIDER - HOSPITAL COURSE
Mr. Staples is a 63 year old male with hx of alcohol use and pancreatic cancer s/p whipple presenting from OSH ([place]) for trauma evaluation s/p fall down 12 stairs. Patient fell down stairs, + HS, but denies LOC. (-) AC. Was intoxicated during fall. Of note, has history of ETOH withdrawal requiring hospitalization. On CT imaging at [place], patient found to have intraabdominal hematoma w/ concerns for sigmoid/mesenteric contusion, 3.8x3.8 presacral hematoma, w/o evidence of acute fracture in the thorax or pelvis. CTH negative for acute hemorrhage. CT C spine negative for acute fractures/dislocation. On arrival to [facility] ED, patient with 1 episode of non bloody, bilious emesis, small volume. In transit, patient agitated with tremors concerning for alcohol withdrawal and DT's. Given Ativan and Fentanyl. Patient admitted to SICU for alcohol withdrawal. In SICU, patient noted to have non bloody bilious emesis x4- an NGT attempted however aborted. Patient placed on 1:1 initially and then discontinued. Home meds were restarted, and on 1/16 patient placed on CLD and then advanced to regular diet. CIWA was continued. Due to U/A being positive, a urine culture was sent and patient started on ceftriaxone. Due to hematoma noted, patients h/h monitored which remained stable. A physical therapy eval was performed and determined patient needed acute rehab. PMR evaluated patient and he was transferred to the floor. On day of discharge, the patient was tolerating diet, working with PT and pain controlled. All questions were answered and patient safely discharged to acute rehab once auth and bed obtained.    Mr. Staples is a 63 year old male with hx of alcohol use and pancreatic cancer s/p whipple presenting from OSH ([place]) for trauma evaluation s/p fall down 12 stairs. Patient fell down stairs, + HS, but denies LOC. (-) AC. Was intoxicated during fall. Of note, has history of ETOH withdrawal requiring hospitalization. On CT imaging at [place], patient found to have intraabdominal hematoma w/ concerns for sigmoid/mesenteric contusion, 3.8x3.8 presacral hematoma, w/o evidence of acute fracture in the thorax or pelvis. CTH negative for acute hemorrhage. CT C spine negative for acute fractures/dislocation. On arrival to [facility] ED, patient with 1 episode of non bloody, bilious emesis, small volume. In transit, patient agitated with tremors concerning for alcohol withdrawal and DT's. Given Ativan and Fentanyl. Patient admitted to SICU for alcohol withdrawal. In SICU, patient noted to have non bloody bilious emesis x4- an NGT attempted however aborted. Patient placed on 1:1 initially and then discontinued. Home meds were restarted, and on 1/16 patient placed on CLD and then advanced to regular diet. CIWA was continued. Due to U/A being positive, a urine culture was sent and patient started on ceftriaxone. Due to hematoma noted, patients h/h monitored which remained stable. A physical therapy eval was performed and determined patient needed acute rehab. PMR evaluated patient and he was transferred to the floor. On day of discharge, the patient was tolerating diet, working with PT and pain controlled. All questions were answered and patient safely discharged to home, the patient refused recommendations by PT and OT for acute in-patient rehab.

## 2023-01-19 NOTE — PROGRESS NOTE ADULT - SUBJECTIVE AND OBJECTIVE BOX
[facility] Division of Hospital Medicine  Ranjith Joshi MD  Available via MS Teams  Pager: [phone]    SUBJECTIVE / OVERNIGHT EVENTS: No events overnight. Continues to report lower back pain, worse with movement. No pain in lower extremities. Abdominal pain improvement. No tremors, headache, anxiety.    MEDICATIONS  (STANDING):  acetaminophen     Tablet .. 975 milliGRAM(s) Oral every 6 hours  chlordiazePOXIDE   Oral   chlordiazePOXIDE 50 milliGRAM(s) Oral every 24 hours  chlorhexidine 2% Cloths 1 Application(s) Topical daily  ciprofloxacin     Tablet 250 milliGRAM(s) Oral two times a day  enoxaparin Injectable 40 milliGRAM(s) SubCutaneous every 24 hours  folic acid 1 milliGRAM(s) Oral daily  influenza   Vaccine 0.5 milliLiter(s) IntraMuscular once  lidocaine   4% Patch 1 Patch Transdermal every 24 hours  losartan 50 milliGRAM(s) Oral daily  multivitamin 1 Tablet(s) Oral daily  thiamine 100 milliGRAM(s) Oral daily    MEDICATIONS  (PRN):  ondansetron Injectable 4 milliGRAM(s) IV Push every 6 hours PRN Nausea and/or Vomiting  traMADol 25 milliGRAM(s) Oral every 8 hours PRN Severe Pain (7 - 10)      I&O's Summary    18 Jan 2023 07:01  -  19 Jan 2023 07:00  --------------------------------------------------------  IN: 1800 mL / OUT: 750 mL / NET: 1050 mL    19 Jan 2023 07:01  -  19 Jan 2023 12:19  --------------------------------------------------------  IN: 0 mL / OUT: 200 mL / NET: -200 mL        PHYSICAL EXAM:  Vital Signs Last 24 Hrs  T(C): 36.3 (19 Jan 2023 09:00), Max: 37.6 (18 Jan 2023 13:47)  T(F): 97.4 (19 Jan 2023 09:00), Max: 99.7 (18 Jan 2023 13:47)  HR: 94 (19 Jan 2023 09:00) (67 - 94)  BP: 116/65 (19 Jan 2023 09:00) (100/59 - 116/78)  BP(mean): --  RR: 18 (19 Jan 2023 09:00) (18 - 18)  SpO2: 100% (19 Jan 2023 09:00) (95% - 100%)    Parameters below as of 19 Jan 2023 09:00  Patient On (Oxygen Delivery Method): room air    CONSTITUTIONAL: NAD, well-developed, well-groomed  EYES: PERRL; conjunctiva and sclera clear  RESPIRATORY: Normal respiratory effort; lungs are clear to auscultation bilaterally  CARDIOVASCULAR: Regular rate and rhythm, normal S1 and S2, no murmur/rub/gallop; No lower extremity edema; Peripheral pulses are 2+ bilaterally  ABDOMEN: Nontender to palpation, normoactive bowel sounds, no rebound/guarding; No hepatosplenomegaly  MUSCULOSKELETAL:  No clubbing or cyanosis of digits; no joint swelling or tenderness to palpation  PSYCH: A+O to person, place, and time; affect flat  NEUROLOGY: CN 2-12 are intact and symmetric; no gross sensory deficits; foot plantar/dorsiflexors 5/5 b/l. Sensation intact to light touch. Hip flexors 5/5 b/l.  SKIN: No rashes; no palpable lesions    LABS:                        8.9    5.09  )-----------( 64       ( 19 Jan 2023 07:01 )             27.1     01-19    135  |  98  |  9   ----------------------------<  113<H>  4.1   |  27  |  0.82    Ca    8.5      19 Jan 2023 06:56  Phos  3.9     01-19  Mg     1.9     01-19        Culture - Urine (collected 16 Jan 2023 14:13)  Source: Clean Catch Clean Catch (Midstream)  Final Report (18 Jan 2023 14:31):    >100,000 CFU/ml Coag Negative Staphylococcus "Susceptibilities not    performed"      COVID-19 PCR: NotDetec (09 Oct 2022 20:53)      RADIOLOGY & ADDITIONAL TESTS:  New Imaging Personally Reviewed Today: No new studies    COORDINATION OF CARE:  Consultant Communication and Details of Discussion (where applicable): Dr. Luque regarding recommendation for AR and patient declination. Dr. Bray regarding discharge planning and barriers.

## 2023-01-19 NOTE — PROGRESS NOTE ADULT - ASSESSMENT
Patient is a 63 year old male with hx of alcohol use and pancreatic cancer s/p whipple presenting from OS ([place]) for trauma evaluation s/p fall down 12 stairs. Admitted to SICU service.

## 2023-01-19 NOTE — DISCHARGE NOTE PROVIDER - NSDCACTIVITY_GEN_ALL_CORE
This is a recent snapshot of the patient's [facility] medical record.  For current drug dose and complete information and questions, call [phone] or In Basket pool, fv home infusion ([id number])  CSN Number:  [id number]      
No heavy lifting/straining/Follow Instructions Provided by your Surgical Team

## 2023-01-19 NOTE — PROGRESS NOTE ADULT - ASSESSMENT
63 year old male presenting s/p fall down 12 stairs recovering in the SICU for alcohol withdrawal monitoring.  Downgraded on 1/17/23.    Injuries identified:  - Presacral hematoma  - Sigmoid/mesenteric contusion    Plan:  - Multimodal pain control  - Librium Taper finished  - Tertiary exam done  - Dispo: Acute Rehab; Appreciate PT, OT, and PMR recs      ACS/Trauma Surgery  [id number]

## 2023-01-19 NOTE — DISCHARGE NOTE PROVIDER - NSDCCPCAREPLAN_GEN_ALL_CORE_FT
PRINCIPAL DISCHARGE DIAGNOSIS  Diagnosis: Hematoma  Assessment and Plan of Treatment: Due to your fall you have developed a hematoma.  If you notice any further bruising, worsening of site return to ER/ call your PCP      SECONDARY DISCHARGE DIAGNOSES  Diagnosis: Abnormal urinalysis  Assessment and Plan of Treatment: During your stay you had a bacteria noted in your urine.  You were started on antibiotics.  If you develop any trouble urinating, painful urination, blood in your urine please call your PCP.    Diagnosis: Alcohol withdrawal  Assessment and Plan of Treatment: Call your local emergency number (911 in the [place]) for any of the following:  You have sudden chest pain or trouble breathing.  You pass out or think you had a seizure.  You feel like you want to harm yourself or others.  Call your doctor if:  Your breathing or heartbeat is faster than usual.  You are confused, hallucinating, or extremely agitated.  You cannot stop vomiting, or you vomit blood.  You are shaking and it does not get better after you take your medicine.  For support and more information:  Alcoholics Anonymous  Web Address: http://www.aa.org  Substance Abuse and Mental Health Services Administration  Web Address: http://www.samhsa.gov

## 2023-01-19 NOTE — CONSULT NOTE ADULT - ASSESSMENT
Impression:  Mr. Staples is a 63 year old male with hx of alcohol use and pancreatic cancer s/p whipple presenting from OSH ([place]) for trauma evaluation s/p fall down 12 stairs.    Plan:  c/w CIWA protocol, PRN ativan  c/w taper of librium   For UTI c/w Cirpo 150mg PO BID   c/w losartan for maangement of HTN  PT- ROM, Bed Mob, Transfers, Amb w AD and bracing as needed  Prec- Falls  DVT Prophylaxis - lovenox 40mg SQ QD   Skin- Turn q2 h  Dispo- Acute Rehab     Recommending acute rehab. Patient can tolerate 3  hours of thearpy per day and requries daily visits from a physician

## 2023-01-19 NOTE — DISCHARGE NOTE PROVIDER - NSDCMRMEDTOKEN_GEN_ALL_CORE_FT
acetaminophen 325 mg oral tablet: 3 tab(s) orally every 6 hours  irbesartan 150 mg oral tablet: 1 tab(s) orally once a day  lidocaine 4% topical film: Apply topically to affected area once a day   Multiple Vitamins oral tablet: 1 tab(s) orally once a day

## 2023-01-19 NOTE — PROGRESS NOTE ADULT - SUBJECTIVE AND OBJECTIVE BOX
TEAM TRAUMA Surgery Daily Progress Note  =====================================================    INTERVAL EVENTS:  - No Acute events overnight    SUBJECTIVE: Patient seen and examined at bedside on AM rounds. Patient reports that they're feeling ok with chronic back pain still present. Tolerating regular diet without issues    --------------------------------------------------------------------------------------  OBJECTIVE:    VITAL SIGNS:  Vital Signs Last 24 Hrs  T(C): 36.6 (19 Jan 2023 03:55), Max: 37.6 (18 Jan 2023 13:47)  T(F): 97.9 (19 Jan 2023 03:55), Max: 99.7 (18 Jan 2023 13:47)  HR: 67 (19 Jan 2023 03:55) (67 - 91)  BP: 103/67 (19 Jan 2023 03:55) (100/59 - 116/78)  BP(mean): --  RR: 18 (19 Jan 2023 03:55) (18 - 18)  SpO2: 100% (19 Jan 2023 03:55) (95% - 100%)    Parameters below as of 19 Jan 2023 03:55  Patient On (Oxygen Delivery Method): room air      --------------------------------------------------------------------------------------    EXAM:  General: NAD, resting in the bed at an angle, off of his back  HEENT: Normocephalic  Respiratory: equal chest wall expansion bilaterally   Abdomen: Soft, non-distended, nontender, no rebound or guarding  Ext: motor and sensory grossly intact in all 4 extremities    --------------------------------------------------------------------------------------    LABS:                        8.9    5.09  )-----------( 64       ( 19 Jan 2023 07:01 )             27.1     01-19    135  |  98  |  9   ----------------------------<  113<H>  4.1   |  27  |  0.82    Ca    8.5      19 Jan 2023 06:56  Phos  3.9     01-19  Mg     1.9     01-19            --------------------------------------------------------------------------------------    INS AND OUTS:    01-18-23 @ 07:01  -  01-19-23 @ 07:00  --------------------------------------------------------  IN: 1800 mL / OUT: 750 mL / NET: 1050 mL        --------------------------------------------------------------------------------------    MEDICATIONS:  MEDICATIONS  (STANDING):  acetaminophen     Tablet .. 975 milliGRAM(s) Oral every 6 hours  chlordiazePOXIDE   Oral   chlordiazePOXIDE 50 milliGRAM(s) Oral every 24 hours  chlorhexidine 2% Cloths 1 Application(s) Topical daily  ciprofloxacin     Tablet 250 milliGRAM(s) Oral two times a day  enoxaparin Injectable 40 milliGRAM(s) SubCutaneous every 24 hours  folic acid 1 milliGRAM(s) Oral daily  influenza   Vaccine 0.5 milliLiter(s) IntraMuscular once  lidocaine   4% Patch 1 Patch Transdermal every 24 hours  losartan 50 milliGRAM(s) Oral daily  multivitamin 1 Tablet(s) Oral daily  thiamine 100 milliGRAM(s) Oral daily    MEDICATIONS  (PRN):  ondansetron Injectable 4 milliGRAM(s) IV Push every 6 hours PRN Nausea and/or Vomiting  traMADol 25 milliGRAM(s) Oral every 8 hours PRN Severe Pain (7 - 10)    --------------------------------------------------------------------------------------

## 2023-01-20 ENCOUNTER — TRANSCRIPTION ENCOUNTER (OUTPATIENT)
Age: 63
End: 2023-01-20

## 2023-01-20 VITALS
SYSTOLIC BLOOD PRESSURE: 144 MMHG | HEART RATE: 63 BPM | OXYGEN SATURATION: 98 % | RESPIRATION RATE: 18 BRPM | DIASTOLIC BLOOD PRESSURE: 77 MMHG | TEMPERATURE: 98 F

## 2023-01-20 LAB
ANION GAP SERPL CALC-SCNC: 9 MMOL/L — SIGNIFICANT CHANGE UP (ref 5–17)
BUN SERPL-MCNC: 8 MG/DL — SIGNIFICANT CHANGE UP (ref 7–23)
CALCIUM SERPL-MCNC: 8.9 MG/DL — SIGNIFICANT CHANGE UP (ref 8.4–10.5)
CHLORIDE SERPL-SCNC: 103 MMOL/L — SIGNIFICANT CHANGE UP (ref 96–108)
CO2 SERPL-SCNC: 25 MMOL/L — SIGNIFICANT CHANGE UP (ref 22–31)
CREAT SERPL-MCNC: 0.79 MG/DL — SIGNIFICANT CHANGE UP (ref 0.5–1.3)
EGFR: 100 ML/MIN/1.73M2 — SIGNIFICANT CHANGE UP
GLUCOSE SERPL-MCNC: 102 MG/DL — HIGH (ref 70–99)
HCT VFR BLD CALC: 28 % — LOW (ref 39–50)
HGB BLD-MCNC: 9.2 G/DL — LOW (ref 13–17)
MAGNESIUM SERPL-MCNC: 1.6 MG/DL — SIGNIFICANT CHANGE UP (ref 1.6–2.6)
MCHC RBC-ENTMCNC: 31.2 PG — SIGNIFICANT CHANGE UP (ref 27–34)
MCHC RBC-ENTMCNC: 32.9 GM/DL — SIGNIFICANT CHANGE UP (ref 32–36)
MCV RBC AUTO: 94.9 FL — SIGNIFICANT CHANGE UP (ref 80–100)
NRBC # BLD: 0 /100 WBCS — SIGNIFICANT CHANGE UP (ref 0–0)
PHOSPHATE SERPL-MCNC: 3.1 MG/DL — SIGNIFICANT CHANGE UP (ref 2.5–4.5)
PLATELET # BLD AUTO: 79 K/UL — LOW (ref 150–400)
POTASSIUM SERPL-MCNC: 4.3 MMOL/L — SIGNIFICANT CHANGE UP (ref 3.5–5.3)
POTASSIUM SERPL-SCNC: 4.3 MMOL/L — SIGNIFICANT CHANGE UP (ref 3.5–5.3)
RBC # BLD: 2.95 M/UL — LOW (ref 4.2–5.8)
RBC # FLD: 13.6 % — SIGNIFICANT CHANGE UP (ref 10.3–14.5)
SODIUM SERPL-SCNC: 137 MMOL/L — SIGNIFICANT CHANGE UP (ref 135–145)
WBC # BLD: 2.97 K/UL — LOW (ref 3.8–10.5)
WBC # FLD AUTO: 2.97 K/UL — LOW (ref 3.8–10.5)

## 2023-01-20 PROCEDURE — 83605 ASSAY OF LACTIC ACID: CPT

## 2023-01-20 PROCEDURE — 99232 SBSQ HOSP IP/OBS MODERATE 35: CPT

## 2023-01-20 PROCEDURE — 82330 ASSAY OF CALCIUM: CPT

## 2023-01-20 PROCEDURE — U0003: CPT

## 2023-01-20 PROCEDURE — U0005: CPT

## 2023-01-20 PROCEDURE — 97530 THERAPEUTIC ACTIVITIES: CPT

## 2023-01-20 PROCEDURE — 85014 HEMATOCRIT: CPT

## 2023-01-20 PROCEDURE — 83036 HEMOGLOBIN GLYCOSYLATED A1C: CPT

## 2023-01-20 PROCEDURE — 83735 ASSAY OF MAGNESIUM: CPT

## 2023-01-20 PROCEDURE — 86901 BLOOD TYPING SEROLOGIC RH(D): CPT

## 2023-01-20 PROCEDURE — 80307 DRUG TEST PRSMV CHEM ANLYZR: CPT

## 2023-01-20 PROCEDURE — 90715 TDAP VACCINE 7 YRS/> IM: CPT

## 2023-01-20 PROCEDURE — 85027 COMPLETE CBC AUTOMATED: CPT

## 2023-01-20 PROCEDURE — 96375 TX/PRO/DX INJ NEW DRUG ADDON: CPT

## 2023-01-20 PROCEDURE — 86900 BLOOD TYPING SEROLOGIC ABO: CPT

## 2023-01-20 PROCEDURE — 97166 OT EVAL MOD COMPLEX 45 MIN: CPT

## 2023-01-20 PROCEDURE — 99291 CRITICAL CARE FIRST HOUR: CPT

## 2023-01-20 PROCEDURE — 82435 ASSAY OF BLOOD CHLORIDE: CPT

## 2023-01-20 PROCEDURE — 85018 HEMOGLOBIN: CPT

## 2023-01-20 PROCEDURE — 97161 PT EVAL LOW COMPLEX 20 MIN: CPT

## 2023-01-20 PROCEDURE — 86850 RBC ANTIBODY SCREEN: CPT

## 2023-01-20 PROCEDURE — 96376 TX/PRO/DX INJ SAME DRUG ADON: CPT

## 2023-01-20 PROCEDURE — 81001 URINALYSIS AUTO W/SCOPE: CPT

## 2023-01-20 PROCEDURE — 84132 ASSAY OF SERUM POTASSIUM: CPT

## 2023-01-20 PROCEDURE — 36415 COLL VENOUS BLD VENIPUNCTURE: CPT

## 2023-01-20 PROCEDURE — 97535 SELF CARE MNGMENT TRAINING: CPT

## 2023-01-20 PROCEDURE — 72131 CT LUMBAR SPINE W/O DYE: CPT | Mod: QQ

## 2023-01-20 PROCEDURE — 96374 THER/PROPH/DIAG INJ IV PUSH: CPT

## 2023-01-20 PROCEDURE — 80053 COMPREHEN METABOLIC PANEL: CPT

## 2023-01-20 PROCEDURE — 85025 COMPLETE CBC W/AUTO DIFF WBC: CPT

## 2023-01-20 PROCEDURE — 85610 PROTHROMBIN TIME: CPT

## 2023-01-20 PROCEDURE — 84295 ASSAY OF SERUM SODIUM: CPT

## 2023-01-20 PROCEDURE — 83690 ASSAY OF LIPASE: CPT

## 2023-01-20 PROCEDURE — 82947 ASSAY GLUCOSE BLOOD QUANT: CPT

## 2023-01-20 PROCEDURE — 85730 THROMBOPLASTIN TIME PARTIAL: CPT

## 2023-01-20 PROCEDURE — 84100 ASSAY OF PHOSPHORUS: CPT

## 2023-01-20 PROCEDURE — 87086 URINE CULTURE/COLONY COUNT: CPT

## 2023-01-20 PROCEDURE — 82803 BLOOD GASES ANY COMBINATION: CPT

## 2023-01-20 PROCEDURE — 97116 GAIT TRAINING THERAPY: CPT

## 2023-01-20 PROCEDURE — 80048 BASIC METABOLIC PNL TOTAL CA: CPT

## 2023-01-20 RX ORDER — TRAMADOL HYDROCHLORIDE 50 MG/1
1 TABLET ORAL
Qty: 12 | Refills: 0
Start: 2023-01-20 | End: 2023-01-23

## 2023-01-20 RX ORDER — MAGNESIUM SULFATE 500 MG/ML
2 VIAL (ML) INJECTION ONCE
Refills: 0 | Status: COMPLETED | OUTPATIENT
Start: 2023-01-20 | End: 2023-01-20

## 2023-01-20 RX ADMIN — Medication 250 MILLIGRAM(S): at 05:21

## 2023-01-20 RX ADMIN — LIDOCAINE 1 PATCH: 4 CREAM TOPICAL at 05:20

## 2023-01-20 RX ADMIN — Medication 975 MILLIGRAM(S): at 05:25

## 2023-01-20 RX ADMIN — Medication 100 MILLIGRAM(S): at 11:36

## 2023-01-20 RX ADMIN — Medication 975 MILLIGRAM(S): at 05:21

## 2023-01-20 RX ADMIN — Medication 975 MILLIGRAM(S): at 11:36

## 2023-01-20 RX ADMIN — Medication 25 GRAM(S): at 08:53

## 2023-01-20 RX ADMIN — TRAMADOL HYDROCHLORIDE 25 MILLIGRAM(S): 50 TABLET ORAL at 05:19

## 2023-01-20 RX ADMIN — TRAMADOL HYDROCHLORIDE 25 MILLIGRAM(S): 50 TABLET ORAL at 06:00

## 2023-01-20 RX ADMIN — TRAMADOL HYDROCHLORIDE 25 MILLIGRAM(S): 50 TABLET ORAL at 13:33

## 2023-01-20 RX ADMIN — Medication 1 TABLET(S): at 11:36

## 2023-01-20 RX ADMIN — Medication 1 MILLIGRAM(S): at 11:36

## 2023-01-20 RX ADMIN — ENOXAPARIN SODIUM 40 MILLIGRAM(S): 100 INJECTION SUBCUTANEOUS at 13:32

## 2023-01-20 NOTE — PROGRESS NOTE ADULT - ASSESSMENT
Patient is a 63 year old male with hx of alcohol use and pancreatic cancer s/p whipple presenting from OS ([place]) for trauma evaluation s/p fall down 12 stairs. Admitted to SICU service.

## 2023-01-20 NOTE — PROGRESS NOTE ADULT - PROBLEM SELECTOR PLAN 2
UCx growing coag neg staph. Not septic.  - Continue ciprofloxaxin 250 mg PO BID to complete five day total course
UCx growing coag neg staph. Not septic.  - Continue ciprofloxacin 250 mg PO BID to complete five day total course
UCx growing coag neg staph  - Can d/c ceftriaxone; does not require IV therapy as tolerating PO diet  - Star ciprofloxaxin 250 mg PO BID to complete five day total course

## 2023-01-20 NOTE — DISCHARGE NOTE NURSING/CASE MANAGEMENT/SOCIAL WORK - PATIENT PORTAL LINK FT
You can access the FollowMyHealth Patient Portal offered by [facility] by registering at the following website: [email or web address]. By joining [facility]’s FollowMyHealth portal, you will also be able to view your health information using other applications (apps) compatible with our system.

## 2023-01-20 NOTE — PROGRESS NOTE ADULT - NS ATTEND AMEND GEN_ALL_CORE FT
seen and examined 01-20-23 @ 1028    tolerating regular diet w/o nausea or vomiting  +flatus  +BM on 1/17    soft / NT / ND  mild sacral tenderness      S4-5 sacral fracture  small presacral hematoma  -pain control    sigmoid colon/mesocolon contusion  -no evidence of compromised viability of sigmoid colon, so no intervention needed    alcohol withdrawal  -completed Librium taper on Thurs 1/19 and no recurrent withdrawal symptoms    dispo  -PT is recommending acute rehab, but patient desires discharge home, so will discharge home today      I reviewed his labs.
seen and examined 01-18-23 @ 1155    tolerating regular diet w/o nausea or vomiting  +flatus  +BM yesterday    soft / NT / ND  mild sacral tenderness      S4-5 sacral fracture  small presacral hematoma  -pain control    sigmoid colon/mesocolon contusion  -no evidence of compromised viability of sigmoid colon, so no intervention needed    alcohol withdrawal  -Librium taper    fall down stairs  -physical therapy evaluation      I reviewed his labs and radiologic images.

## 2023-01-20 NOTE — PROGRESS NOTE ADULT - SUBJECTIVE AND OBJECTIVE BOX
[facility] Division of Hospital Medicine  Ranjith oJshi MD  Available via MS Teams  Pager: [phone]    SUBJECTIVE / OVERNIGHT EVENTS: No events overnight. Continues to report lower back pain. States he has been ambulating without issue. Denies dizziness or lightheadedness. Eager to go home.    MEDICATIONS  (STANDING):  acetaminophen     Tablet .. 975 milliGRAM(s) Oral every 6 hours  chlorhexidine 2% Cloths 1 Application(s) Topical daily  ciprofloxacin     Tablet 250 milliGRAM(s) Oral two times a day  enoxaparin Injectable 40 milliGRAM(s) SubCutaneous every 24 hours  folic acid 1 milliGRAM(s) Oral daily  influenza   Vaccine 0.5 milliLiter(s) IntraMuscular once  lidocaine   4% Patch 1 Patch Transdermal every 24 hours  losartan 50 milliGRAM(s) Oral daily  multivitamin 1 Tablet(s) Oral daily  thiamine 100 milliGRAM(s) Oral daily    MEDICATIONS  (PRN):  ondansetron Injectable 4 milliGRAM(s) IV Push every 6 hours PRN Nausea and/or Vomiting  traMADol 25 milliGRAM(s) Oral every 8 hours PRN Severe Pain (7 - 10)      I&O's Summary    19 Jan 2023 07:01  -  20 Jan 2023 07:00  --------------------------------------------------------  IN: 360 mL / OUT: 200 mL / NET: 160 mL        PHYSICAL EXAM:  Vital Signs Last 24 Hrs  T(C): 36.5 (20 Jan 2023 09:00), Max: 36.8 (20 Jan 2023 01:28)  T(F): 97.7 (20 Jan 2023 09:00), Max: 98.2 (20 Jan 2023 01:28)  HR: 56 (20 Jan 2023 09:00) (56 - 66)  BP: 124/76 (20 Jan 2023 09:00) (100/48 - 124/76)  BP(mean): --  RR: 18 (20 Jan 2023 09:00) (18 - 18)  SpO2: 98% (20 Jan 2023 09:00) (93% - 99%)    Parameters below as of 20 Jan 2023 09:00  Patient On (Oxygen Delivery Method): room air    CONSTITUTIONAL: NAD, well-developed, well-groomed  EYES: PERRL; conjunctiva and sclera clear  RESPIRATORY: Normal respiratory effort; lungs are clear to auscultation bilaterally  CARDIOVASCULAR: Regular rate and rhythm, normal S1 and S2, no murmur/rub/gallop; No lower extremity edema; Peripheral pulses are 2+ bilaterally  ABDOMEN: Nontender to palpation, normoactive bowel sounds, no rebound/guarding; No hepatosplenomegaly  MUSCULOSKELETAL:  No clubbing or cyanosis of digits; no joint swelling or tenderness to palpation  PSYCH: A+O to person, place, and time; affect flat  NEUROLOGY: CN 2-12 are intact and symmetric; no gross sensory deficits; foot plantar/dorsiflexors 5/5 b/l. Sensation intact to light touch. Hip flexors 5/5 b/l.    LABS:                        9.2    2.97  )-----------( 79       ( 20 Jan 2023 07:23 )             28.0     01-20    137  |  103  |  8   ----------------------------<  102<H>  4.3   |  25  |  0.79    Ca    8.9      20 Jan 2023 07:23  Phos  3.1     01-20  Mg     1.6     01-20      COVID-19 PCR: NotDetec (19 Jan 2023 08:47)  COVID-19 PCR: NotDetec (09 Oct 2022 20:53)      RADIOLOGY & ADDITIONAL TESTS:  New Imaging Personally Reviewed Today: No new studies    COORDINATION OF CARE:  Consultant Communication and Details of Discussion (where applicable): TUNDE Caruso regarding discharge planning process, refusal of AR services

## 2023-01-20 NOTE — PROGRESS NOTE ADULT - PROBLEM SELECTOR PLAN 3
Likely related to alcohol use, underlying hepatic steatosis noted on CT A/P. Downtrending  - Avoid hepatotoxic medications as able  - Alcohol cessation  - Outpatient hepatology follow-up

## 2023-01-20 NOTE — PROGRESS NOTE ADULT - PROVIDER SPECIALTY LIST ADULT
SICU
Trauma Surgery
SICU
Trauma Surgery
Internal Medicine
SICU
Trauma Surgery
Internal Medicine
Internal Medicine

## 2023-01-20 NOTE — PROGRESS NOTE ADULT - SUBJECTIVE AND OBJECTIVE BOX
SURGERY DAILY PROGRESS NOTE:         SUBJECTIVE/ROS: Patient feels well seen and examined at bedside.  No events overnight. No new complaints.  Denies nausea, vomiting, chest pain, shortness of breath.  Patient states he wants to go home.         MEDICATIONS  (STANDING):  acetaminophen     Tablet .. 975 milliGRAM(s) Oral every 6 hours  chlorhexidine 2% Cloths 1 Application(s) Topical daily  ciprofloxacin     Tablet 250 milliGRAM(s) Oral two times a day  enoxaparin Injectable 40 milliGRAM(s) SubCutaneous every 24 hours  folic acid 1 milliGRAM(s) Oral daily  influenza   Vaccine 0.5 milliLiter(s) IntraMuscular once  lidocaine   4% Patch 1 Patch Transdermal every 24 hours  losartan 50 milliGRAM(s) Oral daily  multivitamin 1 Tablet(s) Oral daily  thiamine 100 milliGRAM(s) Oral daily    MEDICATIONS  (PRN):  ondansetron Injectable 4 milliGRAM(s) IV Push every 6 hours PRN Nausea and/or Vomiting  traMADol 25 milliGRAM(s) Oral every 8 hours PRN Severe Pain (7 - 10)      OBJECTIVE:    Vital Signs Last 24 Hrs  T(C): 36.4 (20 Jan 2023 05:15), Max: 36.8 (20 Jan 2023 01:28)  T(F): 97.5 (20 Jan 2023 05:15), Max: 98.2 (20 Jan 2023 01:28)  HR: 64 (20 Jan 2023 05:15) (62 - 94)  BP: 108/55 (20 Jan 2023 05:15) (100/48 - 122/73)  BP(mean): --  RR: 18 (20 Jan 2023 05:15) (18 - 18)  SpO2: 98% (20 Jan 2023 05:15) (93% - 100%)    Parameters below as of 20 Jan 2023 05:15  Patient On (Oxygen Delivery Method): room air            I&O's Detail    19 Jan 2023 07:01  -  20 Jan 2023 07:00  --------------------------------------------------------  IN:    Oral Fluid: 360 mL  Total IN: 360 mL    OUT:    Voided (mL): 200 mL  Total OUT: 200 mL    Total NET: 160 mL          Daily     Daily     LABS:                        9.2    2.97  )-----------( 79       ( 20 Jan 2023 07:23 )             28.0     01-19    135  |  98  |  9   ----------------------------<  113<H>  4.1   |  27  |  0.82    Ca    8.5      19 Jan 2023 06:56  Phos  3.9     01-19  Mg     1.9     01-19    EXAM:  General: NAD  HEENT: Normocephalic  Respiratory: equal chest wall expansion bilaterally   Abdomen: Soft, non-distended, nontender, no rebound or guarding  Ext: motor and sensory grossly intact in all 4 extremities

## 2023-01-20 NOTE — PROGRESS NOTE ADULT - ASSESSMENT
63 year old male presenting s/p fall down 12 stairs recovering in the SICU for alcohol withdrawal monitoring.  Downgraded on 1/17/23.    Injuries identified:  - Presacral hematoma  - Sigmoid/mesenteric contusion    Plan:  - Multimodal pain control  - Librium Taper finished  - Tertiary exam done  - Dispo: Acute Rehab; Appreciate PT, OT, and PMR recs however patient wants to go home      ACS/Trauma Surgery  [id number]

## 2023-01-20 NOTE — PROGRESS NOTE ADULT - PROBLEM SELECTOR PLAN 7
- VTE PPx with lovenox 40 mg SQ daily    Discharge planning pending PT evaluation
- VTE PPx with lovenox 40 mg SQ daily    Recommended for acute rehab but patient not agreeable; prefers to go home.  Will need to reassess strength and balance before considering discharge to home, as well as social work follow-up regarding alcohol use disorder.
- VTE PPx with lovenox 40 mg SQ daily    Recommended for acute rehab but patient still not agreeable; prefers to go home. Discussed risks and benefits, including risk of increasing pain and lack of mobility training at home, which could increase risk of falling again. Acute rehab would offer him the opportunity for enhanced balance and strength training prior to return home, and family is in support as well. However, patient continues to decline. In my opinion, he has capacity to decline such services, as he is able to voice risks and benefits and appears understanding of such.    Discharge planning to home with home PT services. Recommended close outpatient follow-up with pain management specialist if pain worsens, as well as orthopedic surgery.

## 2023-01-20 NOTE — PROGRESS NOTE ADULT - PROBLEM SELECTOR PLAN 1
- Continue CIWA monitoring until taper is complete  - Continue librium taper as prescribed, to finish tonight  - PRN ativan for breakthrough symptoms  - MVI, thiamine, folate via PO route  - Social work follow-up
- Has completed librium taper  - Continue MVI, thiamine, folate via PO route  - Social work follow-up
CIWA scores remain 2-3 since yesterday.  - Continue CIWA monitoring until taper is complete  - Continue librium taper as prescribed  - PRN ativan for breakthrough symptoms  - MVI, thiamine, folate via PO route  - Social work follow-up

## 2023-01-20 NOTE — PROGRESS NOTE ADULT - REASON FOR ADMISSION
Trauma - s/p fall down 12 stairs

## 2023-01-20 NOTE — PROGRESS NOTE ADULT - PROBLEM SELECTOR PLAN 4
Presacral hematoma and concern for abdominal contusion related to fall. Has been using opiates throughout admission. There is also a chronic pain component contributing to his use.  - Tramadol PRN breakthrough pain  - Continue acetaminophen ATC as well, capping at 3g/day in setting of mild elevation of transaminases  - Continue lidoderm patch  - OOB to chair and PT follow-up
Presacral hematoma and concern for abdominal contusion related to fall. Has been using opiates throughout admission. There is also a chronic pain component contributing to his use.  - Tramadol PRN breakthrough pain  - Can add acetaminophen ATC as well, capping at 3g/day in setting of mild elevation of transaminases  - Continue lidoderm patch  - OOB to chair and PT follow-up
Presacral hematoma and concern for abdominal contusion related to fall. Has been using 10 mg PO oxycodone and 0.5 mg IV hydromorphone around the clock. There is also a chronic pain component contributing to his use.  - Recommend discontinuation of IV hydromorphone as patient appears comfortable and is tolerating PO diet  - Continue PO oxycodone PRN moderate-severe pain; will consider long acting agent on discharge  - Continue lidoderm patch  - OOB to chair and PT follow-up

## 2023-01-20 NOTE — PROGRESS NOTE ADULT - PROBLEM SELECTOR PROBLEM 1
Rx Refill Note    PATIENT HAS APPT WITH YOU TODAY (12.13.2022)    Requested Prescriptions     Pending Prescriptions Disp Refills   • calcitriol (ROCALTROL) 0.25 MCG capsule [Pharmacy Med Name: CALCITRIOL 0.25 MCG CAPSULE] 90 capsule 0     Sig: TAKE ONE CAPSULE BY MOUTH DAILY      Last office visit with prescribing clinician: 9/1/2022   Last telemedicine visit with prescribing clinician: 12/13/2022   Next office visit with prescribing clinician: 12/13/2022                     {TIP  Is Refill Pharmacy correct?:23}    Would you like a call back once the refill request has been completed: [] Yes [] No    If the office needs to give you a call back, can they leave a voicemail: [] Yes [] No    Marlin Mondragon LPN  12/13/22, 08:26 EST  
Alcohol withdrawal

## 2023-01-20 NOTE — DISCHARGE NOTE NURSING/CASE MANAGEMENT/SOCIAL WORK - NSDCVIVACCINE_GEN_ALL_CORE_FT
Tdap; 16-Jul-2015 09:10; Mitali Miller (RN); [employer]; [id number]; IntraMuscular; Deltoid Left.; 0.5 milliLiter(s); VIS (VIS Published: 09-May-2013, VIS Presented: 16-Jul-2015);   Tdap; 14-Jan-2023 04:21; Yancy Kang (RN); [employer]; [id number] (Exp. Date: 01-Jun-2024); IntraMuscular; Deltoid Left.; 0.5 milliLiter(s); VIS (VIS Published: 09-May-2013, VIS Presented: 14-Jan-2023);

## 2023-01-20 NOTE — PROGRESS NOTE ADULT - PROBLEM SELECTOR PLAN 6
- Continue losartan
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 Stable.
- Continue losartan
- Continue losartan

## 2023-01-20 NOTE — PROGRESS NOTE ADULT - PROBLEM SELECTOR PLAN 5
- Plan of care as above  - No evidence for neurologic compromise on exam today  - Outpatient follow-up
- Plan of care as above  - No evidence for neurologic compromise on exam today  - Outpatient follow-up
- Plan of care as above  - No evidence for neurologic compromise on exam today  - Outpatient follow-up, follows with a pain management specialist already

## 2023-05-15 ENCOUNTER — EMERGENCY (EMERGENCY)
Facility: HOSPITAL | Age: 63
LOS: 1 days | Discharge: HOME | End: 2023-05-15
Attending: STUDENT IN AN ORGANIZED HEALTH CARE EDUCATION/TRAINING PROGRAM | Admitting: STUDENT IN AN ORGANIZED HEALTH CARE EDUCATION/TRAINING PROGRAM
Payer: COMMERCIAL

## 2023-05-15 VITALS
TEMPERATURE: 98 F | OXYGEN SATURATION: 100 % | HEIGHT: 71 IN | RESPIRATION RATE: 18 BRPM | SYSTOLIC BLOOD PRESSURE: 128 MMHG | WEIGHT: 220.02 LBS | HEART RATE: 75 BPM | DIASTOLIC BLOOD PRESSURE: 77 MMHG

## 2023-05-15 DIAGNOSIS — Z98.890 OTHER SPECIFIED POSTPROCEDURAL STATES: Chronic | ICD-10-CM

## 2023-05-15 LAB
ALBUMIN SERPL ELPH-MCNC: 2.4 G/DL — LOW (ref 3.3–5)
ALP SERPL-CCNC: 86 U/L — SIGNIFICANT CHANGE UP (ref 40–120)
ALT FLD-CCNC: 26 U/L — SIGNIFICANT CHANGE UP (ref 12–78)
ANION GAP SERPL CALC-SCNC: 3 MMOL/L — LOW (ref 5–17)
APTT BLD: 39.7 SEC — HIGH (ref 27.5–35.5)
AST SERPL-CCNC: 50 U/L — HIGH (ref 15–37)
BASOPHILS # BLD AUTO: 0.09 K/UL — SIGNIFICANT CHANGE UP (ref 0–0.2)
BASOPHILS NFR BLD AUTO: 2.4 % — HIGH (ref 0–2)
BILIRUB SERPL-MCNC: 1.2 MG/DL — SIGNIFICANT CHANGE UP (ref 0.2–1.2)
BUN SERPL-MCNC: 7 MG/DL — SIGNIFICANT CHANGE UP (ref 7–23)
CALCIUM SERPL-MCNC: 8.3 MG/DL — LOW (ref 8.5–10.1)
CHLORIDE SERPL-SCNC: 109 MMOL/L — HIGH (ref 96–108)
CO2 SERPL-SCNC: 27 MMOL/L — SIGNIFICANT CHANGE UP (ref 22–31)
CREAT SERPL-MCNC: 0.79 MG/DL — SIGNIFICANT CHANGE UP (ref 0.5–1.3)
EGFR: 100 ML/MIN/1.73M2 — SIGNIFICANT CHANGE UP
EOSINOPHIL # BLD AUTO: 0.11 K/UL — SIGNIFICANT CHANGE UP (ref 0–0.5)
EOSINOPHIL NFR BLD AUTO: 2.9 % — SIGNIFICANT CHANGE UP (ref 0–6)
ETHANOL SERPL-MCNC: 213 MG/DL — HIGH (ref 0–10)
GLUCOSE SERPL-MCNC: 132 MG/DL — HIGH (ref 70–99)
HCT VFR BLD CALC: 29.3 % — LOW (ref 39–50)
HGB BLD-MCNC: 9.8 G/DL — LOW (ref 13–17)
IMM GRANULOCYTES NFR BLD AUTO: 0.3 % — SIGNIFICANT CHANGE UP (ref 0–0.9)
INR BLD: 1.65 RATIO — HIGH (ref 0.88–1.16)
LIDOCAIN IGE QN: 12 U/L — LOW (ref 73–393)
LYMPHOCYTES # BLD AUTO: 0.85 K/UL — LOW (ref 1–3.3)
LYMPHOCYTES # BLD AUTO: 22.8 % — SIGNIFICANT CHANGE UP (ref 13–44)
MCHC RBC-ENTMCNC: 31.5 PG — SIGNIFICANT CHANGE UP (ref 27–34)
MCHC RBC-ENTMCNC: 33.4 GM/DL — SIGNIFICANT CHANGE UP (ref 32–36)
MCV RBC AUTO: 94.2 FL — SIGNIFICANT CHANGE UP (ref 80–100)
MONOCYTES # BLD AUTO: 0.32 K/UL — SIGNIFICANT CHANGE UP (ref 0–0.9)
MONOCYTES NFR BLD AUTO: 8.6 % — SIGNIFICANT CHANGE UP (ref 2–14)
NEUTROPHILS # BLD AUTO: 2.35 K/UL — SIGNIFICANT CHANGE UP (ref 1.8–7.4)
NEUTROPHILS NFR BLD AUTO: 63 % — SIGNIFICANT CHANGE UP (ref 43–77)
NRBC # BLD: 0 /100 WBCS — SIGNIFICANT CHANGE UP (ref 0–0)
PLATELET # BLD AUTO: 108 K/UL — LOW (ref 150–400)
POTASSIUM SERPL-MCNC: 3.6 MMOL/L — SIGNIFICANT CHANGE UP (ref 3.5–5.3)
POTASSIUM SERPL-SCNC: 3.6 MMOL/L — SIGNIFICANT CHANGE UP (ref 3.5–5.3)
PROT SERPL-MCNC: 6.7 G/DL — SIGNIFICANT CHANGE UP (ref 6–8.3)
PROTHROM AB SERPL-ACNC: 19.4 SEC — HIGH (ref 10.5–13.4)
RBC # BLD: 3.11 M/UL — LOW (ref 4.2–5.8)
RBC # FLD: 13.8 % — SIGNIFICANT CHANGE UP (ref 10.3–14.5)
SODIUM SERPL-SCNC: 139 MMOL/L — SIGNIFICANT CHANGE UP (ref 135–145)
TROPONIN I, HIGH SENSITIVITY RESULT: 10 NG/L — SIGNIFICANT CHANGE UP
WBC # BLD: 3.73 K/UL — LOW (ref 3.8–10.5)
WBC # FLD AUTO: 3.73 K/UL — LOW (ref 3.8–10.5)

## 2023-05-15 PROCEDURE — 84484 ASSAY OF TROPONIN QUANT: CPT

## 2023-05-15 PROCEDURE — 93005 ELECTROCARDIOGRAM TRACING: CPT

## 2023-05-15 PROCEDURE — 71260 CT THORAX DX C+: CPT | Mod: 26,MA

## 2023-05-15 PROCEDURE — 83690 ASSAY OF LIPASE: CPT

## 2023-05-15 PROCEDURE — 80307 DRUG TEST PRSMV CHEM ANLYZR: CPT

## 2023-05-15 PROCEDURE — 99285 EMERGENCY DEPT VISIT HI MDM: CPT | Mod: 25

## 2023-05-15 PROCEDURE — 85730 THROMBOPLASTIN TIME PARTIAL: CPT

## 2023-05-15 PROCEDURE — 74177 CT ABD & PELVIS W/CONTRAST: CPT | Mod: 26,MA

## 2023-05-15 PROCEDURE — 96374 THER/PROPH/DIAG INJ IV PUSH: CPT | Mod: XU

## 2023-05-15 PROCEDURE — 85610 PROTHROMBIN TIME: CPT

## 2023-05-15 PROCEDURE — 85025 COMPLETE CBC W/AUTO DIFF WBC: CPT

## 2023-05-15 PROCEDURE — 74177 CT ABD & PELVIS W/CONTRAST: CPT | Mod: MA

## 2023-05-15 PROCEDURE — 36415 COLL VENOUS BLD VENIPUNCTURE: CPT

## 2023-05-15 PROCEDURE — 99285 EMERGENCY DEPT VISIT HI MDM: CPT

## 2023-05-15 PROCEDURE — 80053 COMPREHEN METABOLIC PANEL: CPT

## 2023-05-15 PROCEDURE — 93010 ELECTROCARDIOGRAM REPORT: CPT

## 2023-05-15 PROCEDURE — 71260 CT THORAX DX C+: CPT | Mod: MA

## 2023-05-15 RX ORDER — ACETAMINOPHEN 500 MG
1000 TABLET ORAL ONCE
Refills: 0 | Status: COMPLETED | OUTPATIENT
Start: 2023-05-15 | End: 2023-05-15

## 2023-05-15 RX ORDER — SODIUM CHLORIDE 9 MG/ML
1000 INJECTION INTRAMUSCULAR; INTRAVENOUS; SUBCUTANEOUS ONCE
Refills: 0 | Status: COMPLETED | OUTPATIENT
Start: 2023-05-15 | End: 2023-05-15

## 2023-05-15 RX ORDER — LIDOCAINE 4 G/100G
1 CREAM TOPICAL ONCE
Refills: 0 | Status: COMPLETED | OUTPATIENT
Start: 2023-05-15 | End: 2023-05-15

## 2023-05-15 RX ADMIN — SODIUM CHLORIDE 2000 MILLILITER(S): 9 INJECTION INTRAMUSCULAR; INTRAVENOUS; SUBCUTANEOUS at 08:24

## 2023-05-15 RX ADMIN — Medication 400 MILLIGRAM(S): at 09:17

## 2023-05-15 RX ADMIN — LIDOCAINE 1 PATCH: 4 CREAM TOPICAL at 08:24

## 2023-05-15 NOTE — ED PROVIDER NOTE - PATIENT PORTAL LINK FT
You can access the FollowMyHealth Patient Portal offered by [facility] by registering at the following website: [email or web address]. By joining [employer]’s FollowMyHealth portal, you will also be able to view your health information using other applications (apps) compatible with our system.

## 2023-05-15 NOTE — ED PROVIDER NOTE - NSFOLLOWUPINSTRUCTIONS_ED_ALL_ED_FT
Please follow up with your Primary Care Physician and any specialists as discussed.  Please review your CT results with your primary care physician and with GI.  Please take your medications as prescribed and or instructed.  If your symptoms persist or worsen, please seek care. Either return to the Emergency Department, go to urgent care or see your primary care doctor.  Please refer to general information and instructions attached or below:     Chest Wall Pain  Chest wall pain is pain in or around the bones and muscles of your chest. Sometimes, an injury causes this pain. Excessive coughing or overuse of arm and chest muscles may also cause chest wall pain. Sometimes, the cause may not be known. This pain may take several weeks or longer to get better.    Follow these instructions at home:  Managing pain, stiffness, and swelling    A bag of ice on a towel on the skin.  If directed, put ice on the painful area:  Put ice in a plastic bag.  Place a towel between your skin and the bag.  Leave the ice on for 20 minutes, 2–3 times per day.  Activity    Rest as told by your health care provider.  Avoid activities that cause pain. These include any activities that use your chest muscles or your abdominal and side muscles to lift heavy items. Ask your health care provider what activities are safe for you.  General instructions    A do not smoke cigarettes sign.  Take over-the-counter and prescription medicines only as told by your health care provider.  Do not use any products that contain nicotine or tobacco, such as cigarettes, e-cigarettes, and chewing tobacco. These can delay healing after injury. If you need help quitting, ask your health care provider.  Keep all follow-up visits as told by your health care provider. This is important.  Contact a health care provider if:  You have a fever.  Your chest pain becomes worse.  You have new symptoms.  Get help right away if:  You have nausea or vomiting.  You feel sweaty or light-headed.  You have a cough with mucus from your lungs (sputum) or you cough up blood.  You develop shortness of breath.  These symptoms may represent a serious problem that is an emergency. Do not wait to see if the symptoms will go away. Get medical help right away. Call your local emergency services (911 in the [place] Do not drive yourself to the hospital.    Summary  Chest wall pain is pain in or around the bones and muscles of your chest.  Depending on the cause, it may be treated with ice, rest, medicines, and avoiding activities that cause pain.  Contact a health care provider if you have a fever, worsening chest pain, or new symptoms.  Get help right away if you feel light-headed or you develop shortness of breath. These symptoms may be an emergency.  This information is not intended to replace advice given to you by your health care provider. Make sure you discuss any questions you have with your health care provider.

## 2023-05-15 NOTE — ED ADULT NURSE NOTE - ALCOHOL PRE SCREEN (AUDIT - C)
Problem: Patient Care Overview  Goal: Plan of Care Review  Outcome: Ongoing (interventions implemented as appropriate)  Flowsheets (Taken 7/8/2020 4700)  Outcome Summary: no soa; c/o rt flank pain this afternoon; nausea while in cardio for echo, so echo to be done tomorrow; pt feels like it could be gas pain; CT scan ordered; no emesis; RSV and COVID negative; BP low; cont to monitor      Statement Selected

## 2023-05-15 NOTE — ED ADULT NURSE NOTE - NSFALLRISKINTERV_ED_ALL_ED

## 2023-05-15 NOTE — ED ADULT NURSE NOTE - OBJECTIVE STATEMENT
Pt states he had a fall a week ago and has been having  right rib pain since. Pt denies SOB and CP. Pt appears uncomfortable. Pt in no acute distress. Pt updated on plan of care.

## 2023-05-15 NOTE — ED PROVIDER NOTE - PHYSICAL EXAMINATION
Vital signs as available reviewed.  General:  No acute distress.  Head:  Normocephalic, atraumatic.  Eyes:  Conjunctiva pink, no icterus.  Cardiovascular:  Regular rate, no obvious murmur.  Respiratory:  Clear to auscultation, good air entry bilaterally.  Abdomen:  Soft, + RUQ tenderness to palpation.  Musculoskeletal:  No obvious deformity. + right lower / anterolateral rib tenderness to palpation.   Neurologic: Alert and oriented, moving all extremities.  Skin:  Warm and dry.

## 2023-05-15 NOTE — ED PROVIDER NOTE - CARE PROVIDER_API CALL
Jan Hauser ()  Internal Medicine  [address] [place] [address]  Phone: [phone]  Fax: [phone]  Follow Up Time: 4-6 Days

## 2023-05-15 NOTE — ED PROVIDER NOTE - OBJECTIVE STATEMENT
62 yo M history of hypertension here complaining of right sided rib pain. On going x 1 week, patient reports he tripped on a curb and fell then. no reported head strike or loss of consciousness. no reported nausea, vomiting. not on AC. no pain meds taken. PMD Dr Barrios.

## 2023-05-15 NOTE — ED ADULT NURSE NOTE - NSICDXPASTMEDICALHX_GEN_ALL_CORE_FT
Statement Selected PAST MEDICAL HISTORY:  Alcohol abuse     Anxiety     Hypertension     Pancreatic cancer

## 2023-05-15 NOTE — ED ADULT TRIAGE NOTE - CHIEF COMPLAINT QUOTE
63 yr old male c/o right sided rib pain x 1 week. Denies recent fall/trauma, sob, fever, CP, abdominal pain, NV.

## 2023-05-15 NOTE — ED PROVIDER NOTE - CLINICAL SUMMARY MEDICAL DECISION MAKING FREE TEXT BOX
here with right sided thorax pain after fall, will get trauma work up. had multiple internal injuries after fall recently.

## 2023-07-11 ENCOUNTER — EMERGENCY (EMERGENCY)
Facility: HOSPITAL | Age: 63
LOS: 1 days | Discharge: HOME | End: 2023-07-11
Attending: STUDENT IN AN ORGANIZED HEALTH CARE EDUCATION/TRAINING PROGRAM | Admitting: STUDENT IN AN ORGANIZED HEALTH CARE EDUCATION/TRAINING PROGRAM
Payer: COMMERCIAL

## 2023-07-11 VITALS
DIASTOLIC BLOOD PRESSURE: 73 MMHG | HEIGHT: 71 IN | RESPIRATION RATE: 16 BRPM | TEMPERATURE: 99 F | SYSTOLIC BLOOD PRESSURE: 126 MMHG | WEIGHT: 220.02 LBS | HEART RATE: 74 BPM | OXYGEN SATURATION: 98 %

## 2023-07-11 DIAGNOSIS — Z98.890 OTHER SPECIFIED POSTPROCEDURAL STATES: Chronic | ICD-10-CM

## 2023-07-11 PROCEDURE — 73080 X-RAY EXAM OF ELBOW: CPT | Mod: 26,RT

## 2023-07-11 PROCEDURE — 99284 EMERGENCY DEPT VISIT MOD MDM: CPT | Mod: 25

## 2023-07-11 PROCEDURE — 70450 CT HEAD/BRAIN W/O DYE: CPT | Mod: MA

## 2023-07-11 PROCEDURE — 73090 X-RAY EXAM OF FOREARM: CPT

## 2023-07-11 PROCEDURE — 99284 EMERGENCY DEPT VISIT MOD MDM: CPT

## 2023-07-11 PROCEDURE — 90471 IMMUNIZATION ADMIN: CPT

## 2023-07-11 PROCEDURE — 93005 ELECTROCARDIOGRAM TRACING: CPT

## 2023-07-11 PROCEDURE — 72125 CT NECK SPINE W/O DYE: CPT | Mod: MA

## 2023-07-11 PROCEDURE — 73080 X-RAY EXAM OF ELBOW: CPT

## 2023-07-11 PROCEDURE — 73090 X-RAY EXAM OF FOREARM: CPT | Mod: 26,LT

## 2023-07-11 PROCEDURE — 90715 TDAP VACCINE 7 YRS/> IM: CPT

## 2023-07-11 PROCEDURE — 93010 ELECTROCARDIOGRAM REPORT: CPT

## 2023-07-11 RX ORDER — ACETAMINOPHEN 500 MG
975 TABLET ORAL ONCE
Refills: 0 | Status: COMPLETED | OUTPATIENT
Start: 2023-07-11 | End: 2023-07-11

## 2023-07-11 RX ORDER — TETANUS TOXOID, REDUCED DIPHTHERIA TOXOID AND ACELLULAR PERTUSSIS VACCINE, ADSORBED 5; 2.5; 8; 8; 2.5 [IU]/.5ML; [IU]/.5ML; UG/.5ML; UG/.5ML; UG/.5ML
0.5 SUSPENSION INTRAMUSCULAR ONCE
Refills: 0 | Status: COMPLETED | OUTPATIENT
Start: 2023-07-11 | End: 2023-07-11

## 2023-07-11 RX ADMIN — TETANUS TOXOID, REDUCED DIPHTHERIA TOXOID AND ACELLULAR PERTUSSIS VACCINE, ADSORBED 0.5 MILLILITER(S): 5; 2.5; 8; 8; 2.5 SUSPENSION INTRAMUSCULAR at 23:41

## 2023-07-11 RX ADMIN — Medication 975 MILLIGRAM(S): at 23:40

## 2023-07-11 NOTE — ED ADULT NURSE NOTE - OBJECTIVE STATEMENT
Patient came in to ED accompanied by his brother reports he fell yesterday c/o pain on his right side of his head, neck , right elbow and forearm. On assessment noted swollen right forearm. Denies LOC, nausea/ vomiting. Admits he was drinking when it happened.

## 2023-07-11 NOTE — ED PROVIDER NOTE - NSFOLLOWUPINSTRUCTIONS_ED_ALL_ED_FT
Please follow up with your Primary Care Physician and any specialists as discussed.  Please take your medications as prescribed and or instructed.  If your symptoms persist or worsen, please seek care. Either return to the Emergency Department, go to urgent care or see your primary care doctor.  Please refer to general information and instructions attached or below:     Fall Prevention    WHAT YOU NEED TO KNOW:    Fall prevention includes ways to make your home and other areas safer. It also includes ways you can move more carefully to prevent a fall. Health conditions that cause changes in your blood pressure, vision, or muscle strength and coordination may increase your risk for falls. Medicines may also increase your risk for falls if they make you dizzy, weak, or sleepy.     DISCHARGE INSTRUCTIONS:    Call 911 or have someone else call if:     You have fallen and are unconscious.      You have fallen and cannot move part of your body.    Contact your healthcare provider if:     You have fallen and have pain or a headache.      You have questions or concerns about your condition or care.    Fall prevention tips:     Stand or sit up slowly. This may help you keep your balance and prevent falls.      Use assistive devices as directed. Your healthcare provider may suggest that you use a cane or walker to help you keep your balance. You may need to have grab bars put in your bathroom near the toilet or in the shower.      Wear shoes that fit well and have soles that . Wear shoes both inside and outside. Use slippers with good . Do not wear shoes with high heels.      Wear a personal alarm. This is a device that allows you to call 911 if you fall and need help. Ask your healthcare provider for more information.      Stay active. Exercise can help strengthen your muscles and improve your balance. Your healthcare provider may recommend water aerobics or walking. He or she may also recommend physical therapy to improve your coordination. Never start an exercise program without talking to your healthcare provider first.       Manage your medical conditions. Keep all appointments with your healthcare providers. Visit your eye doctor as directed.           Home safety tips:     Add items to prevent falls in the bathroom. Put nonslip strips on your bath or shower floor to prevent you from slipping. Use a bath mat if you do not have carpet in the bathroom. This will prevent you from falling when you step out of the bath or shower. Use a shower seat so you do not need to stand while you shower. Sit on the toilet or a chair in your bathroom to dry yourself and put on clothing. This will prevent you from losing your balance from drying or dressing yourself while you are standing.       Keep paths clear. Remove books, shoes, and other objects from walkways and stairs. Place cords for telephones and lamps out of the way so that you do not need to walk over them. Tape them down if you cannot move them. Remove small rugs. If you cannot remove a rug, secure it with double-sided tape. This will prevent you from tripping.       Install bright lights in your home. Use night lights to help light paths to the bathroom or kitchen. Always turn on the light before you start walking.      Keep items you use often on shelves within reach. Do not use a step stool to help you reach an item.      Paint or place reflective tape on the edges of your stairs. This will help you see the stairs better.    Follow up with your healthcare provider as directed: Write down your questions so you remember to ask them during your visits.     ====================================================================   Alcohol Use Disorder    WHAT YOU NEED TO KNOW:    Alcohol use disorder (AUD) is problem drinking. AUD includes alcohol abuse and alcohol dependency.     DISCHARGE INSTRUCTIONS:    Seek care immediately if:     Your heart is beating faster than usual.      You have hallucinations.      You cannot remember what happens while you are drinking.      You have seizures.    Contact your healthcare provider if:     You are anxious and have nausea.      Your hands are shaky and you are sweating heavily.      You have questions or concerns about your condition or care.    Follow up with your healthcare provider as directed: Do not try to stop drinking on your own. Your healthcare provider may need to help you withdraw from alcohol safely. He may need to admit you to the hospital. You may also need any of the following treatments:    Medicines to decrease your craving for alcohol      Support groups such as Alcoholics Anonymous       Therapy from a psychiatrist or psychologist       Admission to an inpatient facility for treatment for severe AUD    Interested in discussing options to reduce your alcohol or drug use?      [facility]: [phone]   [facility] Substance Abuse Services: [phone], option #2   Methadone Maintenance & Ambulatory Opiate Detox: [phone]  [facility]: [phone]  [facility]: [phone]  [facility]: [phone]    [facility]: [phone], option #2   [facility]: [phone]    [facility]: [phone]    [facility] Central Intake: [phone]  [facility] Chemical Dependency/Ancillary Withdrawal: [phone]  [facility] Methadone Maintenance: [phone]    [facility]: [phone]  [facility] Addiction Treatment Services: [phone]    [facility]Line: 7-471-9-HOPENY    [facility] Office of Alcoholism and Substance Abuse Services (OASAS): https://www.oasas.[place].gov/providerdirectory/  [facility] for Addiction Services and Psychotherapy Interventions Research (CASPIR)  www.[facility].org     Interested in discussing options to reduce your tobacco use?    [facility] for Tobacco Control:  [phone]  [facility] QUITLINE: 2-522-ZH-QUITS ([phone])    Interested in learning more about substance use?      http://rethinkingdrinking.niaaa.nih.gov   https://www.drugabuse.gov/patients-families     Learn more about opioid overdose prevention programs in [place]:  http://www.health.[place].gov/diseases/aids/general/opioid_overdose_prevention/

## 2023-07-11 NOTE — ED PROVIDER NOTE - PATIENT PORTAL LINK FT
You can access the FollowMyHealth Patient Portal offered by [facility] by registering at the following website: [email or web address]. By joining [employer]’s FollowMyHealth portal, you will also be able to view your health information using other applications (apps) compatible with our system.

## 2023-07-11 NOTE — ED ADULT NURSE NOTE - NSFALLHARMRISKINTERV_ED_ALL_ED

## 2023-07-11 NOTE — ED PROVIDER NOTE - CARE PLAN
1 Principal Discharge DX:	Neck pain  Secondary Diagnosis:	Right arm pain  Secondary Diagnosis:	Right hip pain  Secondary Diagnosis:	Fall

## 2023-07-11 NOTE — ED PROVIDER NOTE - OBJECTIVE STATEMENT
63-year-old male history of ulcerative colitis here complaining headache, neck pain, right forearm and elbow pain, right hip pain after he fell last night.  Patient fell while drinking so does not know the circumstances.  Unknown tetanus vaccination.

## 2023-07-11 NOTE — ED PROVIDER NOTE - PHYSICAL EXAMINATION
Vital signs as available reviewed.  General:  Comfortable, no acute distress.  Head:  Normocephalic, atraumatic.  Eyes:  Conjunctiva pink, no icterus. Pupils equal, round, reactive to light, extra-occular eye movements intact.  Cardiovascular:  Regular rate, no obvious murmur.  Respiratory:  Clear to auscultation, good air entry bilaterally.  Abdomen:  Soft, non-tender.  Musculoskeletal:  No tenderness to palpation over c/t/l spine. No tenderness to palpation over chest wall, clavicles, shoulders, upper / lower legs. Right forearm with swelling abrasions.  Compartments soft.  Moderate tenderness to palpation.  Elbow with normal range of motion.  Distal extremity neurovascular intact.  No tenderness palpation over wrist and hand.  Tenderness to palpation over right hip.  Neurologic: Alert and oriented, moving all extremities. Sensation intact.  Skin:  Warm and dry. abrasions to right proximal forearm.

## 2023-07-12 VITALS
DIASTOLIC BLOOD PRESSURE: 66 MMHG | OXYGEN SATURATION: 98 % | TEMPERATURE: 98 F | SYSTOLIC BLOOD PRESSURE: 138 MMHG | HEART RATE: 67 BPM | RESPIRATION RATE: 16 BRPM

## 2023-07-12 PROCEDURE — 72125 CT NECK SPINE W/O DYE: CPT | Mod: 26,MA

## 2023-07-12 PROCEDURE — 70450 CT HEAD/BRAIN W/O DYE: CPT | Mod: 26,MA

## 2023-07-12 NOTE — ED ADULT NURSE REASSESSMENT NOTE - NS ED NURSE REASSESS COMMENT FT1
Patient ambulatory with steady gait and alert/oriented x4. Dr. Avilez made aware and told ok to discharge patient via cab. Patient for discharge calling his brother Tong Staples @ [phone] but no answer. Patient wanted to go home via cab. Patient ambulatory with steady gait and alert/oriented x4. Dr. Avilez made aware and told ok to discharge patient via cab.

## 2023-07-31 NOTE — PHYSICAL THERAPY INITIAL EVALUATION ADULT - REHAB POTENTIAL, PT EVAL
Has Your Skin Lesion Been Treated?: not been treated Is This A New Presentation, Or A Follow-Up?: Skin Lesions Which Family Member (Optional)?: Brother good, to achieve stated therapy goals

## 2023-11-28 ENCOUNTER — EMERGENCY (EMERGENCY)
Facility: HOSPITAL | Age: 63
LOS: 1 days | Discharge: ANOTHER HOSPITAL | End: 2023-11-28
Attending: STUDENT IN AN ORGANIZED HEALTH CARE EDUCATION/TRAINING PROGRAM | Admitting: STUDENT IN AN ORGANIZED HEALTH CARE EDUCATION/TRAINING PROGRAM
Payer: COMMERCIAL

## 2023-11-28 VITALS
DIASTOLIC BLOOD PRESSURE: 84 MMHG | HEART RATE: 99 BPM | OXYGEN SATURATION: 96 % | RESPIRATION RATE: 18 BRPM | WEIGHT: 220.46 LBS | TEMPERATURE: 98 F | SYSTOLIC BLOOD PRESSURE: 157 MMHG

## 2023-11-28 DIAGNOSIS — Z98.890 OTHER SPECIFIED POSTPROCEDURAL STATES: Chronic | ICD-10-CM

## 2023-11-28 LAB
ALBUMIN SERPL ELPH-MCNC: 3.1 G/DL — LOW (ref 3.3–5)
ALBUMIN SERPL ELPH-MCNC: 3.1 G/DL — LOW (ref 3.3–5)
ALP SERPL-CCNC: 113 U/L — SIGNIFICANT CHANGE UP (ref 40–120)
ALP SERPL-CCNC: 113 U/L — SIGNIFICANT CHANGE UP (ref 40–120)
ALT FLD-CCNC: 27 U/L — SIGNIFICANT CHANGE UP (ref 12–78)
ALT FLD-CCNC: 27 U/L — SIGNIFICANT CHANGE UP (ref 12–78)
ANION GAP SERPL CALC-SCNC: 12 MMOL/L — SIGNIFICANT CHANGE UP (ref 5–17)
ANION GAP SERPL CALC-SCNC: 12 MMOL/L — SIGNIFICANT CHANGE UP (ref 5–17)
APTT BLD: 35.2 SEC — SIGNIFICANT CHANGE UP (ref 24.5–35.6)
APTT BLD: 35.2 SEC — SIGNIFICANT CHANGE UP (ref 24.5–35.6)
AST SERPL-CCNC: 56 U/L — HIGH (ref 15–37)
AST SERPL-CCNC: 56 U/L — HIGH (ref 15–37)
BASOPHILS # BLD AUTO: 0.15 K/UL — SIGNIFICANT CHANGE UP (ref 0–0.2)
BASOPHILS # BLD AUTO: 0.15 K/UL — SIGNIFICANT CHANGE UP (ref 0–0.2)
BASOPHILS NFR BLD AUTO: 2 % — SIGNIFICANT CHANGE UP (ref 0–2)
BASOPHILS NFR BLD AUTO: 2 % — SIGNIFICANT CHANGE UP (ref 0–2)
BILIRUB SERPL-MCNC: 1.5 MG/DL — HIGH (ref 0.2–1.2)
BILIRUB SERPL-MCNC: 1.5 MG/DL — HIGH (ref 0.2–1.2)
BUN SERPL-MCNC: 11 MG/DL — SIGNIFICANT CHANGE UP (ref 7–23)
BUN SERPL-MCNC: 11 MG/DL — SIGNIFICANT CHANGE UP (ref 7–23)
CALCIUM SERPL-MCNC: 8.3 MG/DL — LOW (ref 8.5–10.1)
CALCIUM SERPL-MCNC: 8.3 MG/DL — LOW (ref 8.5–10.1)
CHLORIDE SERPL-SCNC: 106 MMOL/L — SIGNIFICANT CHANGE UP (ref 96–108)
CHLORIDE SERPL-SCNC: 106 MMOL/L — SIGNIFICANT CHANGE UP (ref 96–108)
CO2 SERPL-SCNC: 26 MMOL/L — SIGNIFICANT CHANGE UP (ref 22–31)
CO2 SERPL-SCNC: 26 MMOL/L — SIGNIFICANT CHANGE UP (ref 22–31)
CREAT SERPL-MCNC: 0.8 MG/DL — SIGNIFICANT CHANGE UP (ref 0.5–1.3)
CREAT SERPL-MCNC: 0.8 MG/DL — SIGNIFICANT CHANGE UP (ref 0.5–1.3)
EGFR: 100 ML/MIN/1.73M2 — SIGNIFICANT CHANGE UP
EGFR: 100 ML/MIN/1.73M2 — SIGNIFICANT CHANGE UP
EOSINOPHIL # BLD AUTO: 0.03 K/UL — SIGNIFICANT CHANGE UP (ref 0–0.5)
EOSINOPHIL # BLD AUTO: 0.03 K/UL — SIGNIFICANT CHANGE UP (ref 0–0.5)
EOSINOPHIL NFR BLD AUTO: 0.4 % — SIGNIFICANT CHANGE UP (ref 0–6)
EOSINOPHIL NFR BLD AUTO: 0.4 % — SIGNIFICANT CHANGE UP (ref 0–6)
ETHANOL SERPL-MCNC: 361 MG/DL — HIGH (ref 0–10)
ETHANOL SERPL-MCNC: 361 MG/DL — HIGH (ref 0–10)
GLUCOSE SERPL-MCNC: 134 MG/DL — HIGH (ref 70–99)
GLUCOSE SERPL-MCNC: 134 MG/DL — HIGH (ref 70–99)
HCT VFR BLD CALC: 33.3 % — LOW (ref 39–50)
HCT VFR BLD CALC: 33.3 % — LOW (ref 39–50)
HGB BLD-MCNC: 11.4 G/DL — LOW (ref 13–17)
HGB BLD-MCNC: 11.4 G/DL — LOW (ref 13–17)
IMM GRANULOCYTES NFR BLD AUTO: 0.5 % — SIGNIFICANT CHANGE UP (ref 0–0.9)
IMM GRANULOCYTES NFR BLD AUTO: 0.5 % — SIGNIFICANT CHANGE UP (ref 0–0.9)
INR BLD: 1.26 RATIO — HIGH (ref 0.85–1.18)
INR BLD: 1.26 RATIO — HIGH (ref 0.85–1.18)
LIDOCAIN IGE QN: 7 U/L — LOW (ref 13–75)
LIDOCAIN IGE QN: 7 U/L — LOW (ref 13–75)
LYMPHOCYTES # BLD AUTO: 0.87 K/UL — LOW (ref 1–3.3)
LYMPHOCYTES # BLD AUTO: 0.87 K/UL — LOW (ref 1–3.3)
LYMPHOCYTES # BLD AUTO: 11.6 % — LOW (ref 13–44)
LYMPHOCYTES # BLD AUTO: 11.6 % — LOW (ref 13–44)
MCHC RBC-ENTMCNC: 31 PG — SIGNIFICANT CHANGE UP (ref 27–34)
MCHC RBC-ENTMCNC: 31 PG — SIGNIFICANT CHANGE UP (ref 27–34)
MCHC RBC-ENTMCNC: 34.2 GM/DL — SIGNIFICANT CHANGE UP (ref 32–36)
MCHC RBC-ENTMCNC: 34.2 GM/DL — SIGNIFICANT CHANGE UP (ref 32–36)
MCV RBC AUTO: 90.5 FL — SIGNIFICANT CHANGE UP (ref 80–100)
MCV RBC AUTO: 90.5 FL — SIGNIFICANT CHANGE UP (ref 80–100)
MONOCYTES # BLD AUTO: 0.32 K/UL — SIGNIFICANT CHANGE UP (ref 0–0.9)
MONOCYTES # BLD AUTO: 0.32 K/UL — SIGNIFICANT CHANGE UP (ref 0–0.9)
MONOCYTES NFR BLD AUTO: 4.3 % — SIGNIFICANT CHANGE UP (ref 2–14)
MONOCYTES NFR BLD AUTO: 4.3 % — SIGNIFICANT CHANGE UP (ref 2–14)
NEUTROPHILS # BLD AUTO: 6.11 K/UL — SIGNIFICANT CHANGE UP (ref 1.8–7.4)
NEUTROPHILS # BLD AUTO: 6.11 K/UL — SIGNIFICANT CHANGE UP (ref 1.8–7.4)
NEUTROPHILS NFR BLD AUTO: 81.2 % — HIGH (ref 43–77)
NEUTROPHILS NFR BLD AUTO: 81.2 % — HIGH (ref 43–77)
NRBC # BLD: 0 /100 WBCS — SIGNIFICANT CHANGE UP (ref 0–0)
NRBC # BLD: 0 /100 WBCS — SIGNIFICANT CHANGE UP (ref 0–0)
PLATELET # BLD AUTO: 62 K/UL — LOW (ref 150–400)
PLATELET # BLD AUTO: 62 K/UL — LOW (ref 150–400)
POTASSIUM SERPL-MCNC: 3.7 MMOL/L — SIGNIFICANT CHANGE UP (ref 3.5–5.3)
POTASSIUM SERPL-MCNC: 3.7 MMOL/L — SIGNIFICANT CHANGE UP (ref 3.5–5.3)
POTASSIUM SERPL-SCNC: 3.7 MMOL/L — SIGNIFICANT CHANGE UP (ref 3.5–5.3)
POTASSIUM SERPL-SCNC: 3.7 MMOL/L — SIGNIFICANT CHANGE UP (ref 3.5–5.3)
PROT SERPL-MCNC: 7.7 G/DL — SIGNIFICANT CHANGE UP (ref 6–8.3)
PROT SERPL-MCNC: 7.7 G/DL — SIGNIFICANT CHANGE UP (ref 6–8.3)
PROTHROM AB SERPL-ACNC: 14.6 SEC — HIGH (ref 9.5–13)
PROTHROM AB SERPL-ACNC: 14.6 SEC — HIGH (ref 9.5–13)
RBC # BLD: 3.68 M/UL — LOW (ref 4.2–5.8)
RBC # BLD: 3.68 M/UL — LOW (ref 4.2–5.8)
RBC # FLD: 13.4 % — SIGNIFICANT CHANGE UP (ref 10.3–14.5)
RBC # FLD: 13.4 % — SIGNIFICANT CHANGE UP (ref 10.3–14.5)
SODIUM SERPL-SCNC: 144 MMOL/L — SIGNIFICANT CHANGE UP (ref 135–145)
SODIUM SERPL-SCNC: 144 MMOL/L — SIGNIFICANT CHANGE UP (ref 135–145)
TROPONIN I, HIGH SENSITIVITY RESULT: 9.3 NG/L — SIGNIFICANT CHANGE UP
TROPONIN I, HIGH SENSITIVITY RESULT: 9.3 NG/L — SIGNIFICANT CHANGE UP
WBC # BLD: 7.52 K/UL — SIGNIFICANT CHANGE UP (ref 3.8–10.5)
WBC # BLD: 7.52 K/UL — SIGNIFICANT CHANGE UP (ref 3.8–10.5)
WBC # FLD AUTO: 7.52 K/UL — SIGNIFICANT CHANGE UP (ref 3.8–10.5)
WBC # FLD AUTO: 7.52 K/UL — SIGNIFICANT CHANGE UP (ref 3.8–10.5)

## 2023-11-28 PROCEDURE — 70450 CT HEAD/BRAIN W/O DYE: CPT | Mod: 26,MA

## 2023-11-28 PROCEDURE — 99285 EMERGENCY DEPT VISIT HI MDM: CPT | Mod: 25

## 2023-11-28 PROCEDURE — 70486 CT MAXILLOFACIAL W/O DYE: CPT | Mod: 26,MA

## 2023-11-28 PROCEDURE — 93010 ELECTROCARDIOGRAM REPORT: CPT

## 2023-11-28 PROCEDURE — 71045 X-RAY EXAM CHEST 1 VIEW: CPT | Mod: 26

## 2023-11-28 PROCEDURE — 72125 CT NECK SPINE W/O DYE: CPT | Mod: 26,MA

## 2023-11-28 PROCEDURE — 72170 X-RAY EXAM OF PELVIS: CPT | Mod: 26

## 2023-11-28 PROCEDURE — 12014 RPR F/E/E/N/L/M 5.1-7.5 CM: CPT

## 2023-11-28 RX ORDER — ACETAMINOPHEN 500 MG
1000 TABLET ORAL ONCE
Refills: 0 | Status: COMPLETED | OUTPATIENT
Start: 2023-11-28 | End: 2023-11-28

## 2023-11-28 RX ORDER — SODIUM CHLORIDE 9 MG/ML
1000 INJECTION INTRAMUSCULAR; INTRAVENOUS; SUBCUTANEOUS ONCE
Refills: 0 | Status: COMPLETED | OUTPATIENT
Start: 2023-11-28 | End: 2023-11-28

## 2023-11-28 RX ORDER — TETANUS TOXOID, REDUCED DIPHTHERIA TOXOID AND ACELLULAR PERTUSSIS VACCINE, ADSORBED 5; 2.5; 8; 8; 2.5 [IU]/.5ML; [IU]/.5ML; UG/.5ML; UG/.5ML; UG/.5ML
0.5 SUSPENSION INTRAMUSCULAR ONCE
Refills: 0 | Status: COMPLETED | OUTPATIENT
Start: 2023-11-28 | End: 2023-11-28

## 2023-11-28 RX ORDER — FENTANYL CITRATE 50 UG/ML
50 INJECTION INTRAVENOUS ONCE
Refills: 0 | Status: DISCONTINUED | OUTPATIENT
Start: 2023-11-28 | End: 2023-11-28

## 2023-11-28 RX ADMIN — SODIUM CHLORIDE 1000 MILLILITER(S): 9 INJECTION INTRAMUSCULAR; INTRAVENOUS; SUBCUTANEOUS at 21:15

## 2023-11-28 RX ADMIN — TETANUS TOXOID, REDUCED DIPHTHERIA TOXOID AND ACELLULAR PERTUSSIS VACCINE, ADSORBED 0.5 MILLILITER(S): 5; 2.5; 8; 8; 2.5 SUSPENSION INTRAMUSCULAR at 23:41

## 2023-11-28 RX ADMIN — FENTANYL CITRATE 50 MICROGRAM(S): 50 INJECTION INTRAVENOUS at 21:12

## 2023-11-28 RX ADMIN — Medication 400 MILLIGRAM(S): at 21:15

## 2023-11-28 NOTE — ED PROVIDER NOTE - PHYSICAL EXAMINATION
GENERAL: notably uncomfortable from pain   HEAD:  Atraumatic, Normocephalic  EYES: Marked L periorbital ecchymosis and swelling- unable to open eyelid to examine eye  ENT: MMM; oropharynx clear  NECK: Supple, No JVD  CHEST/LUNG: Clear to auscultation bilaterally; No wheeze  HEART: Regular rate and rhythm; No murmurs, rubs, or gallops  ABDOMEN: Soft, Nontender, Nondistended; Bowel sounds present  EXTREMITIES:  2+ Peripheral Pulses, No clubbing, cyanosis, or edema  PSYCH: AAOx3  NEUROLOGY: no focal motor or sensory deficits. 5/5 muscle strength in all extremities.   SKIN: Complex laceration L eyebrow curving into lateral eyelid.

## 2023-11-28 NOTE — ED PROVIDER NOTE - CLINICAL SUMMARY MEDICAL DECISION MAKING FREE TEXT BOX
62 y/o M PMH of EtOH abuse, HTN, Pancreatic Ca s/p Whipple presenting s/p fall  Unwitnessed +EtOH use  Marked L periorbital swelling and ecchymosis   Unable to examine L eye  CT max face negative for fracture or retrobulbar hematoma   CTH small parafalcine SDH--->given 1 unit of platelets as per NeuroSx recommendations   Transfer to [facility] for opthalmology evaluation

## 2023-11-28 NOTE — ED PROVIDER NOTE - OBJECTIVE STATEMENT
62 y/o M PMH of EtOH abuse, HTN, Pancreatic Ca s/p Whipple presenting s/p fall    Patient accompanied by his brother who provides limited history. He was found in his bed today with bleeding and lacerations. He saw a small cabinet with blood smeared on it and believes that patient fell over this piece of furniture. Arrives with L periorbital swelling and bleeding. Cannot recall circumstances of fall- brother notes that patient abuses EtOH.

## 2023-11-28 NOTE — ED ADULT NURSE NOTE - ED STAT RN HANDOFF DETAILS
pt trasnportation picked pt up for transfer patient is A&Ox4. vital signs within normal limits for patient. patient denies chest pain, or shortness of breath.  medications tolerated well. responded well to plasma  safety precautions maintained.  will continue to assess and monitor for safety.

## 2023-11-28 NOTE — ED ADULT NURSE NOTE - NSFALLUNIVINTERV_ED_ALL_ED
Bed/Stretcher in lowest position, wheels locked, appropriate side rails in place/Call bell, personal items and telephone in reach/Instruct patient to call for assistance before getting out of bed/chair/stretcher/Non-slip footwear applied when patient is off stretcher/[place] to call system/Physically safe environment - no spills, clutter or unnecessary equipment/Purposeful proactive rounding/Room/bathroom lighting operational, light cord in reach

## 2023-11-28 NOTE — ED ADULT NURSE NOTE - OBJECTIVE STATEMENT
Pt is AOX3, brother at bedside. pt was found s/p fall. L eye injury. Active bleeding noted. Pt is unaware how he fell or what happened. Pts brother found him awake and alert. Pt is unable to open L eye. ETOH abuse. pt has been through detox before at [facility]. Pt is able to respond to pain, name. Pt denies CP/SOB. NO allergies noted. Pt and brother are unaware what medication he is currently on. pt lives with brother. Pending TX. Care ongoing.

## 2023-11-28 NOTE — ED ADULT TRIAGE NOTE - CHIEF COMPLAINT QUOTE
fall trauma about 5 pm to 7 pm no witness at home loc lt eye swelling  and bleeding not on blood thinner

## 2023-11-28 NOTE — ED PROVIDER NOTE - PROGRESS NOTE DETAILS
Spoke with opthalmology at [facility]-accepted transfer. Spoke to NSx who recommended platelet goal of 80k- will order 1 unit of platelets.

## 2023-11-29 ENCOUNTER — INPATIENT (INPATIENT)
Facility: HOSPITAL | Age: 63
LOS: 19 days | Discharge: NURSING, REHAB OR LONG-TERM CARE | DRG: 82 | End: 2023-12-19
Attending: SURGERY | Admitting: SURGERY
Payer: COMMERCIAL

## 2023-11-29 ENCOUNTER — TRANSCRIPTION ENCOUNTER (OUTPATIENT)
Age: 63
End: 2023-11-29

## 2023-11-29 VITALS
HEART RATE: 87 BPM | TEMPERATURE: 98 F | DIASTOLIC BLOOD PRESSURE: 75 MMHG | SYSTOLIC BLOOD PRESSURE: 144 MMHG | OXYGEN SATURATION: 97 % | RESPIRATION RATE: 18 BRPM

## 2023-11-29 VITALS
HEART RATE: 98 BPM | TEMPERATURE: 98 F | DIASTOLIC BLOOD PRESSURE: 67 MMHG | RESPIRATION RATE: 20 BRPM | SYSTOLIC BLOOD PRESSURE: 128 MMHG | OXYGEN SATURATION: 95 %

## 2023-11-29 DIAGNOSIS — S06.5XAA TRAUMATIC SUBDURAL HEMORRHAGE WITH LOSS OF CONSCIOUSNESS STATUS UNKNOWN, INITIAL ENCOUNTER: ICD-10-CM

## 2023-11-29 DIAGNOSIS — Z98.890 OTHER SPECIFIED POSTPROCEDURAL STATES: Chronic | ICD-10-CM

## 2023-11-29 LAB
ALBUMIN SERPL ELPH-MCNC: 3.3 G/DL — SIGNIFICANT CHANGE UP (ref 3.3–5)
ALBUMIN SERPL ELPH-MCNC: 3.3 G/DL — SIGNIFICANT CHANGE UP (ref 3.3–5)
ALBUMIN SERPL ELPH-MCNC: 3.7 G/DL — SIGNIFICANT CHANGE UP (ref 3.3–5)
ALBUMIN SERPL ELPH-MCNC: 3.7 G/DL — SIGNIFICANT CHANGE UP (ref 3.3–5)
ALP SERPL-CCNC: 101 U/L — SIGNIFICANT CHANGE UP (ref 40–120)
ALP SERPL-CCNC: 101 U/L — SIGNIFICANT CHANGE UP (ref 40–120)
ALP SERPL-CCNC: 103 U/L — SIGNIFICANT CHANGE UP (ref 40–120)
ALP SERPL-CCNC: 103 U/L — SIGNIFICANT CHANGE UP (ref 40–120)
ALT FLD-CCNC: 17 U/L — SIGNIFICANT CHANGE UP (ref 10–45)
ANION GAP SERPL CALC-SCNC: 11 MMOL/L — SIGNIFICANT CHANGE UP (ref 5–17)
ANION GAP SERPL CALC-SCNC: 11 MMOL/L — SIGNIFICANT CHANGE UP (ref 5–17)
ANION GAP SERPL CALC-SCNC: 14 MMOL/L — SIGNIFICANT CHANGE UP (ref 5–17)
ANION GAP SERPL CALC-SCNC: 14 MMOL/L — SIGNIFICANT CHANGE UP (ref 5–17)
APPEARANCE UR: CLEAR — SIGNIFICANT CHANGE UP
APPEARANCE UR: CLEAR — SIGNIFICANT CHANGE UP
APTT BLD: 35.2 SEC — SIGNIFICANT CHANGE UP (ref 24.5–35.6)
APTT BLD: 35.4 SEC — SIGNIFICANT CHANGE UP (ref 24.5–35.6)
APTT BLD: 35.4 SEC — SIGNIFICANT CHANGE UP (ref 24.5–35.6)
AST SERPL-CCNC: 43 U/L — HIGH (ref 10–40)
AST SERPL-CCNC: 43 U/L — HIGH (ref 10–40)
AST SERPL-CCNC: 45 U/L — HIGH (ref 10–40)
AST SERPL-CCNC: 45 U/L — HIGH (ref 10–40)
BASE EXCESS BLDV CALC-SCNC: 0.8 MMOL/L — SIGNIFICANT CHANGE UP (ref -2–3)
BASE EXCESS BLDV CALC-SCNC: 0.8 MMOL/L — SIGNIFICANT CHANGE UP (ref -2–3)
BASOPHILS # BLD AUTO: 0.09 K/UL — SIGNIFICANT CHANGE UP (ref 0–0.2)
BASOPHILS # BLD AUTO: 0.09 K/UL — SIGNIFICANT CHANGE UP (ref 0–0.2)
BASOPHILS # BLD AUTO: 0.12 K/UL — SIGNIFICANT CHANGE UP (ref 0–0.2)
BASOPHILS # BLD AUTO: 0.12 K/UL — SIGNIFICANT CHANGE UP (ref 0–0.2)
BASOPHILS NFR BLD AUTO: 1.3 % — SIGNIFICANT CHANGE UP (ref 0–2)
BILIRUB SERPL-MCNC: 1.4 MG/DL — HIGH (ref 0.2–1.2)
BILIRUB SERPL-MCNC: 1.4 MG/DL — HIGH (ref 0.2–1.2)
BILIRUB SERPL-MCNC: 1.6 MG/DL — HIGH (ref 0.2–1.2)
BILIRUB SERPL-MCNC: 1.6 MG/DL — HIGH (ref 0.2–1.2)
BILIRUB UR-MCNC: NEGATIVE — SIGNIFICANT CHANGE UP
BILIRUB UR-MCNC: NEGATIVE — SIGNIFICANT CHANGE UP
BLD GP AB SCN SERPL QL: NEGATIVE — SIGNIFICANT CHANGE UP
BLD GP AB SCN SERPL QL: NEGATIVE — SIGNIFICANT CHANGE UP
BUN SERPL-MCNC: 11 MG/DL — SIGNIFICANT CHANGE UP (ref 7–23)
BUN SERPL-MCNC: 11 MG/DL — SIGNIFICANT CHANGE UP (ref 7–23)
BUN SERPL-MCNC: 12 MG/DL — SIGNIFICANT CHANGE UP (ref 7–23)
BUN SERPL-MCNC: 12 MG/DL — SIGNIFICANT CHANGE UP (ref 7–23)
CALCIUM SERPL-MCNC: 8.3 MG/DL — LOW (ref 8.4–10.5)
CALCIUM SERPL-MCNC: 8.3 MG/DL — LOW (ref 8.4–10.5)
CALCIUM SERPL-MCNC: 8.5 MG/DL — SIGNIFICANT CHANGE UP (ref 8.4–10.5)
CALCIUM SERPL-MCNC: 8.5 MG/DL — SIGNIFICANT CHANGE UP (ref 8.4–10.5)
CHLORIDE SERPL-SCNC: 102 MMOL/L — SIGNIFICANT CHANGE UP (ref 96–108)
CHLORIDE SERPL-SCNC: 102 MMOL/L — SIGNIFICANT CHANGE UP (ref 96–108)
CHLORIDE SERPL-SCNC: 104 MMOL/L — SIGNIFICANT CHANGE UP (ref 96–108)
CHLORIDE SERPL-SCNC: 104 MMOL/L — SIGNIFICANT CHANGE UP (ref 96–108)
CO2 BLDV-SCNC: 28 MMOL/L — HIGH (ref 22–26)
CO2 BLDV-SCNC: 28 MMOL/L — HIGH (ref 22–26)
CO2 SERPL-SCNC: 24 MMOL/L — SIGNIFICANT CHANGE UP (ref 22–31)
CO2 SERPL-SCNC: 24 MMOL/L — SIGNIFICANT CHANGE UP (ref 22–31)
CO2 SERPL-SCNC: 27 MMOL/L — SIGNIFICANT CHANGE UP (ref 22–31)
CO2 SERPL-SCNC: 27 MMOL/L — SIGNIFICANT CHANGE UP (ref 22–31)
COLOR SPEC: YELLOW — SIGNIFICANT CHANGE UP
COLOR SPEC: YELLOW — SIGNIFICANT CHANGE UP
CREAT SERPL-MCNC: 0.66 MG/DL — SIGNIFICANT CHANGE UP (ref 0.5–1.3)
CREAT SERPL-MCNC: 0.66 MG/DL — SIGNIFICANT CHANGE UP (ref 0.5–1.3)
CREAT SERPL-MCNC: 0.67 MG/DL — SIGNIFICANT CHANGE UP (ref 0.5–1.3)
CREAT SERPL-MCNC: 0.67 MG/DL — SIGNIFICANT CHANGE UP (ref 0.5–1.3)
DIFF PNL FLD: NEGATIVE — SIGNIFICANT CHANGE UP
DIFF PNL FLD: NEGATIVE — SIGNIFICANT CHANGE UP
EGFR: 106 ML/MIN/1.73M2 — SIGNIFICANT CHANGE UP
EOSINOPHIL # BLD AUTO: 0 K/UL — SIGNIFICANT CHANGE UP (ref 0–0.5)
EOSINOPHIL NFR BLD AUTO: 0 % — SIGNIFICANT CHANGE UP (ref 0–6)
ETHANOL SERPL-MCNC: 264 MG/DL — HIGH (ref 0–10)
ETHANOL SERPL-MCNC: 264 MG/DL — HIGH (ref 0–10)
GAS PNL BLDV: SIGNIFICANT CHANGE UP
GAS PNL BLDV: SIGNIFICANT CHANGE UP
GLUCOSE SERPL-MCNC: 132 MG/DL — HIGH (ref 70–99)
GLUCOSE SERPL-MCNC: 132 MG/DL — HIGH (ref 70–99)
GLUCOSE SERPL-MCNC: 135 MG/DL — HIGH (ref 70–99)
GLUCOSE SERPL-MCNC: 135 MG/DL — HIGH (ref 70–99)
GLUCOSE UR QL: NEGATIVE MG/DL — SIGNIFICANT CHANGE UP
GLUCOSE UR QL: NEGATIVE MG/DL — SIGNIFICANT CHANGE UP
HCO3 BLDV-SCNC: 27 MMOL/L — SIGNIFICANT CHANGE UP (ref 22–29)
HCO3 BLDV-SCNC: 27 MMOL/L — SIGNIFICANT CHANGE UP (ref 22–29)
HCT VFR BLD CALC: 23.7 % — LOW (ref 39–50)
HCT VFR BLD CALC: 23.7 % — LOW (ref 39–50)
HCT VFR BLD CALC: 26 % — LOW (ref 39–50)
HCT VFR BLD CALC: 27 % — LOW (ref 39–50)
HCT VFR BLD CALC: 27 % — LOW (ref 39–50)
HGB BLD-MCNC: 8.1 G/DL — LOW (ref 13–17)
HGB BLD-MCNC: 8.1 G/DL — LOW (ref 13–17)
HGB BLD-MCNC: 8.8 G/DL — LOW (ref 13–17)
HGB BLD-MCNC: 8.8 G/DL — LOW (ref 13–17)
HGB BLD-MCNC: 8.9 G/DL — LOW (ref 13–17)
HGB BLD-MCNC: 8.9 G/DL — LOW (ref 13–17)
HGB BLD-MCNC: 9.1 G/DL — LOW (ref 13–17)
HGB BLD-MCNC: 9.1 G/DL — LOW (ref 13–17)
IMM GRANULOCYTES NFR BLD AUTO: 0.6 % — SIGNIFICANT CHANGE UP (ref 0–0.9)
IMM GRANULOCYTES NFR BLD AUTO: 0.6 % — SIGNIFICANT CHANGE UP (ref 0–0.9)
IMM GRANULOCYTES NFR BLD AUTO: 1 % — HIGH (ref 0–0.9)
IMM GRANULOCYTES NFR BLD AUTO: 1 % — HIGH (ref 0–0.9)
INR BLD: 1.24 RATIO — HIGH (ref 0.85–1.18)
INR BLD: 1.24 RATIO — HIGH (ref 0.85–1.18)
INR BLD: 1.26 RATIO — HIGH (ref 0.85–1.18)
INR BLD: 1.26 RATIO — HIGH (ref 0.85–1.18)
INR BLD: 1.49 RATIO — HIGH (ref 0.85–1.18)
INR BLD: 1.49 RATIO — HIGH (ref 0.85–1.18)
INR BLD: 1.52 RATIO — HIGH (ref 0.85–1.18)
INR BLD: 1.52 RATIO — HIGH (ref 0.85–1.18)
KETONES UR-MCNC: ABNORMAL MG/DL
KETONES UR-MCNC: ABNORMAL MG/DL
LACTATE SERPL-SCNC: 3.5 MMOL/L — HIGH (ref 0.5–2)
LACTATE SERPL-SCNC: 3.5 MMOL/L — HIGH (ref 0.5–2)
LEUKOCYTE ESTERASE UR-ACNC: NEGATIVE — SIGNIFICANT CHANGE UP
LEUKOCYTE ESTERASE UR-ACNC: NEGATIVE — SIGNIFICANT CHANGE UP
LIDOCAIN IGE QN: 7 U/L — SIGNIFICANT CHANGE UP (ref 7–60)
LIDOCAIN IGE QN: 7 U/L — SIGNIFICANT CHANGE UP (ref 7–60)
LYMPHOCYTES # BLD AUTO: 0.65 K/UL — LOW (ref 1–3.3)
LYMPHOCYTES # BLD AUTO: 0.65 K/UL — LOW (ref 1–3.3)
LYMPHOCYTES # BLD AUTO: 1.11 K/UL — SIGNIFICANT CHANGE UP (ref 1–3.3)
LYMPHOCYTES # BLD AUTO: 1.11 K/UL — SIGNIFICANT CHANGE UP (ref 1–3.3)
LYMPHOCYTES # BLD AUTO: 11.7 % — LOW (ref 13–44)
LYMPHOCYTES # BLD AUTO: 11.7 % — LOW (ref 13–44)
LYMPHOCYTES # BLD AUTO: 9.1 % — LOW (ref 13–44)
LYMPHOCYTES # BLD AUTO: 9.1 % — LOW (ref 13–44)
MAGNESIUM SERPL-MCNC: 1.3 MG/DL — LOW (ref 1.6–2.6)
MAGNESIUM SERPL-MCNC: 1.3 MG/DL — LOW (ref 1.6–2.6)
MCHC RBC-ENTMCNC: 30.8 PG — SIGNIFICANT CHANGE UP (ref 27–34)
MCHC RBC-ENTMCNC: 30.8 PG — SIGNIFICANT CHANGE UP (ref 27–34)
MCHC RBC-ENTMCNC: 31.2 PG — SIGNIFICANT CHANGE UP (ref 27–34)
MCHC RBC-ENTMCNC: 31.2 PG — SIGNIFICANT CHANGE UP (ref 27–34)
MCHC RBC-ENTMCNC: 31.3 PG — SIGNIFICANT CHANGE UP (ref 27–34)
MCHC RBC-ENTMCNC: 33.7 GM/DL — SIGNIFICANT CHANGE UP (ref 32–36)
MCHC RBC-ENTMCNC: 33.7 GM/DL — SIGNIFICANT CHANGE UP (ref 32–36)
MCHC RBC-ENTMCNC: 33.8 GM/DL — SIGNIFICANT CHANGE UP (ref 32–36)
MCHC RBC-ENTMCNC: 33.8 GM/DL — SIGNIFICANT CHANGE UP (ref 32–36)
MCHC RBC-ENTMCNC: 34.2 GM/DL — SIGNIFICANT CHANGE UP (ref 32–36)
MCV RBC AUTO: 91.5 FL — SIGNIFICANT CHANGE UP (ref 80–100)
MCV RBC AUTO: 92.2 FL — SIGNIFICANT CHANGE UP (ref 80–100)
MCV RBC AUTO: 92.2 FL — SIGNIFICANT CHANGE UP (ref 80–100)
MONOCYTES # BLD AUTO: 0.31 K/UL — SIGNIFICANT CHANGE UP (ref 0–0.9)
MONOCYTES # BLD AUTO: 0.31 K/UL — SIGNIFICANT CHANGE UP (ref 0–0.9)
MONOCYTES # BLD AUTO: 0.59 K/UL — SIGNIFICANT CHANGE UP (ref 0–0.9)
MONOCYTES # BLD AUTO: 0.59 K/UL — SIGNIFICANT CHANGE UP (ref 0–0.9)
MONOCYTES NFR BLD AUTO: 4.3 % — SIGNIFICANT CHANGE UP (ref 2–14)
MONOCYTES NFR BLD AUTO: 4.3 % — SIGNIFICANT CHANGE UP (ref 2–14)
MONOCYTES NFR BLD AUTO: 6.2 % — SIGNIFICANT CHANGE UP (ref 2–14)
MONOCYTES NFR BLD AUTO: 6.2 % — SIGNIFICANT CHANGE UP (ref 2–14)
NEUTROPHILS # BLD AUTO: 6.01 K/UL — SIGNIFICANT CHANGE UP (ref 1.8–7.4)
NEUTROPHILS # BLD AUTO: 6.01 K/UL — SIGNIFICANT CHANGE UP (ref 1.8–7.4)
NEUTROPHILS # BLD AUTO: 7.64 K/UL — HIGH (ref 1.8–7.4)
NEUTROPHILS # BLD AUTO: 7.64 K/UL — HIGH (ref 1.8–7.4)
NEUTROPHILS NFR BLD AUTO: 80.2 % — HIGH (ref 43–77)
NEUTROPHILS NFR BLD AUTO: 80.2 % — HIGH (ref 43–77)
NEUTROPHILS NFR BLD AUTO: 84.3 % — HIGH (ref 43–77)
NEUTROPHILS NFR BLD AUTO: 84.3 % — HIGH (ref 43–77)
NITRITE UR-MCNC: NEGATIVE — SIGNIFICANT CHANGE UP
NITRITE UR-MCNC: NEGATIVE — SIGNIFICANT CHANGE UP
NRBC # BLD: 0 /100 WBCS — SIGNIFICANT CHANGE UP (ref 0–0)
PCO2 BLDV: 46 MMHG — SIGNIFICANT CHANGE UP (ref 42–55)
PCO2 BLDV: 46 MMHG — SIGNIFICANT CHANGE UP (ref 42–55)
PH BLDV: 7.37 — SIGNIFICANT CHANGE UP (ref 7.32–7.43)
PH BLDV: 7.37 — SIGNIFICANT CHANGE UP (ref 7.32–7.43)
PH UR: 5 — SIGNIFICANT CHANGE UP (ref 5–8)
PH UR: 5 — SIGNIFICANT CHANGE UP (ref 5–8)
PHOSPHATE SERPL-MCNC: 3.7 MG/DL — SIGNIFICANT CHANGE UP (ref 2.5–4.5)
PHOSPHATE SERPL-MCNC: 3.7 MG/DL — SIGNIFICANT CHANGE UP (ref 2.5–4.5)
PLATELET # BLD AUTO: 58 K/UL — LOW (ref 150–400)
PLATELET # BLD AUTO: 58 K/UL — LOW (ref 150–400)
PLATELET # BLD AUTO: 61 K/UL — LOW (ref 150–400)
PLATELET # BLD AUTO: 61 K/UL — LOW (ref 150–400)
PLATELET # BLD AUTO: 62 K/UL — LOW (ref 150–400)
PLATELET # BLD AUTO: 62 K/UL — LOW (ref 150–400)
PLATELET # BLD AUTO: 91 K/UL — LOW (ref 150–400)
PLATELET # BLD AUTO: 91 K/UL — LOW (ref 150–400)
PO2 BLDV: 53 MMHG — HIGH (ref 25–45)
PO2 BLDV: 53 MMHG — HIGH (ref 25–45)
POTASSIUM SERPL-MCNC: 3.9 MMOL/L — SIGNIFICANT CHANGE UP (ref 3.5–5.3)
POTASSIUM SERPL-MCNC: 3.9 MMOL/L — SIGNIFICANT CHANGE UP (ref 3.5–5.3)
POTASSIUM SERPL-MCNC: 4.3 MMOL/L — SIGNIFICANT CHANGE UP (ref 3.5–5.3)
POTASSIUM SERPL-MCNC: 4.3 MMOL/L — SIGNIFICANT CHANGE UP (ref 3.5–5.3)
POTASSIUM SERPL-SCNC: 3.9 MMOL/L — SIGNIFICANT CHANGE UP (ref 3.5–5.3)
POTASSIUM SERPL-SCNC: 3.9 MMOL/L — SIGNIFICANT CHANGE UP (ref 3.5–5.3)
POTASSIUM SERPL-SCNC: 4.3 MMOL/L — SIGNIFICANT CHANGE UP (ref 3.5–5.3)
POTASSIUM SERPL-SCNC: 4.3 MMOL/L — SIGNIFICANT CHANGE UP (ref 3.5–5.3)
PROT SERPL-MCNC: 6.8 G/DL — SIGNIFICANT CHANGE UP (ref 6–8.3)
PROT SERPL-MCNC: 6.8 G/DL — SIGNIFICANT CHANGE UP (ref 6–8.3)
PROT SERPL-MCNC: 7 G/DL — SIGNIFICANT CHANGE UP (ref 6–8.3)
PROT SERPL-MCNC: 7 G/DL — SIGNIFICANT CHANGE UP (ref 6–8.3)
PROT UR-MCNC: NEGATIVE MG/DL — SIGNIFICANT CHANGE UP
PROT UR-MCNC: NEGATIVE MG/DL — SIGNIFICANT CHANGE UP
PROTHROM AB SERPL-ACNC: 13.6 SEC — HIGH (ref 9.5–13)
PROTHROM AB SERPL-ACNC: 13.6 SEC — HIGH (ref 9.5–13)
PROTHROM AB SERPL-ACNC: 13.8 SEC — HIGH (ref 9.5–13)
PROTHROM AB SERPL-ACNC: 13.8 SEC — HIGH (ref 9.5–13)
PROTHROM AB SERPL-ACNC: 15.5 SEC — HIGH (ref 9.5–13)
PROTHROM AB SERPL-ACNC: 15.5 SEC — HIGH (ref 9.5–13)
PROTHROM AB SERPL-ACNC: 15.8 SEC — HIGH (ref 9.5–13)
PROTHROM AB SERPL-ACNC: 15.8 SEC — HIGH (ref 9.5–13)
RBC # BLD: 2.59 M/UL — LOW (ref 4.2–5.8)
RBC # BLD: 2.59 M/UL — LOW (ref 4.2–5.8)
RBC # BLD: 2.82 M/UL — LOW (ref 4.2–5.8)
RBC # BLD: 2.82 M/UL — LOW (ref 4.2–5.8)
RBC # BLD: 2.84 M/UL — LOW (ref 4.2–5.8)
RBC # BLD: 2.84 M/UL — LOW (ref 4.2–5.8)
RBC # BLD: 2.95 M/UL — LOW (ref 4.2–5.8)
RBC # BLD: 2.95 M/UL — LOW (ref 4.2–5.8)
RBC # FLD: 13.5 % — SIGNIFICANT CHANGE UP (ref 10.3–14.5)
RBC # FLD: 13.5 % — SIGNIFICANT CHANGE UP (ref 10.3–14.5)
RBC # FLD: 13.6 % — SIGNIFICANT CHANGE UP (ref 10.3–14.5)
RH IG SCN BLD-IMP: POSITIVE — SIGNIFICANT CHANGE UP
RH IG SCN BLD-IMP: POSITIVE — SIGNIFICANT CHANGE UP
SAO2 % BLDV: 77.6 % — SIGNIFICANT CHANGE UP (ref 67–88)
SAO2 % BLDV: 77.6 % — SIGNIFICANT CHANGE UP (ref 67–88)
SODIUM SERPL-SCNC: 140 MMOL/L — SIGNIFICANT CHANGE UP (ref 135–145)
SODIUM SERPL-SCNC: 140 MMOL/L — SIGNIFICANT CHANGE UP (ref 135–145)
SODIUM SERPL-SCNC: 142 MMOL/L — SIGNIFICANT CHANGE UP (ref 135–145)
SODIUM SERPL-SCNC: 142 MMOL/L — SIGNIFICANT CHANGE UP (ref 135–145)
SP GR SPEC: 1.03 — SIGNIFICANT CHANGE UP (ref 1–1.03)
SP GR SPEC: 1.03 — SIGNIFICANT CHANGE UP (ref 1–1.03)
UROBILINOGEN FLD QL: 0.2 MG/DL — SIGNIFICANT CHANGE UP (ref 0.2–1)
UROBILINOGEN FLD QL: 0.2 MG/DL — SIGNIFICANT CHANGE UP (ref 0.2–1)
WBC # BLD: 6.36 K/UL — SIGNIFICANT CHANGE UP (ref 3.8–10.5)
WBC # BLD: 6.36 K/UL — SIGNIFICANT CHANGE UP (ref 3.8–10.5)
WBC # BLD: 6.98 K/UL — SIGNIFICANT CHANGE UP (ref 3.8–10.5)
WBC # BLD: 6.98 K/UL — SIGNIFICANT CHANGE UP (ref 3.8–10.5)
WBC # BLD: 7.13 K/UL — SIGNIFICANT CHANGE UP (ref 3.8–10.5)
WBC # BLD: 7.13 K/UL — SIGNIFICANT CHANGE UP (ref 3.8–10.5)
WBC # BLD: 9.52 K/UL — SIGNIFICANT CHANGE UP (ref 3.8–10.5)
WBC # BLD: 9.52 K/UL — SIGNIFICANT CHANGE UP (ref 3.8–10.5)
WBC # FLD AUTO: 6.36 K/UL — SIGNIFICANT CHANGE UP (ref 3.8–10.5)
WBC # FLD AUTO: 6.36 K/UL — SIGNIFICANT CHANGE UP (ref 3.8–10.5)
WBC # FLD AUTO: 6.98 K/UL — SIGNIFICANT CHANGE UP (ref 3.8–10.5)
WBC # FLD AUTO: 6.98 K/UL — SIGNIFICANT CHANGE UP (ref 3.8–10.5)
WBC # FLD AUTO: 7.13 K/UL — SIGNIFICANT CHANGE UP (ref 3.8–10.5)
WBC # FLD AUTO: 7.13 K/UL — SIGNIFICANT CHANGE UP (ref 3.8–10.5)
WBC # FLD AUTO: 9.52 K/UL — SIGNIFICANT CHANGE UP (ref 3.8–10.5)
WBC # FLD AUTO: 9.52 K/UL — SIGNIFICANT CHANGE UP (ref 3.8–10.5)

## 2023-11-29 PROCEDURE — 90471 IMMUNIZATION ADMIN: CPT

## 2023-11-29 PROCEDURE — 71260 CT THORAX DX C+: CPT | Mod: 26,MA

## 2023-11-29 PROCEDURE — 70450 CT HEAD/BRAIN W/O DYE: CPT | Mod: 26,MA,59

## 2023-11-29 PROCEDURE — 99285 EMERGENCY DEPT VISIT HI MDM: CPT

## 2023-11-29 PROCEDURE — 73552 X-RAY EXAM OF FEMUR 2/>: CPT | Mod: 26,LT

## 2023-11-29 PROCEDURE — 86850 RBC ANTIBODY SCREEN: CPT

## 2023-11-29 PROCEDURE — 86901 BLOOD TYPING SEROLOGIC RH(D): CPT

## 2023-11-29 PROCEDURE — 99222 1ST HOSP IP/OBS MODERATE 55: CPT

## 2023-11-29 PROCEDURE — 71045 X-RAY EXAM CHEST 1 VIEW: CPT

## 2023-11-29 PROCEDURE — 72170 X-RAY EXAM OF PELVIS: CPT | Mod: 26

## 2023-11-29 PROCEDURE — 96374 THER/PROPH/DIAG INJ IV PUSH: CPT | Mod: XU

## 2023-11-29 PROCEDURE — 72125 CT NECK SPINE W/O DYE: CPT | Mod: 26,MA

## 2023-11-29 PROCEDURE — 73564 X-RAY EXAM KNEE 4 OR MORE: CPT | Mod: 26,50

## 2023-11-29 PROCEDURE — P9100: CPT

## 2023-11-29 PROCEDURE — P9037: CPT

## 2023-11-29 PROCEDURE — 36415 COLL VENOUS BLD VENIPUNCTURE: CPT

## 2023-11-29 PROCEDURE — 93005 ELECTROCARDIOGRAM TRACING: CPT

## 2023-11-29 PROCEDURE — 85610 PROTHROMBIN TIME: CPT

## 2023-11-29 PROCEDURE — 99233 SBSQ HOSP IP/OBS HIGH 50: CPT

## 2023-11-29 PROCEDURE — 70498 CT ANGIOGRAPHY NECK: CPT | Mod: 26,MA

## 2023-11-29 PROCEDURE — 72125 CT NECK SPINE W/O DYE: CPT | Mod: MA

## 2023-11-29 PROCEDURE — 70450 CT HEAD/BRAIN W/O DYE: CPT | Mod: 26,77

## 2023-11-29 PROCEDURE — 70450 CT HEAD/BRAIN W/O DYE: CPT | Mod: MA

## 2023-11-29 PROCEDURE — 99285 EMERGENCY DEPT VISIT HI MDM: CPT | Mod: 25

## 2023-11-29 PROCEDURE — 12014 RPR F/E/E/N/L/M 5.1-7.5 CM: CPT

## 2023-11-29 PROCEDURE — 72129 CT CHEST SPINE W/DYE: CPT | Mod: 26,MA

## 2023-11-29 PROCEDURE — 72170 X-RAY EXAM OF PELVIS: CPT

## 2023-11-29 PROCEDURE — 70496 CT ANGIOGRAPHY HEAD: CPT | Mod: 26,MA

## 2023-11-29 PROCEDURE — 71045 X-RAY EXAM CHEST 1 VIEW: CPT | Mod: 26

## 2023-11-29 PROCEDURE — 70486 CT MAXILLOFACIAL W/O DYE: CPT | Mod: MA

## 2023-11-29 PROCEDURE — 83690 ASSAY OF LIPASE: CPT

## 2023-11-29 PROCEDURE — 70486 CT MAXILLOFACIAL W/O DYE: CPT | Mod: 26,MA

## 2023-11-29 PROCEDURE — 74177 CT ABD & PELVIS W/CONTRAST: CPT | Mod: 26,MA

## 2023-11-29 PROCEDURE — 90715 TDAP VACCINE 7 YRS/> IM: CPT

## 2023-11-29 PROCEDURE — 80053 COMPREHEN METABOLIC PANEL: CPT

## 2023-11-29 PROCEDURE — 80307 DRUG TEST PRSMV CHEM ANLYZR: CPT

## 2023-11-29 PROCEDURE — 84484 ASSAY OF TROPONIN QUANT: CPT

## 2023-11-29 PROCEDURE — 86900 BLOOD TYPING SEROLOGIC ABO: CPT

## 2023-11-29 PROCEDURE — 85730 THROMBOPLASTIN TIME PARTIAL: CPT

## 2023-11-29 PROCEDURE — 96375 TX/PRO/DX INJ NEW DRUG ADDON: CPT | Mod: XU

## 2023-11-29 PROCEDURE — 85025 COMPLETE CBC W/AUTO DIFF WBC: CPT

## 2023-11-29 RX ORDER — HYDROMORPHONE HYDROCHLORIDE 2 MG/ML
0.5 INJECTION INTRAMUSCULAR; INTRAVENOUS; SUBCUTANEOUS ONCE
Refills: 0 | Status: DISCONTINUED | OUTPATIENT
Start: 2023-11-29 | End: 2023-11-29

## 2023-11-29 RX ORDER — MAGNESIUM SULFATE 500 MG/ML
2 VIAL (ML) INJECTION
Refills: 0 | Status: COMPLETED | OUTPATIENT
Start: 2023-11-29 | End: 2023-11-29

## 2023-11-29 RX ORDER — ACETAMINOPHEN 500 MG
975 TABLET ORAL EVERY 6 HOURS
Refills: 0 | Status: DISCONTINUED | OUTPATIENT
Start: 2023-11-29 | End: 2023-11-29

## 2023-11-29 RX ORDER — PHYTONADIONE (VIT K1) 5 MG
5 TABLET ORAL ONCE
Refills: 0 | Status: COMPLETED | OUTPATIENT
Start: 2023-11-29 | End: 2023-11-29

## 2023-11-29 RX ORDER — ESCITALOPRAM OXALATE 10 MG/1
1 TABLET, FILM COATED ORAL
Refills: 0 | DISCHARGE

## 2023-11-29 RX ORDER — IRBESARTAN 75 MG/1
1 TABLET ORAL
Refills: 0 | DISCHARGE

## 2023-11-29 RX ORDER — CHLORHEXIDINE GLUCONATE 213 G/1000ML
1 SOLUTION TOPICAL
Refills: 0 | Status: DISCONTINUED | OUTPATIENT
Start: 2023-11-29 | End: 2023-12-19

## 2023-11-29 RX ORDER — ONDANSETRON 8 MG/1
4 TABLET, FILM COATED ORAL ONCE
Refills: 0 | Status: COMPLETED | OUTPATIENT
Start: 2023-11-29 | End: 2023-11-29

## 2023-11-29 RX ORDER — IRBESARTAN 75 MG/1
1 TABLET ORAL
Qty: 0 | Refills: 0 | DISCHARGE

## 2023-11-29 RX ORDER — INFLUENZA VIRUS VACCINE 15; 15; 15; 15 UG/.5ML; UG/.5ML; UG/.5ML; UG/.5ML
0.5 SUSPENSION INTRAMUSCULAR ONCE
Refills: 0 | Status: DISCONTINUED | OUTPATIENT
Start: 2023-11-29 | End: 2023-12-19

## 2023-11-29 RX ORDER — ESCITALOPRAM OXALATE 10 MG/1
20 TABLET, FILM COATED ORAL DAILY
Refills: 0 | Status: DISCONTINUED | OUTPATIENT
Start: 2023-11-29 | End: 2023-12-19

## 2023-11-29 RX ORDER — LOSARTAN POTASSIUM 100 MG/1
50 TABLET, FILM COATED ORAL DAILY
Refills: 0 | Status: DISCONTINUED | OUTPATIENT
Start: 2023-11-30 | End: 2023-12-19

## 2023-11-29 RX ORDER — ERYTHROMYCIN BASE 5 MG/GRAM
1 OINTMENT (GRAM) OPHTHALMIC (EYE) THREE TIMES A DAY
Refills: 0 | Status: DISCONTINUED | OUTPATIENT
Start: 2023-11-29 | End: 2023-12-19

## 2023-11-29 RX ORDER — PROTHROMBIN COMPLEX CONCENTRATE (HUMAN) 25.5; 16.5; 24; 22; 22; 26 [IU]/ML; [IU]/ML; [IU]/ML; [IU]/ML; [IU]/ML; [IU]/ML
1500 POWDER, FOR SOLUTION INTRAVENOUS ONCE
Refills: 0 | Status: COMPLETED | OUTPATIENT
Start: 2023-11-29 | End: 2023-11-29

## 2023-11-29 RX ORDER — ACETAMINOPHEN 500 MG
1000 TABLET ORAL EVERY 6 HOURS
Refills: 0 | Status: DISCONTINUED | OUTPATIENT
Start: 2023-11-29 | End: 2023-11-29

## 2023-11-29 RX ORDER — OXYCODONE HYDROCHLORIDE 5 MG/1
10 TABLET ORAL EVERY 6 HOURS
Refills: 0 | Status: DISCONTINUED | OUTPATIENT
Start: 2023-11-29 | End: 2023-12-03

## 2023-11-29 RX ORDER — ACETAMINOPHEN 500 MG
1000 TABLET ORAL ONCE
Refills: 0 | Status: COMPLETED | OUTPATIENT
Start: 2023-11-29 | End: 2023-11-29

## 2023-11-29 RX ORDER — LEVETIRACETAM 250 MG/1
500 TABLET, FILM COATED ORAL
Refills: 0 | Status: COMPLETED | OUTPATIENT
Start: 2023-11-29 | End: 2023-12-06

## 2023-11-29 RX ORDER — FOLIC ACID 0.8 MG
1 TABLET ORAL DAILY
Refills: 0 | Status: DISCONTINUED | OUTPATIENT
Start: 2023-11-29 | End: 2023-12-19

## 2023-11-29 RX ORDER — MORPHINE SULFATE 50 MG/1
4 CAPSULE, EXTENDED RELEASE ORAL ONCE
Refills: 0 | Status: DISCONTINUED | OUTPATIENT
Start: 2023-11-29 | End: 2023-11-29

## 2023-11-29 RX ORDER — SODIUM CHLORIDE 9 MG/ML
250 INJECTION INTRAMUSCULAR; INTRAVENOUS; SUBCUTANEOUS ONCE
Refills: 0 | Status: COMPLETED | OUTPATIENT
Start: 2023-11-29 | End: 2023-11-29

## 2023-11-29 RX ORDER — ACETAMINOPHEN 500 MG
500 TABLET ORAL EVERY 6 HOURS
Refills: 0 | Status: COMPLETED | OUTPATIENT
Start: 2023-11-29 | End: 2023-11-30

## 2023-11-29 RX ORDER — POLYETHYLENE GLYCOL 3350 17 G/17G
17 POWDER, FOR SOLUTION ORAL DAILY
Refills: 0 | Status: DISCONTINUED | OUTPATIENT
Start: 2023-11-29 | End: 2023-12-19

## 2023-11-29 RX ORDER — SENNA PLUS 8.6 MG/1
2 TABLET ORAL AT BEDTIME
Refills: 0 | Status: DISCONTINUED | OUTPATIENT
Start: 2023-11-29 | End: 2023-12-19

## 2023-11-29 RX ORDER — OXYCODONE HYDROCHLORIDE 5 MG/1
5 TABLET ORAL EVERY 4 HOURS
Refills: 0 | Status: DISCONTINUED | OUTPATIENT
Start: 2023-11-29 | End: 2023-12-03

## 2023-11-29 RX ORDER — LEVETIRACETAM 250 MG/1
500 TABLET, FILM COATED ORAL EVERY 12 HOURS
Refills: 0 | Status: DISCONTINUED | OUTPATIENT
Start: 2023-11-29 | End: 2023-11-29

## 2023-11-29 RX ORDER — THIAMINE MONONITRATE (VIT B1) 100 MG
100 TABLET ORAL DAILY
Refills: 0 | Status: DISCONTINUED | OUTPATIENT
Start: 2023-11-29 | End: 2023-12-19

## 2023-11-29 RX ADMIN — Medication 200 MILLIGRAM(S): at 17:16

## 2023-11-29 RX ADMIN — LEVETIRACETAM 500 MILLIGRAM(S): 250 TABLET, FILM COATED ORAL at 23:12

## 2023-11-29 RX ADMIN — Medication 1 MILLIGRAM(S): at 15:56

## 2023-11-29 RX ADMIN — ONDANSETRON 4 MILLIGRAM(S): 8 TABLET, FILM COATED ORAL at 14:30

## 2023-11-29 RX ADMIN — PROTHROMBIN COMPLEX CONCENTRATE (HUMAN) 400 INTERNATIONAL UNIT(S): 25.5; 16.5; 24; 22; 22; 26 POWDER, FOR SOLUTION INTRAVENOUS at 06:37

## 2023-11-29 RX ADMIN — Medication 5 MILLIGRAM(S): at 11:40

## 2023-11-29 RX ADMIN — LEVETIRACETAM 400 MILLIGRAM(S): 250 TABLET, FILM COATED ORAL at 06:48

## 2023-11-29 RX ADMIN — MORPHINE SULFATE 4 MILLIGRAM(S): 50 CAPSULE, EXTENDED RELEASE ORAL at 06:20

## 2023-11-29 RX ADMIN — MORPHINE SULFATE 4 MILLIGRAM(S): 50 CAPSULE, EXTENDED RELEASE ORAL at 02:25

## 2023-11-29 RX ADMIN — Medication 400 MILLIGRAM(S): at 12:52

## 2023-11-29 RX ADMIN — OXYCODONE HYDROCHLORIDE 10 MILLIGRAM(S): 5 TABLET ORAL at 21:12

## 2023-11-29 RX ADMIN — HYDROMORPHONE HYDROCHLORIDE 0.5 MILLIGRAM(S): 2 INJECTION INTRAMUSCULAR; INTRAVENOUS; SUBCUTANEOUS at 09:42

## 2023-11-29 RX ADMIN — Medication 25 GRAM(S): at 12:14

## 2023-11-29 RX ADMIN — SODIUM CHLORIDE 250 MILLILITER(S): 9 INJECTION INTRAMUSCULAR; INTRAVENOUS; SUBCUTANEOUS at 02:25

## 2023-11-29 RX ADMIN — Medication 25 GRAM(S): at 14:01

## 2023-11-29 RX ADMIN — HYDROMORPHONE HYDROCHLORIDE 0.5 MILLIGRAM(S): 2 INJECTION INTRAMUSCULAR; INTRAVENOUS; SUBCUTANEOUS at 10:12

## 2023-11-29 RX ADMIN — Medication 1 APPLICATION(S): at 21:12

## 2023-11-29 RX ADMIN — ONDANSETRON 4 MILLIGRAM(S): 8 TABLET, FILM COATED ORAL at 12:52

## 2023-11-29 RX ADMIN — Medication 1000 MILLIGRAM(S): at 13:22

## 2023-11-29 RX ADMIN — Medication 500 MILLIGRAM(S): at 23:40

## 2023-11-29 RX ADMIN — ONDANSETRON 4 MILLIGRAM(S): 8 TABLET, FILM COATED ORAL at 05:58

## 2023-11-29 RX ADMIN — POLYETHYLENE GLYCOL 3350 17 GRAM(S): 17 POWDER, FOR SOLUTION ORAL at 12:14

## 2023-11-29 RX ADMIN — Medication 200 MILLIGRAM(S): at 23:10

## 2023-11-29 RX ADMIN — Medication 2 MILLIGRAM(S): at 17:13

## 2023-11-29 RX ADMIN — OXYCODONE HYDROCHLORIDE 10 MILLIGRAM(S): 5 TABLET ORAL at 22:12

## 2023-11-29 RX ADMIN — Medication 500 MILLIGRAM(S): at 17:46

## 2023-11-29 RX ADMIN — CHLORHEXIDINE GLUCONATE 1 APPLICATION(S): 213 SOLUTION TOPICAL at 12:14

## 2023-11-29 NOTE — PHYSICAL THERAPY INITIAL EVALUATION ADULT - ADDITIONAL COMMENTS
Pt lives in a private home with his brother, 12 steps to enter and one flight inside to bedroom. Pt was independent with all functional mobility and ADLs prior to admission without AD. states owns a RW and uses it on occasion.

## 2023-11-29 NOTE — DISCHARGE NOTE PROVIDER - CARE PROVIDER_API CALL
Salvador Joyce  Neurosurgery  [address], [address]  [place] [address]  Phone: [phone]  Fax: [phone]  Follow Up Time: 1 week   Salvador Joyce  Neurosurgery  [address]  [place] [address]  Phone: [phone]  Fax: [phone]  Follow Up Time: 1 week   Salvador Joyce  Neurosurgery  [address]  [place] [address]  Phone: [phone]  Fax: [phone]  Follow Up Time: 1 week

## 2023-11-29 NOTE — ED PROVIDER NOTE - PROGRESS NOTE DETAILS
Jono PGY3: optho at bedside, to dilate. Neurosx consulted but state no acute intervention. Pending trauma recs for admit. Will clear C-collar. Jono PGY3: optho low concern for acute globe rupture. Pressure acceptable, Recommend ointment which they will order. Plan for admit regardless for pain control, vomiting. Neurosx recommends Kcentra, keppra. Awaiting trauma consult recs. spoke with SICU ACP to notify of right knee overread indicating a perieosteal reaction with rec for dedicated right femur imaging. she will follow up. - Deepa Hightower PA-C

## 2023-11-29 NOTE — CONSULT NOTE ADULT - ATTENDING COMMENTS
Evaluated  in SICU. Clinical parameters,  labs, available imagine reviewed.  63y old  Male who presents with a chief complaint of headache after fall. Ground level fall, he hit his head and had loss of consciousness. pt was under influence of alcohol    Awake and alert, oriented X3,   CTH reviewed, has very small parafalcine SDH, f/u CTH after 6 hrs stable. Does not need seizure ppx  Periorbital hematoma with subconjunctival hemorrhage on medical management  Hemodynamically stable  On RA  Start PO  Received Kcentra for high INR, will give one dose Vit K. Coagulopathy more likely from liver dysfunction. Repeat INR  Start Thiamine FA  [facility] DVT ppx for now

## 2023-11-29 NOTE — PROGRESS NOTE ADULT - SUBJECTIVE AND OBJECTIVE BOX
[facility] DEPARTMENT OF OPHTHALMOLOGY  ------------------------------------------------------------------------------  Caty Graff MD, PGY-3  Contact: TEAMS  ------------------------------------------------------------------------------    Interval History: No acute events overnight. Today patient denying blurred vision, eye pain, flashes, floaters, FBS, erythema, or discharge.     MEDICATIONS  (STANDING):  acetaminophen     Tablet .. 975 milliGRAM(s) Oral every 6 hours  chlorhexidine 2% Cloths 1 Application(s) Topical <User Schedule>  escitalopram 20 milliGRAM(s) Oral daily  influenza   Vaccine 0.5 milliLiter(s) IntraMuscular once  magnesium sulfate  IVPB 2 Gram(s) IV Intermittent every 2 hours  polyethylene glycol 3350 17 Gram(s) Oral daily  senna 2 Tablet(s) Oral at bedtime    MEDICATIONS  (PRN):  oxyCODONE    IR 5 milliGRAM(s) Oral every 4 hours PRN Moderate Pain (4 - 6)  oxyCODONE    IR 10 milliGRAM(s) Oral every 6 hours PRN Severe Pain (7 - 10)      VITALS: T(C): 36.7 (11-29-23 @ 08:45)  T(F): 98.1 (11-29-23 @ 08:45), Max: 98.3 (11-29-23 @ 07:15)  HR: 73 (11-29-23 @ 12:00) (71 - 99)  BP: 139/61 (11-29-23 @ 12:00) (127/60 - 157/84)  RR:  (11 - 20)  SpO2:  (95% - 100%)  Wt(kg): --  General: AAO x 3, appropriate mood and affect      Ophthalmology Exam:  Visual acuity (cc with +2.50 readers): 20/25 OD ; 20/40 OS  Pupils: PERRL OU, no APD  Ttono: 13 OD 33 OS  Extraocular movements (EOMs): Full OU, no pain, no diplopia  Confrontational Visual Field (CVF): Full OD ; ENEIDA OS given pain during exam  Color Plates: 12/12 OD ;  ENEIDA OS given pain during exam    Pen Light Exam (PLE)  External: Flat OD, extensive periorbital edema OS  Lids/Lashes/Lacrimal Ducts: Flat OD, extensive JOSUE/LLL edema  Sclera/Conjunctiva: W+Q OD; temporal subconj hemorrhage OS   Cornea: Cl OD, 3+ SPK OD  Anterior Chamber: D+F OU    Iris: Flat OU  Lens: mild cataracts OU    Fundus Exam: dilated with 1% tropicamide and 2.5% phenylephrine  Approval obtained from primary team for dilation  Patient aware that pupils can remained dilated for at least 4-6 hours  Exam performed with 20D lens    Vitreous: wnl OU  Disc, cup/disc: sharp and pink, 0.4 OU   Macula: wnl OU  Vessels: wnl OU  Periphery: RPE changes peripherally OU    [facility] DEPARTMENT OF OPHTHALMOLOGY  ------------------------------------------------------------------------------  Caty Graff MD, PGY-3  Contact: TEAMS  ------------------------------------------------------------------------------    Interval History: No acute events since last exam. Patient states he is able to open eye a little more compared to last exam. Endorses blurred vision.     MEDICATIONS  (STANDING):  acetaminophen     Tablet .. 975 milliGRAM(s) Oral every 6 hours  chlorhexidine 2% Cloths 1 Application(s) Topical <User Schedule>  escitalopram 20 milliGRAM(s) Oral daily  influenza   Vaccine 0.5 milliLiter(s) IntraMuscular once  magnesium sulfate  IVPB 2 Gram(s) IV Intermittent every 2 hours  polyethylene glycol 3350 17 Gram(s) Oral daily  senna 2 Tablet(s) Oral at bedtime    MEDICATIONS  (PRN):  oxyCODONE    IR 5 milliGRAM(s) Oral every 4 hours PRN Moderate Pain (4 - 6)  oxyCODONE    IR 10 milliGRAM(s) Oral every 6 hours PRN Severe Pain (7 - 10)      VITALS: T(C): 36.7 (11-29-23 @ 08:45)  T(F): 98.1 (11-29-23 @ 08:45), Max: 98.3 (11-29-23 @ 07:15)  HR: 73 (11-29-23 @ 12:00) (71 - 99)  BP: 139/61 (11-29-23 @ 12:00) (127/60 - 157/84)  RR:  (11 - 20)  SpO2:  (95% - 100%)  Wt(kg): --  General: AAO x 3, appropriate mood and affect      Ophthalmology Exam:  Visual acuity (cc with +2.50 readers): 20/25 OD ; 20/100 OS (however pt limited by patient pain and nausea during exam, did not seem to be fully participating)  Pupils: PERRL OU, no RAPD  Ttono: 14 OS  Extraocular movements (EOMs): Full OU, no pain, no diplopia  Confrontational Visual Field (CVF): Full OD ; ENEIDA OS given pain during exam    Pen Light Exam (PLE)  External: Flat OD, extensive periorbital edema and ecchymoses OS  Lids/Lashes/Lacrimal Ducts: Flat OD, extensive JOSUE/LLL edema  Sclera/Conjunctiva: W+Q OD; temporal and superior and inferior subconj hemorrhage and chemosis with clearing nasally OS   Cornea: Cl OD, 2+ SPK OD  Anterior Chamber: D+F OU    Iris: Flat OU  Lens: mild cataracts OU    Fundus Exam: dilated with 1% tropicamide and 2.5% phenylephrine  Approval obtained from primary team for dilation  Patient aware that pupils can remained dilated for at least 4-6 hours  Exam performed with 20D lens    Vitreous: wnl OU  Disc, cup/disc: sharp and pink, 0.4 OU   Macula: wnl OU  Vessels: wnl OU  Periphery: RPE changes peripherally OU

## 2023-11-29 NOTE — PATIENT PROFILE ADULT - FUNCTIONAL ASSESSMENT - BASIC MOBILITY 6.
3-calculated by average/Not able to assess (calculate score using [facility] averaging method)  3-calculated by average/Not able to assess (calculate score using [facility] averaging method)  3-calculated by average/Not able to assess (calculate score using [facility] averaging method)

## 2023-11-29 NOTE — DISCHARGE NOTE PROVIDER - NSDCMRMEDTOKEN_GEN_ALL_CORE_FT
irbesartan 150 mg oral tablet: 1 tab(s) orally once a day  Lexapro 20 mg oral tablet: 1 tab(s) orally once a day   acetaminophen 325 mg oral tablet: 3 tab(s) orally every 6 hours As needed Mild Pain (1 - 3)  erythromycin 0.5% ophthalmic ointment: 1 application to each affected eye 3 times a day  folic acid 1 mg oral tablet: 1 tab(s) orally once a day  irbesartan 150 mg oral tablet: 1 tab(s) orally once a day  Lexapro 20 mg oral tablet: 1 tab(s) orally once a day  Multiple Vitamins oral tablet: 1 tab(s) orally once a day  oxyCODONE 10 mg oral tablet: 1 tab(s) orally every 4 hours As needed Severe Pain (7 - 10)  oxyCODONE 5 mg oral tablet: 1 tab(s) orally every 4 hours As needed Moderate Pain (4 - 6)  polyethylene glycol 3350 oral powder for reconstitution: 17 gram(s) orally once a day as needed for  constipation  senna leaf extract oral tablet: 2 tab(s) orally once a day (at bedtime) as needed for  constipation  thiamine 100 mg oral tablet: 1 tab(s) orally once a day   acetaminophen 325 mg oral tablet: 3 tab(s) orally every 6 hours As needed Mild Pain (1 - 3)  erythromycin 0.5% ophthalmic ointment: 1 application to each affected eye 3 times a day  folic acid 1 mg oral tablet: 1 tab(s) orally once a day  irbesartan 150 mg oral tablet: 1 tab(s) orally once a day  Lexapro 20 mg oral tablet: 1 tab(s) orally once a day  Multiple Vitamins oral tablet: 1 tab(s) orally once a day  oxyCODONE 5 mg oral tablet: 1 tab(s) orally every 4 hours As needed Moderate Pain (4 - 6)  polyethylene glycol 3350 oral powder for reconstitution: 17 gram(s) orally once a day as needed for  constipation  senna leaf extract oral tablet: 2 tab(s) orally once a day (at bedtime) as needed for  constipation  thiamine 100 mg oral tablet: 1 tab(s) orally once a day

## 2023-11-29 NOTE — ED PROVIDER NOTE - CLINICAL SUMMARY MEDICAL DECISION MAKING FREE TEXT BOX
Jono PGY3: 63-year-old male with PMH HTN, anxiety depression, history of alcohol abuse presents for evaluation after fall with head strike with L eye injury, transfer for optho/neurosx/trauma cx. rescan with trauma labs including angio given swelling around neck, re-lab. Trauma, optho L eye eval and neurosx cx for SDH. dispo based on findings.

## 2023-11-29 NOTE — ED ADULT NURSE REASSESSMENT NOTE - NS ED NURSE REASSESS COMMENT FT1
Pt report taken from MADISYN Lea. pt has hematoma/ L sided facial swelling. pt is being started on Plasma.  patient is A&Ox4. vital signs within normal limits for patient. patient denies chest pain, or shortness of breath.  awaiting transportation. to [employer]  safety precautions maintained.  will continue to assess and monitor for safety.
pt plasma initiated. awaiting EMS patient is A&Ox4. vital signs within normal limits for patient. patient denies chest pain, or shortness of breath.  medications tolerated well.  safety precautions maintained.  will continue to assess and monitor for safety.

## 2023-11-29 NOTE — DISCHARGE NOTE PROVIDER - CARE PROVIDERS DIRECT ADDRESSES
[email or web address]

## 2023-11-29 NOTE — PHYSICAL THERAPY INITIAL EVALUATION ADULT - GENERAL OBSERVATIONS, REHAB EVAL
Pt encountered supine in bed, AO x,3 + PIV. ICU monitoring Pt encountered supine in bed, AO x,3 + PIV. ICU monitoring. 11/30 Pt received semi supine in bed +IV, +ICU Monitoring lines, +condom cath, ok for PT per RN Danyell.

## 2023-11-29 NOTE — ED PROVIDER NOTE - ATTENDING CONTRIBUTION TO CARE
MD Pugh:  patient seen and evaluated with the resident.  I was present for key portions of the History & Physical, and I agree with the Impression & Plan.    Patient is a 63-year-old male transferred to [facility] from [place] emergency department, after he presented to the ED with facial trauma and lacerations.  Patient was found by his brother in bed with these injuries; patient endorses that he got up to use the restroom and fell over striking his nightstand.  Per the patient's brother, patient abuses ethanol; ethanol level at outside hospital was 361 at 9:10 PM.  Platelets were noted to be 62,000; patient was transfused 1 unit platelets on account of expanding periorbital hematoma.  Patient underwent CT head neck max face which did not reveal any underlying fractures however patient did exhibit significant left periorbital ecchymosis significant enough to prohibit left eye exam.  7 stitches were placed above the left eyebrow at outside hospital.    Per EMS: They endorse that he has developed significant pain and swelling in zone one of the neck above his clavicles.  He is still in C-spine precautions on account of ethanol intoxication.    Vital signs: Blood pressure 128/67, respirations 20, heart rate 98, temperature 98.0, O2 sat 95% on room air  General: Adult male, gross swelling and ecchymosis to the left orbit appreciated,   Neck: C-collar in place, patient has swelling above the right and left clavicle right greater than left, tender to palpation, nonfluctuant, no crepitus  Pulmonary: No distress, upper chest wall with TTP  CV: RRR, no RMG  Abdomen: soft, ND, NT  Ext:  no deformities  Neuro: AAOx3     Medical decision making:   Blunt trauma in a patient who is platelets are 62,000 with left orbital hematoma.  No oropharyngeal hematoma appreciated.  Hemodynamically within normal limits.    Given that patient is still complaining of significant pain in his chest wall and it was a trauma he warrants repeat CNS imaging, as well as pan-CT scan. MD Pugh:  patient seen and evaluated with the resident.  I was present for key portions of the History & Physical, and I agree with the Impression & Plan.    Patient is a 63-year-old male transferred to [facility] from [place] emergency department, after he presented to the ED with facial trauma and lacerations.  Patient was found by his brother in bed with these injuries; patient endorses that he got up to use the restroom and fell over striking his nightstand.  Per the patient's brother, patient abuses ethanol; ethanol level at outside hospital was 361 at 9:10 PM.  Platelets were noted to be 62,000; patient was transfused 1 unit platelets on account of expanding periorbital hematoma.  Patient underwent CT head showed a small left parafalcine subdural hemorrhage, CT neck max face which did not reveal any underlying fractures however patient did exhibit significant left periorbital ecchymosis significant enough to prohibit left eye exam.  7 stitches were placed above the left eyebrow at outside hospital.    Per EMS: They endorse that he has developed significant pain and swelling in zone one of the neck above his clavicles.  He is still in C-spine precautions on account of ethanol intoxication.    Vital signs: Blood pressure 128/67, respirations 20, heart rate 98, temperature 98.0, O2 sat 95% on room air  General: Adult male, gross swelling and ecchymosis to the left orbit appreciated,   Neck: C-collar in place, patient has swelling above the right and left clavicle right greater than left, tender to palpation, nonfluctuant, no crepitus  Pulmonary: No distress, upper chest wall with TTP  CV: RRR, no RMG  Abdomen: soft, ND, NT  Ext:  no deformities  Neuro: AAOx3     Medical decision making:   Blunt trauma in a patient who is platelets are 62,000 with left orbital hematoma.  No oropharyngeal hematoma appreciated.  Hemodynamically within normal limits.    Given that patient is still complaining of significant pain in his chest wall and it was a trauma he warrants repeat CNS imaging, as well as pan-CT scan.

## 2023-11-29 NOTE — ED ADULT NURSE REASSESSMENT NOTE - NS ED NURSE REASSESS COMMENT FT1
Patient sitting comfortably in bed. Updated on POC and bed status. Patient on cardiac monitor. Patient provided with clear liquids. Call bell within reach, bed in lowest position.

## 2023-11-29 NOTE — ED ADULT NURSE NOTE - CHPI ED NUR SYMPTOMS POS
left eye/side of face; arms, legs, back have various bruises/ABRASION/BLEEDING/BRUISING/DEFORMITY/PAIN/TENDERNESS

## 2023-11-29 NOTE — ED ADULT NURSE NOTE - OBJECTIVE STATEMENT
Pt is a 63y M trauma transfer [facility] ETOH abuse (pt states "last drink 2 days ago, drinks 1 pint of vodka per day"), pancreatic cancer, anxiety, depression. Pt states he was "trying to go to the bathroom, I fell in my bedroom after losing my balance and hit my head against my bed stand." According to pt, his brother found him in bed with bruising and swelling, so he called EMS where he was brought to [facility] and later transferred to [facility]. Pt arrived with large hematoma to left side of face, severe swelling in left side of face and orbital area. Pt received 6 stitches to laceration right next to left eye. Pt came in with C-collar in place. Pt complaining of very acute, sharp upper back pain. Pt very sensitive to upper back examination and percussion. Pt c/o R shoulder pain, mild headache, acute left sided facial pain. Pt was given 1 unit of platelets at [facility]. Pt is A&Ox4, calm and cooperative, neurologically intact following commands, moving all 4 extremities. Pt lying in stretcher with appropriate side rails raised. Pt given call bell and instructed to ring for any assistance he may need.

## 2023-11-29 NOTE — H&P ADULT - HISTORY OF PRESENT ILLNESS
Trauma Surgery Admission    CC: Patient is a 63y old  Male who presents with a chief complaint of headache after fall      Patient is a 63y year old male with PMHx of alcohol abuse (drinks 1p vodka/day), pancreatic CA s/p whipple procedure in 2015, depression and HTN who presnts after a ground-level fall, he reports he hit his head on a bedside table and had + HS and + LOC.         Primary Survey  A - airway intact  B - bilateral breath sounds and good chest rise  C - initially BP: 135/78 (23 @ 07:15), palpable pulses in all extremities  D - GCS 15 on arrival  Exposure obtained      Secondary survey  Gen: NAD  HEENT: L periorbital ecchymosis/edema, R neck soft tissue edema  CV: s1, s2, RRR  Pulm: CTA B/L  Chest: No TTP  Abd: Soft, ND, NT, no rebound, no guarding  Groin: Normal appearing  Ext: Palp radial b/l, palp DP b/l  Back: no TTP, no palpable runoff, stepoff, or deformity    PMH  No pertinent past medical history    Hypertension    Anxiety    Pancreatic cancer    Alcohol abuse      PSH  History of pancreatic surgery    H/O ventral hernia repair      MEDS    Allergies    No Known Allergies    Intolerances        Social    Labs:                        8.8    7.13  )-----------( 62       ( 2023 06:39 )             26.0     -    142  |  104  |  12  ----------------------------<  132<H>  3.9   |  24  |  0.67    Ca    8.5      2023 02:37    TPro  6.8  /  Alb  3.3  /  TBili  1.4<H>  /  DBili  x   /  AST  45<H>  /  ALT  17  /  AlkPhos  101  11-    PT/INR - ( 2023 06:39 )   PT: 15.8 sec;   INR: 1.52 ratio         PTT - ( 2023 02:37 )  PTT:35.2 sec  Urinalysis Basic - ( 2023 04:40 )    Color: Yellow / Appearance: Clear / S.026 / pH: x  Gluc: x / Ketone: Trace mg/dL  / Bili: Negative / Urobili: 0.2 mg/dL   Blood: x / Protein: Negative mg/dL / Nitrite: Negative   Leuk Esterase: Negative / RBC: x / WBC x   Sq Epi: x / Non Sq Epi: x / Bacteria: x            Imaging  < from: CT Head No Cont (23 @ 04:20) >  IMPRESSION:    Noncontrast CT Head: Stable since left parafalcine subdural hemorrhage.   No new or increased intracranial hemorrhage. No significant mass effect.    CT maxillofacial: No acute maxillofacial bone fracture. Left periorbital   and facial soft tissue swelling.    CT cervical spine: No acute cervical spine fracture or evidence of   traumatic malalignment.    CTA Neck: No significant flow-limiting stenosis or evidence of acute   diverticular withinthe cervical carotid or vertebral arteries.    Redemonstration of asymmetric right sternocleidomastoid muscle   enlargement and surrounding inflammation.    CTA Head: No proximal large vessel occlusion or significant stenosis.    --- End of Report ---    < end of copied text >  < from: CT Chest w/ IV Cont (23 @ 04:28) >  IMPRESSION:  Asymmetric enlargement and surrounding inflammation involving the right   sternocleidomastoid muscle as well as infiltration of the subcutaneous   fat overlying the right upper chest wall, likely representing the   sequelae of recent trauma.    No evidence of additional acute intrathoracic pathology.    Nonspecific 1.7 cm nodular airspace opacity within the right upper lobe.   Consider short-term follow-up to demonstrate resolution.    No CT evidence of acute traumatic sequelae within the abdomen or pelvis.    No acute thoracic spine fracture or evidence of traumatic malalignment.        --- End of Report ---    < end of copied text >

## 2023-11-29 NOTE — CONSULT NOTE ADULT - SUBJECTIVE AND OBJECTIVE BOX
SICU Consultation Note  =====================================================  HPI: 63y male  HPI:  Trauma Surgery Admission    CC: Patient is a 63y old  Male who presents with a chief complaint of headache after fall. Ground level fall, he hit his head and had loss of consciousness. Past medical history significant for alcohol abuse (drinks 1p vodka/day), pancreatic CA s/p whipple procedure in 2015, depression and HTN.      PAST MEDICAL & SURGICAL HISTORY:  Hypertension      Anxiety      Pancreatic cancer      Alcohol abuse      History of pancreatic surgery  2015      H/O ventral hernia repair        Home Meds:   Allergies: No Known Allergies    Soc:   Advanced Directives: Presumed Full Code     ROS:    General: Non-Contributory  Skin/Breast: Non-Contributory  Ophthalmologic: Non-Contributory  ENMT: Non-Contributory  Respiratory and Thorax: Non-Contributory  Cardiovascular: Non-Contributory  Gastrointestinal: Non-Contributory  Genitourinary: Non-Contributory  Musculoskeletal: Non-Contributory  Neurological: Non-Contributory  Psychiatric: Non-Contributory  Hematology/Lymphatics: Non-Contributory  Endocrine: Non-Contributory  Allergic/Immunologic: Non-Contributory    CURRENT MEDICATIONS:   --------------------------------------------------------------------------------------  Neurologic Medications  acetaminophen     Tablet .. 975 milliGRAM(s) Oral every 6 hours  escitalopram 20 milliGRAM(s) Oral daily  oxyCODONE    IR 10 milliGRAM(s) Oral every 6 hours PRN Severe Pain (7 - 10)  oxyCODONE    IR 5 milliGRAM(s) Oral every 4 hours PRN Moderate Pain (4 - 6)    Respiratory Medications    Cardiovascular Medications    Gastrointestinal Medications  magnesium sulfate  IVPB 2 Gram(s) IV Intermittent every 2 hours  polyethylene glycol 3350 17 Gram(s) Oral daily  senna 2 Tablet(s) Oral at bedtime    Genitourinary Medications    Hematologic/Oncologic Medications  influenza   Vaccine 0.5 milliLiter(s) IntraMuscular once    Antimicrobial/Immunologic Medications    Endocrine/Metabolic Medications    Topical/Other Medications  chlorhexidine 2% Cloths 1 Application(s) Topical <User Schedule>    --------------------------------------------------------------------------------------    VITAL SIGNS, INS/OUTS (last 24 hours):  --------------------------------------------------------------------------------------    T(C): 36.7 (11-29-23 @ 08:45), Max: 36.8 (11-29-23 @ 07:15)  HR: 71 (11-29-23 @ 11:00) (71 - 99)  BP: 127/60 (11-29-23 @ 11:00) (127/60 - 157/84)  BP(mean): 87 (11-29-23 @ 11:00) (87 - 101)  ABP: --  ABP(mean): --  RR: 11 (11-29-23 @ 11:00) (11 - 20)  SpO2: 96% (11-29-23 @ 11:00) (95% - 100%)  Wt(kg): --  CVP(mm Hg): --  CI: --  CAPILLARY BLOOD GLUCOSE       N/A      11-29 @ 07:01  -  11-29 @ 12:05  --------------------------------------------------------  IN:  Total IN: 0 mL    OUT:    Voided (mL): 300 mL  Total OUT: 300 mL    Total NET: -300 mL        --------------------------------------------------------------------------------------    EXAM:  GCS: 15  Exam: Normal, NAD, alert, oriented x 3, no focal deficits    Respiratory  Exam: Lungs clear to auscultation, Normal expansion/effort    Cardiovascular  Exam: S1, S2.  Regular rate and rhythm.    Cardiac Rhythm: Normal Sinus Rhythm    GI  Exam: Abdomen soft, Non-tender, Non-distended  Current Diet:  Regular      Tubes/Lines/Drains    [x] Peripheral IV  [] Central Venous Line     	[] R	[] L	[] IJ	[] Fem	[] SC        Type:	    Date Placed:   [] Arterial Line		[] R	[] L	[] Fem	[] Rad	[] Ax	Date Placed:   [] PICC:         	[] Midline		[] Mediport           [] Urinary Catheter		Date Placed:     Extremities  Exam: Extremities warm, pink, well-perfused.      Derm:  Exam: Good skin turgor, no skin breakdown.        LABS  --------------------------------------------------------------------------------------      LABS:                          8.9    6.98  )-----------( 61       ( 29 Nov 2023 09:23 )             26.0     11-29    140  |  102  |  11  ----------------------------<  135<H>  4.3   |  27  |  0.66    Ca    8.3<L>      29 Nov 2023 09:23  Phos  3.7     11-29  Mg     1.3     11-29    TPro  7.0  /  Alb  3.7  /  TBili  1.6<H>  /  DBili  x   /  AST  43<H>  /  ALT  17  /  AlkPhos  103  11-29    LIVER FUNCTIONS - ( 29 Nov 2023 09:23 )  Alb: 3.7 g/dL / Pro: 7.0 g/dL / ALK PHOS: 103 U/L / ALT: 17 U/L / AST: 43 U/L / GGT: x           PT/INR - ( 29 Nov 2023 10:46 )   PT: 13.8 sec;   INR: 1.26 ratio         PTT - ( 29 Nov 2023 10:46 )  PTT:35.2 sec  Urinalysis Basic - ( 29 Nov 2023 09:23 )    Color: x / Appearance: x / SG: x / pH: x  Gluc: 135 mg/dL / Ketone: x  / Bili: x / Urobili: x   Blood: x / Protein: x / Nitrite: x   Leuk Esterase: x / RBC: x / WBC x   Sq Epi: x / Non Sq Epi: x / Bacteria: x      --------------------------------------------------------------------------------------      IMAGING RESULTS    CT Head No Cont (11.29.23 @ 04:20)   IMPRESSION:    Noncontrast CT Head: Stable since left parafalcine subdural hemorrhage.   No new or increased intracranial hemorrhage. No significant mass effect.    CT maxillofacial: No acute maxillofacial bone fracture. Left periorbital   and facial soft tissue swelling.    CT cervical spine: No acute cervical spine fracture or evidence of   traumatic malalignment.    CTA Neck: No significant flow-limiting stenosis or evidence of acute   diverticular withinthe cervical carotid or vertebral arteries.    Redemonstration of asymmetric right sternocleidomastoid muscle   enlargement and surrounding inflammation.    CTA Head: No proximal large vessel occlusion or significant stenosis.      CT Chest w/ IV Cont (11.29.23 @ 04:28)   IMPRESSION:  Asymmetric enlargement and surrounding inflammation involving the right   sternocleidomastoid muscle as well as infiltration of the subcutaneous   fat overlying the right upper chest wall, likely representing the   sequelae of recent trauma.    No evidence of additional acute intrathoracic pathology.    Nonspecific 1.7 cm nodular airspace opacity within the right upper lobe.   Consider short-term follow-up to demonstrate resolution.    No CT evidence of acute traumatic sequelae within the abdomen or pelvis.    No acute thoracic spine fracture or evidence of traumatic malalignment.      ASSESSMENT:  63y Male with history of ETOH abuse presents from home after a fall from standing with head strike and loss of consciousness.    PLAN    Neurologic:   - Patient with small, left parafalcine subdural hematoma, stable on repeat imaging  - Mental status at baseline  - No need for further imaging    Respiratory:   - Patient without history of respiratory disease, no active issues  - 1.7 cm nodular airspace opacity noted in RUL on CT chest; Outpatient follow up    Cardiovascular:   - Patient with history of hypertension  - Hemodynamically stable off pressors    Gastrointestinal/Nutrition:   - Patient with history of Pancreatic Ca s/p Whipple procedure  - No active issues  - Regular diet    Renal/Genitourinary:   - No active issues, creatinine at baseline, no need for laura    Hematologic:   - Patient with elevated INR after head strike, received 1500 units of KCENTRA, INR now 1.26  - Hemoglobin initially downtrended, now stable  - Follow up PM CBC  - SCDs for DVT PPx    Infectious Disease:   - No active issues    Endocrine:   - No history of DM, blood glucose well controlled; No need to check fingersticks    Disposition:   - Patient can be sent to the floor today, no need for further ICU care    --------------------------------------------------------------------------------------

## 2023-11-29 NOTE — PROGRESS NOTE ADULT - SUBJECTIVE AND OBJECTIVE BOX
[place] [facility] [place]    right pupil was dilated by ophthalmology  left eye is swollen shut  left periorbital ecchymosis  left lateral supraorbital skin avulsion with nylon sutures intact and no evidence of wound infection  lower lip ecchymosis  midline laparotomy scar      left parafalcine SDH  -stable on 6 hour repeat CT head  -repeat CT head tonight  -hold chemical VTE prophylaxis    alcohol abuse  -social work evaluation    elevated INR and thrombocytopenia are concerning for cirrhosis in the setting of alcohol abuse  -f/u with hepatology as an outpatient      I reviewed his labs and radiologic images.

## 2023-11-29 NOTE — CONSULT NOTE ADULT - ASSESSMENT
---NOTE INCOMPLETE ---  63M with PMH HTN, anxiety depression, history of alcohol abuse presents for evaluation after fall with head trauma, does not recall if there was trauma to the left eye, found to have large left periorbital hematoma. ***    # Periorbital hematoma, left  -  - VA *** , IOP ***, no rAPD OU ***   - Anterior exam ***   - DFE ***  - Ice to left eye for the first 48 hours, HOB elevation    Discussed with ***.    Outpatient Follow-up: Patient should follow-up with his/her ophthalmologist or with [facility] Department of Ophthalmology within 1 week of after discharge at:    [address]  [place] [address]  [phone] ---NOTE INCOMPLETE ---  63M with PMH HTN, anxiety depression, history of alcohol abuse presents for evaluation after fall with head trauma, does not recall if there was trauma to the left eye, found to have large left periorbital hematoma. ***    # Periorbital hematoma, left  -  - VA 20/25 OD 20/40 OS , IOP 13 OD 33 OS, no rAPD OU ***   - Anterior exam (left eye) with significant periorbital and lid edema  - AC deep and formed, IOP elevated, low suspicion for globe rupture  - DFE ***  - Ice to left eye for the first 48 hours, HOB elevation  - Recommend ***     Discussed with Dr. Man, chief resident    Outpatient Follow-up: Patient should follow-up with his/her ophthalmologist or with [facility] Department of Ophthalmology within 1 week of after discharge at:    [address] [facility] [address]  [place] [address]  [phone] 63M with PMH HTN, anxiety depression, history of alcohol abuse presents for evaluation after fall with head trauma, does not recall if there was trauma to the left eye, found to have large left periorbital hematoma.    # Left eye trauma  # Periorbital hematoma, left  - 63M with reported mechanical fall 2/2 etoh intoxication, reportedly hit left eye on nightstand, found to have large periorbital hematoma OS  - VA 20/25 OD 20/40 OS , IOP 13 OD 33 OS, no rAPD OU   - Anterior exam (left eye) with significant periorbital and lid edema  - AC deep and formed, IOP elevated, low suspicion for globe rupture  - temporal subconj heme covering left cornea intermittently with 2+SPK  - CT max/face with no retrobulbar hematoma or proptosis  - DFE wnl, no holes, tears or detachments OU   - 1 round of timolol given at bedside, resulting IOP 24 OS  - Ice to left eye for the first 48 hours, HOB elevation  - Recommend erythromycin ointment, left eye TID (ordered) due to subconj heme and irregular corneal wetting    Outpatient Follow-up: Patient should follow-up with his/her ophthalmologist or with [facility] Department of Ophthalmology within 1 week of after discharge at:    [address] [facility] [address]  [place] [address]  [phone]

## 2023-11-29 NOTE — CONSULT NOTE ADULT - ASSESSMENT
63M, PLV txfer. S/p mechanical fall, hit Left side of head against bed stand. PMH EtOH use disorder. CTH shows small Left anterior parafalcine SDH. Exam: AOx2-3 (required choices for month), Right EOMI, Left eye swollen shut, pupils dilated by Ophtho, has Left facial from Left periorbital edema, no drift, SUTTON 5/5.  -6hr repeat CTH was stable   -Plt count 91K from 62K after getting 1u plt at OSH. Rec plt goal >80K  -INR 1.49, rec vitK x3d  -Keppra 500BID x7d.   -Outpt f/u with Dr. Joyce within 1-2 weeks after d/c 63M, PLV txfer. S/p mechanical fall, hit Left side of head against bed stand. PMH EtOH use disorder. CTH shows small Left anterior parafalcine SDH. Exam: AOx2-3 (required choices for month), Right EOMI, Left eye swollen shut, pupils dilated by Ophtho, has Left facial from Left periorbital edema, no drift, SUTTON 5/5.  -6hr repeat CTH was stable   -Plt count 91K from 62K after getting 1u plt at OSH. Rec plt goal >80K  -INR 1.49, give KCentra  -Keppra 500BID x7d.   -Outpt f/u with Dr. Joyce within 1-2 weeks after d/c
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
## 2023-11-29 NOTE — CONSULT NOTE ADULT - SUBJECTIVE AND OBJECTIVE BOX
p ([id number])     HPI:  63M, PLV txfer. S/p mechanical fall, hit Left side of head against bed stand. PMH EtOH use disorder. CTH shows small Left anterior parafalcine SDH. Exam: AOx2-3 (required choices for month), Right EOMI, Left eye swollen shut, pupils dilated by Ophtho, has Left facial from Left periorbital edema, no drift, SUTTON 5/5.    =====================  PAST MEDICAL HISTORY   Hypertension    Anxiety    Pancreatic cancer    Alcohol abuse      PAST SURGICAL HISTORY   History of pancreatic surgery    H/O ventral hernia repair      No Known Allergies      MEDICATIONS:  Antibiotics:    Neuro:    Other:      SOCIAL HISTORY:   Occupation:   Marital Status:     FAMILY HISTORY:  No pertinent family history in first degree relatives        ROS: Negative except per HPI    LABS:  PT/INR - ( 29 Nov 2023 02:37 )   PT: 15.5 sec;   INR: 1.49 ratio         PTT - ( 29 Nov 2023 02:37 )  PTT:35.2 sec                        9.1    9.52  )-----------( 91       ( 29 Nov 2023 02:37 )             27.0     11-29    142  |  104  |  12  ----------------------------<  132<H>  3.9   |  24  |  0.67    Ca    8.5      29 Nov 2023 02:37    TPro  6.8  /  Alb  3.3  /  TBili  1.4<H>  /  DBili  x   /  AST  45<H>  /  ALT  17  /  AlkPhos  101  11-29

## 2023-11-29 NOTE — DISCHARGE NOTE PROVIDER - DETAILS OF MALNUTRITION DIAGNOSIS/DIAGNOSES
This patient has been assessed with a concern for Malnutrition and was treated during this hospitalization for the following Nutrition diagnosis/diagnoses:     -  12/06/2023: Severe protein-calorie malnutrition

## 2023-11-29 NOTE — PHYSICAL THERAPY INITIAL EVALUATION ADULT - PERTINENT HX OF CURRENT PROBLEM, REHAB EVAL
Patient is a 63y year old male with PMHx of alcohol abuse (drinks 1p vodka/day), pancreatic CA s/p whipple procedure in 2015, depression and HTN who presnts after a ground-level fall, he reports he hit his head on a bedside table and had + HS and + LOC. CT head stable.

## 2023-11-29 NOTE — PATIENT PROFILE ADULT - FALL HARM RISK - HARM RISK INTERVENTIONS
Assistance with ambulation/Assistance OOB with selected safe patient handling equipment/Communicate Risk of Fall with Harm to all staff/Discuss with provider need for PT consult/Monitor for mental status changes/Monitor gait and stability/Provide patient with walking aids - walker, cane, crutches/Reinforce activity limits and safety measures with patient and family/Reorient to person, place and time as needed/Tailored Fall Risk Interventions/Toileting schedule using arm’s reach rule for commode and bathroom/Use of alarms - bed, chair and/or voice tab/Visual Cue: Yellow wristband and red socks/Bed in lowest position, wheels locked, appropriate side rails in place/Call bell, personal items and telephone in reach/Instruct patient to call for assistance before getting out of bed or chair/Non-slip footwear when patient is out of bed/[place] to call system/Physically safe environment - no spills, clutter or unnecessary equipment/Purposeful Proactive Rounding/Room/bathroom lighting operational, light cord in reach Assistance with ambulation/Assistance OOB with selected safe patient handling equipment/Communicate Risk of Fall with Harm to all staff/Discuss with provider need for PT consult/Monitor for mental status changes/Monitor gait and stability/Provide patient with walking aids - walker, cane, crutches/Reinforce activity limits and safety measures with patient and family/Reorient to person, place and time as needed/Tailored Fall Risk Interventions/Toileting schedule using arm’s reach rule for commode and bathroom/Use of alarms - bed, chair and/or voice tab/Visual Cue: Yellow wristband and red socks/Bed in lowest position, wheels locked, appropriate side rails in place/Call bell, personal items and telephone in reach/Instruct patient to call for assistance before getting out of bed or chair/Non-slip footwear when patient is out of bed/[place] to call system/Physically safe environment - no spills, clutter or unnecessary equipment/Purposeful Proactive Rounding/Room/bathroom lighting operational, light cord in reach Assistance with ambulation/Assistance OOB with selected safe patient handling equipment/Communicate Risk of Fall with Harm to all staff/Discuss with provider need for PT consult/Monitor for mental status changes/Monitor gait and stability/Provide patient with walking aids - walker, cane, crutches/Reinforce activity limits and safety measures with patient and family/Reorient to person, place and time as needed/Tailored Fall Risk Interventions/Toileting schedule using arm’s reach rule for commode and bathroom/Use of alarms - bed, chair and/or voice tab/Visual Cue: Yellow wristband and red socks/Bed in lowest position, wheels locked, appropriate side rails in place/Call bell, personal items and telephone in reach/Instruct patient to call for assistance before getting out of bed or chair/Non-slip footwear when patient is out of bed/[place] to call system/Physically safe environment - no spills, clutter or unnecessary equipment/Purposeful Proactive Rounding/Room/bathroom lighting operational, light cord in reach

## 2023-11-29 NOTE — PROGRESS NOTE ADULT - ASSESSMENT
63M with PMH HTN, anxiety depression, history of alcohol abuse presents for evaluation after fall with head trauma, does not recall if there was trauma to the left eye, found to have large left periorbital hematoma.    # Left eye trauma  # Periorbital hematoma, left  - 63M with reported mechanical fall 2/2 etoh intoxication, reportedly hit left eye on nightstand, found to have large periorbital hematoma OS  - VA 20/25 OD 20/40 OS , IOP 13 OD 33 OS, no rAPD OU   - Anterior exam (left eye) with significant periorbital and lid edema  - AC deep and formed, IOP elevated, low suspicion for globe rupture  - temporal subconj heme covering left cornea intermittently with 2+SPK  - CT max/face with no retrobulbar hematoma or proptosis  - DFE wnl, no holes, tears or detachments OU   - Ice to left eye for the first 48 hours, HOB elevation  - Continue erythromycin ointment, left eye TID (ordered) due to subconj heme and irregular corneal wetting    SDW Dr. Baker, attending.     Outpatient Follow-up: Patient should follow-up with his/her ophthalmologist or with [facility] Department of Ophthalmology within 1 week of after discharge at:    [address]  [place] [address]  [phone] 63M with PMH HTN, anxiety depression, history of alcohol abuse presents for evaluation after fall with head trauma, does not recall if there was trauma to the left eye, found to have large left periorbital hematoma.    # Left eye trauma  # Periorbital hematoma, left  - 63M with reported mechanical fall 2/2 etoh intoxication, reportedly hit left eye on nightstand, found to have large periorbital hematoma OS  - VA 20/40 on initial exam; patient less cooperative with exam this afternoon due to pain and nausea, unable to reliably assess VA  - PERRL no RAPD, IOP OS 14 today   - Anterior exam (left eye) with significant periorbital and lid edema  - AC deep and formed, IOP wnl, low suspicion for globe rupture  - temporal subconj heme covering left cornea intermittently with 2+SPK  - CT max/face with no retrobulbar hematoma or proptosis  - DFE wnl, no holes, tears or detachments OU   - Ice to left eye every 1-2 hours for the first 48 hours, HOB elevation  - Continue erythromycin ointment, left eye TID (ordered) due to subconj heme and irregular corneal wetting    SDW Dr. Baker, attending.     Outpatient Follow-up: Patient should follow-up with his/her ophthalmologist or with [facility] Department of Ophthalmology within 1 week of after discharge at:    [address] [facility]  [place], [place] [address] [phone]

## 2023-11-29 NOTE — H&P ADULT - ASSESSMENT
ASSESSMENT:  63yM w/ PMHx of alcohol abuse and anx/depression who presents s/p fall w +HS onto bedside table with LOC.  Trauma assessment in ED: ABCs intact , GCS 15 , AAOx3 , with physical exam findings, imaging, and labs as documented above, injuries are identified:   - L parafalcine SDH w/o mass effect  - L periorbital STS  - Inflammation of SCM    PLAN:  - Admit to Trauma Surgery (SICU bed) under Dr. Bryanna Soto    - KCentra for elevated INR  - monitor for alcohol withdrawal  - q4 neuro-checks   - Incentive spirometry  - Multimodal pain control with tylenol PO q8h ATC, tramadol q6h PRN for moderate (25mg), and severe pain (50mg)  - Monitor vital signs  - Tertiary exam in AM  - Trend H/H  - F/u labs       Plan discussed and agreeable with Dr. Soto

## 2023-11-29 NOTE — ED ADULT NURSE REASSESSMENT NOTE - NSFALLHARMRISKINTERV_ED_ALL_ED
Assistance OOB with selected safe patient handling equipment if applicable/Assistance with ambulation/Communicate risk of Fall with Harm to all staff, patient, and family/Monitor gait and stability/Monitor for mental status changes and reorient to person, place, and time, as needed/Provide visual cue: red socks, yellow wristband, yellow gown, etc/Reinforce activity limits and safety measures with patient and family/Toileting schedule using arm’s reach rule for commode and bathroom/Use of alarms - bed, stretcher, chair and/or video monitoring/Bed in lowest position, wheels locked, appropriate side rails in place/Call bell, personal items and telephone in reach/Instruct patient to call for assistance before getting out of bed/chair/stretcher/Non-slip footwear applied when patient is off stretcher/[place] to call system/Physically safe environment - no spills, clutter or unnecessary equipment/Purposeful Proactive Rounding/Room/bathroom lighting operational, light cord in reach

## 2023-11-29 NOTE — ED PROVIDER NOTE - CARE PLAN
1 Principal Discharge DX:	Traumatic subdural hematoma (SDH)  Secondary Diagnosis:	Facial injury, initial encounter

## 2023-11-29 NOTE — DISCHARGE NOTE PROVIDER - NSDCCPCAREPLAN_GEN_ALL_CORE_FT
PRINCIPAL DISCHARGE DIAGNOSIS  Diagnosis: Traumatic subdural hematoma (SDH)  Assessment and Plan of Treatment:       SECONDARY DISCHARGE DIAGNOSES  Diagnosis: Facial injury, initial encounter  Assessment and Plan of Treatment:      PRINCIPAL DISCHARGE DIAGNOSIS  Diagnosis: Traumatic subdural hematoma (SDH)  Assessment and Plan of Treatment: You were found to have a left subdural hematoma on CT imaging  Please follow up with neurosurgery (Dr. Joyce) within 1-2 weeks after discharge      SECONDARY DISCHARGE DIAGNOSES  Diagnosis: Facial injury, initial encounter  Assessment and Plan of Treatment: Ophthalmology evaluated you for a large left periorbital hematoma. Please continue with erythromycin ointment three times a day to your left eye  Please follow-up with your ophthalmologist or with [facility] Department of Ophthalmology within 1 week of after discharge at:  [address]  [place] [address]  [phone]    Diagnosis: Abnormal CT scan  Assessment and Plan of Treatment: You were incidentally found to have 1.7 cm RUL nodule on your CT scan. A copy of the results has been given to you.   Please follow-up with your PCP for further evaluation and management    Diagnosis: Thrombocytopenia  Assessment and Plan of Treatment: You had received 1 unit of platelets in the hospital.   HOME CARE INSTRUCTIONS  Check the skin and linings inside your mouth for bruising or bleeding as directed by your caregiver.  Check your sputum, urine, and stool for blood as directed by your caregiver.  Do not return to any activities that could cause bumps or bruises until your caregiver says it is okay.  Take extra care not to cut yourself when shaving or when using scissors, needles, knives, and other tools.  Take extra care not to burn yourself when ironing or cooking.   Ask your caregiver if it is okay for you to drink alcohol.  Only take over-the-counter or prescription medicines as directed by your caregiver.  Notify all your caregivers, including dentists and eye doctors, about your condition.  SEEK IMMEDIATE MEDICAL CARE IF:  You develop active bleeding from anywhere in your body.   You develop unexplained bruising or bleeding.  You have blood in your sputum, urine, or stool  FOLLOW UP: Please follow up with your PCP and hepatology in 1-2 weeks after discharge     PRINCIPAL DISCHARGE DIAGNOSIS  Diagnosis: Traumatic subdural hematoma (SDH)  Assessment and Plan of Treatment: You were found to have a left subdural hematoma on CT imaging  Please follow up with neurosurgery (Dr. Joyce) within 1-2 weeks after discharge      SECONDARY DISCHARGE DIAGNOSES  Diagnosis: Facial injury, initial encounter  Assessment and Plan of Treatment: Ophthalmology evaluated you for a large left periorbital hematoma. Please continue with erythromycin ointment three times a day to your left eye  Please follow-up with your ophthalmologist or with [facility] Department of Ophthalmology within 1 week of after discharge at:  [address]  [place] [address]  [phone]    Diagnosis: Abnormal CT scan  Assessment and Plan of Treatment: You were incidentally found to have 1.7 cm RUL nodule on your CT scan. A copy of the results has been given to you.   Please follow-up with your PCP for further evaluation and management    Diagnosis: Thrombocytopenia  Assessment and Plan of Treatment: You had received 1 unit of platelets in the hospital.   HOME CARE INSTRUCTIONS  Check the skin and linings inside your mouth for bruising or bleeding as directed by your caregiver.  Check your sputum, urine, and stool for blood as directed by your caregiver.  Do not return to any activities that could cause bumps or bruises until your caregiver says it is okay.  Take extra care not to cut yourself when shaving or when using scissors, needles, knives, and other tools.  Take extra care not to burn yourself when ironing or cooking.   Ask your caregiver if it is okay for you to drink alcohol.  Only take over-the-counter or prescription medicines as directed by your caregiver.  Notify all your caregivers, including dentists and eye doctors, about your condition.  SEEK IMMEDIATE MEDICAL CARE IF:  You develop active bleeding from anywhere in your body.   You develop unexplained bruising or bleeding.  You have blood in your sputum, urine, or stool  FOLLOW UP: Please follow up with your PCP and hepatology in 1-2 weeks after discharge     PRINCIPAL DISCHARGE DIAGNOSIS  Diagnosis: Traumatic subdural hematoma (SDH)  Assessment and Plan of Treatment: You were found to have a left subdural hematoma on CT imaging  Please follow up with neurosurgery (Dr. Joyce) within 1-2 weeks after discharge      SECONDARY DISCHARGE DIAGNOSES  Diagnosis: Facial injury, initial encounter  Assessment and Plan of Treatment: Ophthalmology evaluated you for a large left periorbital hematoma. Please continue with erythromycin ointment three times a day to your left eye  Please follow-up with your ophthalmologist or with [facility] Department of Ophthalmology within 1 week of after discharge at:  [address] [facility] [facility]  [place] [address]  [phone]    Diagnosis: Abnormal CT scan  Assessment and Plan of Treatment: You were incidentally found to have 1.7 cm RUL nodule on your CT scan. A copy of the results has been given to you.   Please follow-up with your PCP for further evaluation and management    Diagnosis: Thrombocytopenia  Assessment and Plan of Treatment: You had received 1 unit of platelets in the hospital.   HOME CARE INSTRUCTIONS  Check the skin and linings inside your mouth for bruising or bleeding as directed by your caregiver.  Check your sputum, urine, and stool for blood as directed by your caregiver.  Do not return to any activities that could cause bumps or bruises until your caregiver says it is okay.  Take extra care not to cut yourself when shaving or when using scissors, needles, knives, and other tools.  Take extra care not to burn yourself when ironing or cooking.   Ask your caregiver if it is okay for you to drink alcohol.  Only take over-the-counter or prescription medicines as directed by your caregiver.  Notify all your caregivers, including dentists and eye doctors, about your condition.  SEEK IMMEDIATE MEDICAL CARE IF:  You develop active bleeding from anywhere in your body.   You develop unexplained bruising or bleeding.  You have blood in your sputum, urine, or stool  FOLLOW UP: Please follow up with your PCP and hepatology in 1-2 weeks after discharge

## 2023-11-29 NOTE — ED PROVIDER NOTE - PHYSICAL EXAMINATION
PRIMARY SURVEY:  A - airway intact, phonating well  B - bilateral equal chest rise, no increased WOB  C - palpable pulses in all extremities    SECONDARY SURVEY:   GEN: resting in bed  HEENT: large hematoma shwetha-orbital L side with involvement of both upper/lower eyelid, unable to retract. no hemotympanum or septal hematomas b/l visualized. R eye WNL EOMI/PERRLA, s/p 7 stitches semilunar around L lateral upper eyelid w/o active bleeding, no tongue bite or teeth malocclusion ; no boggy scalp hematoma/step-off appreciated  NECK: C-collar in place  CHEST: R upper shoulder near medial sternoclavicular border large hematoma/eccymosis w/o pulsation ttp ; no clavicular ttp otherwise, no rib ttp anterior or posterior   BACK: non-tender to palpation along cervical, lumbar spine midline and b/l posterior ribs; no palpable step-offs or hematomas; +ttp over midline upper thoracic T1-4   ABD: soft, non-distended, non-tender   LUE: non-tender to palpation across upper and lower arm, 5/5  strength, no visible axilla injury, intact radial pulses   RUE: non-tender to palpation across upper and lower arm, 5/5  strength, no visible axilla injury, intact radial pulses   LLE: L lateral thigh hematoma with abrasion, able to range at hip/knee, +knee abrasion w/o significant swelling or deformity  RLE: non-tender to palpation across leg, but endorses tenderness when flexing at hip to R hip. no visible deformity/bruising.   NEURO: AAOx4, no focal neuro deficits; CN II-IX grossly intact; JAZZMINE +rectal tone   MSK: pelvis stable  /GYN: perineum intact without lacerations or bleeding

## 2023-11-29 NOTE — DISCHARGE NOTE PROVIDER - NSFOLLOWUPCLINICS_GEN_ALL_ED_FT
[facility]  Ophthalmology  [address] [place] [address] [address]  [place] [address]  Phone: [phone]  Fax:   Follow Up Time: 1 week     [facility]  Ophthalmology  [address] [place] [address] [address]  [place] [address]  Phone: [phone]  Fax:   Follow Up Time: 1 week     [facility]  Ophthalmology  [address] [place] [address] [address]  [place] [address]  Phone: [phone]  Fax:   Follow Up Time: 1 week

## 2023-11-29 NOTE — PHYSICAL THERAPY INITIAL EVALUATION ADULT - ACTIVE RANGE OF MOTION EXAMINATION, REHAB EVAL
Chloraprep bilateral upper extremity Active ROM was WFL (within functional limits)/bilateral  lower extremity Active ROM was WFL (within functional limits)

## 2023-11-29 NOTE — CHART NOTE - NSCHARTNOTEFT_GEN_A_CORE
Incidental findings are findings on history, physical examination, lab work, and imaging that are not directly related to the patient's chief complaint and cause for hospital admission.     1. The incidental findings for this patient are (please list): Nonspecific 1.7 cm nodular airspace opacity within the right upper lobe.     2. Were these findings explained to the patient and/or their family (in the event that either the patient requests communication with their family or the patient is unable to understand or remember the findings and plan)? Yes    3. Was a copy of the lab work/ imaging report given to the patient and/or their family? Yes    4. Was the patient asked whether they can follow up with their PMD regarding this finding? If the patient says no, or does not have a PMD, you must identify a provider (i.e. Medicine Clinic or specialist) with whom the patient will follow up. Yes    5. Was the finding documented on the discharge summary? Yes

## 2023-11-29 NOTE — CONSULT NOTE ADULT - SUBJECTIVE AND OBJECTIVE BOX
[facility] DEPARTMENT OF OPHTHALMOLOGY - INITIAL ADULT CONSULT  ----------------------------------------------------------------------------------------------------  Robert Poe PGY-2  Available on teams  ----------------------------------------------------------------------------------------------------    HPI: 63M with PMH HTN, anxiety depression, history of alcohol abuse presents for evaluation after fall.  Patient states he was trying to go to the bathroom when he lost his balance fell and hit his left side of his head against his bed stand.  Was able to get up and get into bed brother found him in bed with bruising and injuries so called EMS, was taken to [place].  Found to have an acute subdural hematoma all with large subcutaneous hematoma on the left side of the face but no reported fracture.  Patient transferred for further trauma evaluation.  Patient currently mostly complaining of headache and facial pain, right shoulder pain, upper back pain.  Patient denies chest pain, shortness of breath, ABD pain, nausea or vomiting.  Patient denies being on any thinners.  given 1 unit platelets [place]. No blood thinners.    Reports ***     PAST MEDICAL & SURGICAL HISTORY:  Hypertension  Anxiety  Pancreatic cancer  Alcohol abuse  History of pancreatic surgery  2015  H/O ventral hernia repair    Past Ocular History: ***  Ophthalmic Medications: ***  FAMILY HISTORY:    MEDICATIONS  (STANDING):    MEDICATIONS  (PRN):    Allergies & Intolerances:   No Known Allergies    Review of Systems:  Constitutional: No fever, chills  Eyes: See HPI  Neuro: No tremors  Cardiovascular: No chest pain, palpitations  Respiratory: No SOB, no cough  GI: No nausea, vomiting, abdominal pain  : No dysuria  Skin: no rash  Psych: no depression  Endocrine: no polyuria, polydipsia  Heme/lymph: no swelling    VITALS: T(C): 36.7 (11-29-23 @ 01:56)  T(F): 98 (11-29-23 @ 01:56), Max: 98.1 (11-29-23 @ 00:20)  HR: 98 (11-29-23 @ 01:56) (87 - 99)  BP: 128/67 (11-29-23 @ 01:56) (128/67 - 157/84)  RR:  (18 - 20)  SpO2:  (95% - 97%)  Wt(kg): --  General: AAO x 3, appropriate mood and affect    Ophthalmology Exam:  Visual acuity (cc with +2.50 readers): ***  Pupils: PERRL OU, no APD  Ttono: ***  Extraocular movements (EOMs): Full OU, no pain, no diplopia ***   Confrontational Visual Field (CVF): Full OU ***  Color Plates: 12/12 OU ***    Pen Light Exam (PLE)  External: Flat OU  Lids/Lashes/Lacrimal Ducts: Flat OU    Sclera/Conjunctiva: W+Q OU  Cornea: Cl OU  Anterior Chamber: D+F OU    Iris: Flat OU  Lens: Cl OU    Fundus Exam: dilated with 1% tropicamide and 2.5% phenylephrine  Approval obtained from primary team for dilation  Patient aware that pupils can remained dilated for at least 4-6 hours  Exam performed with 20D lens    Vitreous: wnl OU  Disc, cup/disc: sharp and pink, 0.4 OU  Macula: wnl OU  Vessels: wnl OU  Periphery: wnl OU    Labs/Imaging:  CT MAXILLOFACIAL  CT BRAIN   ORDERED   PROCEDURE DATE:  11/28/2023  MAXILLOFACIAL CT:  There is left-sided perinasal, periorbital, premalar, and premaxillary   soft tissue swelling/hematoma. There is no acute maxillofacial bone   fracture. The mandible is intact. The temporomandibular joints are not   dislocated. There are scattered dental caries and periapical lucencies.   There are no traumatic hemorrhagic air-fluid levels within the paranasal   sinuses. Paranasal sinuses are clear apart from mild bilateral ethmoid   and maxillary sinus mucosal thickening. The optic globes are smooth and   symmetric in contour. The retrobulbar fat is well-preserved. The   extraocular muscles are not enlarged or deviated. No intraorbital   radiopaque foreign body is identified. There isasymmetric enlargement   and soft tissue swelling about the right sternocleidomastoid muscle.    IMPRESSION:  CT BRAIN: Thin acute left parafalcine subdural hematoma measuring 2 mm in   maximum thickness. No significant mass effect. No acute calvarial or   skull base fracture.    Pattern of cerebral and cerebellar volume loss and ventriculomegaly,   unchanged. Moderate chronic small vessel ischemic changes.    MAXILLOFACIAL CT: Extensive left-sided facial soft tissue   swelling/hematoma. No acute maxillofacial bone or mandibular fracture.    Asymmetric enlargement and soft tissue swelling about the right   sternocleidomastoid muscle.   [facility] DEPARTMENT OF OPHTHALMOLOGY - INITIAL ADULT CONSULT  ----------------------------------------------------------------------------------------------------  Robert Poe PGY-2  Available on teams  ----------------------------------------------------------------------------------------------------    HPI: 63M with PMH HTN, anxiety depression, history of alcohol abuse presents for evaluation after fall.  Patient states he was trying to go to the bathroom when he lost his balance fell and hit his left side of his head against his bed stand.  Was able to get up and get into bed brother found him in bed with bruising and injuries so called EMS, was taken to [place].  Found to have an acute subdural hematoma all with large subcutaneous hematoma on the left side of the face but no reported fracture.  Patient transferred for further trauma evaluation.  Patient currently mostly complaining of headache and facial pain, right shoulder pain, upper back pain.  Patient denies chest pain, shortness of breath, ABD pain, nausea or vomiting.  Patient denies being on any thinners.  given 1 unit platelets [place]. No blood thinners. Blood etoh found to be 361 on initial presentation.    Reports ***     PAST MEDICAL & SURGICAL HISTORY:  Hypertension  Anxiety  Pancreatic cancer  Alcohol abuse  History of pancreatic surgery  2015  H/O ventral hernia repair    Past Ocular History: ***  Ophthalmic Medications: ***  FAMILY HISTORY:    MEDICATIONS  (STANDING):    MEDICATIONS  (PRN):    Allergies & Intolerances:   No Known Allergies    Review of Systems:  Constitutional: No fever, chills  Eyes: See HPI  Neuro: No tremors  Cardiovascular: No chest pain, palpitations  Respiratory: No SOB, no cough  GI: No nausea, vomiting, abdominal pain  : No dysuria  Skin: no rash  Psych: no depression  Endocrine: no polyuria, polydipsia  Heme/lymph: no swelling    VITALS: T(C): 36.7 (11-29-23 @ 01:56)  T(F): 98 (11-29-23 @ 01:56), Max: 98.1 (11-29-23 @ 00:20)  HR: 98 (11-29-23 @ 01:56) (87 - 99)  BP: 128/67 (11-29-23 @ 01:56) (128/67 - 157/84)  RR:  (18 - 20)  SpO2:  (95% - 97%)  Wt(kg): --  General: AAO x 3, appropriate mood and affect    Ophthalmology Exam:  Visual acuity (cc with +2.50 readers): ***  Pupils: PERRL OU, no APD  Ttono: ***  Extraocular movements (EOMs): Full OU, no pain, no diplopia ***   Confrontational Visual Field (CVF): Full OU ***  Color Plates: 12/12 OU ***    Pen Light Exam (PLE)  External: Flat OU  Lids/Lashes/Lacrimal Ducts: Flat OU    Sclera/Conjunctiva: W+Q OU  Cornea: Cl OU  Anterior Chamber: D+F OU    Iris: Flat OU  Lens: Cl OU    Fundus Exam: dilated with 1% tropicamide and 2.5% phenylephrine  Approval obtained from primary team for dilation  Patient aware that pupils can remained dilated for at least 4-6 hours  Exam performed with 20D lens    Vitreous: wnl OU  Disc, cup/disc: sharp and pink, 0.4 OU  Macula: wnl OU  Vessels: wnl OU  Periphery: wnl OU    Labs/Imaging:  CT MAXILLOFACIAL  CT BRAIN   ORDERED   PROCEDURE DATE:  11/28/2023  MAXILLOFACIAL CT:  There is left-sided perinasal, periorbital, premalar, and premaxillary   soft tissue swelling/hematoma. There is no acute maxillofacial bone   fracture. The mandible is intact. The temporomandibular joints are not   dislocated. There are scattered dental caries and periapical lucencies.   There are no traumatic hemorrhagic air-fluid levels within the paranasal   sinuses. Paranasal sinuses are clear apart from mild bilateral ethmoid   and maxillary sinus mucosal thickening. The optic globes are smooth and   symmetric in contour. The retrobulbar fat is well-preserved. The   extraocular muscles are not enlarged or deviated. No intraorbital   radiopaque foreign body is identified. There isasymmetric enlargement   and soft tissue swelling about the right sternocleidomastoid muscle.    IMPRESSION:  CT BRAIN: Thin acute left parafalcine subdural hematoma measuring 2 mm in   maximum thickness. No significant mass effect. No acute calvarial or   skull base fracture.    Pattern of cerebral and cerebellar volume loss and ventriculomegaly,   unchanged. Moderate chronic small vessel ischemic changes.    MAXILLOFACIAL CT: Extensive left-sided facial soft tissue   swelling/hematoma. No acute maxillofacial bone or mandibular fracture.    Asymmetric enlargement and soft tissue swelling about the right   sternocleidomastoid muscle.   [facility] DEPARTMENT OF OPHTHALMOLOGY - INITIAL ADULT CONSULT  ----------------------------------------------------------------------------------------------------  Robert Poe PGY-2  Available on teams  ----------------------------------------------------------------------------------------------------    HPI: 63M with PMH HTN, anxiety depression, history of alcohol abuse presents for evaluation after fall.  Patient states he was trying to go to the bathroom when he lost his balance fell and hit his left side of his head against his bed stand.  Was able to get up and get into bed brother found him in bed with bruising and injuries so called EMS, was taken to [place]. Reports he hit his left eye and felt his vision was blurry after the trauma. Denies flashes, floaters  Found to have an acute subdural hematoma all with large subcutaneous hematoma on the left side of the face but no reported fracture.  Patient transferred for further trauma evaluation.  Patient currently mostly complaining of headache and facial pain, right shoulder pain, upper back pain.  Patient denies chest pain, shortness of breath, ABD pain, nausea or vomiting.  Patient denies being on any thinners.  given 1 unit platelets [place]. No blood thinners. Blood etoh found to be 361 on initial presentation.    PAST MEDICAL & SURGICAL HISTORY:  Hypertension  Anxiety  Pancreatic cancer  Alcohol abuse  History of pancreatic surgery  2015  H/O ventral hernia repair    Past Ocular History: None  Ophthalmic Medications: None  FAMILY HISTORY:    MEDICATIONS  (STANDING):    MEDICATIONS  (PRN):    Allergies & Intolerances:   No Known Allergies    Review of Systems:  Constitutional: No fever, chills  Eyes: See HPI  Neuro: No tremors  Cardiovascular: No chest pain, palpitations  Respiratory: No SOB, no cough  GI: No nausea, vomiting, abdominal pain  : No dysuria  Skin: no rash  Psych: no depression  Endocrine: no polyuria, polydipsia  Heme/lymph: no swelling    VITALS: T(C): 36.7 (11-29-23 @ 01:56)  T(F): 98 (11-29-23 @ 01:56), Max: 98.1 (11-29-23 @ 00:20)  HR: 98 (11-29-23 @ 01:56) (87 - 99)  BP: 128/67 (11-29-23 @ 01:56) (128/67 - 157/84)  RR:  (18 - 20)  SpO2:  (95% - 97%)  Wt(kg): --  General: AAO x 3, appropriate mood and affect    Ophthalmology Exam:  Visual acuity (cc with +2.50 readers): 20/25 OD ; 20/40 OS  Pupils: PERRL OU, no APD  Ttono: 13 OD 33 OS  Extraocular movements (EOMs): Full OU, no pain, no diplopia  Confrontational Visual Field (CVF): Full OD ; ENEIDA OS given pain during exam  Color Plates: 12/12 OD ;  ENEIDA OS given pain during exam    Pen Light Exam (PLE)  External: Flat OD, extensive periorbital edema OS  Lids/Lashes/Lacrimal Ducts: Flat OD, extensive JOSUE/LLL edema  Sclera/Conjunctiva: W+Q OD; *** OS   Cornea: Cl OU ***  Anterior Chamber: D+F OU    Iris: Flat OU  Lens: Cl OU ***    Fundus Exam: dilated with 1% tropicamide and 2.5% phenylephrine  Approval obtained from primary team for dilation  Patient aware that pupils can remained dilated for at least 4-6 hours  Exam performed with 20D lens    Vitreous: wnl OU  Disc, cup/disc: sharp and pink, 0.4 OU ***  Macula: wnl OU  Vessels: wnl OU  Periphery: wnl OU    Labs/Imaging:  CT MAXILLOFACIAL  CT BRAIN   ORDERED   PROCEDURE DATE:  11/28/2023  MAXILLOFACIAL CT:  There is left-sided perinasal, periorbital, premalar, and premaxillary   soft tissue swelling/hematoma. There is no acute maxillofacial bone   fracture. The mandible is intact. The temporomandibular joints are not   dislocated. There are scattered dental caries and periapical lucencies.   There are no traumatic hemorrhagic air-fluid levels within the paranasal   sinuses. Paranasal sinuses are clear apart from mild bilateral ethmoid   and maxillary sinus mucosal thickening. The optic globes are smooth and   symmetric in contour. The retrobulbar fat is well-preserved. The   extraocular muscles are not enlarged or deviated. No intraorbital   radiopaque foreign body is identified. There isasymmetric enlargement   and soft tissue swelling about the right sternocleidomastoid muscle.    IMPRESSION:  CT BRAIN: Thin acute left parafalcine subdural hematoma measuring 2 mm in   maximum thickness. No significant mass effect. No acute calvarial or   skull base fracture.    Pattern of cerebral and cerebellar volume loss and ventriculomegaly,   unchanged. Moderate chronic small vessel ischemic changes.    MAXILLOFACIAL CT: Extensive left-sided facial soft tissue   swelling/hematoma. No acute maxillofacial bone or mandibular fracture.    Asymmetric enlargement and soft tissue swelling about the right   sternocleidomastoid muscle.   [facility] DEPARTMENT OF OPHTHALMOLOGY - INITIAL ADULT CONSULT  ----------------------------------------------------------------------------------------------------  Robert Poe PGY-2  Available on teams  ----------------------------------------------------------------------------------------------------    HPI: 63M with PMH HTN, anxiety depression, history of alcohol abuse presents for evaluation after fall.  Patient states he was trying to go to the bathroom when he lost his balance fell and hit his left side of his head against his bed stand.  Was able to get up and get into bed brother found him in bed with bruising and injuries so called EMS, was taken to [place]. Reports he hit his left eye and felt his vision was blurry after the trauma. Denies flashes, floaters  Found to have an acute subdural hematoma all with large subcutaneous hematoma on the left side of the face but no reported fracture.  Patient transferred for further trauma evaluation.  Patient currently mostly complaining of headache and facial pain, right shoulder pain, upper back pain.  Patient denies chest pain, shortness of breath, ABD pain, nausea or vomiting.  Patient denies being on any thinners.  given 1 unit platelets [place]. No blood thinners. Blood etoh found to be 361 on initial presentation.    PAST MEDICAL & SURGICAL HISTORY:  Hypertension  Anxiety  Pancreatic cancer  Alcohol abuse  History of pancreatic surgery  2015  H/O ventral hernia repair    Past Ocular History: None  Ophthalmic Medications: None  FAMILY HISTORY:    MEDICATIONS  (STANDING):    MEDICATIONS  (PRN):    Allergies & Intolerances:   No Known Allergies    Review of Systems:  Constitutional: No fever, chills  Eyes: See HPI  Neuro: No tremors  Cardiovascular: No chest pain, palpitations  Respiratory: No SOB, no cough  GI: No nausea, vomiting, abdominal pain  : No dysuria  Skin: no rash  Psych: no depression  Endocrine: no polyuria, polydipsia  Heme/lymph: no swelling    VITALS: T(C): 36.7 (11-29-23 @ 01:56)  T(F): 98 (11-29-23 @ 01:56), Max: 98.1 (11-29-23 @ 00:20)  HR: 98 (11-29-23 @ 01:56) (87 - 99)  BP: 128/67 (11-29-23 @ 01:56) (128/67 - 157/84)  RR:  (18 - 20)  SpO2:  (95% - 97%)  Wt(kg): --  General: AAO x 3, appropriate mood and affect    Ophthalmology Exam:  Visual acuity (cc with +2.50 readers): 20/25 OD ; 20/40 OS  Pupils: PERRL OU, no APD  Ttono: 13 OD 33 OS  Extraocular movements (EOMs): Full OU, no pain, no diplopia  Confrontational Visual Field (CVF): Full OD ; ENEIDA OS given pain during exam  Color Plates: 12/12 OD ;  ENEIDA OS given pain during exam    Pen Light Exam (PLE)  External: Flat OD, extensive periorbital edema OS  Lids/Lashes/Lacrimal Ducts: Flat OD, extensive JOSUE/LLL edema  Sclera/Conjunctiva: W+Q OD; temporal subconj hemorrhage OS   Cornea: Cl OD, 3+ SPK OD  Anterior Chamber: D+F OU    Iris: Flat OU  Lens: mild cataracts OU    Fundus Exam: dilated with 1% tropicamide and 2.5% phenylephrine  Approval obtained from primary team for dilation  Patient aware that pupils can remained dilated for at least 4-6 hours  Exam performed with 20D lens    Vitreous: wnl OU  Disc, cup/disc: sharp and pink, 0.4 OU   Macula: wnl OU  Vessels: wnl OU  Periphery: RPE changes peripherally OU     Labs/Imaging:  CT MAXILLOFACIAL  CT BRAIN   ORDERED   PROCEDURE DATE:  11/28/2023  MAXILLOFACIAL CT:  There is left-sided perinasal, periorbital, premalar, and premaxillary   soft tissue swelling/hematoma. There is no acute maxillofacial bone   fracture. The mandible is intact. The temporomandibular joints are not   dislocated. There are scattered dental caries and periapical lucencies.   There are no traumatic hemorrhagic air-fluid levels within the paranasal   sinuses. Paranasal sinuses are clear apart from mild bilateral ethmoid   and maxillary sinus mucosal thickening. The optic globes are smooth and   symmetric in contour. The retrobulbar fat is well-preserved. The   extraocular muscles are not enlarged or deviated. No intraorbital   radiopaque foreign body is identified. There isasymmetric enlargement   and soft tissue swelling about the right sternocleidomastoid muscle.    IMPRESSION:  CT BRAIN: Thin acute left parafalcine subdural hematoma measuring 2 mm in   maximum thickness. No significant mass effect. No acute calvarial or   skull base fracture.    Pattern of cerebral and cerebellar volume loss and ventriculomegaly,   unchanged. Moderate chronic small vessel ischemic changes.    MAXILLOFACIAL CT: Extensive left-sided facial soft tissue   swelling/hematoma. No acute maxillofacial bone or mandibular fracture.    Asymmetric enlargement and soft tissue swelling about the right   sternocleidomastoid muscle.

## 2023-11-29 NOTE — ED PROVIDER NOTE - OBJECTIVE STATEMENT
63-year-old male with PMH HTN, anxiety depression, history of alcohol abuse presents for evaluation after fall.  Patient states he was trying to go to the bathroom when he lost his balance fell and hit his left side of his head against his bed stand.  Was able to get up and get into bed brother found him in bed with bruising and injuries so called EMS, was taken to [place].  Found to have an acute subdural hematoma all with large subcutaneous hematoma on the left side of the face but no reported fracture.  Patient transferred for further trauma evaluation.  Patient currently mostly complaining of headache and facial pain, right shoulder pain, upper back pain.  Patient denies chest pain, shortness of breath, ABD pain, nausea or vomiting.  Patient denies being on any thinners.  given 1 unit platelets [place]. No blood thinners.

## 2023-11-30 LAB
ALBUMIN SERPL ELPH-MCNC: 3 G/DL — LOW (ref 3.3–5)
ALBUMIN SERPL ELPH-MCNC: 3 G/DL — LOW (ref 3.3–5)
ALBUMIN SERPL ELPH-MCNC: 3.1 G/DL — LOW (ref 3.3–5)
ALBUMIN SERPL ELPH-MCNC: 3.1 G/DL — LOW (ref 3.3–5)
ALP SERPL-CCNC: 81 U/L — SIGNIFICANT CHANGE UP (ref 40–120)
ALP SERPL-CCNC: 81 U/L — SIGNIFICANT CHANGE UP (ref 40–120)
ALP SERPL-CCNC: 91 U/L — SIGNIFICANT CHANGE UP (ref 40–120)
ALP SERPL-CCNC: 91 U/L — SIGNIFICANT CHANGE UP (ref 40–120)
ALT FLD-CCNC: 15 U/L — SIGNIFICANT CHANGE UP (ref 10–45)
ALT FLD-CCNC: 15 U/L — SIGNIFICANT CHANGE UP (ref 10–45)
ALT FLD-CCNC: 17 U/L — SIGNIFICANT CHANGE UP (ref 10–45)
ALT FLD-CCNC: 17 U/L — SIGNIFICANT CHANGE UP (ref 10–45)
ANION GAP SERPL CALC-SCNC: 10 MMOL/L — SIGNIFICANT CHANGE UP (ref 5–17)
ANION GAP SERPL CALC-SCNC: 10 MMOL/L — SIGNIFICANT CHANGE UP (ref 5–17)
ANION GAP SERPL CALC-SCNC: 7 MMOL/L — SIGNIFICANT CHANGE UP (ref 5–17)
ANION GAP SERPL CALC-SCNC: 7 MMOL/L — SIGNIFICANT CHANGE UP (ref 5–17)
APTT BLD: 35.8 SEC — HIGH (ref 24.5–35.6)
APTT BLD: 35.8 SEC — HIGH (ref 24.5–35.6)
AST SERPL-CCNC: 39 U/L — SIGNIFICANT CHANGE UP (ref 10–40)
AST SERPL-CCNC: 39 U/L — SIGNIFICANT CHANGE UP (ref 10–40)
AST SERPL-CCNC: 62 U/L — HIGH (ref 10–40)
AST SERPL-CCNC: 62 U/L — HIGH (ref 10–40)
BILIRUB SERPL-MCNC: 2.1 MG/DL — HIGH (ref 0.2–1.2)
BILIRUB SERPL-MCNC: 2.1 MG/DL — HIGH (ref 0.2–1.2)
BILIRUB SERPL-MCNC: 2.2 MG/DL — HIGH (ref 0.2–1.2)
BILIRUB SERPL-MCNC: 2.2 MG/DL — HIGH (ref 0.2–1.2)
BUN SERPL-MCNC: 14 MG/DL — SIGNIFICANT CHANGE UP (ref 7–23)
CALCIUM SERPL-MCNC: 7.9 MG/DL — LOW (ref 8.4–10.5)
CALCIUM SERPL-MCNC: 7.9 MG/DL — LOW (ref 8.4–10.5)
CALCIUM SERPL-MCNC: 8.1 MG/DL — LOW (ref 8.4–10.5)
CALCIUM SERPL-MCNC: 8.1 MG/DL — LOW (ref 8.4–10.5)
CHLORIDE SERPL-SCNC: 100 MMOL/L — SIGNIFICANT CHANGE UP (ref 96–108)
CHLORIDE SERPL-SCNC: 100 MMOL/L — SIGNIFICANT CHANGE UP (ref 96–108)
CHLORIDE SERPL-SCNC: 97 MMOL/L — SIGNIFICANT CHANGE UP (ref 96–108)
CHLORIDE SERPL-SCNC: 97 MMOL/L — SIGNIFICANT CHANGE UP (ref 96–108)
CK MB CFR SERPL CALC: 2.4 NG/ML — SIGNIFICANT CHANGE UP (ref 0–6.7)
CK MB CFR SERPL CALC: 2.4 NG/ML — SIGNIFICANT CHANGE UP (ref 0–6.7)
CK SERPL-CCNC: 85 U/L — SIGNIFICANT CHANGE UP (ref 30–200)
CK SERPL-CCNC: 85 U/L — SIGNIFICANT CHANGE UP (ref 30–200)
CO2 SERPL-SCNC: 25 MMOL/L — SIGNIFICANT CHANGE UP (ref 22–31)
CO2 SERPL-SCNC: 25 MMOL/L — SIGNIFICANT CHANGE UP (ref 22–31)
CO2 SERPL-SCNC: 32 MMOL/L — HIGH (ref 22–31)
CO2 SERPL-SCNC: 32 MMOL/L — HIGH (ref 22–31)
CREAT SERPL-MCNC: 0.67 MG/DL — SIGNIFICANT CHANGE UP (ref 0.5–1.3)
CREAT SERPL-MCNC: 0.67 MG/DL — SIGNIFICANT CHANGE UP (ref 0.5–1.3)
CREAT SERPL-MCNC: 0.78 MG/DL — SIGNIFICANT CHANGE UP (ref 0.5–1.3)
CREAT SERPL-MCNC: 0.78 MG/DL — SIGNIFICANT CHANGE UP (ref 0.5–1.3)
EGFR: 101 ML/MIN/1.73M2 — SIGNIFICANT CHANGE UP
EGFR: 101 ML/MIN/1.73M2 — SIGNIFICANT CHANGE UP
EGFR: 106 ML/MIN/1.73M2 — SIGNIFICANT CHANGE UP
EGFR: 106 ML/MIN/1.73M2 — SIGNIFICANT CHANGE UP
GLUCOSE SERPL-MCNC: 117 MG/DL — HIGH (ref 70–99)
GLUCOSE SERPL-MCNC: 117 MG/DL — HIGH (ref 70–99)
GLUCOSE SERPL-MCNC: 87 MG/DL — SIGNIFICANT CHANGE UP (ref 70–99)
GLUCOSE SERPL-MCNC: 87 MG/DL — SIGNIFICANT CHANGE UP (ref 70–99)
HCT VFR BLD CALC: 23.2 % — LOW (ref 39–50)
HCT VFR BLD CALC: 23.2 % — LOW (ref 39–50)
HGB BLD-MCNC: 8 G/DL — LOW (ref 13–17)
HGB BLD-MCNC: 8 G/DL — LOW (ref 13–17)
INR BLD: 1.33 RATIO — HIGH (ref 0.85–1.18)
INR BLD: 1.33 RATIO — HIGH (ref 0.85–1.18)
MAGNESIUM SERPL-MCNC: 1.9 MG/DL — SIGNIFICANT CHANGE UP (ref 1.6–2.6)
MAGNESIUM SERPL-MCNC: 1.9 MG/DL — SIGNIFICANT CHANGE UP (ref 1.6–2.6)
MAGNESIUM SERPL-MCNC: 2.2 MG/DL — SIGNIFICANT CHANGE UP (ref 1.6–2.6)
MAGNESIUM SERPL-MCNC: 2.2 MG/DL — SIGNIFICANT CHANGE UP (ref 1.6–2.6)
MCHC RBC-ENTMCNC: 31.1 PG — SIGNIFICANT CHANGE UP (ref 27–34)
MCHC RBC-ENTMCNC: 31.1 PG — SIGNIFICANT CHANGE UP (ref 27–34)
MCHC RBC-ENTMCNC: 34.5 GM/DL — SIGNIFICANT CHANGE UP (ref 32–36)
MCHC RBC-ENTMCNC: 34.5 GM/DL — SIGNIFICANT CHANGE UP (ref 32–36)
MCV RBC AUTO: 90.3 FL — SIGNIFICANT CHANGE UP (ref 80–100)
MCV RBC AUTO: 90.3 FL — SIGNIFICANT CHANGE UP (ref 80–100)
NRBC # BLD: 0 /100 WBCS — SIGNIFICANT CHANGE UP (ref 0–0)
NRBC # BLD: 0 /100 WBCS — SIGNIFICANT CHANGE UP (ref 0–0)
PHOSPHATE SERPL-MCNC: 1.6 MG/DL — LOW (ref 2.5–4.5)
PHOSPHATE SERPL-MCNC: 1.6 MG/DL — LOW (ref 2.5–4.5)
PHOSPHATE SERPL-MCNC: 3.1 MG/DL — SIGNIFICANT CHANGE UP (ref 2.5–4.5)
PHOSPHATE SERPL-MCNC: 3.1 MG/DL — SIGNIFICANT CHANGE UP (ref 2.5–4.5)
PLATELET # BLD AUTO: 45 K/UL — LOW (ref 150–400)
PLATELET # BLD AUTO: 45 K/UL — LOW (ref 150–400)
POTASSIUM SERPL-MCNC: 3.9 MMOL/L — SIGNIFICANT CHANGE UP (ref 3.5–5.3)
POTASSIUM SERPL-MCNC: 3.9 MMOL/L — SIGNIFICANT CHANGE UP (ref 3.5–5.3)
POTASSIUM SERPL-MCNC: 4.8 MMOL/L — SIGNIFICANT CHANGE UP (ref 3.5–5.3)
POTASSIUM SERPL-MCNC: 4.8 MMOL/L — SIGNIFICANT CHANGE UP (ref 3.5–5.3)
POTASSIUM SERPL-SCNC: 3.9 MMOL/L — SIGNIFICANT CHANGE UP (ref 3.5–5.3)
POTASSIUM SERPL-SCNC: 3.9 MMOL/L — SIGNIFICANT CHANGE UP (ref 3.5–5.3)
POTASSIUM SERPL-SCNC: 4.8 MMOL/L — SIGNIFICANT CHANGE UP (ref 3.5–5.3)
POTASSIUM SERPL-SCNC: 4.8 MMOL/L — SIGNIFICANT CHANGE UP (ref 3.5–5.3)
PROT SERPL-MCNC: 6.5 G/DL — SIGNIFICANT CHANGE UP (ref 6–8.3)
PROTHROM AB SERPL-ACNC: 14.5 SEC — HIGH (ref 9.5–13)
PROTHROM AB SERPL-ACNC: 14.5 SEC — HIGH (ref 9.5–13)
RBC # BLD: 2.57 M/UL — LOW (ref 4.2–5.8)
RBC # BLD: 2.57 M/UL — LOW (ref 4.2–5.8)
RBC # FLD: 13.4 % — SIGNIFICANT CHANGE UP (ref 10.3–14.5)
RBC # FLD: 13.4 % — SIGNIFICANT CHANGE UP (ref 10.3–14.5)
SODIUM SERPL-SCNC: 132 MMOL/L — LOW (ref 135–145)
SODIUM SERPL-SCNC: 132 MMOL/L — LOW (ref 135–145)
SODIUM SERPL-SCNC: 139 MMOL/L — SIGNIFICANT CHANGE UP (ref 135–145)
SODIUM SERPL-SCNC: 139 MMOL/L — SIGNIFICANT CHANGE UP (ref 135–145)
TROPONIN T, HIGH SENSITIVITY RESULT: 12 NG/L — SIGNIFICANT CHANGE UP (ref 0–51)
TROPONIN T, HIGH SENSITIVITY RESULT: 12 NG/L — SIGNIFICANT CHANGE UP (ref 0–51)
WBC # BLD: 3.84 K/UL — SIGNIFICANT CHANGE UP (ref 3.8–10.5)
WBC # BLD: 3.84 K/UL — SIGNIFICANT CHANGE UP (ref 3.8–10.5)
WBC # FLD AUTO: 3.84 K/UL — SIGNIFICANT CHANGE UP (ref 3.8–10.5)
WBC # FLD AUTO: 3.84 K/UL — SIGNIFICANT CHANGE UP (ref 3.8–10.5)

## 2023-11-30 PROCEDURE — 93010 ELECTROCARDIOGRAM REPORT: CPT

## 2023-11-30 PROCEDURE — 99233 SBSQ HOSP IP/OBS HIGH 50: CPT

## 2023-11-30 RX ORDER — PHENOBARBITAL 60 MG
130 TABLET ORAL
Refills: 0 | Status: DISCONTINUED | OUTPATIENT
Start: 2023-11-30 | End: 2023-11-30

## 2023-11-30 RX ORDER — HYDROMORPHONE HYDROCHLORIDE 2 MG/ML
0.5 INJECTION INTRAMUSCULAR; INTRAVENOUS; SUBCUTANEOUS ONCE
Refills: 0 | Status: DISCONTINUED | OUTPATIENT
Start: 2023-11-30 | End: 2023-11-30

## 2023-11-30 RX ORDER — ACETAMINOPHEN 500 MG
975 TABLET ORAL EVERY 6 HOURS
Refills: 0 | Status: DISCONTINUED | OUTPATIENT
Start: 2023-11-30 | End: 2023-12-19

## 2023-11-30 RX ORDER — SODIUM,POTASSIUM PHOSPHATES 278-250MG
2 POWDER IN PACKET (EA) ORAL ONCE
Refills: 0 | Status: COMPLETED | OUTPATIENT
Start: 2023-11-30 | End: 2023-11-30

## 2023-11-30 RX ORDER — PHENOBARBITAL 60 MG
130 TABLET ORAL ONCE
Refills: 0 | Status: DISCONTINUED | OUTPATIENT
Start: 2023-11-30 | End: 2023-11-30

## 2023-11-30 RX ORDER — PHENOBARBITAL 60 MG
130 TABLET ORAL
Refills: 0 | Status: DISCONTINUED | OUTPATIENT
Start: 2023-11-30 | End: 2023-12-01

## 2023-11-30 RX ORDER — ENOXAPARIN SODIUM 100 MG/ML
40 INJECTION SUBCUTANEOUS EVERY 24 HOURS
Refills: 0 | Status: DISCONTINUED | OUTPATIENT
Start: 2023-11-30 | End: 2023-12-03

## 2023-11-30 RX ORDER — PHENOBARBITAL 60 MG
97.2 TABLET ORAL EVERY 12 HOURS
Refills: 0 | Status: DISCONTINUED | OUTPATIENT
Start: 2023-12-01 | End: 2023-12-01

## 2023-11-30 RX ORDER — POTASSIUM PHOSPHATE, MONOBASIC POTASSIUM PHOSPHATE, DIBASIC 236; 224 MG/ML; MG/ML
15 INJECTION, SOLUTION INTRAVENOUS ONCE
Refills: 0 | Status: COMPLETED | OUTPATIENT
Start: 2023-11-30 | End: 2023-11-30

## 2023-11-30 RX ADMIN — LEVETIRACETAM 500 MILLIGRAM(S): 250 TABLET, FILM COATED ORAL at 05:21

## 2023-11-30 RX ADMIN — Medication 2 PACKET(S): at 05:20

## 2023-11-30 RX ADMIN — OXYCODONE HYDROCHLORIDE 10 MILLIGRAM(S): 5 TABLET ORAL at 04:37

## 2023-11-30 RX ADMIN — HYDROMORPHONE HYDROCHLORIDE 0.5 MILLIGRAM(S): 2 INJECTION INTRAMUSCULAR; INTRAVENOUS; SUBCUTANEOUS at 05:26

## 2023-11-30 RX ADMIN — HYDROMORPHONE HYDROCHLORIDE 0.5 MILLIGRAM(S): 2 INJECTION INTRAMUSCULAR; INTRAVENOUS; SUBCUTANEOUS at 05:56

## 2023-11-30 RX ADMIN — Medication 500 MILLIGRAM(S): at 05:51

## 2023-11-30 RX ADMIN — Medication 1 APPLICATION(S): at 22:00

## 2023-11-30 RX ADMIN — OXYCODONE HYDROCHLORIDE 5 MILLIGRAM(S): 5 TABLET ORAL at 22:13

## 2023-11-30 RX ADMIN — POTASSIUM PHOSPHATE, MONOBASIC POTASSIUM PHOSPHATE, DIBASIC 62.5 MILLIMOLE(S): 236; 224 INJECTION, SOLUTION INTRAVENOUS at 01:14

## 2023-11-30 RX ADMIN — OXYCODONE HYDROCHLORIDE 5 MILLIGRAM(S): 5 TABLET ORAL at 07:49

## 2023-11-30 RX ADMIN — Medication 100 MILLIGRAM(S): at 11:22

## 2023-11-30 RX ADMIN — OXYCODONE HYDROCHLORIDE 10 MILLIGRAM(S): 5 TABLET ORAL at 03:37

## 2023-11-30 RX ADMIN — OXYCODONE HYDROCHLORIDE 10 MILLIGRAM(S): 5 TABLET ORAL at 11:42

## 2023-11-30 RX ADMIN — Medication 130 MILLIGRAM(S): at 10:24

## 2023-11-30 RX ADMIN — LOSARTAN POTASSIUM 50 MILLIGRAM(S): 100 TABLET, FILM COATED ORAL at 05:20

## 2023-11-30 RX ADMIN — Medication 1 MILLIGRAM(S): at 11:23

## 2023-11-30 RX ADMIN — Medication 975 MILLIGRAM(S): at 18:27

## 2023-11-30 RX ADMIN — CHLORHEXIDINE GLUCONATE 1 APPLICATION(S): 213 SOLUTION TOPICAL at 05:20

## 2023-11-30 RX ADMIN — Medication 1 TABLET(S): at 11:23

## 2023-11-30 RX ADMIN — Medication 975 MILLIGRAM(S): at 18:57

## 2023-11-30 RX ADMIN — HYDROMORPHONE HYDROCHLORIDE 0.5 MILLIGRAM(S): 2 INJECTION INTRAMUSCULAR; INTRAVENOUS; SUBCUTANEOUS at 00:17

## 2023-11-30 RX ADMIN — OXYCODONE HYDROCHLORIDE 5 MILLIGRAM(S): 5 TABLET ORAL at 07:19

## 2023-11-30 RX ADMIN — Medication 200 MILLIGRAM(S): at 05:21

## 2023-11-30 RX ADMIN — SENNA PLUS 2 TABLET(S): 8.6 TABLET ORAL at 22:00

## 2023-11-30 RX ADMIN — ENOXAPARIN SODIUM 40 MILLIGRAM(S): 100 INJECTION SUBCUTANEOUS at 22:00

## 2023-11-30 RX ADMIN — OXYCODONE HYDROCHLORIDE 10 MILLIGRAM(S): 5 TABLET ORAL at 12:12

## 2023-11-30 RX ADMIN — ESCITALOPRAM OXALATE 20 MILLIGRAM(S): 10 TABLET, FILM COATED ORAL at 11:23

## 2023-11-30 RX ADMIN — Medication 1 APPLICATION(S): at 13:25

## 2023-11-30 RX ADMIN — Medication 255 MILLIMOLE(S): at 03:27

## 2023-11-30 RX ADMIN — Medication 255 MILLIMOLE(S): at 01:12

## 2023-11-30 RX ADMIN — POLYETHYLENE GLYCOL 3350 17 GRAM(S): 17 POWDER, FOR SOLUTION ORAL at 11:22

## 2023-11-30 RX ADMIN — Medication 500 MILLIGRAM(S): at 11:58

## 2023-11-30 RX ADMIN — HYDROMORPHONE HYDROCHLORIDE 0.5 MILLIGRAM(S): 2 INJECTION INTRAMUSCULAR; INTRAVENOUS; SUBCUTANEOUS at 00:47

## 2023-11-30 RX ADMIN — Medication 200 MILLIGRAM(S): at 11:23

## 2023-11-30 RX ADMIN — Medication 1 APPLICATION(S): at 05:21

## 2023-11-30 RX ADMIN — LEVETIRACETAM 500 MILLIGRAM(S): 250 TABLET, FILM COATED ORAL at 17:13

## 2023-11-30 NOTE — PROGRESS NOTE ADULT - SUBJECTIVE AND OBJECTIVE BOX
24 HOUR EVENTS:  - PRN Ativan with beers TID for withdrawal concern  - Ordered repeat head CT for worsening headaches with nausea - CT shows stable L frontal SDH and periorbital hematoma  - CLD  - hgb 8.9 to 8.1  - erythromycin ointment TID per sangitatho recs  - keppra for sz ppx    SUBJECTIVE/ROS:  [X] A ten-point review of systems was otherwise negative except as noted.  [ ] Due to altered mental status/intubation, subjective information were not able to be obtained from the patient. History was obtained, to the extent possible, from review of the chart and collateral sources of information.      NEURO  Exam: AOx4, following commands, but somnolent  Meds: acetaminophen   IVPB .. 500 milliGRAM(s) IV Intermittent every 6 hours  escitalopram 20 milliGRAM(s) Oral daily  levETIRAcetam 500 milliGRAM(s) Oral two times a day  LORazepam   Injectable 2 milliGRAM(s) IV Push every 1 hour PRN Symptom-triggered: each CIWA -Ar score 8 or GREATER  oxyCODONE    IR 10 milliGRAM(s) Oral every 6 hours PRN Severe Pain (7 - 10)  oxyCODONE    IR 5 milliGRAM(s) Oral every 4 hours PRN Moderate Pain (4 - 6)    [x] Adequacy of sedation and pain control has been assessed and adjusted      RESPIRATORY  RR: 15 (11-30-23 @ 03:00) (10 - 21)  SpO2: 96% (11-30-23 @ 03:00) (92% - 100%)  Wt(kg): --  Exam: CTA b/l. No murmurs, rubs, gallops appreciated.   Mechanical Ventilation:   ABG - ( 29 Nov 2023 04:40 )  pH: x     /  pCO2: x     /  pO2: x     / HCO3: x     / Base Excess: x     /  SaO2: x       Lactate: 3.5              [ ] Extubation Readiness Assessed  Meds:       CARDIOVASCULAR  HR: 75 (11-30-23 @ 03:00) (69 - 91)  BP: 127/74 (11-30-23 @ 03:00) (106/57 - 145/65)  BP(mean): 95 (11-30-23 @ 03:00) (77 - 101)  ABP: --  ABP(mean): --  Wt(kg): --  CVP(cm H2O): --  VBG - ( 29 Nov 2023 09:00 )  pH: 7.35  /  pCO2: 53    /  pO2: 34    / HCO3: 29    / Base Excess: 3.0   /  SaO2: 45.1   Lactate: 3.2              Lactate, Blood: 3.5 mmol/L (11-29 @ 04:40)    Exam: S1S2. No murmurs, rubs, gallops appreciated.  Cardiac Rhythm: NSR  Meds: losartan 50 milliGRAM(s) Oral daily        GI/NUTRITION  Exam: Soft, non-distended, non-tender.   Diet: CLD  Meds: polyethylene glycol 3350 17 Gram(s) Oral daily  senna 2 Tablet(s) Oral at bedtime      GENITOURINARY  I&O's Detail    11-29 @ 07:01  -  11-30 @ 03:47  --------------------------------------------------------  IN:    IV PiggyBack: 600 mL    IV PiggyBack: 387.5 mL    Oral Fluid: 800 mL  Total IN: 1787.5 mL    OUT:    Voided (mL): 750 mL  Total OUT: 750 mL    Total NET: 1037.5 mL        Weight (kg): 87.1 (11-29 @ 08:45)  11-30    139  |  100  |  14  ----------------------------<  117<H>  3.9   |  32<H>  |  0.78    Ca    8.1<L>      30 Nov 2023 00:09  Phos  1.6     11-30  Mg     2.2     11-30    TPro  6.5  /  Alb  3.1<L>  /  TBili  2.2<H>  /  DBili  x   /  AST  39  /  ALT  17  /  AlkPhos  91  11-30    [ ] Dominguez catheter, indication: N/A  Meds: folic acid 1 milliGRAM(s) Oral daily  multivitamin 1 Tablet(s) Oral daily  potassium phosphate / sodium phosphate Powder (PHOS-NaK) 2 Packet(s) Oral once  thiamine 100 milliGRAM(s) Oral daily        HEMATOLOGIC  Meds:   [x] VTE Prophylaxis                        8.0    3.84  )-----------( 45       ( 30 Nov 2023 00:09 )             23.2     PT/INR - ( 30 Nov 2023 00:09 )   PT: 14.5 sec;   INR: 1.33 ratio         PTT - ( 30 Nov 2023 00:09 )  PTT:35.8 sec  Transfusion     [ ] PRBC   [ ] Platelets   [ ] FFP   [ ] Cryoprecipitate      INFECTIOUS DISEASES  T(C): 36.6 (11-30-23 @ 03:00), Max: 36.8 (11-29-23 @ 07:15)  Wt(kg): --  WBC Count: 3.84 K/uL (11-30 @ 00:09)  WBC Count: 6.36 K/uL (11-29 @ 14:24)  WBC Count: 6.98 K/uL (11-29 @ 09:23)  WBC Count: 7.13 K/uL (11-29 @ 06:39)    Recent Cultures:    Meds: influenza   Vaccine 0.5 milliLiter(s) IntraMuscular once        ENDOCRINE  Capillary Blood Glucose    Meds:       ACCESS DEVICES:  [ ] Peripheral IV  [ ] Central Venous Line	[ ] R	[ ] L	[ ] IJ	[ ] Fem	[ ] SC	Placed:   [ ] Arterial Line		[ ] R	[ ] L	[ ] Fem	[ ] Rad	[ ] Ax	Placed:   [ ] PICC:					[ ] Mediport  [ ] Urinary Catheter, Date Placed:   [ ] Necessity of urinary, arterial, and venous catheters discussed    OTHER MEDICATIONS:  chlorhexidine 2% Cloths 1 Application(s) Topical <User Schedule>  erythromycin   Ointment 1 Application(s) Left EYE three times a day

## 2023-11-30 NOTE — PROGRESS NOTE ADULT - ATTENDING COMMENTS
seen and examined 11-30-23 @ 0858    2016 @ [facility] [place] - Dani MARIN  left eye is much less swollen and opens spontaneously  left periorbital ecchymosis  left lateral supraorbital skin avulsion with nylon sutures intact and no evidence of wound infection, there is a small area of skin dehiscence at the lateral aspect  lower lip ecchymosis  midline laparotomy scar      left parafalcine SDH  -stable on 24 hour repeat CT head (11/29 2046)  -start chemical VTE prophylaxis this evening    alcohol abuse  -social work evaluation    elevated INR and thrombocytopenia are concerning for cirrhosis in the setting of alcohol abuse  -f/u with hepatology as an outpatient      I reviewed his labs and radiologic images.  care coordinated with SICU team.

## 2023-11-30 NOTE — OCCUPATIONAL THERAPY INITIAL EVALUATION ADULT - PERTINENT HX OF CURRENT PROBLEM, REHAB EVAL
Patient is a 63y year old male with PMHx of alcohol abuse (drinks 1p vodka/day), pancreatic CA s/p whipple procedure in 2015, depression and HTN who presnts after a ground-level fall, he reports he hit his head on a bedside table and had + HS and + LOC. CT head stable. Noncontrast CT Head 11/29: Stable since left parafalcine subdural hemorrhage. No new or increased intracranial hemorrhage. No significant mass effect. CT maxillofacial 11/29: No acute maxillofacial bone fracture. Left periorbital   and facial soft tissue swelling. CT cervical spine 11/29: No acute cervical spine fracture or evidence of traumatic malalignment. CTA Neck: No significant flow-limiting stenosis or evidence of acute diverticular within the cervical carotid or vertebral arteries. Redemonstration of asymmetric right sternocleidomastoid muscle enlargement and surrounding inflammation. CTA Head 11/29: No proximal large vessel occlusion or significant stenosis.

## 2023-11-30 NOTE — OCCUPATIONAL THERAPY INITIAL EVALUATION ADULT - LIVES WITH, PROFILE
as per chart and confirmed with pt, Pt lives in a private home with his brother, 12 steps to enter and one flight inside to bedroom. Pt was independent with all functional mobility and ADLs prior to admission without AD. states owns a rolling walker and uses it on occasion./other relative

## 2023-11-30 NOTE — OCCUPATIONAL THERAPY INITIAL EVALUATION ADULT - DIAGNOSIS, OT EVAL
pt presents with decreased strength, endurance, postural control, cognition, and balance limiting their ability to engage in ADLs and functional tasks.

## 2023-11-30 NOTE — OCCUPATIONAL THERAPY INITIAL EVALUATION ADULT - NSOTDISCHREC_GEN_A_CORE
if dc home pt requires assist for all functional mobility & home OT for safety and to increase independence in ADLs and functional tasks./Acute Inpatient Rehab

## 2023-11-30 NOTE — PROGRESS NOTE ADULT - ASSESSMENT
Patient is a 63y year old male with PMHx of alcohol abuse (drinks 1p vodka/day), pancreatic CA s/p whipple procedure in 2015, depression and HTN who presnts after a ground-level fall, had + HS and + LOC. CTH shows small Left anterior parafalcine SDH. Exam: AOx2-3 (required choices for month), Right EOMI, Left eye swollen shut, pupils dilated by Ophtho, has Left facial from Left periorbital edema, no drift, SUTTON 5/5.      Neuro  - Patient with small, left parafalcine subdural hematoma, stable on repeat imaging  - Patient with previous history of withdrawal seizures at [facility] in September  - Per patients brother he has been drinking again the last several days. Patient also has history of substance abuse and med seeking behavior  - Started on CIWA w/ PRN Ativan and beer  - Patient with worsening headaches, ordered repeat head CT  - Keppra 500 BID for sz ppx    Respiratory:   - Patient without history of respiratory disease, no active issues  - 1.7 cm nodular airspace opacity noted in RUL on CT chest; Outpatient follow up    Cardiovascular:   - Patient with history of hypertension  - Hemodynamically stable off pressors    Gastrointestinal/Nutrition:   - Patient with history of Pancreatic Ca s/p Whipple procedure  - Patient with nausea and vomiting  - Clear iquid diet; Advance as tolerated    Renal/Genitourinary:   - No active issues, creatinine at baseline, no need for laura    Hematologic:   - Patient with elevated INR after head strike, received 1500 units of KCENTRA, INR now 1.26  - Hemoglobin initially downtrended, now stable  - Follow up PM CBC  - SCDs for DVT PPx    Infectious Disease:   - No active issues    Endocrine:   - No history of DM, blood glucose well controlled; No need to check fingersticks    Disposition:   - Patient can be sent to the floor today, no need for further ICU care

## 2023-11-30 NOTE — CHART NOTE - NSCHARTNOTEFT_GEN_A_CORE
TERTIARY TRAUMA SURVEY  ------------------------------------------------------------------------------------------    Date/Time: 23 @ 10:11    Trauma Surgery Admission    CC: Patient is a 63y old  Male who presents with a chief complaint of headache after fall      Patient is a 63y year old male with PMHx of alcohol abuse (drinks 1p vodka/day), pancreatic CA s/p whipple procedure in , depression and HTN who presnts after a ground-level fall, he reports he hit his head on a bedside table and had + HS and + LOC.         Primary Survey  A - airway intact  B - bilateral breath sounds and good chest rise  C - initially BP: 135/78 (23 @ 07:15), palpable pulses in all extremities  D - GCS 15 on arrival  Exposure obtained      Secondary survey  Gen: NAD  HEENT: L periorbital ecchymosis/edema, R neck soft tissue edema  CV: s1, s2, RRR  Pulm: CTA B/L  Chest: No TTP  Abd: Soft, ND, NT, no rebound, no guarding  Groin: Normal appearing  Ext: Palp radial b/l, palp DP b/l  Back: no TTP, no palpable runoff, stepoff, or deformity    PMH  No pertinent past medical history    Hypertension    Anxiety    Pancreatic cancer    Alcohol abuse      PSH  History of pancreatic surgery    H/O ventral hernia repair      MEDS    Allergies    No Known Allergies    Intolerances        Social    Labs:                        8.8    7.13  )-----------( 62       ( 2023 06:39 )             26.0         142  |  104  |  12  ----------------------------<  132<H>  3.9   |  24  |  0.67    Ca    8.5      2023 02:37    TPro  6.8  /  Alb  3.3  /  TBili  1.4<H>  /  DBili  x   /  AST  45<H>  /  ALT  17  /  AlkPhos  101  11-29    PT/INR - ( 2023 06:39 )   PT: 15.8 sec;   INR: 1.52 ratio         PTT - ( 2023 02:37 )  PTT:35.2 sec  Urinalysis Basic - ( 2023 04:40 )    Color: Yellow / Appearance: Clear / S.026 / pH: x  Gluc: x / Ketone: Trace mg/dL  / Bili: Negative / Urobili: 0.2 mg/dL   Blood: x / Protein: Negative mg/dL / Nitrite: Negative   Leuk Esterase: Negative / RBC: x / WBC x   Sq Epi: x / Non Sq Epi: x / Bacteria: x            Imaging  < from: CT Head No Cont (23 @ 04:20) >  IMPRESSION:    Noncontrast CT Head: Stable since left parafalcine subdural hemorrhage.   No new or increased intracranial hemorrhage. No significant mass effect.    CT maxillofacial: No acute maxillofacial bone fracture. Left periorbital   and facial soft tissue swelling.    CT cervical spine: No acute cervical spine fracture or evidence of   traumatic malalignment.    CTA Neck: No significant flow-limiting stenosis or evidence of acute   diverticular withinthe cervical carotid or vertebral arteries.    Redemonstration of asymmetric right sternocleidomastoid muscle   enlargement and surrounding inflammation.    CTA Head: No proximal large vessel occlusion or significant stenosis.    --- End of Report ---    < end of copied text >  < from: CT Chest w/ IV Cont (23 @ 04:28) >  IMPRESSION:  Asymmetric enlargement and surrounding inflammation involving the right   sternocleidomastoid muscle as well as infiltration of the subcutaneous   fat overlying the right upper chest wall, likely representing the   sequelae of recent trauma.    No evidence of additional acute intrathoracic pathology.    Nonspecific 1.7 cm nodular airspace opacity within the right upper lobe.   Consider short-term follow-up to demonstrate resolution.    No CT evidence of acute traumatic sequelae within the abdomen or pelvis.    No acute thoracic spine fracture or evidence of traumatic malalignment.        --- End of Report ---    < end of copied text >     (2023 07:32)      INTERVAL EVENTS: ***    PAST MEDICAL & SURGICAL HISTORY:  Hypertension      Anxiety      Pancreatic cancer      Alcohol abuse      History of pancreatic surgery        H/O ventral hernia repair        [] No significant past history as reviewed with the patient and family    FAMILY HISTORY:    [] Family history not pertinent as reviewed with the patient and family    SOCIAL HISTORY: ***    ALLERGIES: No Known Allergies      HOME MEDICATIONS: ***    CURRENT MEDICATIONS:   MEDICATIONS (STANDING): acetaminophen   IVPB .. 500 milliGRAM(s) IV Intermittent every 6 hours  escitalopram 20 milliGRAM(s) Oral daily  folic acid 1 milliGRAM(s) Oral daily  influenza   Vaccine 0.5 milliLiter(s) IntraMuscular once  levETIRAcetam 500 milliGRAM(s) Oral two times a day  losartan 50 milliGRAM(s) Oral daily  multivitamin 1 Tablet(s) Oral daily  PHENobarbital IVPB 130 milliGRAM(s) IV Intermittent once  polyethylene glycol 3350 17 Gram(s) Oral daily  senna 2 Tablet(s) Oral at bedtime  thiamine 100 milliGRAM(s) Oral daily    MEDICATIONS (PRN):LORazepam   Injectable 2 milliGRAM(s) IV Push every 1 hour PRN Symptom-triggered: each CIWA -Ar score 8 or GREATER  oxyCODONE    IR 5 milliGRAM(s) Oral every 4 hours PRN Moderate Pain (4 - 6)  oxyCODONE    IR 10 milliGRAM(s) Oral every 6 hours PRN Severe Pain (7 - 10)  PHENobarbital IVPB 130 milliGRAM(s) IV Intermittent every 30 minutes PRN alcohol withdrawal    ------------------------------------------------------------------------------------------    VITAL SIGNS  T(C): 36.8 (23 @ 07:00), Max: 36.8 (23 @ 11:00)  HR: 72 (23 @ 09:00) (68 - 91)  BP: 125/59 (23 @ 09:00) (106/57 - 145/65)  RR: 10 (23 @ 09:00) (10 - 21)  SpO2: 95% (23 @ 09:00) (92% - 100%)  CAPILLARY BLOOD GLUCOSE          INS/OUTS:     @ 07:01  -   @ 07:00  --------------------------------------------------------  IN:    IV PiggyBack: 450 mL    IV PiggyBack: 650 mL    Oral Fluid: 900 mL  Total IN: 2000 mL    OUT:    Incontinent per Condom Catheter (mL): 210 mL    Voided (mL): 750 mL  Total OUT: 960 mL    Total NET: 1040 mL       @ 07:01  -   @ 10:11  --------------------------------------------------------  IN:  Total IN: 0 mL    OUT:    Incontinent per Condom Catheter (mL): 200 mL  Total OUT: 200 mL    Total NET: -200 mL        Drug Dosing Weight  Height (cm): 180.3 (2023 08:45)  Weight (kg): 87.1 (2023 08:45)  BMI (kg/m2): 26.8 (2023 08:45)  BSA (m2): 2.07 (2023 08:45)    PHYSICAL EXAM:    General: NAD, Sitting in bed comfortably  HEENT:L periorbital ecchymosis/edema, R neck soft tissue edema,   Neck: Soft, supple  Cardio: RRR, nml  Resp: Good effort, CTA b/l  Thorax: No chest wall tenderness  Skin: Intact, no breakdown  Lymphatic/Nodes: No palpable lymphadenopathy  Musculoskeletal: All 4 extremities moving spontaneously, no limitations  ------------------------------------------------------------------------------------------    LABS  CBC ( @ 00:09)                              8.0<L>                         3.84    )----------------(  45<L>      --    % Neutrophils, --    % Lymphocytes, ANC: --                                  23.2<L>  CBC ( @ 14:24)                              8.1<L>                         6.36    )----------------(  58<L>      --    % Neutrophils, --    % Lymphocytes, ANC: --                                  23.7<L>    BMP ( @ 00:09)             139     |  100     |  14    		Ca++ --      Ca 8.1<L>             ---------------------------------( 117<H>		Mg 2.2                3.9     |  32<H>   |  0.78  			Ph 1.6<L>  BMP ( @ 09:23)             140     |  102     |  11    		Ca++ --      Ca 8.3<L>             ---------------------------------( 135<H>		Mg 1.3<L>             4.3     |  27      |  0.66  			Ph 3.7       LFTs ( @ 00:09)      TPro 6.5 / Alb 3.1<L> / TBili 2.2<H> / DBili -- / AST 39 / ALT 17 / AlkPhos 91  LFTs ( @ 09:23)      TPro 7.0 / Alb 3.7 / TBili 1.6<H> / DBili -- / AST 43<H> / ALT 17 / AlkPhos 103    Coags ( @ 00:09)  aPTT 35.8<H> / INR 1.33<H> / PT 14.5<H>  Coags ( @ 14:24)  aPTT 35.4 / INR 1.24<H> / PT 13.6<H>      ABG ( @ 04:40)      /  /  /  /  / %     Lactate:  3.5<H>    VBG ( @ 09:00)     7.35 / 53 / 34 / 29 / 3.0 / 45.1<L>%     Lactate: 3.2<H>  VBG ( @ 02:35)     7.37 / 46 / 53<H> / 27 / 0.8 / 77.6%     Lactate: --      MICROBIOLOGY  Urinalysis ( @ 00:09):     Color:  / Appearance:  / SG:  / pH:  / Gluc: 117<H> / Ketones:  / Bili:  / Urobili:  / Protein : / Nitrites:  / Leuk.Est:  / RBC:  / WBC:  / Sq Epi:  / Non Sq Epi:  / Bacteria          ------------------------------------------------------------------------------------------    RADIOLOGICAL FINDINGS REVIEW:  ***  CXR:   Pelvis Films:   C-Spine Films:   T/L/S Spine Films:   Extremity Films:   Head CT:   C-Spine CT:   Neck CT:   Chest CT:   ABD/Pelvis CT:   Other:     List Injuries Identified to Date:   Head injury    Traumatic subdural hematoma (SDH)        List Operative and Interventional Radiological Procedures:     INTERPRETATION:     No new injuries identified during tertiary physical exam. Incidental findings were discussed with patient at bedside.

## 2023-11-30 NOTE — OCCUPATIONAL THERAPY INITIAL EVALUATION ADULT - PLANNED THERAPY INTERVENTIONS, OT EVAL
----- Message from Mayi Vega MD sent at 9/14/2019  7:21 AM CDT -----  She needs eliquis 5 mg po bid . She needs to stop plavix when she starts eliquis   Make sure she has her holter scheduled before her f/u in 2 weeks  
ADL retraining/balance training/bed mobility training/strengthening/transfer training

## 2023-11-30 NOTE — PROGRESS NOTE ADULT - ASSESSMENT
ASSESSMENT:  63yM w/ PMHx of alcohol abuse and anx/depression who presents s/p fall w +HS onto bedside table with LOC.  Trauma assessment in ED: ABCs intact , GCS 15 , AAOx3 , with physical exam findings, imaging, and labs as documented above, injuries are identified:   - L parafalcine SDH w/o mass effect  - L periorbital STS  - Inflammation of SCM    PLAN:    - Multimodal pain control with tylenol PO q8h ATC, tramadol q6h PRN for moderate (25mg), and severe pain (50mg)  - Tertiary exam performed  - Trend H/H  - F/u labs   - No face dressing, leave open to air  - follow up PT evaluation

## 2023-11-30 NOTE — PROGRESS NOTE ADULT - ATTENDING COMMENTS
Evaluated  in SICU. Clinical parameters,  labs, available imagine reviewed.  63y old  Male who presents with a chief complaint of headache after fall. Ground level fall, he hit his head and had loss of consciousness. pt was under influence of alcohol    Started withdrawing from alcohol, will start low Phenobarb protocol.   Repeat CTH has stable small parafalcine SDH, seizure ppx with Keppra  Periorbital hematoma with subconjunctival hemorrhage on medical management  Hemodynamically stable  On RA  On  PO  Received Kcentra for high INR, will give one dose Vit K. Coagulopathy more likely from liver dysfunction. Hb stabilized after initial drop.  On Thiamine FA  Start pharm DVT ppx

## 2023-11-30 NOTE — PROGRESS NOTE ADULT - SUBJECTIVE AND OBJECTIVE BOX
Surgery Progress Note    SUBJECTIVE: Pt seen and examined at bedside. Patient comfortable and in no-apparent distress.  Pain is controlled.      Vital Signs Last 24 Hrs  T(C): 36.8 (30 Nov 2023 07:00), Max: 36.8 (29 Nov 2023 11:00)  T(F): 98.2 (30 Nov 2023 07:00), Max: 98.2 (29 Nov 2023 11:00)  HR: 70 (30 Nov 2023 08:00) (68 - 91)  BP: 128/61 (30 Nov 2023 08:00) (106/57 - 145/65)  BP(mean): 87 (30 Nov 2023 08:00) (77 - 100)  RR: 10 (30 Nov 2023 08:00) (10 - 21)  SpO2: 96% (30 Nov 2023 08:00) (92% - 100%)    Parameters below as of 30 Nov 2023 07:00  Patient On (Oxygen Delivery Method): room air        Physical Exam:  General Appearance: NAD  HEENT: left periorbital swelling, left eye can open more than yesterday, reduction in left face swelling   Respiratory: No labored breathing  CV: Pulse regularly present      LABS:                        8.0    3.84  )-----------( 45       ( 30 Nov 2023 00:09 )             23.2     11-30    139  |  100  |  14  ----------------------------<  117<H>  3.9   |  32<H>  |  0.78    Ca    8.1<L>      30 Nov 2023 00:09  Phos  1.6     11-30  Mg     2.2     11-30    TPro  6.5  /  Alb  3.1<L>  /  TBili  2.2<H>  /  DBili  x   /  AST  39  /  ALT  17  /  AlkPhos  91  11-30    PT/INR - ( 30 Nov 2023 00:09 )   PT: 14.5 sec;   INR: 1.33 ratio         PTT - ( 30 Nov 2023 00:09 )  PTT:35.8 sec  Urinalysis Basic - ( 30 Nov 2023 00:09 )    Color: x / Appearance: x / SG: x / pH: x  Gluc: 117 mg/dL / Ketone: x  / Bili: x / Urobili: x   Blood: x / Protein: x / Nitrite: x   Leuk Esterase: x / RBC: x / WBC x   Sq Epi: x / Non Sq Epi: x / Bacteria: x        INs and OUTs:    11-29-23 @ 07:01  -  11-30-23 @ 07:00  --------------------------------------------------------  IN: 2000 mL / OUT: 960 mL / NET: 1040 mL

## 2023-12-01 LAB
ALBUMIN SERPL ELPH-MCNC: 2.9 G/DL — LOW (ref 3.3–5)
ALBUMIN SERPL ELPH-MCNC: 2.9 G/DL — LOW (ref 3.3–5)
ALP SERPL-CCNC: 84 U/L — SIGNIFICANT CHANGE UP (ref 40–120)
ALP SERPL-CCNC: 84 U/L — SIGNIFICANT CHANGE UP (ref 40–120)
ALT FLD-CCNC: 13 U/L — SIGNIFICANT CHANGE UP (ref 10–45)
ALT FLD-CCNC: 13 U/L — SIGNIFICANT CHANGE UP (ref 10–45)
ANION GAP SERPL CALC-SCNC: 6 MMOL/L — SIGNIFICANT CHANGE UP (ref 5–17)
ANION GAP SERPL CALC-SCNC: 6 MMOL/L — SIGNIFICANT CHANGE UP (ref 5–17)
APTT BLD: 39.7 SEC — HIGH (ref 24.5–35.6)
APTT BLD: 39.7 SEC — HIGH (ref 24.5–35.6)
AST SERPL-CCNC: 29 U/L — SIGNIFICANT CHANGE UP (ref 10–40)
AST SERPL-CCNC: 29 U/L — SIGNIFICANT CHANGE UP (ref 10–40)
BILIRUB SERPL-MCNC: 2.1 MG/DL — HIGH (ref 0.2–1.2)
BILIRUB SERPL-MCNC: 2.1 MG/DL — HIGH (ref 0.2–1.2)
BUN SERPL-MCNC: 11 MG/DL — SIGNIFICANT CHANGE UP (ref 7–23)
BUN SERPL-MCNC: 11 MG/DL — SIGNIFICANT CHANGE UP (ref 7–23)
CALCIUM SERPL-MCNC: 7.8 MG/DL — LOW (ref 8.4–10.5)
CALCIUM SERPL-MCNC: 7.8 MG/DL — LOW (ref 8.4–10.5)
CHLORIDE SERPL-SCNC: 97 MMOL/L — SIGNIFICANT CHANGE UP (ref 96–108)
CHLORIDE SERPL-SCNC: 97 MMOL/L — SIGNIFICANT CHANGE UP (ref 96–108)
CO2 SERPL-SCNC: 32 MMOL/L — HIGH (ref 22–31)
CO2 SERPL-SCNC: 32 MMOL/L — HIGH (ref 22–31)
CREAT SERPL-MCNC: 0.73 MG/DL — SIGNIFICANT CHANGE UP (ref 0.5–1.3)
CREAT SERPL-MCNC: 0.73 MG/DL — SIGNIFICANT CHANGE UP (ref 0.5–1.3)
EGFR: 103 ML/MIN/1.73M2 — SIGNIFICANT CHANGE UP
EGFR: 103 ML/MIN/1.73M2 — SIGNIFICANT CHANGE UP
GLUCOSE SERPL-MCNC: 117 MG/DL — HIGH (ref 70–99)
GLUCOSE SERPL-MCNC: 117 MG/DL — HIGH (ref 70–99)
HCT VFR BLD CALC: 21.3 % — LOW (ref 39–50)
HCT VFR BLD CALC: 21.3 % — LOW (ref 39–50)
HCT VFR BLD CALC: 21.8 % — LOW (ref 39–50)
HCT VFR BLD CALC: 21.8 % — LOW (ref 39–50)
HCT VFR BLD CALC: 22.8 % — LOW (ref 39–50)
HCT VFR BLD CALC: 22.8 % — LOW (ref 39–50)
HGB BLD-MCNC: 7.3 G/DL — LOW (ref 13–17)
HGB BLD-MCNC: 7.6 G/DL — LOW (ref 13–17)
HGB BLD-MCNC: 7.6 G/DL — LOW (ref 13–17)
INR BLD: 1.54 RATIO — HIGH (ref 0.85–1.18)
INR BLD: 1.54 RATIO — HIGH (ref 0.85–1.18)
MAGNESIUM SERPL-MCNC: 1.7 MG/DL — SIGNIFICANT CHANGE UP (ref 1.6–2.6)
MAGNESIUM SERPL-MCNC: 1.7 MG/DL — SIGNIFICANT CHANGE UP (ref 1.6–2.6)
MCHC RBC-ENTMCNC: 31.1 PG — SIGNIFICANT CHANGE UP (ref 27–34)
MCHC RBC-ENTMCNC: 31.1 PG — SIGNIFICANT CHANGE UP (ref 27–34)
MCHC RBC-ENTMCNC: 31.3 PG — SIGNIFICANT CHANGE UP (ref 27–34)
MCHC RBC-ENTMCNC: 33.3 GM/DL — SIGNIFICANT CHANGE UP (ref 32–36)
MCHC RBC-ENTMCNC: 33.3 GM/DL — SIGNIFICANT CHANGE UP (ref 32–36)
MCHC RBC-ENTMCNC: 33.5 GM/DL — SIGNIFICANT CHANGE UP (ref 32–36)
MCHC RBC-ENTMCNC: 33.5 GM/DL — SIGNIFICANT CHANGE UP (ref 32–36)
MCHC RBC-ENTMCNC: 34.3 GM/DL — SIGNIFICANT CHANGE UP (ref 32–36)
MCHC RBC-ENTMCNC: 34.3 GM/DL — SIGNIFICANT CHANGE UP (ref 32–36)
MCV RBC AUTO: 91.4 FL — SIGNIFICANT CHANGE UP (ref 80–100)
MCV RBC AUTO: 91.4 FL — SIGNIFICANT CHANGE UP (ref 80–100)
MCV RBC AUTO: 92.8 FL — SIGNIFICANT CHANGE UP (ref 80–100)
MCV RBC AUTO: 92.8 FL — SIGNIFICANT CHANGE UP (ref 80–100)
MCV RBC AUTO: 93.8 FL — SIGNIFICANT CHANGE UP (ref 80–100)
MCV RBC AUTO: 93.8 FL — SIGNIFICANT CHANGE UP (ref 80–100)
NRBC # BLD: 0 /100 WBCS — SIGNIFICANT CHANGE UP (ref 0–0)
PHOSPHATE SERPL-MCNC: 2.4 MG/DL — LOW (ref 2.5–4.5)
PHOSPHATE SERPL-MCNC: 2.4 MG/DL — LOW (ref 2.5–4.5)
PLATELET # BLD AUTO: 32 K/UL — LOW (ref 150–400)
PLATELET # BLD AUTO: 32 K/UL — LOW (ref 150–400)
PLATELET # BLD AUTO: 37 K/UL — LOW (ref 150–400)
PLATELET # BLD AUTO: 37 K/UL — LOW (ref 150–400)
PLATELET # BLD AUTO: 53 K/UL — LOW (ref 150–400)
PLATELET # BLD AUTO: 53 K/UL — LOW (ref 150–400)
POTASSIUM SERPL-MCNC: 3.4 MMOL/L — LOW (ref 3.5–5.3)
POTASSIUM SERPL-MCNC: 3.4 MMOL/L — LOW (ref 3.5–5.3)
POTASSIUM SERPL-SCNC: 3.4 MMOL/L — LOW (ref 3.5–5.3)
POTASSIUM SERPL-SCNC: 3.4 MMOL/L — LOW (ref 3.5–5.3)
PROT SERPL-MCNC: 6.1 G/DL — SIGNIFICANT CHANGE UP (ref 6–8.3)
PROT SERPL-MCNC: 6.1 G/DL — SIGNIFICANT CHANGE UP (ref 6–8.3)
PROTHROM AB SERPL-ACNC: 16 SEC — HIGH (ref 9.5–13)
PROTHROM AB SERPL-ACNC: 16 SEC — HIGH (ref 9.5–13)
RBC # BLD: 2.33 M/UL — LOW (ref 4.2–5.8)
RBC # BLD: 2.33 M/UL — LOW (ref 4.2–5.8)
RBC # BLD: 2.35 M/UL — LOW (ref 4.2–5.8)
RBC # BLD: 2.35 M/UL — LOW (ref 4.2–5.8)
RBC # BLD: 2.43 M/UL — LOW (ref 4.2–5.8)
RBC # BLD: 2.43 M/UL — LOW (ref 4.2–5.8)
RBC # FLD: 13.2 % — SIGNIFICANT CHANGE UP (ref 10.3–14.5)
SODIUM SERPL-SCNC: 135 MMOL/L — SIGNIFICANT CHANGE UP (ref 135–145)
SODIUM SERPL-SCNC: 135 MMOL/L — SIGNIFICANT CHANGE UP (ref 135–145)
WBC # BLD: 4.14 K/UL — SIGNIFICANT CHANGE UP (ref 3.8–10.5)
WBC # BLD: 4.14 K/UL — SIGNIFICANT CHANGE UP (ref 3.8–10.5)
WBC # BLD: 4.45 K/UL — SIGNIFICANT CHANGE UP (ref 3.8–10.5)
WBC # BLD: 4.45 K/UL — SIGNIFICANT CHANGE UP (ref 3.8–10.5)
WBC # BLD: 6.04 K/UL — SIGNIFICANT CHANGE UP (ref 3.8–10.5)
WBC # BLD: 6.04 K/UL — SIGNIFICANT CHANGE UP (ref 3.8–10.5)
WBC # FLD AUTO: 4.14 K/UL — SIGNIFICANT CHANGE UP (ref 3.8–10.5)
WBC # FLD AUTO: 4.14 K/UL — SIGNIFICANT CHANGE UP (ref 3.8–10.5)
WBC # FLD AUTO: 4.45 K/UL — SIGNIFICANT CHANGE UP (ref 3.8–10.5)
WBC # FLD AUTO: 4.45 K/UL — SIGNIFICANT CHANGE UP (ref 3.8–10.5)
WBC # FLD AUTO: 6.04 K/UL — SIGNIFICANT CHANGE UP (ref 3.8–10.5)
WBC # FLD AUTO: 6.04 K/UL — SIGNIFICANT CHANGE UP (ref 3.8–10.5)

## 2023-12-01 PROCEDURE — 99232 SBSQ HOSP IP/OBS MODERATE 35: CPT

## 2023-12-01 RX ORDER — SODIUM,POTASSIUM PHOSPHATES 278-250MG
2 POWDER IN PACKET (EA) ORAL EVERY 6 HOURS
Refills: 0 | Status: DISCONTINUED | OUTPATIENT
Start: 2023-12-01 | End: 2023-12-01

## 2023-12-01 RX ORDER — POTASSIUM CHLORIDE 20 MEQ
40 PACKET (EA) ORAL ONCE
Refills: 0 | Status: COMPLETED | OUTPATIENT
Start: 2023-12-01 | End: 2023-12-01

## 2023-12-01 RX ORDER — PHYTONADIONE (VIT K1) 5 MG
10 TABLET ORAL ONCE
Refills: 0 | Status: COMPLETED | OUTPATIENT
Start: 2023-12-01 | End: 2023-12-01

## 2023-12-01 RX ORDER — MAGNESIUM SULFATE 500 MG/ML
1 VIAL (ML) INJECTION ONCE
Refills: 0 | Status: COMPLETED | OUTPATIENT
Start: 2023-12-01 | End: 2023-12-01

## 2023-12-01 RX ORDER — POTASSIUM PHOSPHATE, MONOBASIC POTASSIUM PHOSPHATE, DIBASIC 236; 224 MG/ML; MG/ML
15 INJECTION, SOLUTION INTRAVENOUS ONCE
Refills: 0 | Status: COMPLETED | OUTPATIENT
Start: 2023-12-01 | End: 2023-12-01

## 2023-12-01 RX ORDER — MAGNESIUM SULFATE 500 MG/ML
2 VIAL (ML) INJECTION ONCE
Refills: 0 | Status: COMPLETED | OUTPATIENT
Start: 2023-12-01 | End: 2023-12-01

## 2023-12-01 RX ADMIN — Medication 1 APPLICATION(S): at 21:23

## 2023-12-01 RX ADMIN — CHLORHEXIDINE GLUCONATE 1 APPLICATION(S): 213 SOLUTION TOPICAL at 05:08

## 2023-12-01 RX ADMIN — Medication 97.2 MILLIGRAM(S): at 05:08

## 2023-12-01 RX ADMIN — Medication 1 TABLET(S): at 11:45

## 2023-12-01 RX ADMIN — POLYETHYLENE GLYCOL 3350 17 GRAM(S): 17 POWDER, FOR SOLUTION ORAL at 11:45

## 2023-12-01 RX ADMIN — OXYCODONE HYDROCHLORIDE 5 MILLIGRAM(S): 5 TABLET ORAL at 05:13

## 2023-12-01 RX ADMIN — ENOXAPARIN SODIUM 40 MILLIGRAM(S): 100 INJECTION SUBCUTANEOUS at 21:23

## 2023-12-01 RX ADMIN — OXYCODONE HYDROCHLORIDE 5 MILLIGRAM(S): 5 TABLET ORAL at 06:13

## 2023-12-01 RX ADMIN — Medication 975 MILLIGRAM(S): at 18:20

## 2023-12-01 RX ADMIN — Medication 1 MILLIGRAM(S): at 11:45

## 2023-12-01 RX ADMIN — OXYCODONE HYDROCHLORIDE 10 MILLIGRAM(S): 5 TABLET ORAL at 00:53

## 2023-12-01 RX ADMIN — LEVETIRACETAM 500 MILLIGRAM(S): 250 TABLET, FILM COATED ORAL at 17:21

## 2023-12-01 RX ADMIN — Medication 975 MILLIGRAM(S): at 01:38

## 2023-12-01 RX ADMIN — Medication 1 APPLICATION(S): at 05:08

## 2023-12-01 RX ADMIN — Medication 100 GRAM(S): at 01:32

## 2023-12-01 RX ADMIN — ESCITALOPRAM OXALATE 20 MILLIGRAM(S): 10 TABLET, FILM COATED ORAL at 11:45

## 2023-12-01 RX ADMIN — Medication 2 TABLET(S): at 01:32

## 2023-12-01 RX ADMIN — Medication 975 MILLIGRAM(S): at 02:38

## 2023-12-01 RX ADMIN — LEVETIRACETAM 500 MILLIGRAM(S): 250 TABLET, FILM COATED ORAL at 05:08

## 2023-12-01 RX ADMIN — POTASSIUM PHOSPHATE, MONOBASIC POTASSIUM PHOSPHATE, DIBASIC 62.5 MILLIMOLE(S): 236; 224 INJECTION, SOLUTION INTRAVENOUS at 02:11

## 2023-12-01 RX ADMIN — OXYCODONE HYDROCHLORIDE 10 MILLIGRAM(S): 5 TABLET ORAL at 08:32

## 2023-12-01 RX ADMIN — OXYCODONE HYDROCHLORIDE 5 MILLIGRAM(S): 5 TABLET ORAL at 00:23

## 2023-12-01 RX ADMIN — OXYCODONE HYDROCHLORIDE 10 MILLIGRAM(S): 5 TABLET ORAL at 00:23

## 2023-12-01 RX ADMIN — Medication 100 MILLIGRAM(S): at 11:45

## 2023-12-01 RX ADMIN — Medication 10 MILLIGRAM(S): at 21:24

## 2023-12-01 RX ADMIN — Medication 975 MILLIGRAM(S): at 17:20

## 2023-12-01 RX ADMIN — OXYCODONE HYDROCHLORIDE 10 MILLIGRAM(S): 5 TABLET ORAL at 09:02

## 2023-12-01 RX ADMIN — Medication 40 MILLIEQUIVALENT(S): at 02:11

## 2023-12-01 RX ADMIN — Medication 25 GRAM(S): at 03:57

## 2023-12-01 RX ADMIN — Medication 975 MILLIGRAM(S): at 23:51

## 2023-12-01 NOTE — PROGRESS NOTE ADULT - ASSESSMENT
Patient is a 63y year old male with PMHx of alcohol abuse (drinks 1p vodka/day), pancreatic CA s/p whipple procedure in 2015, depression and HTN who presnts after a ground-level fall, had + HS and + LOC. CTH shows small Left anterior parafalcine SDH.     Neuro  - Patient with small, left parafalcine subdural hematoma, stable on repeat imaging  - Patient with previous history of withdrawal seizures at [facility] in September  - Per patients brother he has been drinking again the last several days. Patient also has history of substance abuse and med seeking behavior  - Started on CIWA w/ PRN Ativan and beer  - Patient with worsening headaches, ordered repeat head CT > stable SDH  - Keppra for sz ppx    Respiratory:   - Patient without history of respiratory disease, no active issues  - 1.7 cm nodular airspace opacity noted in RUL on CT chest; Outpatient follow up    Cardiovascular:   - hx HTN  - Not requiring pressors    Gastrointestinal/Nutrition:   - Patient with history of Pancreatic Ca s/p Whipple procedure  - Patient with nausea and vomiting  - Clear liquid diet; Advance as tolerated    Renal/Genitourinary:   - No active issues, creatinine at baseline, no need for laura    Hematologic:   - Patient with elevated INR after head strike, received 1500 units of KCENTRA, INR now 1.26  - Hemoglobin initially downtrended, now stable  - Follow up PM CBC  - SCDs for DVT PPx  - lovenox for VTE ppx    Infectious Disease:   - No active issues    Endocrine:   - No history of DM, blood glucose well controlled; No need to check fingersticks    Disposition:   - Patient can be sent to the floor today, no need for further ICU care

## 2023-12-01 NOTE — PROGRESS NOTE ADULT - ATTENDING COMMENTS
Evaluated  in SICU. Clinical parameters,  labs, available imagine reviewed.  63y old  Male who presents with a chief complaint of headache after fall. Ground level fall, he hit his head and had loss of consciousness. pt was under influence of alcohol    Pt very calm, did not trigger CIWA, can DC Phenobarb.   Repeat CTH has stable small parafalcine SDH, seizure ppx with Keppra  Periorbital hematoma with subconjunctival hemorrhage on medical management  Hemodynamically stable  On RA  On  PO  Will give one dose Vit K to day again for increasing INR. Coagulopathy more likely from liver dysfunction.   Monitor low Hb  On Thiamine FA  On pharm DVT ppx

## 2023-12-01 NOTE — CHART NOTE - NSCHARTNOTEFT_GEN_A_CORE
SICU Transfer Note    Transfer from: SICU  Transfer to:  tower   Accepting physican: Gutierrez  ASSESSMENT & PLAN:     63yM w/ PMHx of alcohol abuse and anx/depression who presents s/p fall w +HS onto bedside table with LOC.  Trauma assessment in ED: ABCs intact , GCS 15 , AAOx3 , with physical exam findings, imaging, and labs as documented above, injuries are identified:   - L parafalcine SDH w/o mass effect  - L periorbital STS      PLAN:    - Multimodal pain control with tylenol PO q8h ATC, tramadol q6h PRN for moderate (25mg), and severe pain (50mg)  - Tertiary exam performed  - Trend H/H  - F/u labs   - No face dressing, leave open to air  - Phenobarbital taper D/C   - PT evaluation- Acute Inpatient Rehab    For Follow-Up:   Repeat CBC        Vital Signs Last 24 Hrs  T(C): 36.5 (01 Dec 2023 10:18), Max: 37.1 (30 Nov 2023 19:00)  T(F): 97.7 (01 Dec 2023 10:18), Max: 98.7 (30 Nov 2023 19:00)  HR: 82 (01 Dec 2023 10:18) (67 - 88)  BP: 116/64 (01 Dec 2023 10:18) (97/60 - 130/67)  BP(mean): 75 (01 Dec 2023 09:00) (74 - 93)  RR: 18 (01 Dec 2023 10:18) (11 - 20)  SpO2: 93% (01 Dec 2023 10:18) (93% - 98%)    Parameters below as of 01 Dec 2023 10:18  Patient On (Oxygen Delivery Method): room air      I&O's Summary    30 Nov 2023 07:01  -  01 Dec 2023 07:00  --------------------------------------------------------  IN: 1470 mL / OUT: 700 mL / NET: 770 mL    01 Dec 2023 07:01  -  01 Dec 2023 11:38  --------------------------------------------------------  IN: 120 mL / OUT: 0 mL / NET: 120 mL          MEDICATIONS  (STANDING):  chlorhexidine 2% Cloths 1 Application(s) Topical <User Schedule>  enoxaparin Injectable 40 milliGRAM(s) SubCutaneous every 24 hours  erythromycin   Ointment 1 Application(s) Left EYE three times a day  escitalopram 20 milliGRAM(s) Oral daily  folic acid 1 milliGRAM(s) Oral daily  influenza   Vaccine 0.5 milliLiter(s) IntraMuscular once  levETIRAcetam 500 milliGRAM(s) Oral two times a day  losartan 50 milliGRAM(s) Oral daily  multivitamin 1 Tablet(s) Oral daily  phytonadione   Solution 10 milliGRAM(s) Oral once  polyethylene glycol 3350 17 Gram(s) Oral daily  senna 2 Tablet(s) Oral at bedtime  thiamine 100 milliGRAM(s) Oral daily    MEDICATIONS  (PRN):  acetaminophen     Tablet .. 975 milliGRAM(s) Oral every 6 hours PRN Mild Pain (1 - 3)  oxyCODONE    IR 5 milliGRAM(s) Oral every 4 hours PRN Moderate Pain (4 - 6)  oxyCODONE    IR 10 milliGRAM(s) Oral every 6 hours PRN Severe Pain (7 - 10)        LABS                                            7.3                   Neurophils% (auto):   x      (12-01 @ 05:07):    4.45 )-----------(37           Lymphocytes% (auto):  x                                             21.3                   Eosinphils% (auto):   x        Manual%: Neutrophils x    ; Lymphocytes x    ; Eosinophils x    ; Bands%: x    ; Blasts x                                    135    |  97     |  11                  Calcium: 7.8   / iCa: x      (12-01 @ 00:22)    ----------------------------<  117       Magnesium: 1.7                              3.4     |  32     |  0.73             Phosphorous: 2.4      TPro  6.1    /  Alb  2.9    /  TBili  2.1    /  DBili  x      /  AST  29     /  ALT  13     /  AlkPhos  84     01 Dec 2023 00:22    ( 12-01 @ 00:22 )   PT: 16.0 sec;   INR: 1.54 ratio  aPTT: 39.7 sec

## 2023-12-01 NOTE — PROGRESS NOTE ADULT - SUBJECTIVE AND OBJECTIVE BOX
Surgery Progress Note    SUBJECTIVE: Pt seen and examined at bedside. Patient comfortable and in no-apparent distress.       Vital Signs Last 24 Hrs  T(C): 36.7 (01 Dec 2023 03:00), Max: 37.1 (30 Nov 2023 19:00)  T(F): 98 (01 Dec 2023 03:00), Max: 98.7 (30 Nov 2023 19:00)  HR: 80 (01 Dec 2023 06:00) (67 - 82)  BP: 106/60 (01 Dec 2023 06:00) (106/60 - 136/65)  BP(mean): 77 (01 Dec 2023 06:00) (74 - 93)  RR: 13 (01 Dec 2023 06:00) (10 - 20)  SpO2: 93% (01 Dec 2023 06:00) (93% - 97%)    Parameters below as of 01 Dec 2023 03:00  Patient On (Oxygen Delivery Method): room air        Physical Exam:  General Appearance: Appears well, NAD  HEENT: left periorbital swelling, improving compared to previous exams   Respiratory: No labored breathing  CV: Pulse regularly present  Abdomen: Soft, nontender    LABS:                        7.3    4.45  )-----------( 37       ( 01 Dec 2023 05:07 )             21.3     12-01    135  |  97  |  11  ----------------------------<  117<H>  3.4<L>   |  32<H>  |  0.73    Ca    7.8<L>      01 Dec 2023 00:22  Phos  2.4     12-01  Mg     1.7     12-01    TPro  6.1  /  Alb  2.9<L>  /  TBili  2.1<H>  /  DBili  x   /  AST  29  /  ALT  13  /  AlkPhos  84  12-01    PT/INR - ( 01 Dec 2023 00:22 )   PT: 16.0 sec;   INR: 1.54 ratio         PTT - ( 01 Dec 2023 00:22 )  PTT:39.7 sec  Urinalysis Basic - ( 01 Dec 2023 00:22 )    Color: x / Appearance: x / SG: x / pH: x  Gluc: 117 mg/dL / Ketone: x  / Bili: x / Urobili: x   Blood: x / Protein: x / Nitrite: x   Leuk Esterase: x / RBC: x / WBC x   Sq Epi: x / Non Sq Epi: x / Bacteria: x        INs and OUTs:    11-29-23 @ 07:01  -  11-30-23 @ 07:00  --------------------------------------------------------  IN: 2000 mL / OUT: 960 mL / NET: 1040 mL    11-30-23 @ 07:01  -  12-01-23 @ 06:08  --------------------------------------------------------  IN: 1470 mL / OUT: 700 mL / NET: 770 mL     Surgery Progress Note    SUBJECTIVE: Pt seen and examined at bedside. Patient comfortable and in no-apparent distress.       Vital Signs Last 24 Hrs  T(C): 36.7 (01 Dec 2023 03:00), Max: 37.1 (30 Nov 2023 19:00)  T(F): 98 (01 Dec 2023 03:00), Max: 98.7 (30 Nov 2023 19:00)  HR: 80 (01 Dec 2023 06:00) (67 - 82)  BP: 106/60 (01 Dec 2023 06:00) (106/60 - 136/65)  BP(mean): 77 (01 Dec 2023 06:00) (74 - 93)  RR: 13 (01 Dec 2023 06:00) (10 - 20)  SpO2: 93% (01 Dec 2023 06:00) (93% - 97%)    Parameters below as of 01 Dec 2023 03:00  Patient On (Oxygen Delivery Method): room air        Physical Exam:  General Appearance: NAD  HEENT: left periorbital swelling, improving compared to previous exams, EOMI, ecchymosis on right neck/ supraclavicular region   Respiratory: No labored breathing  CV: Pulse regularly present  Abdomen: Soft, nontender    LABS:                        7.3    4.45  )-----------( 37       ( 01 Dec 2023 05:07 )             21.3     12-01    135  |  97  |  11  ----------------------------<  117<H>  3.4<L>   |  32<H>  |  0.73    Ca    7.8<L>      01 Dec 2023 00:22  Phos  2.4     12-01  Mg     1.7     12-01    TPro  6.1  /  Alb  2.9<L>  /  TBili  2.1<H>  /  DBili  x   /  AST  29  /  ALT  13  /  AlkPhos  84  12-01    PT/INR - ( 01 Dec 2023 00:22 )   PT: 16.0 sec;   INR: 1.54 ratio         PTT - ( 01 Dec 2023 00:22 )  PTT:39.7 sec  Urinalysis Basic - ( 01 Dec 2023 00:22 )    Color: x / Appearance: x / SG: x / pH: x  Gluc: 117 mg/dL / Ketone: x  / Bili: x / Urobili: x   Blood: x / Protein: x / Nitrite: x   Leuk Esterase: x / RBC: x / WBC x   Sq Epi: x / Non Sq Epi: x / Bacteria: x        INs and OUTs:    11-29-23 @ 07:01  -  11-30-23 @ 07:00  --------------------------------------------------------  IN: 2000 mL / OUT: 960 mL / NET: 1040 mL    11-30-23 @ 07:01  -  12-01-23 @ 06:08  --------------------------------------------------------  IN: 1470 mL / OUT: 700 mL / NET: 770 mL

## 2023-12-01 NOTE — PROGRESS NOTE ADULT - ASSESSMENT
ASSESSMENT:  63yM w/ PMHx of alcohol abuse and anx/depression who presents s/p fall w +HS onto bedside table with LOC.  Trauma assessment in ED: ABCs intact , GCS 15 , AAOx3 , with physical exam findings, imaging, and labs as documented above, injuries are identified:   - L parafalcine SDH w/o mass effect  - L periorbital STS      PLAN:    - appreciate care per SICU  - Multimodal pain control with tylenol PO q8h ATC, tramadol q6h PRN for moderate (25mg), and severe pain (50mg)  - Tertiary exam performed  - Trend H/H  - F/u labs   - No face dressing, leave open to air  -  PT evaluation- Acute Inpatient Rehab

## 2023-12-01 NOTE — PROGRESS NOTE ADULT - SUBJECTIVE AND OBJECTIVE BOX
24 HOUR EVENTS:  - started on phenobarb taper for alcohol withdrawal  - lovenox restarted      SUBJECTIVE/ROS:  [X] A ten-point review of systems was otherwise negative except as noted.  [ ] Due to altered mental status/intubation, subjective information were not able to be obtained from the patient. History was obtained, to the extent possible, from review of the chart and collateral sources of information.      NEURO  Exam: AOx4, NAD. +L periorbital and maxillary ecchymosis  Meds: acetaminophen     Tablet .. 975 milliGRAM(s) Oral every 6 hours PRN Mild Pain (1 - 3)  escitalopram 20 milliGRAM(s) Oral daily  levETIRAcetam 500 milliGRAM(s) Oral two times a day  oxyCODONE    IR 10 milliGRAM(s) Oral every 6 hours PRN Severe Pain (7 - 10)  oxyCODONE    IR 5 milliGRAM(s) Oral every 4 hours PRN Moderate Pain (4 - 6)  PHENobarbital Injectable 130 milliGRAM(s) IV Push every 30 minutes PRN CIWA > 8    [x] Adequacy of sedation and pain control has been assessed and adjusted      RESPIRATORY  RR: 17 (11-30-23 @ 23:00) (10 - 20)  SpO2: 95% (11-30-23 @ 23:00) (93% - 97%)  Wt(kg): --  Exam: Breathing comfortably on RA, non-tachypneic, satting 95  Mechanical Ventilation:   ABG - ( 29 Nov 2023 04:40 )  pH: x     /  pCO2: x     /  pO2: x     / HCO3: x     / Base Excess: x     /  SaO2: x       Lactate: 3.5              [ ] Extubation Readiness Assessed  Meds:       CARDIOVASCULAR  HR: 73 (11-30-23 @ 23:00) (67 - 82)  BP: 107/51 (11-30-23 @ 23:00) (107/51 - 136/65)  BP(mean): 74 (11-30-23 @ 23:00) (74 - 95)  ABP: --  ABP(mean): --  Wt(kg): --  CVP(cm H2O): --  VBG - ( 29 Nov 2023 09:00 )  pH: 7.35  /  pCO2: 53    /  pO2: 34    / HCO3: 29    / Base Excess: 3.0   /  SaO2: 45.1   Lactate: 3.2        Exam: non-tachycardic, S1S2  Cardiac Rhythm: NSR  Meds: losartan 50 milliGRAM(s) Oral daily      GI/NUTRITION  Exam: Soft, non-distended, non-tender.   Diet: Regular diet  Meds: polyethylene glycol 3350 17 Gram(s) Oral daily  senna 2 Tablet(s) Oral at bedtime      GENITOURINARY  I&O's Detail    11-29 @ 07:01  -  11-30 @ 07:00  --------------------------------------------------------  IN:    IV PiggyBack: 450 mL    IV PiggyBack: 650 mL    Oral Fluid: 900 mL  Total IN: 2000 mL    OUT:    Incontinent per Condom Catheter (mL): 210 mL    Voided (mL): 750 mL  Total OUT: 960 mL    Total NET: 1040 mL      11-30 @ 07:01 - 12-01 @ 00:01  --------------------------------------------------------  IN:    Oral Fluid: 830 mL  Total IN: 830 mL    OUT:    Incontinent per Condom Catheter (mL): 600 mL  Total OUT: 600 mL    Total NET: 230 mL          11-30    132<L>  |  97  |  14  ----------------------------<  87  4.8   |  25  |  0.67    Ca    7.9<L>      30 Nov 2023 17:58  Phos  3.1     11-30  Mg     1.9     11-30    TPro  6.5  /  Alb  3.0<L>  /  TBili  2.1<H>  /  DBili  x   /  AST  62<H>  /  ALT  15  /  AlkPhos  81  11-30    [ ] Dominguez catheter, indication: N/A  Meds: folic acid 1 milliGRAM(s) Oral daily  multivitamin 1 Tablet(s) Oral daily  thiamine 100 milliGRAM(s) Oral daily        HEMATOLOGIC  Meds: enoxaparin Injectable 40 milliGRAM(s) SubCutaneous every 24 hours    [x] VTE Prophylaxis                        8.0    3.84  )-----------( 45       ( 30 Nov 2023 00:09 )             23.2     PT/INR - ( 30 Nov 2023 00:09 )   PT: 14.5 sec;   INR: 1.33 ratio         PTT - ( 30 Nov 2023 00:09 )  PTT:35.8 sec  Transfusion     [ ] PRBC   [ ] Platelets   [ ] FFP   [ ] Cryoprecipitate      INFECTIOUS DISEASES  T(C): 36.5 (11-30-23 @ 23:00), Max: 37.1 (11-30-23 @ 19:00)  Wt(kg): --  WBC Count: 3.84 K/uL (11-30 @ 00:09)    Recent Cultures:    Meds: influenza   Vaccine 0.5 milliLiter(s) IntraMuscular once        ENDOCRINE  Capillary Blood Glucose    Meds:       ACCESS DEVICES:  [ ] Peripheral IV  [ ] Central Venous Line	[ ] R	[ ] L	[ ] IJ	[ ] Fem	[ ] SC	Placed:   [ ] Arterial Line		[ ] R	[ ] L	[ ] Fem	[ ] Rad	[ ] Ax	Placed:   [ ] PICC:					[ ] Mediport  [ ] Urinary Catheter, Date Placed:   [ ] Necessity of urinary, arterial, and venous catheters discussed    OTHER MEDICATIONS:  chlorhexidine 2% Cloths 1 Application(s) Topical <User Schedule>  erythromycin   Ointment 1 Application(s) Left EYE three times a day      CODE STATUS:     IMAGING:    < from: CT Head No Cont (11.29.23 @ 20:49) >    ACC: [id number] EXAM:  CT BRAIN   ORDERED BY: HUONG CORNEJO     PROCEDURE DATE:  11/29/2023          INTERPRETATION:  Clinical Indication: Follow-up left frontal parafalcine   subdural hemorrhage    5mm axial sections of the brain were obtained from base to vertex,   without the intravenous administration of contrast material. Coronal and   sagittal computer generated reconstructed views are available.    Comparison is made with the prior CT of 3:55 AM.    There is a thin left frontal parasagittal subdural hematoma which is   unchanged the prior exam. No new hemorrhage is identified. Ventricular   and sulcal prominence is consistent with mild age-appropriate   involutional change. Small vessel white matter ischemic changes are   noted. There is a large left periorbital and premaxillary soft tissue   swelling and hematoma which is unchanged since the prior exam. No   depressed skull fractures are identified.        IMPRESSION: Age-appropriate involutional and ischemic gliotic changes. No   change in small left frontal parasagittal subdural hematoma. No change in   left periorbital and premaxillary soft tissue swelling and hematoma since   3:55 AM.    --- End of Report ---            JUAN TREJO MD; Attending Radiologist  This document has been electronically signed. Nov 29 2023  8:57PM    < end of copied text >

## 2023-12-02 LAB
ANION GAP SERPL CALC-SCNC: 7 MMOL/L — SIGNIFICANT CHANGE UP (ref 5–17)
ANION GAP SERPL CALC-SCNC: 7 MMOL/L — SIGNIFICANT CHANGE UP (ref 5–17)
BUN SERPL-MCNC: 12 MG/DL — SIGNIFICANT CHANGE UP (ref 7–23)
BUN SERPL-MCNC: 12 MG/DL — SIGNIFICANT CHANGE UP (ref 7–23)
CALCIUM SERPL-MCNC: 8 MG/DL — LOW (ref 8.4–10.5)
CALCIUM SERPL-MCNC: 8 MG/DL — LOW (ref 8.4–10.5)
CHLORIDE SERPL-SCNC: 96 MMOL/L — SIGNIFICANT CHANGE UP (ref 96–108)
CHLORIDE SERPL-SCNC: 96 MMOL/L — SIGNIFICANT CHANGE UP (ref 96–108)
CO2 SERPL-SCNC: 30 MMOL/L — SIGNIFICANT CHANGE UP (ref 22–31)
CO2 SERPL-SCNC: 30 MMOL/L — SIGNIFICANT CHANGE UP (ref 22–31)
CREAT SERPL-MCNC: 0.75 MG/DL — SIGNIFICANT CHANGE UP (ref 0.5–1.3)
CREAT SERPL-MCNC: 0.75 MG/DL — SIGNIFICANT CHANGE UP (ref 0.5–1.3)
EGFR: 102 ML/MIN/1.73M2 — SIGNIFICANT CHANGE UP
EGFR: 102 ML/MIN/1.73M2 — SIGNIFICANT CHANGE UP
GLUCOSE SERPL-MCNC: 97 MG/DL — SIGNIFICANT CHANGE UP (ref 70–99)
GLUCOSE SERPL-MCNC: 97 MG/DL — SIGNIFICANT CHANGE UP (ref 70–99)
HCT VFR BLD CALC: 19.8 % — CRITICAL LOW (ref 39–50)
HCT VFR BLD CALC: 19.8 % — CRITICAL LOW (ref 39–50)
HCT VFR BLD CALC: 23.6 % — LOW (ref 39–50)
HCT VFR BLD CALC: 23.6 % — LOW (ref 39–50)
HGB BLD-MCNC: 6.6 G/DL — CRITICAL LOW (ref 13–17)
HGB BLD-MCNC: 6.6 G/DL — CRITICAL LOW (ref 13–17)
HGB BLD-MCNC: 7.9 G/DL — LOW (ref 13–17)
HGB BLD-MCNC: 7.9 G/DL — LOW (ref 13–17)
MAGNESIUM SERPL-MCNC: 1.9 MG/DL — SIGNIFICANT CHANGE UP (ref 1.6–2.6)
MAGNESIUM SERPL-MCNC: 1.9 MG/DL — SIGNIFICANT CHANGE UP (ref 1.6–2.6)
MCHC RBC-ENTMCNC: 30.7 PG — SIGNIFICANT CHANGE UP (ref 27–34)
MCHC RBC-ENTMCNC: 30.7 PG — SIGNIFICANT CHANGE UP (ref 27–34)
MCHC RBC-ENTMCNC: 31.1 PG — SIGNIFICANT CHANGE UP (ref 27–34)
MCHC RBC-ENTMCNC: 31.1 PG — SIGNIFICANT CHANGE UP (ref 27–34)
MCHC RBC-ENTMCNC: 33.3 GM/DL — SIGNIFICANT CHANGE UP (ref 32–36)
MCHC RBC-ENTMCNC: 33.3 GM/DL — SIGNIFICANT CHANGE UP (ref 32–36)
MCHC RBC-ENTMCNC: 33.5 GM/DL — SIGNIFICANT CHANGE UP (ref 32–36)
MCHC RBC-ENTMCNC: 33.5 GM/DL — SIGNIFICANT CHANGE UP (ref 32–36)
MCV RBC AUTO: 91.8 FL — SIGNIFICANT CHANGE UP (ref 80–100)
MCV RBC AUTO: 91.8 FL — SIGNIFICANT CHANGE UP (ref 80–100)
MCV RBC AUTO: 93.4 FL — SIGNIFICANT CHANGE UP (ref 80–100)
MCV RBC AUTO: 93.4 FL — SIGNIFICANT CHANGE UP (ref 80–100)
NRBC # BLD: 0 /100 WBCS — SIGNIFICANT CHANGE UP (ref 0–0)
OB PNL STL: NEGATIVE — SIGNIFICANT CHANGE UP
OB PNL STL: NEGATIVE — SIGNIFICANT CHANGE UP
PHOSPHATE SERPL-MCNC: 2.6 MG/DL — SIGNIFICANT CHANGE UP (ref 2.5–4.5)
PHOSPHATE SERPL-MCNC: 2.6 MG/DL — SIGNIFICANT CHANGE UP (ref 2.5–4.5)
PLATELET # BLD AUTO: 41 K/UL — LOW (ref 150–400)
POTASSIUM SERPL-MCNC: 3.3 MMOL/L — LOW (ref 3.5–5.3)
POTASSIUM SERPL-MCNC: 3.3 MMOL/L — LOW (ref 3.5–5.3)
POTASSIUM SERPL-SCNC: 3.3 MMOL/L — LOW (ref 3.5–5.3)
POTASSIUM SERPL-SCNC: 3.3 MMOL/L — LOW (ref 3.5–5.3)
RBC # BLD: 2.12 M/UL — LOW (ref 4.2–5.8)
RBC # BLD: 2.12 M/UL — LOW (ref 4.2–5.8)
RBC # BLD: 2.57 M/UL — LOW (ref 4.2–5.8)
RBC # BLD: 2.57 M/UL — LOW (ref 4.2–5.8)
RBC # FLD: 13.2 % — SIGNIFICANT CHANGE UP (ref 10.3–14.5)
RBC # FLD: 13.2 % — SIGNIFICANT CHANGE UP (ref 10.3–14.5)
RBC # FLD: 14 % — SIGNIFICANT CHANGE UP (ref 10.3–14.5)
RBC # FLD: 14 % — SIGNIFICANT CHANGE UP (ref 10.3–14.5)
SODIUM SERPL-SCNC: 133 MMOL/L — LOW (ref 135–145)
SODIUM SERPL-SCNC: 133 MMOL/L — LOW (ref 135–145)
WBC # BLD: 3.49 K/UL — LOW (ref 3.8–10.5)
WBC # BLD: 3.49 K/UL — LOW (ref 3.8–10.5)
WBC # BLD: 4.19 K/UL — SIGNIFICANT CHANGE UP (ref 3.8–10.5)
WBC # BLD: 4.19 K/UL — SIGNIFICANT CHANGE UP (ref 3.8–10.5)
WBC # FLD AUTO: 3.49 K/UL — LOW (ref 3.8–10.5)
WBC # FLD AUTO: 3.49 K/UL — LOW (ref 3.8–10.5)
WBC # FLD AUTO: 4.19 K/UL — SIGNIFICANT CHANGE UP (ref 3.8–10.5)
WBC # FLD AUTO: 4.19 K/UL — SIGNIFICANT CHANGE UP (ref 3.8–10.5)

## 2023-12-02 PROCEDURE — 70470 CT HEAD/BRAIN W/O & W/DYE: CPT | Mod: 26

## 2023-12-02 PROCEDURE — 71260 CT THORAX DX C+: CPT | Mod: 26

## 2023-12-02 PROCEDURE — 74177 CT ABD & PELVIS W/CONTRAST: CPT | Mod: 26

## 2023-12-02 PROCEDURE — 99232 SBSQ HOSP IP/OBS MODERATE 35: CPT

## 2023-12-02 RX ORDER — POTASSIUM CHLORIDE 20 MEQ
40 PACKET (EA) ORAL ONCE
Refills: 0 | Status: COMPLETED | OUTPATIENT
Start: 2023-12-02 | End: 2023-12-02

## 2023-12-02 RX ADMIN — OXYCODONE HYDROCHLORIDE 10 MILLIGRAM(S): 5 TABLET ORAL at 06:24

## 2023-12-02 RX ADMIN — Medication 2 MILLIGRAM(S): at 23:20

## 2023-12-02 RX ADMIN — Medication 1 APPLICATION(S): at 06:22

## 2023-12-02 RX ADMIN — OXYCODONE HYDROCHLORIDE 10 MILLIGRAM(S): 5 TABLET ORAL at 12:34

## 2023-12-02 RX ADMIN — ENOXAPARIN SODIUM 40 MILLIGRAM(S): 100 INJECTION SUBCUTANEOUS at 21:27

## 2023-12-02 RX ADMIN — Medication 40 MILLIEQUIVALENT(S): at 11:13

## 2023-12-02 RX ADMIN — Medication 975 MILLIGRAM(S): at 18:30

## 2023-12-02 RX ADMIN — Medication 975 MILLIGRAM(S): at 17:30

## 2023-12-02 RX ADMIN — OXYCODONE HYDROCHLORIDE 10 MILLIGRAM(S): 5 TABLET ORAL at 13:34

## 2023-12-02 RX ADMIN — LEVETIRACETAM 500 MILLIGRAM(S): 250 TABLET, FILM COATED ORAL at 17:25

## 2023-12-02 RX ADMIN — Medication 975 MILLIGRAM(S): at 00:30

## 2023-12-02 RX ADMIN — Medication 1 APPLICATION(S): at 12:50

## 2023-12-02 RX ADMIN — Medication 1 APPLICATION(S): at 21:27

## 2023-12-02 RX ADMIN — LEVETIRACETAM 500 MILLIGRAM(S): 250 TABLET, FILM COATED ORAL at 06:21

## 2023-12-02 RX ADMIN — ESCITALOPRAM OXALATE 20 MILLIGRAM(S): 10 TABLET, FILM COATED ORAL at 11:13

## 2023-12-02 RX ADMIN — LOSARTAN POTASSIUM 50 MILLIGRAM(S): 100 TABLET, FILM COATED ORAL at 06:21

## 2023-12-02 RX ADMIN — Medication 100 MILLIGRAM(S): at 11:13

## 2023-12-02 RX ADMIN — OXYCODONE HYDROCHLORIDE 10 MILLIGRAM(S): 5 TABLET ORAL at 22:05

## 2023-12-02 RX ADMIN — Medication 1 MILLIGRAM(S): at 11:13

## 2023-12-02 RX ADMIN — Medication 1 TABLET(S): at 11:13

## 2023-12-02 RX ADMIN — SENNA PLUS 2 TABLET(S): 8.6 TABLET ORAL at 21:27

## 2023-12-02 RX ADMIN — OXYCODONE HYDROCHLORIDE 10 MILLIGRAM(S): 5 TABLET ORAL at 23:05

## 2023-12-02 NOTE — PROGRESS NOTE ADULT - ASSESSMENT
ASSESSMENT:  63yM w/ PMHx of alcohol abuse and anx/depression who presents s/p fall w +HS onto bedside table with LOC.  Trauma assessment in ED: ABCs intact , GCS 15 , AAOx3 , with physical exam findings, imaging, and labs as documented above, injuries are identified:   - L parafalcine SDH w/o mass effect  - L periorbital STS      PLAN:  - Hgb 6.6 this AM -> 1u prbc and recheck CBC  - CT head, neck, chest, abd pelvis with IV contrast to evaluate for bleeding  - Multimodal pain control with tylenol PO q8h ATC, tramadol q6h PRN for moderate (25mg), and severe pain (50mg)  - Tertiary exam performed  - No face dressing, leave open to air  -  PT evaluation- Acute Inpatient Rehab    Trauma ACS 9801

## 2023-12-02 NOTE — PROGRESS NOTE ADULT - SUBJECTIVE AND OBJECTIVE BOX
Surgery Progress Note    S: Patient seen and examined. No acute events overnight. Reports headache that has been consistent severity. Tolerating regular diet. Denies feeling shaky. Downgraded from SICU to floor yesterday.    O:  Physical Exam:  Gen: Laying in bed, NAD  HEENT: left periorbital swelling with surrounding ecchymosis, continuing to improve, EOMI, ecchymosis on right neck/ supraclavicular region   Resp: Unlabored breathing  Abd: soft, nontender, nondistended  Ext: Moves 4 extremities spontaneously    Vital Signs Last 24 Hrs  T(C): 36.6 (02 Dec 2023 09:04), Max: 36.8 (01 Dec 2023 16:40)  T(F): 97.8 (02 Dec 2023 09:04), Max: 98.3 (01 Dec 2023 16:40)  HR: 71 (02 Dec 2023 09:04) (71 - 82)  BP: 116/69 (02 Dec 2023 09:04) (104/62 - 120/68)  BP(mean): --  RR: 18 (02 Dec 2023 09:04) (18 - 18)  SpO2: 99% (02 Dec 2023 09:04) (93% - 99%)    Parameters below as of 02 Dec 2023 09:04  Patient On (Oxygen Delivery Method): room air        I&O's Detail    01 Dec 2023 07:01  -  02 Dec 2023 07:00  --------------------------------------------------------  IN:    Oral Fluid: 900 mL  Total IN: 900 mL    OUT:  Total OUT: 0 mL    Total NET: 900 mL                                6.6    4.19  )-----------( 41       ( 02 Dec 2023 06:46 )             19.8       12-02    133<L>  |  96  |  12  ----------------------------<  97  3.3<L>   |  30  |  0.75    Ca    8.0<L>      02 Dec 2023 06:44  Phos  2.6     12-02  Mg     1.9     12-02    TPro  6.1  /  Alb  2.9<L>  /  TBili  2.1<H>  /  DBili  x   /  AST  29  /  ALT  13  /  AlkPhos  84  12-01

## 2023-12-02 NOTE — PROGRESS NOTE ADULT - TIME BILLING
I saw and evaluated patient and agree with above note  looks well, tolerating diet  there has been a slow drift down in hemoglobin  has ecchymosis to face and back, will plan repeat CT imaging to confer no active bleeding source  will transfuse one unit PRBC

## 2023-12-03 LAB
ANION GAP SERPL CALC-SCNC: 6 MMOL/L — SIGNIFICANT CHANGE UP (ref 5–17)
ANION GAP SERPL CALC-SCNC: 6 MMOL/L — SIGNIFICANT CHANGE UP (ref 5–17)
BUN SERPL-MCNC: 8 MG/DL — SIGNIFICANT CHANGE UP (ref 7–23)
BUN SERPL-MCNC: 8 MG/DL — SIGNIFICANT CHANGE UP (ref 7–23)
CALCIUM SERPL-MCNC: 8.3 MG/DL — LOW (ref 8.4–10.5)
CALCIUM SERPL-MCNC: 8.3 MG/DL — LOW (ref 8.4–10.5)
CHLORIDE SERPL-SCNC: 101 MMOL/L — SIGNIFICANT CHANGE UP (ref 96–108)
CHLORIDE SERPL-SCNC: 101 MMOL/L — SIGNIFICANT CHANGE UP (ref 96–108)
CO2 SERPL-SCNC: 30 MMOL/L — SIGNIFICANT CHANGE UP (ref 22–31)
CO2 SERPL-SCNC: 30 MMOL/L — SIGNIFICANT CHANGE UP (ref 22–31)
CREAT SERPL-MCNC: 0.77 MG/DL — SIGNIFICANT CHANGE UP (ref 0.5–1.3)
CREAT SERPL-MCNC: 0.77 MG/DL — SIGNIFICANT CHANGE UP (ref 0.5–1.3)
EGFR: 101 ML/MIN/1.73M2 — SIGNIFICANT CHANGE UP
EGFR: 101 ML/MIN/1.73M2 — SIGNIFICANT CHANGE UP
GLUCOSE BLDC GLUCOMTR-MCNC: 115 MG/DL — HIGH (ref 70–99)
GLUCOSE BLDC GLUCOMTR-MCNC: 115 MG/DL — HIGH (ref 70–99)
GLUCOSE SERPL-MCNC: 86 MG/DL — SIGNIFICANT CHANGE UP (ref 70–99)
GLUCOSE SERPL-MCNC: 86 MG/DL — SIGNIFICANT CHANGE UP (ref 70–99)
HCT VFR BLD CALC: 22.5 % — LOW (ref 39–50)
HCT VFR BLD CALC: 22.5 % — LOW (ref 39–50)
HGB BLD-MCNC: 7.6 G/DL — LOW (ref 13–17)
HGB BLD-MCNC: 7.6 G/DL — LOW (ref 13–17)
MAGNESIUM SERPL-MCNC: 1.8 MG/DL — SIGNIFICANT CHANGE UP (ref 1.6–2.6)
MAGNESIUM SERPL-MCNC: 1.8 MG/DL — SIGNIFICANT CHANGE UP (ref 1.6–2.6)
MCHC RBC-ENTMCNC: 31.3 PG — SIGNIFICANT CHANGE UP (ref 27–34)
MCHC RBC-ENTMCNC: 31.3 PG — SIGNIFICANT CHANGE UP (ref 27–34)
MCHC RBC-ENTMCNC: 33.8 GM/DL — SIGNIFICANT CHANGE UP (ref 32–36)
MCHC RBC-ENTMCNC: 33.8 GM/DL — SIGNIFICANT CHANGE UP (ref 32–36)
MCV RBC AUTO: 92.6 FL — SIGNIFICANT CHANGE UP (ref 80–100)
MCV RBC AUTO: 92.6 FL — SIGNIFICANT CHANGE UP (ref 80–100)
NRBC # BLD: 0 /100 WBCS — SIGNIFICANT CHANGE UP (ref 0–0)
NRBC # BLD: 0 /100 WBCS — SIGNIFICANT CHANGE UP (ref 0–0)
PHOSPHATE SERPL-MCNC: 2.2 MG/DL — LOW (ref 2.5–4.5)
PHOSPHATE SERPL-MCNC: 2.2 MG/DL — LOW (ref 2.5–4.5)
PLATELET # BLD AUTO: 46 K/UL — LOW (ref 150–400)
PLATELET # BLD AUTO: 46 K/UL — LOW (ref 150–400)
POTASSIUM SERPL-MCNC: 3.2 MMOL/L — LOW (ref 3.5–5.3)
POTASSIUM SERPL-MCNC: 3.2 MMOL/L — LOW (ref 3.5–5.3)
POTASSIUM SERPL-SCNC: 3.2 MMOL/L — LOW (ref 3.5–5.3)
POTASSIUM SERPL-SCNC: 3.2 MMOL/L — LOW (ref 3.5–5.3)
RBC # BLD: 2.43 M/UL — LOW (ref 4.2–5.8)
RBC # BLD: 2.43 M/UL — LOW (ref 4.2–5.8)
RBC # FLD: 14.1 % — SIGNIFICANT CHANGE UP (ref 10.3–14.5)
RBC # FLD: 14.1 % — SIGNIFICANT CHANGE UP (ref 10.3–14.5)
SODIUM SERPL-SCNC: 137 MMOL/L — SIGNIFICANT CHANGE UP (ref 135–145)
SODIUM SERPL-SCNC: 137 MMOL/L — SIGNIFICANT CHANGE UP (ref 135–145)
WBC # BLD: 2.6 K/UL — LOW (ref 3.8–10.5)
WBC # BLD: 2.6 K/UL — LOW (ref 3.8–10.5)
WBC # FLD AUTO: 2.6 K/UL — LOW (ref 3.8–10.5)
WBC # FLD AUTO: 2.6 K/UL — LOW (ref 3.8–10.5)

## 2023-12-03 PROCEDURE — 99232 SBSQ HOSP IP/OBS MODERATE 35: CPT

## 2023-12-03 PROCEDURE — 99233 SBSQ HOSP IP/OBS HIGH 50: CPT

## 2023-12-03 RX ORDER — OXYCODONE HYDROCHLORIDE 5 MG/1
10 TABLET ORAL EVERY 4 HOURS
Refills: 0 | Status: DISCONTINUED | OUTPATIENT
Start: 2023-12-03 | End: 2023-12-10

## 2023-12-03 RX ORDER — POTASSIUM CHLORIDE 20 MEQ
40 PACKET (EA) ORAL EVERY 4 HOURS
Refills: 0 | Status: COMPLETED | OUTPATIENT
Start: 2023-12-03 | End: 2023-12-03

## 2023-12-03 RX ORDER — OXYCODONE HYDROCHLORIDE 5 MG/1
5 TABLET ORAL EVERY 4 HOURS
Refills: 0 | Status: DISCONTINUED | OUTPATIENT
Start: 2023-12-03 | End: 2023-12-06

## 2023-12-03 RX ORDER — OXYCODONE HYDROCHLORIDE 5 MG/1
10 TABLET ORAL ONCE
Refills: 0 | Status: DISCONTINUED | OUTPATIENT
Start: 2023-12-03 | End: 2023-12-03

## 2023-12-03 RX ADMIN — Medication 1 APPLICATION(S): at 22:12

## 2023-12-03 RX ADMIN — Medication 100 MILLIGRAM(S): at 12:00

## 2023-12-03 RX ADMIN — Medication 2 MILLIGRAM(S): at 00:34

## 2023-12-03 RX ADMIN — LOSARTAN POTASSIUM 50 MILLIGRAM(S): 100 TABLET, FILM COATED ORAL at 06:43

## 2023-12-03 RX ADMIN — OXYCODONE HYDROCHLORIDE 5 MILLIGRAM(S): 5 TABLET ORAL at 23:41

## 2023-12-03 RX ADMIN — Medication 2 MILLIGRAM(S): at 01:34

## 2023-12-03 RX ADMIN — Medication 1 APPLICATION(S): at 13:09

## 2023-12-03 RX ADMIN — ESCITALOPRAM OXALATE 20 MILLIGRAM(S): 10 TABLET, FILM COATED ORAL at 12:00

## 2023-12-03 RX ADMIN — OXYCODONE HYDROCHLORIDE 10 MILLIGRAM(S): 5 TABLET ORAL at 19:52

## 2023-12-03 RX ADMIN — Medication 1 MILLIGRAM(S): at 12:00

## 2023-12-03 RX ADMIN — OXYCODONE HYDROCHLORIDE 10 MILLIGRAM(S): 5 TABLET ORAL at 20:40

## 2023-12-03 RX ADMIN — Medication 1 APPLICATION(S): at 06:43

## 2023-12-03 RX ADMIN — POLYETHYLENE GLYCOL 3350 17 GRAM(S): 17 POWDER, FOR SOLUTION ORAL at 12:00

## 2023-12-03 RX ADMIN — Medication 40 MILLIEQUIVALENT(S): at 13:09

## 2023-12-03 RX ADMIN — Medication 975 MILLIGRAM(S): at 19:15

## 2023-12-03 RX ADMIN — Medication 975 MILLIGRAM(S): at 19:45

## 2023-12-03 RX ADMIN — LEVETIRACETAM 500 MILLIGRAM(S): 250 TABLET, FILM COATED ORAL at 06:43

## 2023-12-03 RX ADMIN — LEVETIRACETAM 500 MILLIGRAM(S): 250 TABLET, FILM COATED ORAL at 17:01

## 2023-12-03 RX ADMIN — CHLORHEXIDINE GLUCONATE 1 APPLICATION(S): 213 SOLUTION TOPICAL at 06:06

## 2023-12-03 RX ADMIN — Medication 1 TABLET(S): at 12:00

## 2023-12-03 RX ADMIN — Medication 40 MILLIEQUIVALENT(S): at 17:01

## 2023-12-03 NOTE — CHART NOTE - NSCHARTNOTEFT_GEN_A_CORE
Rapid Response   Patient is a 63y old Male admitted for fall w +HS onto bedside table with LOC   Rapid response team called for increasing CIWA score to 21.    Patient was seen and examined at the bedside by the rapid response team.     PAST MEDICAL & SURGICAL HISTORY:  Hypertension  Anxiety  Pancreatic cancer  Alcohol abuse  History of pancreatic surgery  2015  H/O ventral hernia repair    Vital Signs Last 24 Hrs  T(C): 36.7 (03 Dec 2023 00:03), Max: 37.2 (02 Dec 2023 16:40)  T(F): 98.1 (03 Dec 2023 00:03), Max: 98.9 (02 Dec 2023 16:40)  HR: 64 (03 Dec 2023 00:03) (62 - 71)  BP: 119/74 (03 Dec 2023 00:03) (100/61 - 119/74)  RR: 18 (03 Dec 2023 00:03) (18 - 18)  SpO2: 99% (03 Dec 2023 00:03) (96% - 99%)    Parameters below as of 03 Dec 2023 00:03  Patient On (Oxygen Delivery Method): room air    Physical Exam:   Gen: Laying in bed, agitated, attempting to get out of bed  HEENT: left periorbital swelling with surrounding ecchymosis, EOMI, ecchymosis on right neck/ supraclavicular region   Resp: Unlabored breathing  Abd: soft, nontender, nondistended  Ext: Moves 4 extremities spontaneously, tremor noted    12-01 @ 07:01  -  12-02 @ 07:00  --------------------------------------------------------  IN: 900 mL / OUT: 0 mL / NET: 900 mL    12-02 @ 07:01  -  12-03 @ 01:56  --------------------------------------------------------  IN: 480 mL / OUT: 0 mL / NET: 480 mL                          7.9    3.49  )-----------( 41       ( 02 Dec 2023 18:12 )             23.6     12-02    133<L>  |  96  |  12  ----------------------------<  97  3.3<L>   |  30  |  0.75    Ca    8.0<L>      02 Dec 2023 06:44  Phos  2.6     12-02  Mg     1.9     12-02      Assessment- Rapid Response called for increasing CIWA score in 63y year old male admitted for fall resulting in parafalcine SDH w/o mass effect and L periorbital STS. He has been on CIWA protocol since admission and was on a phenobarb taper while in the SICU, now getting ativan based on CIWA score. CIWA score based on tremors, agitation, hallucinations, and disorientation. He had received 2 doses of 2mg ativan in the 2 hours prior to rapid. The patient had also had a slowly dropping Hgb and had The CTH, and CT CAP performed today with insignificant findings and no sign of new or worsening bleeding. He also responded appropriately to 1 unit RBC this afternoon.     Plan- The patient was given another dose of 2 mg ativan for his elevated CIWA score and will be placed on 1 to 1. He does not have other signs of alcohol withdrawal at this time as he is afebrile and hemodynamically stable. The patient's symptoms could also be explained by delirium so he will be placed on a 1 to 1. Rapid Response   Patient is a 63y old Male admitted for fall w +HS onto bedside table with LOC   Rapid response team called for increasing CIWA score to 21.    Patient was seen and examined at the bedside by the rapid response team.     PAST MEDICAL & SURGICAL HISTORY:  Hypertension  Anxiety  Pancreatic cancer  Alcohol abuse  History of pancreatic surgery  2015  H/O ventral hernia repair    Vital Signs Last 24 Hrs  T(C): 36.7 (03 Dec 2023 00:03), Max: 37.2 (02 Dec 2023 16:40)  T(F): 98.1 (03 Dec 2023 00:03), Max: 98.9 (02 Dec 2023 16:40)  HR: 64 (03 Dec 2023 00:03) (62 - 71)  BP: 119/74 (03 Dec 2023 00:03) (100/61 - 119/74)  RR: 18 (03 Dec 2023 00:03) (18 - 18)  SpO2: 99% (03 Dec 2023 00:03) (96% - 99%)    Parameters below as of 03 Dec 2023 00:03  Patient On (Oxygen Delivery Method): room air    Physical Exam:   Gen: Laying in bed, agitated, attempting to get out of bed  HEENT: left periorbital swelling with surrounding ecchymosis, EOMI, ecchymosis on right neck/ supraclavicular region   Resp: Unlabored breathing  Abd: soft, nontender, nondistended  Ext: Moves 4 extremities spontaneously, tremor noted    12-01 @ 07:01  -  12-02 @ 07:00  --------------------------------------------------------  IN: 900 mL / OUT: 0 mL / NET: 900 mL    12-02 @ 07:01  -  12-03 @ 01:56  --------------------------------------------------------  IN: 480 mL / OUT: 0 mL / NET: 480 mL                          7.9    3.49  )-----------( 41       ( 02 Dec 2023 18:12 )             23.6     12-02    133<L>  |  96  |  12  ----------------------------<  97  3.3<L>   |  30  |  0.75    Ca    8.0<L>      02 Dec 2023 06:44  Phos  2.6     12-02  Mg     1.9     12-02      Assessment- Rapid Response called for increasing CIWA score in 63y year old male admitted for fall resulting in parafalcine SDH w/o mass effect and L periorbital STS. He has been on CIWA protocol since admission and was on a phenobarb taper while in the SICU, now getting ativan based on CIWA score. Nursing team reports CIWA score of 21 based on tremors, agitation, hallucinations, and disorientation. He had received 2 doses of 2mg ativan in the 2 hours prior to rapid. The patient had also had a slowly dropping Hgb and had The CTH, and CT CAP performed today with insignificant findings and no sign of new or worsening bleeding. He also responded appropriately to 1 unit RBC this afternoon.     Plan- The patient was given another dose of 2 mg ativan for his elevated CIWA score and will be placed on 1 to 1. He does not have other signs of alcohol withdrawal at this time as he is afebrile and hemodynamically stable. The patient's symptoms could also be explained by delirium so he will be placed on a 1 to 1.

## 2023-12-03 NOTE — RAPID RESPONSE TEAM SUMMARY - NSSITUATIONBACKGROUNDRRT_GEN_ALL_CORE
63yM w/ PMHx of alcohol abuse and anx/depression who presents s/p fall w +HS onto bedside table with LOC, found to have L parafalcine SDH w/o mass effect and L periorbital STS s/p phenobarbital in the SICU, transferred to floors under trauma service.     RRT called for increased CIWA score to 21.

## 2023-12-03 NOTE — PROGRESS NOTE ADULT - ASSESSMENT
Patient is a 63y year old male with PMHx of alcohol abuse (drinks 1p vodka/day), pancreatic CA s/p whipple procedure in 2015, depression and HTN who presnts after a ground-level fall, had + HS and + LOC. CTH shows small Left anterior parafalcine SDH.     Neuro  - Patient with small, left parafalcine subdural hematoma, stable on repeat imaging  - Patient with previous history of withdrawal seizures at [facility] in September  - Per patients brother he has been drinking again the last several days. Patient also has history of substance abuse and med seeking behavior  - Patient with headaches, repeat head CT > stable SDH  - Keppra for sz ppx  - c/w CIWA protocol  - pain control with tylenol prn    Respiratory:   - Patient without history of respiratory disease, no active issues  - 1.7 cm nodular airspace opacity noted in RUL on CT chest; Outpatient follow up  - Lexapro 20mg PO qd    Cardiovascular:   - hx HTN, c/w losartan PO qd  - Not requiring pressors    Gastrointestinal/Nutrition:   - Patient with history of Pancreatic Ca s/p Whipple procedure  - Diet: regular  - Bowel regimen: Miralax and senna  - Nutrition supplementation: Folic acid, thiamine, multivitamin    Renal/Genitourinary:   - No active issues, creatinine at baseline, no need for laura    Hematologic:   - Patient with elevated INR after head strike, received 1500 units of KCENTRA, INR now 1.54  - Hemoglobin 7.6   - SCDs for DVT ppx    Infectious Disease:   - c/w erythromycin ointment for L eye for infection ppx    Endocrine:   - No history of DM, blood glucose well controlled; No need to check fingersticks Patient is a 63y year old male with PMHx of alcohol abuse (drinks 1p vodka/day), pancreatic CA s/p whipple procedure in 2015, depression and HTN who presnts after a ground-level fall, had + HS and + LOC. CTH shows small Left anterior parafalcine SDH.     Neuro  - Patient with small, left parafalcine subdural hematoma, stable on repeat imaging  - Patient with previous history of withdrawal seizures at [facility] in September  - Per patients brother he has been drinking again the last several days. Patient also has history of substance abuse and med seeking behavior  - Patient with headaches, repeat head CT > stable SDH  - Keppra for sz ppx  - c/w CIWA protocol  - pain control with tylenol prn    Respiratory:   - Patient without history of respiratory disease, no active issues  - 1.7 cm nodular airspace opacity noted in RUL on CT chest; Outpatient follow up  - Lexapro 20mg PO qd    Cardiovascular:   - hx HTN, c/w losartan PO qd  - Not requiring pressors    Gastrointestinal/Nutrition:   - Patient with history of Pancreatic Ca s/p Whipple procedure  - Diet: regular  - Bowel regimen: Miralax and senna  - Nutrition supplementation: Folic acid, thiamine, multivitamin    Renal/Genitourinary:   - No active issues, creatinine at baseline, no need for laura    Hematologic:   - Patient with elevated INR after head strike, received 1500 units of KCENTRA, INR now 1.54  - Hemoglobin 7.6   - SCDs for DVT ppx    Infectious Disease:   - c/w erythromycin ointment for L eye for infection ppx    Endocrine:   - No history of DM, blood glucose well controlled; No need to check fingersticks Patient is a 63y year old male with PMHx of alcohol abuse (drinks 1p vodka/day), pancreatic CA s/p whipple procedure in 2015, depression and HTN who presnts after a ground-level fall, had + HS and + LOC. CTH shows small Left anterior parafalcine SDH.     Neuro  - Patient with small, left parafalcine subdural hematoma, stable on repeat imaging  - Patient with previous history of withdrawal seizures at [facility] in September  - Per patients brother he has been drinking again the last several days. Patient also has history of substance abuse and med seeking behavior  - Patient with headaches, repeat head CT > stable SDH  - Keppra for sz ppx  - c/w CIWA protocol  - pain control with tylenol prn    Respiratory:   - Patient without history of respiratory disease, no active issues  - 1.7 cm nodular airspace opacity noted in RUL on CT chest; Outpatient follow up  - Lexapro 20mg PO qd    Cardiovascular:   - hx HTN, c/w losartan PO qd  - Not requiring pressors    Gastrointestinal/Nutrition:   - Patient with history of Pancreatic Ca s/p Whipple procedure  - Diet: regular  - Bowel regimen: Miralax and senna  - Nutrition supplementation: Folic acid, thiamine, multivitamin    Renal/Genitourinary:   - No active issues, creatinine at baseline, no need for laura    Hematologic:   - Patient with elevated INR after head strike, received 1500 units of KCENTRA, INR now 1.54  - Hemoglobin 7.6   - SCDs for DVT ppx    Infectious Disease:   - c/w erythromycin ointment for L eye for infection ppx    Endocrine:   - No history of DM, blood glucose well controlled; No need to check fingersticks

## 2023-12-03 NOTE — PROGRESS NOTE ADULT - ATTENDING COMMENTS
Evaluated  in SICU. Clinical parameters,  labs, available imagine reviewed.  63y old  Male who presents with a chief complaint of headache after fall. Ground level fall, he hit his head and had loss of consciousness. pt was under influence of alcohol    Transferred back from floor for reported agitation requiring 2 pushes of Ativan last night. Awake and alert not agitated with no sign of agitation/withdrawal. Will assess with CIWA and treat with PRN Ativan.  Repeat CTH has stable small parafalcine SDH, seizure ppx with Keppra  Periorbital hematoma with subconjunctival hemorrhage on medical management with ointment.  Hemodynamically stable  On RA  On  PO  Will give one dose Vit K to day again for increasing INR. Coagulopathy more likely from liver dysfunction.   Monitor stable low Hb  On Thiamine FA  Mechanical  pharm DVT ppx since has drop in Hb last night requiring PRBC transfusion  OOB/mobilization/PT

## 2023-12-03 NOTE — PROGRESS NOTE ADULT - TIME BILLING
I saw and evaluated patient and agree with above note  required interventions for elevated CIWA score overnight  on my exam patient markedly lethargic although arousal  arranged for patient to be transferred to the SICU

## 2023-12-03 NOTE — PROGRESS NOTE ADULT - SUBJECTIVE AND OBJECTIVE BOX
24 HOUR EVENTS:  - patient brought back to SICU for increased somnolence  - o/n on floors, patient had elevated CIWA, appeared confused; was given several doses of ativan; was somnolent this AM though AOx4  - Patient endorses feeling somnolent and facial pain. Denies nausea, AVH, tactile hallucinations, anxiety, abdominal pain    SUBJECTIVE/ROS:  [X] A ten-point review of systems was otherwise negative except as noted.  [ ] Due to altered mental status/intubation, subjective information were not able to be obtained from the patient. History was obtained, to the extent possible, from review of the chart and collateral sources of information.      NEURO  Exam: AOx4, PERRL, following simple commands, somnolent. Arouses to voice  Meds: acetaminophen     Tablet .. 975 milliGRAM(s) Oral every 6 hours PRN Mild Pain (1 - 3)  escitalopram 20 milliGRAM(s) Oral daily  levETIRAcetam 500 milliGRAM(s) Oral two times a day    [x] Adequacy of sedation and pain control has been assessed and adjusted      RESPIRATORY  RR: 18 (12-03-23 @ 10:00) (16 - 18)  SpO2: 96% (12-03-23 @ 10:00) (96% - 99%)  Wt(kg): --  Exam: CTA b/l. No murmurs, rubs, gallops appreciated.   Mechanical Ventilation:     [ ] Extubation Readiness Assessed  Meds:       CARDIOVASCULAR  HR: 84 (12-03-23 @ 10:00) (62 - 84)  BP: 128/74 (12-03-23 @ 10:00) (100/61 - 128/74)  BP(mean): --  ABP: --  ABP(mean): --  Wt(kg): --  CVP(cm H2O): --      Exam: S1S2. No murmurs, rubs, gallops appreciated.  Cardiac Rhythm: NSR  Meds: losartan 50 milliGRAM(s) Oral daily        GI/NUTRITION  Exam: Soft, non-distended, non-tender.   Diet: Reg diet  Meds: polyethylene glycol 3350 17 Gram(s) Oral daily  senna 2 Tablet(s) Oral at bedtime      GENITOURINARY  I&O's Detail    12-02 @ 07:01  -  12-03 @ 07:00  --------------------------------------------------------  IN:    Oral Fluid: 480 mL  Total IN: 480 mL    OUT:  Total OUT: 0 mL    Total NET: 480 mL          12-03    137  |  101  |  8   ----------------------------<  86  3.2<L>   |  30  |  0.77    Ca    8.3<L>      03 Dec 2023 07:02  Phos  2.2     12-03  Mg     1.8     12-03      [ ] Dominguez catheter, indication: N/A  Meds: folic acid 1 milliGRAM(s) Oral daily  multivitamin 1 Tablet(s) Oral daily  potassium chloride    Tablet ER 40 milliEquivalent(s) Oral every 4 hours  thiamine 100 milliGRAM(s) Oral daily        HEMATOLOGIC  Meds:   [x] VTE Prophylaxis                        7.6    2.60  )-----------( 46       ( 03 Dec 2023 07:04 )             22.5       Transfusion     [ ] PRBC   [ ] Platelets   [ ] FFP   [ ] Cryoprecipitate      INFECTIOUS DISEASES  T(C): 36.7 (12-03-23 @ 10:00), Max: 37.2 (12-02-23 @ 16:40)  Wt(kg): --  WBC Count: 2.60 K/uL (12-03 @ 07:04)  WBC Count: 3.49 K/uL (12-02 @ 18:12)    Recent Cultures:    Meds: influenza   Vaccine 0.5 milliLiter(s) IntraMuscular once        ENDOCRINE  Capillary Blood Glucose    Meds:       ACCESS DEVICES:  [ ] Peripheral IV  [ ] Central Venous Line	[ ] R	[ ] L	[ ] IJ	[ ] Fem	[ ] SC	Placed:   [ ] Arterial Line		[ ] R	[ ] L	[ ] Fem	[ ] Rad	[ ] Ax	Placed:   [ ] PICC:					[ ] Mediport  [ ] Urinary Catheter, Date Placed:   [ ] Necessity of urinary, arterial, and venous catheters discussed    OTHER MEDICATIONS:  chlorhexidine 2% Cloths 1 Application(s) Topical <User Schedule>  erythromycin   Ointment 1 Application(s) Left EYE three times a day

## 2023-12-03 NOTE — PROGRESS NOTE ADULT - SUBJECTIVE AND OBJECTIVE BOX
Surgery Progress Note    S: Patient seen and examined. Hgb was 6.6 yesterday AM, received 1u PRBC with appropriate response. Underwent CTAP, chest, and head to evaluate for any bleeding as patient's hgb had been dropping steadily over last several days. Patient was a rapid response overnight for elevated CIWA score. Given 3x 2mg IV pushes of ativan overnight.     O:  Physical Exam:  Gen: Laying in bed, very drowsy  HEENT: left periorbital swelling with surrounding ecchymosis, continuing to improve, EOMI, ecchymosis on right neck/ supraclavicular region  Resp: Unlabored breathing    Vital Signs Last 24 Hrs  T(C): 36.7 (03 Dec 2023 10:00), Max: 37.2 (02 Dec 2023 16:40)  T(F): 98 (03 Dec 2023 10:00), Max: 98.9 (02 Dec 2023 16:40)  HR: 84 (03 Dec 2023 10:00) (62 - 84)  BP: 128/74 (03 Dec 2023 10:00) (100/61 - 128/74)  BP(mean): --  RR: 18 (03 Dec 2023 10:00) (16 - 18)  SpO2: 96% (03 Dec 2023 10:00) (96% - 99%)    Parameters below as of 03 Dec 2023 10:00  Patient On (Oxygen Delivery Method): room air        I&O's Detail    02 Dec 2023 07:01  -  03 Dec 2023 07:00  --------------------------------------------------------  IN:    Oral Fluid: 480 mL  Total IN: 480 mL    OUT:  Total OUT: 0 mL    Total NET: 480 mL                                7.6    2.60  )-----------( 46       ( 03 Dec 2023 07:04 )             22.5       12-03    137  |  101  |  8   ----------------------------<  86  3.2<L>   |  30  |  0.77    Ca    8.3<L>      03 Dec 2023 07:02  Phos  2.2     12-03  Mg     1.8     12-03

## 2023-12-03 NOTE — PROGRESS NOTE ADULT - ASSESSMENT
ASSESSMENT:  63yM w/ PMHx of alcohol abuse and anx/depression who presents s/p fall w +HS onto bedside table with LOC.  Trauma assessment in ED: ABCs intact , GCS 15 , AAOx3 , with physical exam findings, imaging, and labs as documented above, injuries are identified:   - L parafalcine SDH w/o mass effect  - L periorbital STS      PLAN:  - SICU consult for close monitoring, elevated CIWA score  - Multimodal pain control with tylenol PO q8h ATC, tramadol q6h PRN for moderate (25mg), and severe pain (50mg)  - Tertiary exam performed  - No face dressing, leave open to air  -  PT evaluation- Acute Inpatient Rehab    Trauma ACS 4811 ASSESSMENT:  63yM w/ PMHx of alcohol abuse and anx/depression who presents s/p fall w +HS onto bedside table with LOC.  Trauma assessment in ED: ABCs intact , GCS 15 , AAOx3 , with physical exam findings, imaging, and labs as documented above, injuries are identified:   - L parafalcine SDH w/o mass effect  - L periorbital STS      PLAN:  - SICU consult for close monitoring, elevated CIWA score  - Multimodal pain control with tylenol PO q8h ATC, tramadol q6h PRN for moderate (25mg), and severe pain (50mg)  - Tertiary exam performed  - No face dressing, leave open to air  -  PT evaluation- Acute Inpatient Rehab    Trauma ACS 4831 ASSESSMENT:  63yM w/ PMHx of alcohol abuse and anx/depression who presents s/p fall w +HS onto bedside table with LOC.  Trauma assessment in ED: ABCs intact , GCS 15 , AAOx3 , with physical exam findings, imaging, and labs as documented above, injuries are identified:   - L parafalcine SDH w/o mass effect  - L periorbital STS      PLAN:  - SICU consult for close monitoring, elevated CIWA score  - Multimodal pain control with tylenol PO q8h ATC, tramadol q6h PRN for moderate (25mg), and severe pain (50mg)  - Tertiary exam performed  - No face dressing, leave open to air  -  PT evaluation- Acute Inpatient Rehab    Trauma ACS 7740

## 2023-12-03 NOTE — RAPID RESPONSE TEAM SUMMARY - NSADDTLFINDINGSRRT_GEN_ALL_CORE
On arrival, hemodynamics are normotensive, HR 70s, saturating 95% on room air, afebrile. Glucose 115. Of note, patient had received 10mg oxycodone and 4mg ativan within the past 3-4 hours. On physical exam, patient is AOx1 and had difficulty re-orienting. Currently protecting airway. No tremulousness, tongue fasciculations, or diaphoresis. Pupils equal, round, and reactive, moving all extremities spontaneously. Patient used urinal with assistance during RRT with minimal urine output. Was then able to be re-directed back into bed. Primary trauma team at bedside. I suspect hypoactive/hyperactive delirium in setting of simultaneous benzodiazepine and opioid use vs less likely expanding SDH vs worsening alcohol withdrawal. Recommended frequent re-orientation and either enhanced supervision or constant observation to monitor patient. Physical exam not indicative of worsening alcohol withdrawal. No ativan given. CTH from 12/2 showed stable SDH. Primary team to decide if repeat CTH is required. Primary team present and in agreement.

## 2023-12-04 LAB
ALBUMIN SERPL ELPH-MCNC: 2.9 G/DL — LOW (ref 3.3–5)
ALBUMIN SERPL ELPH-MCNC: 2.9 G/DL — LOW (ref 3.3–5)
ALP SERPL-CCNC: 83 U/L — SIGNIFICANT CHANGE UP (ref 40–120)
ALP SERPL-CCNC: 83 U/L — SIGNIFICANT CHANGE UP (ref 40–120)
ALT FLD-CCNC: 11 U/L — SIGNIFICANT CHANGE UP (ref 10–45)
ALT FLD-CCNC: 11 U/L — SIGNIFICANT CHANGE UP (ref 10–45)
ANION GAP SERPL CALC-SCNC: 7 MMOL/L — SIGNIFICANT CHANGE UP (ref 5–17)
ANION GAP SERPL CALC-SCNC: 7 MMOL/L — SIGNIFICANT CHANGE UP (ref 5–17)
APTT BLD: 37.8 SEC — HIGH (ref 24.5–35.6)
APTT BLD: 37.8 SEC — HIGH (ref 24.5–35.6)
AST SERPL-CCNC: 21 U/L — SIGNIFICANT CHANGE UP (ref 10–40)
AST SERPL-CCNC: 21 U/L — SIGNIFICANT CHANGE UP (ref 10–40)
BILIRUB SERPL-MCNC: 1.8 MG/DL — HIGH (ref 0.2–1.2)
BILIRUB SERPL-MCNC: 1.8 MG/DL — HIGH (ref 0.2–1.2)
BUN SERPL-MCNC: 8 MG/DL — SIGNIFICANT CHANGE UP (ref 7–23)
BUN SERPL-MCNC: 8 MG/DL — SIGNIFICANT CHANGE UP (ref 7–23)
CALCIUM SERPL-MCNC: 8.4 MG/DL — SIGNIFICANT CHANGE UP (ref 8.4–10.5)
CALCIUM SERPL-MCNC: 8.4 MG/DL — SIGNIFICANT CHANGE UP (ref 8.4–10.5)
CHLORIDE SERPL-SCNC: 101 MMOL/L — SIGNIFICANT CHANGE UP (ref 96–108)
CHLORIDE SERPL-SCNC: 101 MMOL/L — SIGNIFICANT CHANGE UP (ref 96–108)
CO2 SERPL-SCNC: 26 MMOL/L — SIGNIFICANT CHANGE UP (ref 22–31)
CO2 SERPL-SCNC: 26 MMOL/L — SIGNIFICANT CHANGE UP (ref 22–31)
CREAT SERPL-MCNC: 0.79 MG/DL — SIGNIFICANT CHANGE UP (ref 0.5–1.3)
CREAT SERPL-MCNC: 0.79 MG/DL — SIGNIFICANT CHANGE UP (ref 0.5–1.3)
EGFR: 100 ML/MIN/1.73M2 — SIGNIFICANT CHANGE UP
EGFR: 100 ML/MIN/1.73M2 — SIGNIFICANT CHANGE UP
GLUCOSE SERPL-MCNC: 107 MG/DL — HIGH (ref 70–99)
GLUCOSE SERPL-MCNC: 107 MG/DL — HIGH (ref 70–99)
HCT VFR BLD CALC: 24.4 % — LOW (ref 39–50)
HCT VFR BLD CALC: 24.4 % — LOW (ref 39–50)
HGB BLD-MCNC: 8.4 G/DL — LOW (ref 13–17)
HGB BLD-MCNC: 8.4 G/DL — LOW (ref 13–17)
INR BLD: 1.5 RATIO — HIGH (ref 0.85–1.18)
INR BLD: 1.5 RATIO — HIGH (ref 0.85–1.18)
MAGNESIUM SERPL-MCNC: 1.5 MG/DL — LOW (ref 1.6–2.6)
MAGNESIUM SERPL-MCNC: 1.5 MG/DL — LOW (ref 1.6–2.6)
MCHC RBC-ENTMCNC: 31.5 PG — SIGNIFICANT CHANGE UP (ref 27–34)
MCHC RBC-ENTMCNC: 31.5 PG — SIGNIFICANT CHANGE UP (ref 27–34)
MCHC RBC-ENTMCNC: 34.4 GM/DL — SIGNIFICANT CHANGE UP (ref 32–36)
MCHC RBC-ENTMCNC: 34.4 GM/DL — SIGNIFICANT CHANGE UP (ref 32–36)
MCV RBC AUTO: 91.4 FL — SIGNIFICANT CHANGE UP (ref 80–100)
MCV RBC AUTO: 91.4 FL — SIGNIFICANT CHANGE UP (ref 80–100)
NRBC # BLD: 0 /100 WBCS — SIGNIFICANT CHANGE UP (ref 0–0)
NRBC # BLD: 0 /100 WBCS — SIGNIFICANT CHANGE UP (ref 0–0)
PHOSPHATE SERPL-MCNC: 2 MG/DL — LOW (ref 2.5–4.5)
PHOSPHATE SERPL-MCNC: 2 MG/DL — LOW (ref 2.5–4.5)
PLATELET # BLD AUTO: 62 K/UL — LOW (ref 150–400)
PLATELET # BLD AUTO: 62 K/UL — LOW (ref 150–400)
POTASSIUM SERPL-MCNC: 4.5 MMOL/L — SIGNIFICANT CHANGE UP (ref 3.5–5.3)
POTASSIUM SERPL-MCNC: 4.5 MMOL/L — SIGNIFICANT CHANGE UP (ref 3.5–5.3)
POTASSIUM SERPL-SCNC: 4.5 MMOL/L — SIGNIFICANT CHANGE UP (ref 3.5–5.3)
POTASSIUM SERPL-SCNC: 4.5 MMOL/L — SIGNIFICANT CHANGE UP (ref 3.5–5.3)
PROT SERPL-MCNC: 6.4 G/DL — SIGNIFICANT CHANGE UP (ref 6–8.3)
PROT SERPL-MCNC: 6.4 G/DL — SIGNIFICANT CHANGE UP (ref 6–8.3)
PROTHROM AB SERPL-ACNC: 16.3 SEC — HIGH (ref 9.5–13)
PROTHROM AB SERPL-ACNC: 16.3 SEC — HIGH (ref 9.5–13)
RBC # BLD: 2.67 M/UL — LOW (ref 4.2–5.8)
RBC # BLD: 2.67 M/UL — LOW (ref 4.2–5.8)
RBC # FLD: 14.5 % — SIGNIFICANT CHANGE UP (ref 10.3–14.5)
RBC # FLD: 14.5 % — SIGNIFICANT CHANGE UP (ref 10.3–14.5)
SODIUM SERPL-SCNC: 134 MMOL/L — LOW (ref 135–145)
SODIUM SERPL-SCNC: 134 MMOL/L — LOW (ref 135–145)
WBC # BLD: 3.22 K/UL — LOW (ref 3.8–10.5)
WBC # BLD: 3.22 K/UL — LOW (ref 3.8–10.5)
WBC # FLD AUTO: 3.22 K/UL — LOW (ref 3.8–10.5)
WBC # FLD AUTO: 3.22 K/UL — LOW (ref 3.8–10.5)

## 2023-12-04 PROCEDURE — 99223 1ST HOSP IP/OBS HIGH 75: CPT

## 2023-12-04 RX ORDER — PHENOBARBITAL 60 MG
32.4 TABLET ORAL EVERY 12 HOURS
Refills: 0 | Status: DISCONTINUED | OUTPATIENT
Start: 2023-12-09 | End: 2023-12-10

## 2023-12-04 RX ORDER — PHENOBARBITAL 60 MG
64.8 TABLET ORAL EVERY 12 HOURS
Refills: 0 | Status: DISCONTINUED | OUTPATIENT
Start: 2023-12-07 | End: 2023-12-08

## 2023-12-04 RX ORDER — MAGNESIUM SULFATE 500 MG/ML
2 VIAL (ML) INJECTION
Refills: 0 | Status: COMPLETED | OUTPATIENT
Start: 2023-12-04 | End: 2023-12-04

## 2023-12-04 RX ORDER — PHENOBARBITAL 60 MG
97.2 TABLET ORAL EVERY 12 HOURS
Refills: 0 | Status: DISCONTINUED | OUTPATIENT
Start: 2023-12-04 | End: 2023-12-06

## 2023-12-04 RX ADMIN — Medication 255 MILLIMOLE(S): at 07:35

## 2023-12-04 RX ADMIN — Medication 975 MILLIGRAM(S): at 15:30

## 2023-12-04 RX ADMIN — Medication 97.2 MILLIGRAM(S): at 21:02

## 2023-12-04 RX ADMIN — LOSARTAN POTASSIUM 50 MILLIGRAM(S): 100 TABLET, FILM COATED ORAL at 06:15

## 2023-12-04 RX ADMIN — OXYCODONE HYDROCHLORIDE 5 MILLIGRAM(S): 5 TABLET ORAL at 00:25

## 2023-12-04 RX ADMIN — Medication 1 APPLICATION(S): at 05:02

## 2023-12-04 RX ADMIN — OXYCODONE HYDROCHLORIDE 10 MILLIGRAM(S): 5 TABLET ORAL at 21:02

## 2023-12-04 RX ADMIN — OXYCODONE HYDROCHLORIDE 10 MILLIGRAM(S): 5 TABLET ORAL at 21:32

## 2023-12-04 RX ADMIN — Medication 1 MILLIGRAM(S): at 11:37

## 2023-12-04 RX ADMIN — Medication 25 GRAM(S): at 06:15

## 2023-12-04 RX ADMIN — Medication 975 MILLIGRAM(S): at 02:47

## 2023-12-04 RX ADMIN — Medication 1 APPLICATION(S): at 21:05

## 2023-12-04 RX ADMIN — OXYCODONE HYDROCHLORIDE 10 MILLIGRAM(S): 5 TABLET ORAL at 11:40

## 2023-12-04 RX ADMIN — Medication 975 MILLIGRAM(S): at 03:30

## 2023-12-04 RX ADMIN — CHLORHEXIDINE GLUCONATE 1 APPLICATION(S): 213 SOLUTION TOPICAL at 05:02

## 2023-12-04 RX ADMIN — OXYCODONE HYDROCHLORIDE 5 MILLIGRAM(S): 5 TABLET ORAL at 08:35

## 2023-12-04 RX ADMIN — Medication 1 TABLET(S): at 11:37

## 2023-12-04 RX ADMIN — Medication 975 MILLIGRAM(S): at 16:30

## 2023-12-04 RX ADMIN — LEVETIRACETAM 500 MILLIGRAM(S): 250 TABLET, FILM COATED ORAL at 06:15

## 2023-12-04 RX ADMIN — LEVETIRACETAM 500 MILLIGRAM(S): 250 TABLET, FILM COATED ORAL at 17:34

## 2023-12-04 RX ADMIN — OXYCODONE HYDROCHLORIDE 10 MILLIGRAM(S): 5 TABLET ORAL at 12:40

## 2023-12-04 RX ADMIN — Medication 255 MILLIMOLE(S): at 06:15

## 2023-12-04 RX ADMIN — Medication 975 MILLIGRAM(S): at 23:51

## 2023-12-04 RX ADMIN — Medication 25 GRAM(S): at 07:35

## 2023-12-04 RX ADMIN — ESCITALOPRAM OXALATE 20 MILLIGRAM(S): 10 TABLET, FILM COATED ORAL at 11:37

## 2023-12-04 RX ADMIN — Medication 100 MILLIGRAM(S): at 11:37

## 2023-12-04 RX ADMIN — OXYCODONE HYDROCHLORIDE 10 MILLIGRAM(S): 5 TABLET ORAL at 06:36

## 2023-12-04 RX ADMIN — Medication 1 APPLICATION(S): at 15:31

## 2023-12-04 RX ADMIN — OXYCODONE HYDROCHLORIDE 10 MILLIGRAM(S): 5 TABLET ORAL at 06:16

## 2023-12-04 RX ADMIN — OXYCODONE HYDROCHLORIDE 5 MILLIGRAM(S): 5 TABLET ORAL at 07:35

## 2023-12-04 NOTE — CONSULT NOTE ADULT - SUBJECTIVE AND OBJECTIVE BOX
Patient is a 63y old  Male who presents with a chief complaint of     Admission HPI:  Trauma Surgery Admission    CC: Patient is a 63y old  Male who presents with a chief complaint of headache after fall    Patient is a 63y year old male with PMHx of alcohol abuse (drinks 1p vodka/day), pancreatic CA s/p whipple procedure in 2015, depression and HTN who presnts after a ground-level fall, he reports he hit his head on a bedside table and had + HS and + LOC.     Primary Survey  A - airway intact  B - bilateral breath sounds and good chest rise  C - initially BP: 135/78 (11-29-23 @ 07:15), palpable pulses in all extremities  D - GCS 15 on arrival  Exposure obtained    On admission had imaging:    CT Head No Cont (11.29.23 @ 04:20) >  IMPRESSION:    Noncontrast CT Head: Stable since left parafalcine subdural hemorrhage.   No new or increased intracranial hemorrhage. No significant mass effect.    CT maxillofacial: No acute maxillofacial bone fracture. Left periorbital   and facial soft tissue swelling.    CT cervical spine: No acute cervical spine fracture or evidence of   traumatic malalignment.    CTA Neck: No significant flow-limiting stenosis or evidence of acute   diverticular withinthe cervical carotid or vertebral arteries.    Redemonstration of asymmetric right sternocleidomastoid muscle   enlargement and surrounding inflammation.    CTA Head: No proximal large vessel occlusion or significant stenosis.    CT Chest w/ IV Cont (11.29.23 @ 04:28) >  IMPRESSION:  Asymmetric enlargement and surrounding inflammation involving the right   sternocleidomastoid muscle as well as infiltration of the subcutaneous   fat overlying the right upper chest wall, likely representing the   sequelae of recent trauma.    No evidence of additional acute intrathoracic pathology.    Nonspecific 1.7 cm nodular airspace opacity within the right upper lobe.   Consider short-term follow-up to demonstrate resolution.    No CT evidence of acute traumatic sequelae within the abdomen or pelvis.    No acute thoracic spine fracture or evidence of traumatic malalignment.      Course has been complicated by periods of agitation and anemia. Monitored in ICU.    Most recent brain imaging:  CT Head w/wo IV Cont (12.02.23 @ 16:00) >  No change in small left posterior parafalcine subdural   hematoma compared with 11/29/2023. No change in large left periorbital   soft tissue swelling/hematoma. No abnormal parenchymal or leptomeningeal   enhancement.      REVIEW OF SYSTEMS: Limited insight, denies all ROS, calm at evaluation but per 1:1 has had periods of restlessness     PAST MEDICAL & SURGICAL HISTORY  Hypertension  Anxiety  Pancreatic cancer  Alcohol abuse  History of pancreatic surgery  H/O ventral hernia repair    FUNCTIONAL HISTORY:   Lives w brother in home w 12 ALICIA and one flight  PTA Independent    CURRENT FUNCTIONAL STATUS:  CG Transfers    FAMILY HISTORY   No pertinent family history in first degree relatives    MEDICATIONS   acetaminophen     Tablet .. 975 milliGRAM(s) Oral every 6 hours PRN  chlorhexidine 2% Cloths 1 Application(s) Topical <User Schedule>  erythromycin   Ointment 1 Application(s) Left EYE three times a day  escitalopram 20 milliGRAM(s) Oral daily  folic acid 1 milliGRAM(s) Oral daily  influenza   Vaccine 0.5 milliLiter(s) IntraMuscular once  levETIRAcetam 500 milliGRAM(s) Oral two times a day  losartan 50 milliGRAM(s) Oral daily  multivitamin 1 Tablet(s) Oral daily  oxyCODONE    IR 10 milliGRAM(s) Oral every 4 hours PRN  oxyCODONE    IR 5 milliGRAM(s) Oral every 4 hours PRN  polyethylene glycol 3350 17 Gram(s) Oral daily  senna 2 Tablet(s) Oral at bedtime  thiamine 100 milliGRAM(s) Oral daily    ALLERGIES  No Known Allergies    VITALS  T(C): 36.7 (12-04-23 @ 11:00), Max: 36.7 (12-04-23 @ 03:00)  HR: 71 (12-04-23 @ 12:00) (60 - 83)  BP: 131/68 (12-04-23 @ 12:00) (94/58 - 134/65)  RR: 17 (12-04-23 @ 12:00) (10 - 113)  SpO2: 100% (12-04-23 @ 12:00) (94% - 100%)  Wt(kg): --    PHYSICAL EXAM  Constitutional - NAD, Comfortable  HEENT - + facial and bl eye eccymoses, EOMI  Neck - Supple  Chest - Eccymoses throughout chest wall, No distress, no use of accessory muscles for respiration  Cardiovascular -Well perfused  Abdomen - BS+, Soft, NTND  Extremities - No C/C/E, No calf tenderness   Neurologic Exam -                 AAO x 3  Motor non-focal UE and LEs  No clonus  Poor concentration/ distractable  unable to spell world backwards   Psychiatric - Mood stable, Affect WNL    RECENT LABS/IMAGING  CBC Full  -  ( 04 Dec 2023 05:07 )  WBC Count : 3.22 K/uL  RBC Count : 2.67 M/uL  Hemoglobin : 8.4 g/dL  Hematocrit : 24.4 %  Platelet Count - Automated : 62 K/uL  Mean Cell Volume : 91.4 fl  Mean Cell Hemoglobin : 31.5 pg  Mean Cell Hemoglobin Concentration : 34.4 gm/dL  Auto Neutrophil # : x  Auto Lymphocyte # : x  Auto Monocyte # : x  Auto Eosinophil # : x  Auto Basophil # : x  Auto Neutrophil % : x  Auto Lymphocyte % : x  Auto Monocyte % : x  Auto Eosinophil % : x  Auto Basophil % : x    12-04    134<L>  |  101  |  8   ----------------------------<  107<H>  4.5   |  26  |  0.79    Ca    8.4      04 Dec 2023 05:07  Phos  2.0     12-04  Mg     1.5     12-04    TPro  6.4  /  Alb  2.9<L>  /  TBili  1.8<H>  /  DBili  x   /  AST  21  /  ALT  11  /  AlkPhos  83  12-04    Urinalysis Basic - ( 04 Dec 2023 05:07 )    Color: x / Appearance: x / SG: x / pH: x  Gluc: 107 mg/dL / Ketone: x  / Bili: x / Urobili: x   Blood: x / Protein: x / Nitrite: x   Leuk Esterase: x / RBC: x / WBC x   Sq Epi: x / Non Sq Epi: x / Bacteria: x    Impression:  62 yo with functional deficits secondary to diagnosis of TBI    Plan:  PT- ROM, Bed Mob, Transfers, Amb w AD and bracing as needed  OT- ADLs, bracing  SLP- Dysphagia eval and treat  Prec- Falls, Cardiac  DVT Prophylaxis- SCDs  Monitor anemia  Agitation- consider trazadone prn  Skin- Turn q2 h  Dispo-  Acute TBI Rehab- patient requires active and ongoing therapeutic interventions of multiple disciplines and can tolerate 3 hours of therapies x 4wks. Can actively participate and benefit from  an intensive rehabilitation program. Requires supervision by a rehabilitation physician and a coordinated interdisciplinary approach to providing rehabilitation.     When stabilized patient will require continued hospitalization for acute medical complications secondary to above diagnoses that are substantially impairing quality of life.  Will continue to follow. Rehab recommendations are dependent on how functional progress changes as well as how patient continues to participate and tolerate therapeutic interventions, which may change. Recommend ongoing mobilization by staff to maintain cardiopulmonary function and prevention of secondary complications related to debility. Discussed the specific management and recommendations above with rehab clinical care team/rehab liaison.     Patient is a 63y old  Male who presents with a chief complaint of     Admission HPI:  Trauma Surgery Admission    CC: Patient is a 63y old  Male who presents with a chief complaint of headache after fall    Patient is a 63y year old male with PMHx of alcohol abuse (drinks 1p vodka/day), pancreatic CA s/p whipple procedure in 2015, depression and HTN who presnts after a ground-level fall, he reports he hit his head on a bedside table and had + HS and + LOC.     Primary Survey  A - airway intact  B - bilateral breath sounds and good chest rise  C - initially BP: 135/78 (11-29-23 @ 07:15), palpable pulses in all extremities  D - GCS 15 on arrival  Exposure obtained    On admission had imaging:    CT Head No Cont (11.29.23 @ 04:20) >  IMPRESSION:    Noncontrast CT Head: Stable since left parafalcine subdural hemorrhage.   No new or increased intracranial hemorrhage. No significant mass effect.    CT maxillofacial: No acute maxillofacial bone fracture. Left periorbital   and facial soft tissue swelling.    CT cervical spine: No acute cervical spine fracture or evidence of   traumatic malalignment.    CTA Neck: No significant flow-limiting stenosis or evidence of acute   diverticular withinthe cervical carotid or vertebral arteries.    Redemonstration of asymmetric right sternocleidomastoid muscle   enlargement and surrounding inflammation.    CTA Head: No proximal large vessel occlusion or significant stenosis.    CT Chest w/ IV Cont (11.29.23 @ 04:28) >  IMPRESSION:  Asymmetric enlargement and surrounding inflammation involving the right   sternocleidomastoid muscle as well as infiltration of the subcutaneous   fat overlying the right upper chest wall, likely representing the   sequelae of recent trauma.    No evidence of additional acute intrathoracic pathology.    Nonspecific 1.7 cm nodular airspace opacity within the right upper lobe.   Consider short-term follow-up to demonstrate resolution.    No CT evidence of acute traumatic sequelae within the abdomen or pelvis.    No acute thoracic spine fracture or evidence of traumatic malalignment.      Course has been complicated by periods of agitation and anemia. Monitored in ICU.    Most recent brain imaging:  CT Head w/wo IV Cont (12.02.23 @ 16:00) >  No change in small left posterior parafalcine subdural   hematoma compared with 11/29/2023. No change in large left periorbital   soft tissue swelling/hematoma. No abnormal parenchymal or leptomeningeal   enhancement.      REVIEW OF SYSTEMS: Limited insight, denies all ROS, calm at evaluation but per 1:1 has had periods of restlessness     PAST MEDICAL & SURGICAL HISTORY  Hypertension  Anxiety  Pancreatic cancer  Alcohol abuse  History of pancreatic surgery  H/O ventral hernia repair    FUNCTIONAL HISTORY:   Lives w brother in home w 12 ALICIA and one flight  PTA Independent    CURRENT FUNCTIONAL STATUS:  CG Transfers    FAMILY HISTORY   No pertinent family history in first degree relatives    MEDICATIONS   acetaminophen     Tablet .. 975 milliGRAM(s) Oral every 6 hours PRN  chlorhexidine 2% Cloths 1 Application(s) Topical <User Schedule>  erythromycin   Ointment 1 Application(s) Left EYE three times a day  escitalopram 20 milliGRAM(s) Oral daily  folic acid 1 milliGRAM(s) Oral daily  influenza   Vaccine 0.5 milliLiter(s) IntraMuscular once  levETIRAcetam 500 milliGRAM(s) Oral two times a day  losartan 50 milliGRAM(s) Oral daily  multivitamin 1 Tablet(s) Oral daily  oxyCODONE    IR 10 milliGRAM(s) Oral every 4 hours PRN  oxyCODONE    IR 5 milliGRAM(s) Oral every 4 hours PRN  polyethylene glycol 3350 17 Gram(s) Oral daily  senna 2 Tablet(s) Oral at bedtime  thiamine 100 milliGRAM(s) Oral daily    ALLERGIES  No Known Allergies    VITALS  T(C): 36.7 (12-04-23 @ 11:00), Max: 36.7 (12-04-23 @ 03:00)  HR: 71 (12-04-23 @ 12:00) (60 - 83)  BP: 131/68 (12-04-23 @ 12:00) (94/58 - 134/65)  RR: 17 (12-04-23 @ 12:00) (10 - 113)  SpO2: 100% (12-04-23 @ 12:00) (94% - 100%)  Wt(kg): --    PHYSICAL EXAM  Constitutional - NAD, Comfortable  HEENT - + facial and bl eye eccymoses, EOMI  Neck - Supple  Chest - Eccymoses throughout chest wall, No distress, no use of accessory muscles for respiration  Cardiovascular -Well perfused  Abdomen - BS+, Soft, NTND  Extremities - No C/C/E, No calf tenderness   Neurologic Exam -                 AAO x 3  Motor non-focal UE and LEs  No clonus  Poor concentration/ distractable  unable to spell world backwards   Psychiatric - Mood stable, Affect WNL    RECENT LABS/IMAGING  CBC Full  -  ( 04 Dec 2023 05:07 )  WBC Count : 3.22 K/uL  RBC Count : 2.67 M/uL  Hemoglobin : 8.4 g/dL  Hematocrit : 24.4 %  Platelet Count - Automated : 62 K/uL  Mean Cell Volume : 91.4 fl  Mean Cell Hemoglobin : 31.5 pg  Mean Cell Hemoglobin Concentration : 34.4 gm/dL  Auto Neutrophil # : x  Auto Lymphocyte # : x  Auto Monocyte # : x  Auto Eosinophil # : x  Auto Basophil # : x  Auto Neutrophil % : x  Auto Lymphocyte % : x  Auto Monocyte % : x  Auto Eosinophil % : x  Auto Basophil % : x    12-04    134<L>  |  101  |  8   ----------------------------<  107<H>  4.5   |  26  |  0.79    Ca    8.4      04 Dec 2023 05:07  Phos  2.0     12-04  Mg     1.5     12-04    TPro  6.4  /  Alb  2.9<L>  /  TBili  1.8<H>  /  DBili  x   /  AST  21  /  ALT  11  /  AlkPhos  83  12-04    Urinalysis Basic - ( 04 Dec 2023 05:07 )    Color: x / Appearance: x / SG: x / pH: x  Gluc: 107 mg/dL / Ketone: x  / Bili: x / Urobili: x   Blood: x / Protein: x / Nitrite: x   Leuk Esterase: x / RBC: x / WBC x   Sq Epi: x / Non Sq Epi: x / Bacteria: x    Impression:  64 yo with functional deficits secondary to diagnosis of TBI    Plan:  PT- ROM, Bed Mob, Transfers, Amb w AD and bracing as needed  OT- ADLs, bracing  SLP- Dysphagia eval and treat  Prec- Falls, Cardiac  DVT Prophylaxis- SCDs  Monitor anemia  Agitation- consider trazadone prn  Skin- Turn q2 h  Dispo-  Acute TBI Rehab- patient requires active and ongoing therapeutic interventions of multiple disciplines and can tolerate 3 hours of therapies x 4wks. Can actively participate and benefit from  an intensive rehabilitation program. Requires supervision by a rehabilitation physician and a coordinated interdisciplinary approach to providing rehabilitation.     When stabilized patient will require continued hospitalization for acute medical complications secondary to above diagnoses that are substantially impairing quality of life.  Will continue to follow. Rehab recommendations are dependent on how functional progress changes as well as how patient continues to participate and tolerate therapeutic interventions, which may change. Recommend ongoing mobilization by staff to maintain cardiopulmonary function and prevention of secondary complications related to debility. Discussed the specific management and recommendations above with rehab clinical care team/rehab liaison.

## 2023-12-04 NOTE — PROGRESS NOTE ADULT - SUBJECTIVE AND OBJECTIVE BOX
Surgery Progress Note    S: Patient seen and examined. No acute events overnight.    O:  Physical Exam:  Gen: Laying in bed, sleeping  HEENT: L periorbital swelling with ecchymosis, ecchymosis on right neck/supraclavicular area  Resp: Unlabored breathing on room air    Vital Signs Last 24 Hrs  T(C): 36.7 (04 Dec 2023 11:00), Max: 36.7 (04 Dec 2023 03:00)  T(F): 98 (04 Dec 2023 11:00), Max: 98.1 (04 Dec 2023 03:00)  HR: 71 (04 Dec 2023 12:00) (60 - 83)  BP: 131/68 (04 Dec 2023 12:00) (94/58 - 134/65)  BP(mean): 92 (04 Dec 2023 12:00) (71 - 97)  RR: 17 (04 Dec 2023 12:00) (10 - 113)  SpO2: 100% (04 Dec 2023 12:00) (94% - 100%)    Parameters below as of 04 Dec 2023 11:00  Patient On (Oxygen Delivery Method): room air        I&O's Detail    03 Dec 2023 07:01  -  04 Dec 2023 07:00  --------------------------------------------------------  IN:    Oral Fluid: 360 mL  Total IN: 360 mL    OUT:    Incontinent per Condom Catheter (mL): 800 mL    Voided (mL): 1075 mL  Total OUT: 1875 mL    Total NET: -1515 mL      04 Dec 2023 07:01  -  04 Dec 2023 12:30  --------------------------------------------------------  IN:    IV PiggyBack: 250 mL    IV PiggyBack: 50 mL  Total IN: 300 mL    OUT:    Voided (mL): 400 mL  Total OUT: 400 mL    Total NET: -100 mL                                8.4    3.22  )-----------( 62       ( 04 Dec 2023 05:07 )             24.4       12-04    134<L>  |  101  |  8   ----------------------------<  107<H>  4.5   |  26  |  0.79    Ca    8.4      04 Dec 2023 05:07  Phos  2.0     12-04  Mg     1.5     12-04    TPro  6.4  /  Alb  2.9<L>  /  TBili  1.8<H>  /  DBili  x   /  AST  21  /  ALT  11  /  AlkPhos  83  12-04

## 2023-12-04 NOTE — PROGRESS NOTE ADULT - ASSESSMENT
ASSESSMENT:  63yM w/ PMHx of alcohol abuse and anx/depression who presents s/p fall w +HS onto bedside table with LOC.  Trauma assessment in ED: ABCs intact , GCS 15 , AAOx3 , with physical exam findings, imaging, and labs as documented above, injuries are identified:   - L parafalcine SDH w/o mass effect  - L periorbital STS      PLAN:  - Multimodal pain control with tylenol PO q8h ATC, tramadol q6h PRN for moderate (25mg), and severe pain (50mg)  - Tertiary exam performed  - No face dressing, leave open to air  -  PT evaluation- Acute Inpatient Rehab  - Appreciate care per SICU    Trauma ACS 9003 ASSESSMENT:  63yM w/ PMHx of alcohol abuse and anx/depression who presents s/p fall w +HS onto bedside table with LOC.  Trauma assessment in ED: ABCs intact , GCS 15 , AAOx3 , with physical exam findings, imaging, and labs as documented above, injuries are identified:   - L parafalcine SDH w/o mass effect  - L periorbital STS      PLAN:  - Multimodal pain control with tylenol PO q8h ATC, tramadol q6h PRN for moderate (25mg), and severe pain (50mg)  - Tertiary exam performed  - No face dressing, leave open to air  -  PT evaluation- Acute Inpatient Rehab  - Appreciate care per SICU    Trauma ACS 9090

## 2023-12-04 NOTE — PROGRESS NOTE ADULT - SUBJECTIVE AND OBJECTIVE BOX
HISTORY  63y Male    24 HOUR EVENTS:  - Readmitted for AMS, patient following commands but somnolent    SUBJECTIVE/ROS:  [ X ] A ten-point review of systems was otherwise negative except as noted.      NEURO  Exam: Left periorbital bruising, patient AAOx4    Meds: acetaminophen     Tablet .. 975 milliGRAM(s) Oral every 6 hours PRN Mild Pain (1 - 3)  escitalopram 20 milliGRAM(s) Oral daily  levETIRAcetam 500 milliGRAM(s) Oral two times a day  oxyCODONE    IR 10 milliGRAM(s) Oral every 4 hours PRN Severe Pain (7 - 10)  oxyCODONE    IR 5 milliGRAM(s) Oral every 4 hours PRN Moderate Pain (4 - 6)      RESPIRATORY  RR: 17 (12-03-23 @ 21:00) (13 - 41)  SpO2: 100% (12-03-23 @ 21:00) (93% - 100%)  Wt(kg): --  Exam: CTA B/L      CARDIOVASCULAR  HR: 66 (12-03-23 @ 21:00) (66 - 84)  BP: 134/65 (12-03-23 @ 21:00) (112/65 - 141/75)  BP(mean): 91 (12-03-23 @ 21:00) (84 - 102)  ABP: --  ABP(mean): --  Wt(kg): --  CVP(cm H2O): --    Exam: RRR, No Murmurs  Cardiac Rhythm: NSR  Meds: losartan 50 milliGRAM(s) Oral daily      GI/NUTRITION  Exam: Soft, non-distended, non-tender.   Diet: Regular, beers TID w/ meals  Meds: polyethylene glycol 3350 17 Gram(s) Oral daily  senna 2 Tablet(s) Oral at bedtime      GENITOURINARY  I&O's Detail    12-02 @ 07:01  -  12-03 @ 07:00  --------------------------------------------------------  IN:    Oral Fluid: 480 mL  Total IN: 480 mL    OUT:  Total OUT: 0 mL    Total NET: 480 mL      12-03 @ 07:01  -  12-04 @ 01:54  --------------------------------------------------------  IN:    Oral Fluid: 360 mL  Total IN: 360 mL    OUT:    Incontinent per Condom Catheter (mL): 800 mL    Voided (mL): 425 mL  Total OUT: 1225 mL    Total NET: -865 mL          12-03    137  |  101  |  8   ----------------------------<  86  3.2<L>   |  30  |  0.77    Ca    8.3<L>      03 Dec 2023 07:02  Phos  2.2     12-03  Mg     1.8     12-03      Meds: folic acid 1 milliGRAM(s) Oral daily  multivitamin 1 Tablet(s) Oral daily  thiamine 100 milliGRAM(s) Oral daily        HEMATOLOGIC  Meds:   [x] VTE Prophylaxis                        7.6    2.60  )-----------( 46       ( 03 Dec 2023 07:04 )             22.5       Transfusion     [ ] PRBC   [ ] Platelets   [ ] FFP   [ ] Cryoprecipitate      INFECTIOUS DISEASES  T(C): 36.1 (12-03-23 @ 15:00), Max: 36.7 (12-03-23 @ 10:00)  Wt(kg): --  WBC Count: 2.60 K/uL (12-03 @ 07:04)    Recent Cultures:    Meds: influenza   Vaccine 0.5 milliLiter(s) IntraMuscular once        ENDOCRINE  Capillary Blood Glucose    Meds:       ACCESS DEVICES:  [ x ] Peripheral IV  [ ] Central Venous Line	[ ] R	[ ] L	[ ] IJ	[ ] Fem	[ ] SC	Placed:   [ ] Arterial Line		[ ] R	[ ] L	[ ] Fem	[ ] Rad	[ ] Ax	Placed:   [ ] PICC:					[ ] Mediport  [ ] Urinary Catheter, Date Placed:     OTHER MEDICATIONS:  chlorhexidine 2% Cloths 1 Application(s) Topical <User Schedule>  erythromycin   Ointment 1 Application(s) Left EYE three times a day      CODE STATUS:     - Full Code    IMAGING:    CT Head w/wo IV Cont (12.02.23 @ 16:00)   Impression: No change in small left posterior parafalcine subdural   hematoma compared with 11/29/2023. No change in large left periorbital   soft tissue swelling/hematoma. No abnormal parenchymal or leptomeningeal   enhancement.    CT Chest w/ IV Cont (12.02.23 @ 16:00)   IMPRESSION:  No hematoma or evidence of retroperitoneal bleeding.    Status post Whipple procedure. Subtle soft tissue inferior to the   operative bed associated with surgical clips is mildly increased from   prior imaging in 2021. Recurrent disease is a possibility.      Assessment and Plan:  Neuro  - Patient with small  left parafalcine SDH, has been stable  - Left periorbital hematoma  - Patient with previous history of withdrawal seizures at [facility] in Sept and h/o substance abuse and med seeking behavior  - Patient had received ativan for withdrawal, now on beer TID with meals  - Keppra for seizure ppx  - Complaining of headaches; Tylenol PRN  - Home Lexapro    Respiratory:  - RA  - 1.7 cm nodular airspace opacity noted in RUL on CT chest; Outpatient follow up    Cardiovascular:   - Home losartan for HTN    Gastrointestinal/Nutrition:   - Regular diet as tolerated  - Bowel regimen    Renal/Genitourinary:  - no acute issues    Hematologic:   - Hemoglobin has been slowly downtrending so holding chem VTE ppx  - SCDs for DVT PPx    Infectious Disease:  - Left eye erythromycin    Misc:  - Thiamine, folic acid, multivitamin given h/o EtOH    Endocrine:   - no active issues HISTORY  63y Male    24 HOUR EVENTS:  - Readmitted for AMS, patient following commands but somnolent    SUBJECTIVE/ROS:  [ X ] A ten-point review of systems was otherwise negative except as noted.      NEURO  Exam: Left periorbital bruising, patient AAOx4    Meds: acetaminophen     Tablet .. 975 milliGRAM(s) Oral every 6 hours PRN Mild Pain (1 - 3)  escitalopram 20 milliGRAM(s) Oral daily  levETIRAcetam 500 milliGRAM(s) Oral two times a day  oxyCODONE    IR 10 milliGRAM(s) Oral every 4 hours PRN Severe Pain (7 - 10)  oxyCODONE    IR 5 milliGRAM(s) Oral every 4 hours PRN Moderate Pain (4 - 6)      RESPIRATORY  RR: 17 (12-03-23 @ 21:00) (13 - 41)  SpO2: 100% (12-03-23 @ 21:00) (93% - 100%)  Wt(kg): --  Exam: CTA B/L      CARDIOVASCULAR  HR: 66 (12-03-23 @ 21:00) (66 - 84)  BP: 134/65 (12-03-23 @ 21:00) (112/65 - 141/75)  BP(mean): 91 (12-03-23 @ 21:00) (84 - 102)  ABP: --  ABP(mean): --  Wt(kg): --  CVP(cm H2O): --    Exam: RRR, No Murmurs  Cardiac Rhythm: NSR  Meds: losartan 50 milliGRAM(s) Oral daily      GI/NUTRITION  Exam: Soft, non-distended, non-tender.   Diet: Regular, beers TID w/ meals  Meds: polyethylene glycol 3350 17 Gram(s) Oral daily  senna 2 Tablet(s) Oral at bedtime      GENITOURINARY  I&O's Detail    12-02 @ 07:01  -  12-03 @ 07:00  --------------------------------------------------------  IN:    Oral Fluid: 480 mL  Total IN: 480 mL    OUT:  Total OUT: 0 mL    Total NET: 480 mL      12-03 @ 07:01  -  12-04 @ 01:54  --------------------------------------------------------  IN:    Oral Fluid: 360 mL  Total IN: 360 mL    OUT:    Incontinent per Condom Catheter (mL): 800 mL    Voided (mL): 425 mL  Total OUT: 1225 mL    Total NET: -865 mL          12-03    137  |  101  |  8   ----------------------------<  86  3.2<L>   |  30  |  0.77    Ca    8.3<L>      03 Dec 2023 07:02  Phos  2.2     12-03  Mg     1.8     12-03      Meds: folic acid 1 milliGRAM(s) Oral daily  multivitamin 1 Tablet(s) Oral daily  thiamine 100 milliGRAM(s) Oral daily        HEMATOLOGIC  Meds:   [x] VTE Prophylaxis                        7.6    2.60  )-----------( 46       ( 03 Dec 2023 07:04 )             22.5       Transfusion     [ ] PRBC   [ ] Platelets   [ ] FFP   [ ] Cryoprecipitate      INFECTIOUS DISEASES  T(C): 36.1 (12-03-23 @ 15:00), Max: 36.7 (12-03-23 @ 10:00)  Wt(kg): --  WBC Count: 2.60 K/uL (12-03 @ 07:04)    Recent Cultures:    Meds: influenza   Vaccine 0.5 milliLiter(s) IntraMuscular once        ENDOCRINE  Capillary Blood Glucose    Meds:       ACCESS DEVICES:  [ x ] Peripheral IV  [ ] Central Venous Line	[ ] R	[ ] L	[ ] IJ	[ ] Fem	[ ] SC	Placed:   [ ] Arterial Line		[ ] R	[ ] L	[ ] Fem	[ ] Rad	[ ] Ax	Placed:   [ ] PICC:					[ ] Mediport  [ ] Urinary Catheter, Date Placed:     OTHER MEDICATIONS:  chlorhexidine 2% Cloths 1 Application(s) Topical <User Schedule>  erythromycin   Ointment 1 Application(s) Left EYE three times a day      CODE STATUS:     - Full Code    IMAGING:    CT Head w/wo IV Cont (12.02.23 @ 16:00)   Impression: No change in small left posterior parafalcine subdural   hematoma compared with 11/29/2023. No change in large left periorbital   soft tissue swelling/hematoma. No abnormal parenchymal or leptomeningeal   enhancement.    CT Chest w/ IV Cont (12.02.23 @ 16:00)   IMPRESSION:  No hematoma or evidence of retroperitoneal bleeding.    Status post Whipple procedure. Subtle soft tissue inferior to the   operative bed associated with surgical clips is mildly increased from   prior imaging in 2021. Recurrent disease is a possibility.      Assessment and Plan:  Neuro  - Patient with small  left parafalcine SDH, has been stable  - Left periorbital hematoma  - Patient with previous history of withdrawal seizures at [facility] in Sept and h/o substance abuse and med seeking behavior  - Patient had received ativan for withdrawal, now on beer TID with meals  - Keppra for seizure ppx  - Complaining of headaches; Tylenol PRN  - Home Lexapro    Respiratory:  - RA  - 1.7 cm nodular airspace opacity noted in RUL on CT chest; Outpatient follow up    Cardiovascular:   - Home losartan for HTN    Gastrointestinal/Nutrition:   - Regular diet as tolerated  - Bowel regimen    Renal/Genitourinary:  - no acute issues    Hematologic:   - Hemoglobin has been slowly downtrending so holding chem VTE ppx  - SCDs for DVT PPx    Infectious Disease:  - Left eye erythromycin    Misc:  - Thiamine, folic acid, multivitamin given h/o EtOH    Endocrine:   - no active issues HISTORY  63y Male w/ PMH ETOH abuse and withdrawal seizures, brought in by EMS after a fall and head strike at home. Patient initially admitted to SICU for SDH and frequent neurological monitoring. Patient re admitted to SICU on 12/3 for worsening mental status.    24 HOUR EVENTS:  - Readmitted for AMS, patient following commands but somnolent    SUBJECTIVE/ROS:  [ X ] A ten-point review of systems was otherwise negative except as noted.      NEURO  Exam: Left periorbital bruising, patient AAOx4    Meds: acetaminophen     Tablet .. 975 milliGRAM(s) Oral every 6 hours PRN Mild Pain (1 - 3)  escitalopram 20 milliGRAM(s) Oral daily  levETIRAcetam 500 milliGRAM(s) Oral two times a day  oxyCODONE    IR 10 milliGRAM(s) Oral every 4 hours PRN Severe Pain (7 - 10)  oxyCODONE    IR 5 milliGRAM(s) Oral every 4 hours PRN Moderate Pain (4 - 6)      RESPIRATORY  RR: 17 (12-03-23 @ 21:00) (13 - 41)  SpO2: 100% (12-03-23 @ 21:00) (93% - 100%)  Wt(kg): --  Exam: CTA B/L      CARDIOVASCULAR  HR: 66 (12-03-23 @ 21:00) (66 - 84)  BP: 134/65 (12-03-23 @ 21:00) (112/65 - 141/75)  BP(mean): 91 (12-03-23 @ 21:00) (84 - 102)  ABP: --  ABP(mean): --  Wt(kg): --  CVP(cm H2O): --    Exam: RRR, No Murmurs  Cardiac Rhythm: NSR  Meds: losartan 50 milliGRAM(s) Oral daily      GI/NUTRITION  Exam: Soft, non-distended, non-tender.   Diet: Regular, beers TID w/ meals  Meds: polyethylene glycol 3350 17 Gram(s) Oral daily  senna 2 Tablet(s) Oral at bedtime      GENITOURINARY  I&O's Detail    12-02 @ 07:01  -  12-03 @ 07:00  --------------------------------------------------------  IN:    Oral Fluid: 480 mL  Total IN: 480 mL    OUT:  Total OUT: 0 mL    Total NET: 480 mL      12-03 @ 07:01  -  12-04 @ 01:54  --------------------------------------------------------  IN:    Oral Fluid: 360 mL  Total IN: 360 mL    OUT:    Incontinent per Condom Catheter (mL): 800 mL    Voided (mL): 425 mL  Total OUT: 1225 mL    Total NET: -865 mL          12-03    137  |  101  |  8   ----------------------------<  86  3.2<L>   |  30  |  0.77    Ca    8.3<L>      03 Dec 2023 07:02  Phos  2.2     12-03  Mg     1.8     12-03      Meds: folic acid 1 milliGRAM(s) Oral daily  multivitamin 1 Tablet(s) Oral daily  thiamine 100 milliGRAM(s) Oral daily        HEMATOLOGIC  Meds:   [x] VTE Prophylaxis                        7.6    2.60  )-----------( 46       ( 03 Dec 2023 07:04 )             22.5       Transfusion     [ ] PRBC   [ ] Platelets   [ ] FFP   [ ] Cryoprecipitate      INFECTIOUS DISEASES  T(C): 36.1 (12-03-23 @ 15:00), Max: 36.7 (12-03-23 @ 10:00)  Wt(kg): --  WBC Count: 2.60 K/uL (12-03 @ 07:04)    Recent Cultures:    Meds: influenza   Vaccine 0.5 milliLiter(s) IntraMuscular once        ENDOCRINE  Capillary Blood Glucose    Meds:       ACCESS DEVICES:  [ x ] Peripheral IV  [ ] Central Venous Line	[ ] R	[ ] L	[ ] IJ	[ ] Fem	[ ] SC	Placed:   [ ] Arterial Line		[ ] R	[ ] L	[ ] Fem	[ ] Rad	[ ] Ax	Placed:   [ ] PICC:					[ ] Mediport  [ ] Urinary Catheter, Date Placed:     OTHER MEDICATIONS:  chlorhexidine 2% Cloths 1 Application(s) Topical <User Schedule>  erythromycin   Ointment 1 Application(s) Left EYE three times a day      CODE STATUS:     - Full Code    IMAGING:    CT Head w/wo IV Cont (12.02.23 @ 16:00)   Impression: No change in small left posterior parafalcine subdural   hematoma compared with 11/29/2023. No change in large left periorbital   soft tissue swelling/hematoma. No abnormal parenchymal or leptomeningeal   enhancement.    CT Chest w/ IV Cont (12.02.23 @ 16:00)   IMPRESSION:  No hematoma or evidence of retroperitoneal bleeding.    Status post Whipple procedure. Subtle soft tissue inferior to the   operative bed associated with surgical clips is mildly increased from   prior imaging in 2021. Recurrent disease is a possibility.      Assessment and Plan:  Neuro  - Patient with small  left parafalcine SDH, has been stable  - Left periorbital hematoma  - Patient with previous history of withdrawal seizures at [facility] in Sept and h/o substance abuse and med seeking behavior  - Patient had received ativan for withdrawal, now on beer TID with meals  - Keppra for seizure ppx  - Complaining of headaches; Tylenol PRN  - Home Lexapro    Respiratory:  - RA  - 1.7 cm nodular airspace opacity noted in RUL on CT chest; Outpatient follow up    Cardiovascular:   - Home losartan for HTN    Gastrointestinal/Nutrition:   - Regular diet as tolerated  - Bowel regimen    Renal/Genitourinary:  - no acute issues    Hematologic:   - Hemoglobin has been slowly downtrending so holding chem VTE ppx  - SCDs for DVT PPx    Infectious Disease:  - Left eye erythromycin    Misc:  - Thiamine, folic acid, multivitamin given h/o EtOH    Endocrine:   - no active issues HISTORY  63y Male w/ PMH ETOH abuse and withdrawal seizures, brought in by EMS after a fall and head strike at home. Patient initially admitted to SICU for SDH and frequent neurological monitoring. Patient re admitted to SICU on 12/3 for worsening mental status.    24 HOUR EVENTS:  - Readmitted for AMS, patient following commands but somnolent    SUBJECTIVE/ROS:  [ X ] A ten-point review of systems was otherwise negative except as noted.      NEURO  Exam: Left periorbital bruising, patient AAOx4    Meds: acetaminophen     Tablet .. 975 milliGRAM(s) Oral every 6 hours PRN Mild Pain (1 - 3)  escitalopram 20 milliGRAM(s) Oral daily  levETIRAcetam 500 milliGRAM(s) Oral two times a day  oxyCODONE    IR 10 milliGRAM(s) Oral every 4 hours PRN Severe Pain (7 - 10)  oxyCODONE    IR 5 milliGRAM(s) Oral every 4 hours PRN Moderate Pain (4 - 6)      RESPIRATORY  RR: 17 (12-03-23 @ 21:00) (13 - 41)  SpO2: 100% (12-03-23 @ 21:00) (93% - 100%)  Wt(kg): --  Exam: CTA B/L      CARDIOVASCULAR  HR: 66 (12-03-23 @ 21:00) (66 - 84)  BP: 134/65 (12-03-23 @ 21:00) (112/65 - 141/75)  BP(mean): 91 (12-03-23 @ 21:00) (84 - 102)  ABP: --  ABP(mean): --  Wt(kg): --  CVP(cm H2O): --    Exam: RRR, No Murmurs  Cardiac Rhythm: NSR  Meds: losartan 50 milliGRAM(s) Oral daily      GI/NUTRITION  Exam: Soft, non-distended, non-tender.   Diet: Regular, beers TID w/ meals  Meds: polyethylene glycol 3350 17 Gram(s) Oral daily  senna 2 Tablet(s) Oral at bedtime      GENITOURINARY  I&O's Detail    12-02 @ 07:01  -  12-03 @ 07:00  --------------------------------------------------------  IN:    Oral Fluid: 480 mL  Total IN: 480 mL    OUT:  Total OUT: 0 mL    Total NET: 480 mL      12-03 @ 07:01  -  12-04 @ 01:54  --------------------------------------------------------  IN:    Oral Fluid: 360 mL  Total IN: 360 mL    OUT:    Incontinent per Condom Catheter (mL): 800 mL    Voided (mL): 425 mL  Total OUT: 1225 mL    Total NET: -865 mL          12-03    137  |  101  |  8   ----------------------------<  86  3.2<L>   |  30  |  0.77    Ca    8.3<L>      03 Dec 2023 07:02  Phos  2.2     12-03  Mg     1.8     12-03      Meds: folic acid 1 milliGRAM(s) Oral daily  multivitamin 1 Tablet(s) Oral daily  thiamine 100 milliGRAM(s) Oral daily        HEMATOLOGIC  Meds:   [x] VTE Prophylaxis                        7.6    2.60  )-----------( 46       ( 03 Dec 2023 07:04 )             22.5       Transfusion     [ ] PRBC   [ ] Platelets   [ ] FFP   [ ] Cryoprecipitate      INFECTIOUS DISEASES  T(C): 36.1 (12-03-23 @ 15:00), Max: 36.7 (12-03-23 @ 10:00)  Wt(kg): --  WBC Count: 2.60 K/uL (12-03 @ 07:04)    Recent Cultures:    Meds: influenza   Vaccine 0.5 milliLiter(s) IntraMuscular once        ENDOCRINE  Capillary Blood Glucose    Meds:       ACCESS DEVICES:  [ x ] Peripheral IV  [ ] Central Venous Line	[ ] R	[ ] L	[ ] IJ	[ ] Fem	[ ] SC	Placed:   [ ] Arterial Line		[ ] R	[ ] L	[ ] Fem	[ ] Rad	[ ] Ax	Placed:   [ ] PICC:					[ ] Mediport  [ ] Urinary Catheter, Date Placed:     OTHER MEDICATIONS:  chlorhexidine 2% Cloths 1 Application(s) Topical <User Schedule>  erythromycin   Ointment 1 Application(s) Left EYE three times a day      CODE STATUS:     - Full Code    IMAGING:    CT Head w/wo IV Cont (12.02.23 @ 16:00)   Impression: No change in small left posterior parafalcine subdural   hematoma compared with 11/29/2023. No change in large left periorbital   soft tissue swelling/hematoma. No abnormal parenchymal or leptomeningeal   enhancement.    CT Chest w/ IV Cont (12.02.23 @ 16:00)   IMPRESSION:  No hematoma or evidence of retroperitoneal bleeding.    Status post Whipple procedure. Subtle soft tissue inferior to the   operative bed associated with surgical clips is mildly increased from   prior imaging in 2021. Recurrent disease is a possibility.      Assessment and Plan:  Neuro  - Patient with small  left parafalcine SDH, has been stable  - Left periorbital hematoma  - Patient with previous history of withdrawal seizures at [facility] in Sept and h/o substance abuse and med seeking behavior  - Patient had received ativan for withdrawal, now on beer TID with meals  - Keppra for seizure ppx  - Complaining of headaches; Tylenol PRN  - Home Lexapro    Respiratory:  - RA  - 1.7 cm nodular airspace opacity noted in RUL on CT chest; Outpatient follow up    Cardiovascular:   - Home losartan for HTN    Gastrointestinal/Nutrition:   - Regular diet as tolerated  - Bowel regimen    Renal/Genitourinary:  - no acute issues    Hematologic:   - Hemoglobin has been slowly downtrending so holding chem VTE ppx  - SCDs for DVT PPx    Infectious Disease:  - Left eye erythromycin    Misc:  - Thiamine, folic acid, multivitamin given h/o EtOH    Endocrine:   - no active issues

## 2023-12-04 NOTE — CONSULT NOTE ADULT - CONSULT REASON
Subdural hematoma with abnormal coags and downtrending hemoglobin
Evaluate Rehabilitation Needs
Traumatic SDH
Left eye trauma

## 2023-12-05 LAB
CK MB CFR SERPL CALC: 1.7 NG/ML — SIGNIFICANT CHANGE UP (ref 0–6.7)
CK MB CFR SERPL CALC: 1.7 NG/ML — SIGNIFICANT CHANGE UP (ref 0–6.7)
CK SERPL-CCNC: 26 U/L — LOW (ref 30–200)
CK SERPL-CCNC: 26 U/L — LOW (ref 30–200)
TROPONIN T, HIGH SENSITIVITY RESULT: 10 NG/L — SIGNIFICANT CHANGE UP (ref 0–51)
TROPONIN T, HIGH SENSITIVITY RESULT: 10 NG/L — SIGNIFICANT CHANGE UP (ref 0–51)

## 2023-12-05 PROCEDURE — 99233 SBSQ HOSP IP/OBS HIGH 50: CPT

## 2023-12-05 PROCEDURE — 71045 X-RAY EXAM CHEST 1 VIEW: CPT | Mod: 26

## 2023-12-05 PROCEDURE — 93010 ELECTROCARDIOGRAM REPORT: CPT

## 2023-12-05 RX ORDER — ENOXAPARIN SODIUM 100 MG/ML
40 INJECTION SUBCUTANEOUS EVERY 24 HOURS
Refills: 0 | Status: DISCONTINUED | OUTPATIENT
Start: 2023-12-05 | End: 2023-12-19

## 2023-12-05 RX ADMIN — Medication 1 MILLIGRAM(S): at 11:27

## 2023-12-05 RX ADMIN — Medication 1 APPLICATION(S): at 21:56

## 2023-12-05 RX ADMIN — OXYCODONE HYDROCHLORIDE 10 MILLIGRAM(S): 5 TABLET ORAL at 11:27

## 2023-12-05 RX ADMIN — SENNA PLUS 2 TABLET(S): 8.6 TABLET ORAL at 21:55

## 2023-12-05 RX ADMIN — Medication 1 TABLET(S): at 11:26

## 2023-12-05 RX ADMIN — OXYCODONE HYDROCHLORIDE 10 MILLIGRAM(S): 5 TABLET ORAL at 05:08

## 2023-12-05 RX ADMIN — OXYCODONE HYDROCHLORIDE 10 MILLIGRAM(S): 5 TABLET ORAL at 06:08

## 2023-12-05 RX ADMIN — OXYCODONE HYDROCHLORIDE 10 MILLIGRAM(S): 5 TABLET ORAL at 12:26

## 2023-12-05 RX ADMIN — CHLORHEXIDINE GLUCONATE 1 APPLICATION(S): 213 SOLUTION TOPICAL at 05:06

## 2023-12-05 RX ADMIN — Medication 975 MILLIGRAM(S): at 21:55

## 2023-12-05 RX ADMIN — Medication 1 APPLICATION(S): at 05:05

## 2023-12-05 RX ADMIN — LOSARTAN POTASSIUM 50 MILLIGRAM(S): 100 TABLET, FILM COATED ORAL at 05:04

## 2023-12-05 RX ADMIN — Medication 975 MILLIGRAM(S): at 12:26

## 2023-12-05 RX ADMIN — Medication 100 MILLIGRAM(S): at 11:27

## 2023-12-05 RX ADMIN — Medication 975 MILLIGRAM(S): at 22:45

## 2023-12-05 RX ADMIN — ENOXAPARIN SODIUM 40 MILLIGRAM(S): 100 INJECTION SUBCUTANEOUS at 21:55

## 2023-12-05 RX ADMIN — ESCITALOPRAM OXALATE 20 MILLIGRAM(S): 10 TABLET, FILM COATED ORAL at 11:27

## 2023-12-05 RX ADMIN — Medication 97.2 MILLIGRAM(S): at 18:13

## 2023-12-05 RX ADMIN — Medication 975 MILLIGRAM(S): at 11:26

## 2023-12-05 RX ADMIN — Medication 85 MILLIMOLE(S): at 21:58

## 2023-12-05 RX ADMIN — LEVETIRACETAM 500 MILLIGRAM(S): 250 TABLET, FILM COATED ORAL at 05:04

## 2023-12-05 RX ADMIN — LEVETIRACETAM 500 MILLIGRAM(S): 250 TABLET, FILM COATED ORAL at 18:13

## 2023-12-05 RX ADMIN — Medication 97.2 MILLIGRAM(S): at 05:03

## 2023-12-05 RX ADMIN — POLYETHYLENE GLYCOL 3350 17 GRAM(S): 17 POWDER, FOR SOLUTION ORAL at 11:26

## 2023-12-05 RX ADMIN — Medication 1 APPLICATION(S): at 14:04

## 2023-12-05 NOTE — CHART NOTE - NSCHARTNOTEFT_GEN_A_CORE
SICU Transfer Note    Transfer from: SICU  Transfer to: floor  Accepting julito: Hossein    SICU COURSE: brought to SICU due to increased CIWA score. Placed back on Phenobarb taper. Last CIWA was 7.       ASSESSMENT & PLAN: 63yM w/ PMHx of alcohol abuse and anx/depression who presents s/p fall w +HS onto bedside table with LOC.  Trauma assessment in ED: ABCs intact , GCS 15 , AAOx3 , with physical exam findings, imaging, and labs as documented above, injuries are identified:   - L parafalcine SDH w/o mass effect  - L periorbital STS    For Follow-Up: phenobarb taper, last CIWA was 7.     Vital Signs Last 24 Hrs  T(C): 36.4 (04 Dec 2023 23:36), Max: 37.1 (04 Dec 2023 19:00)  T(F): 97.6 (04 Dec 2023 23:36), Max: 98.7 (04 Dec 2023 19:00)  HR: 61 (04 Dec 2023 23:36) (57 - 83)  BP: 131/81 (04 Dec 2023 23:36) (90/58 - 144/59)  BP(mean): 89 (04 Dec 2023 23:00) (69 - 97)  RR: 16 (04 Dec 2023 23:36) (10 - 113)  SpO2: 98% (04 Dec 2023 23:36) (95% - 100%)    Parameters below as of 04 Dec 2023 23:36  Patient On (Oxygen Delivery Method): room air      I&O's Summary    03 Dec 2023 07:01  -  04 Dec 2023 07:00  --------------------------------------------------------  IN: 360 mL / OUT: 1875 mL / NET: -1515 mL    04 Dec 2023 07:01  -  05 Dec 2023 00:52  --------------------------------------------------------  IN: 620 mL / OUT: 1050 mL / NET: -430 mL          MEDICATIONS  (STANDING):  chlorhexidine 2% Cloths 1 Application(s) Topical <User Schedule>  erythromycin   Ointment 1 Application(s) Left EYE three times a day  escitalopram 20 milliGRAM(s) Oral daily  folic acid 1 milliGRAM(s) Oral daily  influenza   Vaccine 0.5 milliLiter(s) IntraMuscular once  levETIRAcetam 500 milliGRAM(s) Oral two times a day  losartan 50 milliGRAM(s) Oral daily  multivitamin 1 Tablet(s) Oral daily  PHENobarbital 97.2 milliGRAM(s) Oral every 12 hours  polyethylene glycol 3350 17 Gram(s) Oral daily  senna 2 Tablet(s) Oral at bedtime  thiamine 100 milliGRAM(s) Oral daily    MEDICATIONS  (PRN):  acetaminophen     Tablet .. 975 milliGRAM(s) Oral every 6 hours PRN Mild Pain (1 - 3)  oxyCODONE    IR 10 milliGRAM(s) Oral every 4 hours PRN Severe Pain (7 - 10)  oxyCODONE    IR 5 milliGRAM(s) Oral every 4 hours PRN Moderate Pain (4 - 6)        LABS                                            8.4                   Neurophils% (auto):   x      (12-04 @ 05:07):    3.22 )-----------(62           Lymphocytes% (auto):  x                                             24.4                   Eosinphils% (auto):   x        Manual%: Neutrophils x    ; Lymphocytes x    ; Eosinophils x    ; Bands%: x    ; Blasts x                                    134    |  101    |  8                   Calcium: 8.4   / iCa: x      (12-04 @ 05:07)    ----------------------------<  107       Magnesium: 1.5                              4.5     |  26     |  0.79             Phosphorous: 2.0      TPro  6.4    /  Alb  2.9    /  TBili  1.8    /  DBili  x      /  AST  21     /  ALT  11     /  AlkPhos  83     04 Dec 2023 05:07    ( 12-04 @ 05:07 )   PT: 16.3 sec;   INR: 1.50 ratio  aPTT: 37.8 sec

## 2023-12-05 NOTE — PROGRESS NOTE ADULT - ASSESSMENT
63yM w/ PMHx of alcohol abuse and anx/depression who presents s/p fall w +HS onto bedside table with LOC.  Trauma assessment in ED: ABCs intact , GCS 15 , AAOx3 , with physical exam findings, imaging, and labs as documented above, injuries are identified:   - L parafalcine SDH w/o mass effect  - L periorbital STS      PLAN:  - Multimodal pain control with tylenol PO q8h ATC, tramadol q6h PRN for moderate (25mg), and severe pain (50mg)  - phenobarbital taper  - Tertiary exam performed  - No face dressing, leave open to air  -  PT evaluation- Acute Inpatient Rehab      Trauma ACS 4610 63yM w/ PMHx of alcohol abuse and anx/depression who presents s/p fall w +HS onto bedside table with LOC.  Trauma assessment in ED: ABCs intact , GCS 15 , AAOx3 , with physical exam findings, imaging, and labs as documented above, injuries are identified:   - L parafalcine SDH w/o mass effect  - L periorbital STS      PLAN:  - Multimodal pain control with tylenol PO q8h ATC, tramadol q6h PRN for moderate (25mg), and severe pain (50mg)  - phenobarbital taper  - Tertiary exam performed  - No face dressing, leave open to air  -  PT evaluation- Acute Inpatient Rehab      Trauma ACS [id number]

## 2023-12-05 NOTE — PROGRESS NOTE ADULT - NS ATTEND AMEND GEN_ALL_CORE FT
seen and examined 12-05-23 @ 1016    2016 @ [facility] [place]    PERRL  left periorbital / neck / upper chest with evolving deep puprple ecchymosis  left lateral supraorbital skin avulsion with nylon sutures intact and no evidence of wound infection  lower lip ecchymosis  midline laparotomy scar      left parafalcine SDH  -stable on multiple repeat CT head, most recently 12/2 1538  -chemical VTE prophylaxis was started on 11/30, but held on 12/2 for decreasing hgb requiring 1u RBC transfusion  -hgb has been stable since 12/2 transfusion, so will restart Lovenox    alcohol abuse  -evaluated by social work on 11/30    alcohol withdrawal  -phenobarbital taper    elevated INR and thrombocytopenia are concerning for cirrhosis in the setting of alcohol abuse  -f/u with hepatology as an outpatient    hypophosphatemia  -IV repletion    dispo  -transfer to acute rehab when available      I reviewed his labs and radiologic images. seen and examined 12-05-23 @ 1016    [place] [facility] [place]    PERRL  left periorbital / neck / upper chest with evolving deep puprple ecchymosis  left lateral supraorbital skin avulsion with nylon sutures intact and no evidence of wound infection  lower lip ecchymosis  midline laparotomy scar      left parafalcine SDH  -stable on multiple repeat CT head, most recently 12/2 1538  -chemical VTE prophylaxis was started on 11/30, but held on 12/2 for decreasing hgb requiring 1u RBC transfusion  -hgb has been stable since 12/2 transfusion, so will restart Lovenox    alcohol abuse  -evaluated by social work on 11/30    alcohol withdrawal  -phenobarbital taper    elevated INR and thrombocytopenia are concerning for cirrhosis in the setting of alcohol abuse  -f/u with hepatology as an outpatient    hypophosphatemia  -IV repletion    dispo  -transfer to acute rehab when available      I reviewed his labs and radiologic images. seen and examined 12-05-23 @ 1016    2016 @ [facility] [place]    PERRL  left periorbital / neck / upper chest with evolving deep puprple ecchymosis  left lateral supraorbital skin avulsion with nylon sutures intact and no evidence of wound infection  lower lip ecchymosis  midline laparotomy scar      left parafalcine SDH  -stable on multiple repeat CT head, most recently 12/2 1538  -chemical VTE prophylaxis was started on 11/30, but held on 12/2 for decreasing hgb requiring 1u RBC transfusion  -unclear etiology for normocytic anemia, but hgb has been stable since 12/2 transfusion, so will restart Lovenox    alcohol abuse  -evaluated by social work on 11/30    alcohol withdrawal  -phenobarbital taper    elevated INR and thrombocytopenia are concerning for cirrhosis in the setting of alcohol abuse  -f/u with hepatology as an outpatient    hypophosphatemia  -IV repletion    dispo  -transfer to acute rehab when available      I reviewed his labs and radiologic images. seen and examined 12-05-23 @ 1016    2016 @ [facility] [place]    PERRL  left periorbital / neck / upper chest with evolving deep puprple ecchymosis  left lateral supraorbital skin avulsion with nylon sutures intact and no evidence of wound infection  lower lip ecchymosis  midline laparotomy scar      left parafalcine SDH  -stable on multiple repeat CT head, most recently 12/2 1538  -chemical VTE prophylaxis was started on 11/30, but held on 12/2 for decreasing hgb requiring 1u RBC transfusion  -unclear etiology for normocytic anemia, but hgb has been stable since 12/2 transfusion, so will restart Lovenox    alcohol abuse  -evaluated by social work on 11/30    alcohol withdrawal  -phenobarbital taper    elevated INR and thrombocytopenia are concerning for cirrhosis in the setting of alcohol abuse  -f/u with hepatology as an outpatient    hypophosphatemia  -IV repletion    dispo  -transfer to acute rehab when available      I reviewed his labs and radiologic images.

## 2023-12-05 NOTE — PROGRESS NOTE ADULT - SUBJECTIVE AND OBJECTIVE BOX
TRAUMA SURGERY DAILY PROGRESS NOTE:       SUBJECTIVE/ROS: Patient seen and examined at bedside.  Transferred to floor overnight.  States pain controlled with analgesia.   Denies nausea, vomiting, chest pain, shortness of breath.         MEDICATIONS  (STANDING):  chlorhexidine 2% Cloths 1 Application(s) Topical <User Schedule>  erythromycin   Ointment 1 Application(s) Left EYE three times a day  escitalopram 20 milliGRAM(s) Oral daily  folic acid 1 milliGRAM(s) Oral daily  influenza   Vaccine 0.5 milliLiter(s) IntraMuscular once  levETIRAcetam 500 milliGRAM(s) Oral two times a day  losartan 50 milliGRAM(s) Oral daily  multivitamin 1 Tablet(s) Oral daily  PHENobarbital 97.2 milliGRAM(s) Oral every 12 hours  polyethylene glycol 3350 17 Gram(s) Oral daily  senna 2 Tablet(s) Oral at bedtime  thiamine 100 milliGRAM(s) Oral daily    MEDICATIONS  (PRN):  acetaminophen     Tablet .. 975 milliGRAM(s) Oral every 6 hours PRN Mild Pain (1 - 3)  oxyCODONE    IR 10 milliGRAM(s) Oral every 4 hours PRN Severe Pain (7 - 10)  oxyCODONE    IR 5 milliGRAM(s) Oral every 4 hours PRN Moderate Pain (4 - 6)      OBJECTIVE:    Vital Signs Last 24 Hrs  T(C): 36.8 (05 Dec 2023 04:25), Max: 37.1 (04 Dec 2023 19:00)  T(F): 98.2 (05 Dec 2023 04:25), Max: 98.7 (04 Dec 2023 19:00)  HR: 60 (05 Dec 2023 04:25) (57 - 83)  BP: 138/71 (05 Dec 2023 04:25) (90/58 - 144/59)  BP(mean): 89 (04 Dec 2023 23:00) (69 - 92)  RR: 18 (05 Dec 2023 04:25) (10 - 20)  SpO2: 99% (05 Dec 2023 04:25) (97% - 100%)    Parameters below as of 05 Dec 2023 04:25  Patient On (Oxygen Delivery Method): room air            I&O's Detail    04 Dec 2023 07:01  -  05 Dec 2023 07:00  --------------------------------------------------------  IN:    IV PiggyBack: 250 mL    IV PiggyBack: 50 mL    Oral Fluid: 320 mL  Total IN: 620 mL    OUT:    Voided (mL): 1750 mL  Total OUT: 1750 mL    Total NET: -1130 mL          Physical Exam:  Gen: Laying in bed, sleeping  HEENT: L periorbital swelling with ecchymosis, ecchymosis on right neck/supraclavicular area  Resp: Unlabored breathing on room air      LABS:                        8.4    3.22  )-----------( 62       ( 04 Dec 2023 05:07 )             24.4     12-04    134<L>  |  101  |  8   ----------------------------<  107<H>  4.5   |  26  |  0.79    Ca    8.4      04 Dec 2023 05:07  Phos  2.0     12-04  Mg     1.5     12-04    TPro  6.4  /  Alb  2.9<L>  /  TBili  1.8<H>  /  DBili  x   /  AST  21  /  ALT  11  /  AlkPhos  83  12-04    PT/INR - ( 04 Dec 2023 05:07 )   PT: 16.3 sec;   INR: 1.50 ratio         PTT - ( 04 Dec 2023 05:07 )  PTT:37.8 sec  Urinalysis Basic - ( 04 Dec 2023 05:07 )    Color: x / Appearance: x / SG: x / pH: x  Gluc: 107 mg/dL / Ketone: x  / Bili: x / Urobili: x   Blood: x / Protein: x / Nitrite: x   Leuk Esterase: x / RBC: x / WBC x   Sq Epi: x / Non Sq Epi: x / Bacteria: x

## 2023-12-06 ENCOUNTER — TRANSCRIPTION ENCOUNTER (OUTPATIENT)
Age: 63
End: 2023-12-06

## 2023-12-06 PROCEDURE — 99233 SBSQ HOSP IP/OBS HIGH 50: CPT

## 2023-12-06 RX ADMIN — OXYCODONE HYDROCHLORIDE 10 MILLIGRAM(S): 5 TABLET ORAL at 22:16

## 2023-12-06 RX ADMIN — ESCITALOPRAM OXALATE 20 MILLIGRAM(S): 10 TABLET, FILM COATED ORAL at 11:45

## 2023-12-06 RX ADMIN — OXYCODONE HYDROCHLORIDE 5 MILLIGRAM(S): 5 TABLET ORAL at 05:34

## 2023-12-06 RX ADMIN — OXYCODONE HYDROCHLORIDE 5 MILLIGRAM(S): 5 TABLET ORAL at 06:15

## 2023-12-06 RX ADMIN — ENOXAPARIN SODIUM 40 MILLIGRAM(S): 100 INJECTION SUBCUTANEOUS at 21:32

## 2023-12-06 RX ADMIN — OXYCODONE HYDROCHLORIDE 10 MILLIGRAM(S): 5 TABLET ORAL at 17:54

## 2023-12-06 RX ADMIN — Medication 975 MILLIGRAM(S): at 05:28

## 2023-12-06 RX ADMIN — Medication 1 APPLICATION(S): at 13:55

## 2023-12-06 RX ADMIN — LEVETIRACETAM 500 MILLIGRAM(S): 250 TABLET, FILM COATED ORAL at 05:28

## 2023-12-06 RX ADMIN — Medication 1 TABLET(S): at 11:46

## 2023-12-06 RX ADMIN — Medication 100 MILLIGRAM(S): at 11:46

## 2023-12-06 RX ADMIN — Medication 1 MILLIGRAM(S): at 11:46

## 2023-12-06 RX ADMIN — POLYETHYLENE GLYCOL 3350 17 GRAM(S): 17 POWDER, FOR SOLUTION ORAL at 11:46

## 2023-12-06 RX ADMIN — Medication 975 MILLIGRAM(S): at 06:15

## 2023-12-06 RX ADMIN — OXYCODONE HYDROCHLORIDE 5 MILLIGRAM(S): 5 TABLET ORAL at 13:28

## 2023-12-06 RX ADMIN — OXYCODONE HYDROCHLORIDE 10 MILLIGRAM(S): 5 TABLET ORAL at 23:16

## 2023-12-06 RX ADMIN — LOSARTAN POTASSIUM 50 MILLIGRAM(S): 100 TABLET, FILM COATED ORAL at 05:28

## 2023-12-06 RX ADMIN — OXYCODONE HYDROCHLORIDE 5 MILLIGRAM(S): 5 TABLET ORAL at 12:28

## 2023-12-06 RX ADMIN — Medication 97.2 MILLIGRAM(S): at 17:17

## 2023-12-06 RX ADMIN — SENNA PLUS 2 TABLET(S): 8.6 TABLET ORAL at 21:33

## 2023-12-06 RX ADMIN — Medication 1 APPLICATION(S): at 21:33

## 2023-12-06 RX ADMIN — Medication 97.2 MILLIGRAM(S): at 05:28

## 2023-12-06 RX ADMIN — LEVETIRACETAM 500 MILLIGRAM(S): 250 TABLET, FILM COATED ORAL at 17:17

## 2023-12-06 RX ADMIN — Medication 1 APPLICATION(S): at 05:29

## 2023-12-06 RX ADMIN — OXYCODONE HYDROCHLORIDE 10 MILLIGRAM(S): 5 TABLET ORAL at 16:54

## 2023-12-06 NOTE — DISCHARGE NOTE NURSING/CASE MANAGEMENT/SOCIAL WORK - NSDCPEFALRISK_GEN_ALL_CORE
For information on Fall & Injury Prevention, visit: [email or web address] OR  [email or web address] OR  https://www.cdc.gov/steadi/patient.html For information on Fall & Injury Prevention, visit: [email or web address] OR  [email or web address] OR  https://www.cdc.gov/steadi/patient.html

## 2023-12-06 NOTE — PROGRESS NOTE ADULT - ATTENDING COMMENTS
seen and examined 12-06-23 @ 1055    [place] [facility] [place]    left periorbital / neck / upper chest with evolving deep purple ecchymosis  left lateral supraorbital skin avulsion with no evidence of wound infection. we removed the nylon sutures today  removed a single blue suture from the posterior scalp  lower lip ecchymosis  midline laparotomy scar      left parafalcine SDH  -stable on multiple repeat CT head, most recently 12/2 1538  -chemical VTE prophylaxis was started on 11/30, but held on 12/2 for decreasing hgb requiring 1u RBC transfusion  -unclear etiology for normocytic anemia, but hgb has been stable since 12/2 transfusion, so will restart Lovenox    alcohol abuse  -evaluated by social work on 11/30    alcohol withdrawal  -phenobarbital taper    elevated INR and thrombocytopenia are concerning for cirrhosis in the setting of alcohol abuse  -f/u with hepatology as an outpatient    dispo  -transfer to acute rehab when available seen and examined 12-06-23 @ 1055    2016 @ [facility] [place]    left periorbital / neck / upper chest with evolving deep purple ecchymosis  left lateral supraorbital skin avulsion with no evidence of wound infection. we removed the nylon sutures today  removed a single blue suture from the posterior scalp  lower lip ecchymosis  midline laparotomy scar      left parafalcine SDH  -stable on multiple repeat CT head, most recently 12/2 1538  -chemical VTE prophylaxis was started on 11/30, but held on 12/2 for decreasing hgb requiring 1u RBC transfusion  -unclear etiology for normocytic anemia, but hgb has been stable since 12/2 transfusion, so will restart Lovenox    alcohol abuse  -evaluated by social work on 11/30    alcohol withdrawal  -phenobarbital taper    elevated INR and thrombocytopenia are concerning for cirrhosis in the setting of alcohol abuse  -f/u with hepatology as an outpatient    dispo  -transfer to acute rehab when available seen and examined 12-06-23 @ 1055    2016 @ [facility] [place]    left periorbital / neck / upper chest with evolving deep purple ecchymosis  left lateral supraorbital skin avulsion with no evidence of wound infection. we removed the nylon sutures today  removed a single blue suture from the posterior scalp  lower lip ecchymosis  midline laparotomy scar      left parafalcine SDH  -stable on multiple repeat CT head, most recently 12/2 1538  -chemical VTE prophylaxis was started on 11/30, but held on 12/2 for decreasing hgb requiring 1u RBC transfusion  -unclear etiology for normocytic anemia, but hgb has been stable since 12/2 transfusion, so Lovenox was restarted on 12/5      alcohol abuse  -evaluated by social work on 11/30    alcohol withdrawal  -phenobarbital taper    elevated INR and thrombocytopenia are concerning for cirrhosis in the setting of alcohol abuse  -f/u with hepatology as an outpatient    dispo  -transfer to acute rehab when available seen and examined 12-06-23 @ 1055    2016 @ [facility] [place]    left periorbital / neck / upper chest with evolving deep purple ecchymosis  left lateral supraorbital skin avulsion with no evidence of wound infection. we removed the nylon sutures today  removed a single blue suture from the posterior scalp  lower lip ecchymosis  midline laparotomy scar      left parafalcine SDH  -stable on multiple repeat CT head, most recently 12/2 1538  -chemical VTE prophylaxis was started on 11/30, but held on 12/2 for decreasing hgb requiring 1u RBC transfusion  -unclear etiology for normocytic anemia, but hgb has been stable since 12/2 transfusion, so Lovenox was restarted on 12/5      alcohol abuse  -evaluated by social work on 11/30    alcohol withdrawal  -phenobarbital taper    elevated INR and thrombocytopenia are concerning for cirrhosis in the setting of alcohol abuse  -f/u with hepatology as an outpatient    dispo  -transfer to acute rehab when available

## 2023-12-06 NOTE — DIETITIAN INITIAL EVALUATION ADULT - ORAL INTAKE PTA/DIET HISTORY
- Unable to obtain subjective intake/diet hx PTA at this time; RD to reassess as able.  - No known food allergies or intolerances noted per patient profile.   - Pt taking Multivitamin PTA per outpatient medications list.   - No difficulty chewing/swallowing reported at this time.

## 2023-12-06 NOTE — DIETITIAN INITIAL EVALUATION ADULT - REASON INDICATOR FOR ASSESSMENT
Pt seen for Length of stay.   Source: PCA at bedside, Electronic Medical Record. Pt asleep during time of RD visit. Transferred from SICU 12/05.   Chart reviewed, events noted.

## 2023-12-06 NOTE — DIETITIAN INITIAL EVALUATION ADULT - ADD RECOMMEND
1) Continue current diet free of therapeutic restrictions as tolerated. Defer texture/consistency to SLP/team.   2) Continue providing Multivitamin, thiamine, folic acid daily in setting of hx of alchocol abuse pending no medical contraindications.  3) Continue to monitor PO intake, weight, labs, skin, GI status, and diet.  4) RD remains available for diet education PRN.  5) Malnutrition sticker placed in chart.

## 2023-12-06 NOTE — DIETITIAN INITIAL EVALUATION ADULT - OTHER INFO
- UBW: unable to obtain at this time.   - Dosing wt: 192 pounds (11/29, bed scale)  - Wt hx per chart: 191.3 pounds (12/04, bed scale)  - Wt hx per [facility] HIE in pounds: 220 (7/11), 200 (3/11), 220 (1/13), 214 (10/10/22).  	- Possible ~28 lb (12%) wt loss x 6 months indicated - clinically severe.    	- RD to obtain subjective weight hx from pt as able.   - RD to continue to monitor weight trends as able.   - Nutritionally Pertinent Meds in-house: folic acid, Multivitamin, thiamine.   - Nutritionally Pertinent Labs: low Na; high BG; low Mg; low Phos; high POCT.  - Hx of alcohol abuse disorder; ordered for Multivitamin, thiamine, folic acid.   - RRT on 12/03 for Elevated CIWA.

## 2023-12-06 NOTE — DISCHARGE NOTE NURSING/CASE MANAGEMENT/SOCIAL WORK - PATIENT PORTAL LINK FT
You can access the FollowMyHealth Patient Portal offered by [facility] by registering at the following website: [email or web address]. By joining [employer]s FollowMyHealth portal, you will also be able to view your health information using other applications (apps) compatible with our system. You can access the FollowMyHealth Patient Portal offered by [facility] by registering at the following website: [email or web address]. By joining [employer]’s FollowMyHealth portal, you will also be able to view your health information using other applications (apps) compatible with our system.

## 2023-12-06 NOTE — DIETITIAN INITIAL EVALUATION ADULT - PERTINENT MEDS FT
16-May-2020 MEDICATIONS  (STANDING):  chlorhexidine 2% Cloths 1 Application(s) Topical <User Schedule>  enoxaparin Injectable 40 milliGRAM(s) SubCutaneous every 24 hours  erythromycin   Ointment 1 Application(s) Left EYE three times a day  escitalopram 20 milliGRAM(s) Oral daily  folic acid 1 milliGRAM(s) Oral daily  influenza   Vaccine 0.5 milliLiter(s) IntraMuscular once  levETIRAcetam 500 milliGRAM(s) Oral two times a day  losartan 50 milliGRAM(s) Oral daily  multivitamin 1 Tablet(s) Oral daily  PHENobarbital 97.2 milliGRAM(s) Oral every 12 hours  polyethylene glycol 3350 17 Gram(s) Oral daily  senna 2 Tablet(s) Oral at bedtime  thiamine 100 milliGRAM(s) Oral daily    MEDICATIONS  (PRN):  acetaminophen     Tablet .. 975 milliGRAM(s) Oral every 6 hours PRN Mild Pain (1 - 3)  oxyCODONE    IR 10 milliGRAM(s) Oral every 4 hours PRN Severe Pain (7 - 10)  oxyCODONE    IR 5 milliGRAM(s) Oral every 4 hours PRN Moderate Pain (4 - 6)

## 2023-12-06 NOTE — PROGRESS NOTE ADULT - SUBJECTIVE AND OBJECTIVE BOX
TRAUMA SURGERY DAILY PROGRESS NOTE:     SUBJECTIVE/ROS:     Overnight: no acute events   Patient seen and evaluated on AM rounds. Facial sutures removed at bedside.  Patient otherwise denies nausea, vomiting, chest pain, shortness of breath     OBJECTIVE:  Vital Signs Last 24 Hrs  T(C): 36.7 (06 Dec 2023 08:41), Max: 36.7 (05 Dec 2023 21:16)  T(F): 98 (06 Dec 2023 08:41), Max: 98.1 (06 Dec 2023 00:00)  HR: 62 (06 Dec 2023 08:41) (61 - 74)  BP: 98/62 (06 Dec 2023 08:41) (98/62 - 131/75)  BP(mean): --  RR: 18 (06 Dec 2023 08:41) (18 - 18)  SpO2: 96% (06 Dec 2023 08:41) (96% - 99%)    Parameters below as of 06 Dec 2023 08:41  Patient On (Oxygen Delivery Method): room air      I&O's Detail    05 Dec 2023 07:01  -  06 Dec 2023 07:00  --------------------------------------------------------  IN:    Oral Fluid: 1410 mL  Total IN: 1410 mL    OUT:    Voided (mL): 575 mL  Total OUT: 575 mL    Total NET: 835 mL    06 Dec 2023 07:01  -  06 Dec 2023 13:06  --------------------------------------------------------  IN:    Oral Fluid: 560 mL  Total IN: 560 mL    OUT:  Total OUT: 0 mL    Total NET: 560 mL    Daily   MEDICATIONS  (STANDING):  chlorhexidine 2% Cloths 1 Application(s) Topical <User Schedule>  enoxaparin Injectable 40 milliGRAM(s) SubCutaneous every 24 hours  erythromycin   Ointment 1 Application(s) Left EYE three times a day  escitalopram 20 milliGRAM(s) Oral daily  folic acid 1 milliGRAM(s) Oral daily  influenza   Vaccine 0.5 milliLiter(s) IntraMuscular once  levETIRAcetam 500 milliGRAM(s) Oral two times a day  losartan 50 milliGRAM(s) Oral daily  multivitamin 1 Tablet(s) Oral daily  PHENobarbital 97.2 milliGRAM(s) Oral every 12 hours  polyethylene glycol 3350 17 Gram(s) Oral daily  senna 2 Tablet(s) Oral at bedtime  thiamine 100 milliGRAM(s) Oral daily    MEDICATIONS  (PRN):  acetaminophen     Tablet .. 975 milliGRAM(s) Oral every 6 hours PRN Mild Pain (1 - 3)  oxyCODONE    IR 10 milliGRAM(s) Oral every 4 hours PRN Severe Pain (7 - 10)  oxyCODONE    IR 5 milliGRAM(s) Oral every 4 hours PRN Moderate Pain (4 - 6)    Labs:    Physical Exam:  General: NAD, resting comfortably in bed  HEENT: L periorbital swelling with ecchymosis, ecchymosis on right neck/supraclavicular area. Sutures removed.  Cardiovascular: appears well perfused   Respiratory: respirations non labored  Gastrointestinal: soft, nontender, nondistended  Extremities: FROM, warm  Neurological: A+Ox3

## 2023-12-06 NOTE — PROGRESS NOTE ADULT - ASSESSMENT
63yM w/ PMHx of alcohol abuse and anx/depression who presents s/p fall w +HS onto bedside table with LOC.  Trauma assessment in ED: ABCs intact , GCS 15 , AAOx3 , with physical exam findings, imaging, and labs as documented above, injuries are identified:   - L parafalcine SDH w/o mass effect  - L periorbital STS    PLAN:  - Multimodal pain control with tylenol PO q8h ATC, tramadol q6h PRN for moderate (25mg), and severe pain (50mg)  - phenobarbital taper  - Tertiary exam performed  - PT evaluation- Acute Inpatient Rehab  - f/u hepatology outpatient for elevated INR and thrombocytopenia    Trauma ACS   [id number]

## 2023-12-06 NOTE — DIETITIAN INITIAL EVALUATION ADULT - ENERGY INTAKE
Adequate (%) In house, PCA reports pt with good PO intake. RD observed pt's breakfast tray 100% consumed at bedside during time of visit.

## 2023-12-06 NOTE — DIETITIAN INITIAL EVALUATION ADULT - REASON FOR ADMISSION
Traumatic subdural hemorrhage with loss of consciousness status unknown, initial encounter    Per chart: 63y year old male with PMHx of alcohol abuse (drinks 1p vodka/day), pancreatic CA s/p whipple procedure in 2015, depression and HTN who presnts after a ground-level fall, he reports he hit his head on a bedside table and had + HS and + LOC.

## 2023-12-06 NOTE — DIETITIAN INITIAL EVALUATION ADULT - OTHER CALCULATIONS
Fluid needs deferred to team.  Estimated needs using dosing weight with consideration for Malnutrition factor, hx alcohol abuse.

## 2023-12-06 NOTE — DISCHARGE NOTE NURSING/CASE MANAGEMENT/SOCIAL WORK - NSDCVIVACCINE_GEN_ALL_CORE_FT
Tdap; 16-Jul-2015 09:10; Mitali Miller (RN); [employer]; [id number]; IntraMuscular; Deltoid Left.; 0.5 milliLiter(s); VIS (VIS Published: 09-May-2013, VIS Presented: 16-Jul-2015);   Tdap; 14-Jan-2023 04:21; Yancy Kang (RN); [employer]; [id number] (Exp. Date: 01-Jun-2024); IntraMuscular; Deltoid Left.; 0.5 milliLiter(s); VIS (VIS Published: 09-May-2013, VIS Presented: 14-Jan-2023);   Tdap; 11-Jul-2023 23:41; Katie Mcneal (RN); [employer]; [id number] (Exp. Date: 01-May-2025); IntraMuscular; Deltoid Right.; 0.5 milliLiter(s); VIS (VIS Published: 09-May-2013, VIS Presented: 11-Jul-2023);   Tdap; 28-Nov-2023 23:41; Leonora Chopra (RN); [employer]; [id number] (Exp. Date: 20-Jul-2025); IntraMuscular; Deltoid Left.; 0.5 milliLiter(s); VIS (VIS Published: 09-May-2013, VIS Presented: 28-Nov-2023);    Tdap; 16-Jul-2015 09:10; Mitali Miller (RN); [employer]; [id number]; IntraMuscular; Deltoid Left.; 0.5 milliLiter(s); VIS (VIS Published: 09-May-2013, VIS Presented: 16-Jul-2015);   Tdap; 14-Jan-2023 04:21; Yancy Kang (RN); [employer]; [id number] (Exp. Date: 01-Jun-2024); IntraMuscular; Deltoid Left.; 0.5 milliLiter(s); VIS (VIS Published: 09-May-2013, VIS Presented: 14-Jan-2023);   Tdap; 11-Jul-2023 23:41; Katie Mcneal (RN); [employer]; [id number] (Exp. Date: 01-May-2025); IntraMuscular; Deltoid Right.; 0.5 milliLiter(s); VIS (VIS Published: 09-May-2013, VIS Presented: 11-Jul-2023);   Tdap; 28-Nov-2023 23:41; Leonora Chopra (RN); [employer]; [id number] (Exp. Date: 20-Jul-2025); IntraMuscular; Deltoid Left.; 0.5 milliLiter(s); VIS (VIS Published: 09-May-2013, VIS Presented: 28-Nov-2023);

## 2023-12-07 PROCEDURE — 99232 SBSQ HOSP IP/OBS MODERATE 35: CPT

## 2023-12-07 RX ADMIN — OXYCODONE HYDROCHLORIDE 10 MILLIGRAM(S): 5 TABLET ORAL at 07:51

## 2023-12-07 RX ADMIN — ENOXAPARIN SODIUM 40 MILLIGRAM(S): 100 INJECTION SUBCUTANEOUS at 21:13

## 2023-12-07 RX ADMIN — Medication 1 APPLICATION(S): at 21:14

## 2023-12-07 RX ADMIN — OXYCODONE HYDROCHLORIDE 10 MILLIGRAM(S): 5 TABLET ORAL at 14:02

## 2023-12-07 RX ADMIN — Medication 975 MILLIGRAM(S): at 23:59

## 2023-12-07 RX ADMIN — ESCITALOPRAM OXALATE 20 MILLIGRAM(S): 10 TABLET, FILM COATED ORAL at 11:35

## 2023-12-07 RX ADMIN — Medication 64.8 MILLIGRAM(S): at 17:10

## 2023-12-07 RX ADMIN — OXYCODONE HYDROCHLORIDE 10 MILLIGRAM(S): 5 TABLET ORAL at 15:02

## 2023-12-07 RX ADMIN — Medication 100 MILLIGRAM(S): at 11:35

## 2023-12-07 RX ADMIN — Medication 1 MILLIGRAM(S): at 11:35

## 2023-12-07 RX ADMIN — POLYETHYLENE GLYCOL 3350 17 GRAM(S): 17 POWDER, FOR SOLUTION ORAL at 11:35

## 2023-12-07 RX ADMIN — Medication 1 APPLICATION(S): at 14:02

## 2023-12-07 RX ADMIN — OXYCODONE HYDROCHLORIDE 10 MILLIGRAM(S): 5 TABLET ORAL at 08:51

## 2023-12-07 RX ADMIN — Medication 1 TABLET(S): at 11:35

## 2023-12-07 RX ADMIN — Medication 64.8 MILLIGRAM(S): at 05:19

## 2023-12-07 RX ADMIN — OXYCODONE HYDROCHLORIDE 10 MILLIGRAM(S): 5 TABLET ORAL at 22:13

## 2023-12-07 RX ADMIN — OXYCODONE HYDROCHLORIDE 10 MILLIGRAM(S): 5 TABLET ORAL at 21:13

## 2023-12-07 RX ADMIN — Medication 1 APPLICATION(S): at 05:19

## 2023-12-07 NOTE — PROGRESS NOTE ADULT - ASSESSMENT
63yM w/ PMHx of alcohol abuse and anx/depression who presents s/p fall w +HS onto bedside table with LOC.  Trauma assessment in ED: ABCs intact, GCS 15, AAOx3. Repeat CTH imaging has remained stable. Remains on a Phenobarbitol taper due to finish on 12/10.     injuries are identified:   - L parafalcine SDH w/o mass effect  - L periorbital STS    PLAN:  - Multimodal pain control with tylenol PO q8h ATC, tramadol q6h PRN for moderate (25mg), and severe pain (50mg)  - phenobarbital taper  - Tertiary exam performed  - No face dressing, leave open to air  - PT evaluation- Acute Inpatient Rehab    Trauma ACS [id number] 63yM w/ PMHx of alcohol abuse and anx/depression who presents s/p fall w +HS onto bedside table with LOC.  Trauma assessment in ED: ABCs intact, GCS 15, AAOx3. Repeat CTH imaging has remained stable. Remains on a Phenobarbitol taper due to finish on 12/10.     injuries are identified:   - L parafalcine SDH w/o mass effect  - L periorbital STS    PLAN:  - Multimodal pain control with tylenol PO q8h ATC, tramadol q6h PRN for moderate (25mg), and severe pain (50mg)  - phenobarbital taper  - Tertiary exam performed  - No face dressing, leave open to air  - PT evaluation- Acute Inpatient Rehab    Trauma ACS [id number]

## 2023-12-07 NOTE — PROGRESS NOTE ADULT - SUBJECTIVE AND OBJECTIVE BOX
TRAUMA SURGERY DAILY PROGRESS NOTE:     SUBJECTIVE/ROS:     Overnight: no acute events overnight.  Patient seen and evaluated on AM rounds. Resting comfortably on the bed.  Patient otherwise denies nausea, vomiting, chest pain, shortness of breath     OBJECTIVE:  Vital Signs Last 24 Hrs  T(C): 36.5 (07 Dec 2023 08:10), Max: 36.8 (06 Dec 2023 23:10)  T(F): 97.7 (07 Dec 2023 08:10), Max: 98.2 (06 Dec 2023 23:10)  HR: 56 (07 Dec 2023 08:10) (56 - 68)  BP: 104/58 (07 Dec 2023 08:10) (93/58 - 105/64)  BP(mean): --  RR: 18 (07 Dec 2023 08:10) (18 - 18)  SpO2: 94% (07 Dec 2023 08:10) (94% - 97%)    Parameters below as of 07 Dec 2023 08:10  Patient On (Oxygen Delivery Method): room air      I&O's Detail    06 Dec 2023 07:01  -  07 Dec 2023 07:00  --------------------------------------------------------  IN:    Oral Fluid: 1660 mL  Total IN: 1660 mL    OUT:  Total OUT: 0 mL    Total NET: 1660 mL      Daily     Daily   MEDICATIONS  (STANDING):  chlorhexidine 2% Cloths 1 Application(s) Topical <User Schedule>  enoxaparin Injectable 40 milliGRAM(s) SubCutaneous every 24 hours  erythromycin   Ointment 1 Application(s) Left EYE three times a day  escitalopram 20 milliGRAM(s) Oral daily  folic acid 1 milliGRAM(s) Oral daily  influenza   Vaccine 0.5 milliLiter(s) IntraMuscular once  losartan 50 milliGRAM(s) Oral daily  multivitamin 1 Tablet(s) Oral daily  PHENobarbital 64.8 milliGRAM(s) Oral every 12 hours  polyethylene glycol 3350 17 Gram(s) Oral daily  senna 2 Tablet(s) Oral at bedtime  thiamine 100 milliGRAM(s) Oral daily    MEDICATIONS  (PRN):  acetaminophen     Tablet .. 975 milliGRAM(s) Oral every 6 hours PRN Mild Pain (1 - 3)  oxyCODONE    IR 10 milliGRAM(s) Oral every 4 hours PRN Severe Pain (7 - 10)  oxyCODONE    IR 5 milliGRAM(s) Oral every 4 hours PRN Moderate Pain (4 - 6)    Labs:    Physical Exam:  General: well developed, well nourished, NAD  HEENT: L periorbital swelling with ecchymosis, ecchymosis on right neck/supraclavicular area  Cardiovascular: appears well perfused   Respiratory: respirations non labored  Gastrointestinal: soft, nontender, nondistended  Extremities: FROM, warm  Neurological: A+Ox3

## 2023-12-07 NOTE — PROGRESS NOTE ADULT - ATTENDING COMMENTS
seen and examined 12-07-23 @ 0730    2016 @ [facility] – [place] - Dani MARIN  left periorbital / neck / upper chest with evolving deep purple ecchymosis  left lateral supraorbital skin avulsion with some old dry blood clot but no evidence of wound infection  lower lip ecchymosis  midline laparotomy scar      left parafalcine SDH  -stable on multiple repeat CT head, most recently 12/2 1538  -chemical VTE prophylaxis was started on 11/30, but held on 12/2 for decreasing hgb requiring 1u RBC transfusion  -unclear etiology for normocytic anemia, but hgb has been stable since 12/2 transfusion, so Lovenox was restarted on 12/5    alcohol abuse  -evaluated by social work on 11/30    alcohol withdrawal  -phenobarbital taper    elevated INR and thrombocytopenia are concerning for cirrhosis in the setting of alcohol abuse  -f/u with hepatology as an outpatient    dispo  -transfer to acute rehab when available seen and examined 12-07-23 @ 0730    [place] [facility] [place]  left periorbital / neck / upper chest with evolving deep purple ecchymosis  left lateral supraorbital skin avulsion with some old dry blood clot but no evidence of wound infection  lower lip ecchymosis  midline laparotomy scar      left parafalcine SDH  -stable on multiple repeat CT head, most recently 12/2 1538  -chemical VTE prophylaxis was started on 11/30, but held on 12/2 for decreasing hgb requiring 1u RBC transfusion  -unclear etiology for normocytic anemia, but hgb has been stable since 12/2 transfusion, so Lovenox was restarted on 12/5    alcohol abuse  -evaluated by social work on 11/30    alcohol withdrawal  -phenobarbital taper    elevated INR and thrombocytopenia are concerning for cirrhosis in the setting of alcohol abuse  -f/u with hepatology as an outpatient    dispo  -transfer to acute rehab when available I will START or STAY ON the medications listed below when I get home from the hospital:    acetaminophen 325 mg oral tablet  -- 3 tab(s) by mouth every 8 hours, As Needed  -- Indication: For  delivery    ibuprofen 600 mg oral tablet  -- 1 tab(s) by mouth every 6 hours, As Needed  -- Indication: For  delivery    ferrous sulfate 325 mg (65 mg elemental iron) oral tablet  -- 1 tab(s) by mouth once a day  -- Indication: For  delivery    Prenatal Multivitamins with Folic Acid 1 mg oral tablet  -- 1 tab(s) by mouth once a day  -- Indication: For  delivery

## 2023-12-08 PROCEDURE — 99231 SBSQ HOSP IP/OBS SF/LOW 25: CPT

## 2023-12-08 RX ADMIN — OXYCODONE HYDROCHLORIDE 10 MILLIGRAM(S): 5 TABLET ORAL at 20:40

## 2023-12-08 RX ADMIN — OXYCODONE HYDROCHLORIDE 10 MILLIGRAM(S): 5 TABLET ORAL at 20:10

## 2023-12-08 RX ADMIN — ENOXAPARIN SODIUM 40 MILLIGRAM(S): 100 INJECTION SUBCUTANEOUS at 22:12

## 2023-12-08 RX ADMIN — OXYCODONE HYDROCHLORIDE 10 MILLIGRAM(S): 5 TABLET ORAL at 13:12

## 2023-12-08 RX ADMIN — Medication 1 MILLIGRAM(S): at 11:25

## 2023-12-08 RX ADMIN — Medication 64.8 MILLIGRAM(S): at 17:09

## 2023-12-08 RX ADMIN — Medication 100 MILLIGRAM(S): at 11:25

## 2023-12-08 RX ADMIN — Medication 64.8 MILLIGRAM(S): at 05:04

## 2023-12-08 RX ADMIN — Medication 1 APPLICATION(S): at 22:12

## 2023-12-08 RX ADMIN — ESCITALOPRAM OXALATE 20 MILLIGRAM(S): 10 TABLET, FILM COATED ORAL at 11:25

## 2023-12-08 RX ADMIN — Medication 1 APPLICATION(S): at 13:11

## 2023-12-08 RX ADMIN — OXYCODONE HYDROCHLORIDE 10 MILLIGRAM(S): 5 TABLET ORAL at 14:12

## 2023-12-08 RX ADMIN — Medication 975 MILLIGRAM(S): at 00:59

## 2023-12-08 RX ADMIN — Medication 1 APPLICATION(S): at 05:05

## 2023-12-08 RX ADMIN — OXYCODONE HYDROCHLORIDE 10 MILLIGRAM(S): 5 TABLET ORAL at 05:04

## 2023-12-08 RX ADMIN — Medication 975 MILLIGRAM(S): at 22:45

## 2023-12-08 RX ADMIN — Medication 1 TABLET(S): at 11:25

## 2023-12-08 RX ADMIN — OXYCODONE HYDROCHLORIDE 10 MILLIGRAM(S): 5 TABLET ORAL at 06:04

## 2023-12-08 RX ADMIN — Medication 975 MILLIGRAM(S): at 22:15

## 2023-12-08 NOTE — PROGRESS NOTE ADULT - ASSESSMENT
63yM w/ PMHx of alcohol abuse and anx/depression who presents s/p fall w +HS onto bedside table with LOC.  Trauma assessment in ED: ABCs intact, GCS 15, AAOx3. Repeat CTH imaging has remained stable. Remains on a Phenobarbitol taper due to finish on 12/10.     injuries are identified:   - L parafalcine SDH w/o mass effect  - L periorbital STS    PLAN:  - Multimodal pain control with tylenol PO q8h ATC, tramadol q6h PRN for moderate (25mg), and severe pain (50mg)  - phenobarbital taper  - Tertiary exam performed  - No face dressing, leave open to air  - PT evaluation- Acute Inpatient Rehab once phenobarbital taper completed     Trauma ACS [id number] 63yM w/ PMHx of alcohol abuse and anx/depression who presents s/p fall w +HS onto bedside table with LOC.  Trauma assessment in ED: ABCs intact, GCS 15, AAOx3. Repeat CTH imaging has remained stable. Remains on a Phenobarbitol taper due to finish on 12/10.     injuries are identified:   - L parafalcine SDH w/o mass effect  - L periorbital STS    PLAN:  - Multimodal pain control with tylenol PO q8h ATC, tramadol q6h PRN for moderate (25mg), and severe pain (50mg)  - phenobarbital taper  - Tertiary exam performed  - No face dressing, leave open to air  - PT evaluation- Acute Inpatient Rehab once phenobarbital taper completed     Trauma ACS [id number]

## 2023-12-08 NOTE — PROGRESS NOTE ADULT - SUBJECTIVE AND OBJECTIVE BOX
TRAUMA SURGERY DAILY PROGRESS NOTE:         SUBJECTIVE/ROS: Patient feels well.  No events overnight. States pain controlled with analgesia.   Denies nausea, vomiting, chest pain, shortness of breath         MEDICATIONS  (STANDING):  chlorhexidine 2% Cloths 1 Application(s) Topical <User Schedule>  enoxaparin Injectable 40 milliGRAM(s) SubCutaneous every 24 hours  erythromycin   Ointment 1 Application(s) Left EYE three times a day  escitalopram 20 milliGRAM(s) Oral daily  folic acid 1 milliGRAM(s) Oral daily  influenza   Vaccine 0.5 milliLiter(s) IntraMuscular once  losartan 50 milliGRAM(s) Oral daily  multivitamin 1 Tablet(s) Oral daily  PHENobarbital 64.8 milliGRAM(s) Oral every 12 hours  polyethylene glycol 3350 17 Gram(s) Oral daily  senna 2 Tablet(s) Oral at bedtime  thiamine 100 milliGRAM(s) Oral daily    MEDICATIONS  (PRN):  acetaminophen     Tablet .. 975 milliGRAM(s) Oral every 6 hours PRN Mild Pain (1 - 3)  oxyCODONE    IR 10 milliGRAM(s) Oral every 4 hours PRN Severe Pain (7 - 10)  oxyCODONE    IR 5 milliGRAM(s) Oral every 4 hours PRN Moderate Pain (4 - 6)      OBJECTIVE:    Vital Signs Last 24 Hrs  T(C): 36.8 (08 Dec 2023 08:54), Max: 37.2 (07 Dec 2023 16:06)  T(F): 98.3 (08 Dec 2023 08:54), Max: 98.9 (07 Dec 2023 16:06)  HR: 60 (08 Dec 2023 08:54) (60 - 93)  BP: 111/66 (08 Dec 2023 08:54) (100/65 - 113/57)  BP(mean): --  RR: 18 (08 Dec 2023 08:54) (18 - 18)  SpO2: 98% (08 Dec 2023 08:54) (97% - 98%)    Parameters below as of 08 Dec 2023 08:54  Patient On (Oxygen Delivery Method): room air            I&O's Detail    07 Dec 2023 07:01  -  08 Dec 2023 07:00  --------------------------------------------------------  IN:    Oral Fluid: 1560 mL  Total IN: 1560 mL    OUT:    Voided (mL): 400 mL  Total OUT: 400 mL    Total NET: 1160 mL      08 Dec 2023 07:01  -  08 Dec 2023 10:25  --------------------------------------------------------  IN:    Oral Fluid: 240 mL  Total IN: 240 mL    OUT:  Total OUT: 0 mL    Total NET: 240 mL      PHYSICAL:  General: laying in bed, NAD  HEENT: L periorbital swelling improved with ecchymosis, ecchymosis on right neck/supraclavicular area  Cardiovascular: appears well perfused   Respiratory: non labored breathing

## 2023-12-09 PROCEDURE — 99231 SBSQ HOSP IP/OBS SF/LOW 25: CPT | Mod: GC

## 2023-12-09 RX ADMIN — Medication 1 APPLICATION(S): at 05:37

## 2023-12-09 RX ADMIN — Medication 975 MILLIGRAM(S): at 06:07

## 2023-12-09 RX ADMIN — Medication 975 MILLIGRAM(S): at 20:45

## 2023-12-09 RX ADMIN — OXYCODONE HYDROCHLORIDE 10 MILLIGRAM(S): 5 TABLET ORAL at 09:31

## 2023-12-09 RX ADMIN — ENOXAPARIN SODIUM 40 MILLIGRAM(S): 100 INJECTION SUBCUTANEOUS at 22:19

## 2023-12-09 RX ADMIN — Medication 1 APPLICATION(S): at 13:09

## 2023-12-09 RX ADMIN — Medication 975 MILLIGRAM(S): at 20:15

## 2023-12-09 RX ADMIN — OXYCODONE HYDROCHLORIDE 10 MILLIGRAM(S): 5 TABLET ORAL at 10:31

## 2023-12-09 RX ADMIN — Medication 1 MILLIGRAM(S): at 11:38

## 2023-12-09 RX ADMIN — Medication 32.4 MILLIGRAM(S): at 05:37

## 2023-12-09 RX ADMIN — Medication 32.4 MILLIGRAM(S): at 17:02

## 2023-12-09 RX ADMIN — ESCITALOPRAM OXALATE 20 MILLIGRAM(S): 10 TABLET, FILM COATED ORAL at 11:38

## 2023-12-09 RX ADMIN — Medication 1 TABLET(S): at 11:38

## 2023-12-09 RX ADMIN — Medication 975 MILLIGRAM(S): at 05:37

## 2023-12-09 RX ADMIN — OXYCODONE HYDROCHLORIDE 10 MILLIGRAM(S): 5 TABLET ORAL at 22:57

## 2023-12-09 RX ADMIN — OXYCODONE HYDROCHLORIDE 10 MILLIGRAM(S): 5 TABLET ORAL at 23:37

## 2023-12-09 RX ADMIN — Medication 1 APPLICATION(S): at 22:19

## 2023-12-09 RX ADMIN — Medication 100 MILLIGRAM(S): at 11:38

## 2023-12-09 NOTE — PROGRESS NOTE ADULT - SUBJECTIVE AND OBJECTIVE BOX
[facility] DEPARTMENT OF OPHTHALMOLOGY  ------------------------------------------------------------------------------  Elisabeth Wynn MD PGY-2  ------------------------------------------------------------------------------    Interval History: No acute events overnight. Patient reports that swelling has improved in the left eye. Denies any pain or photophobia. Vision stable.     MEDICATIONS  (STANDING):  chlorhexidine 2% Cloths 1 Application(s) Topical <User Schedule>  enoxaparin Injectable 40 milliGRAM(s) SubCutaneous every 24 hours  erythromycin   Ointment 1 Application(s) Left EYE three times a day  escitalopram 20 milliGRAM(s) Oral daily  folic acid 1 milliGRAM(s) Oral daily  influenza   Vaccine 0.5 milliLiter(s) IntraMuscular once  losartan 50 milliGRAM(s) Oral daily  multivitamin 1 Tablet(s) Oral daily  PHENobarbital 32.4 milliGRAM(s) Oral every 12 hours  polyethylene glycol 3350 17 Gram(s) Oral daily  senna 2 Tablet(s) Oral at bedtime  thiamine 100 milliGRAM(s) Oral daily    MEDICATIONS  (PRN):  acetaminophen     Tablet .. 975 milliGRAM(s) Oral every 6 hours PRN Mild Pain (1 - 3)  oxyCODONE    IR 10 milliGRAM(s) Oral every 4 hours PRN Severe Pain (7 - 10)  oxyCODONE    IR 5 milliGRAM(s) Oral every 4 hours PRN Moderate Pain (4 - 6)      VITALS: T(C): 36.8 (12-09-23 @ 12:18)  T(F): 98.2 (12-09-23 @ 12:18), Max: 99.1 (12-09-23 @ 04:31)  HR: 58 (12-09-23 @ 12:18) (58 - 83)  BP: 118/70 (12-09-23 @ 12:18) (100/62 - 126/73)  RR:  (18 - 18)  SpO2:  (96% - 98%)  Wt(kg): --  General: AAO x 3, appropriate mood and affect      Ophthalmology Exam:  Visual acuity: 20/30+ OD, 20/40+ OS  Pupils: PERRL OU, no RAPD  Ttono: 8 OD, 10 OS  Extraocular movements (EOMs): Full OU, no pain, no diplopia    Pen Light Exam (PLE)  External: Flat OD, moderate periorbital edema OS.  Lids/Lashes/Lacrimal Ducts: Flat OD, JOSUE/LLL ecchymosis (edema nearly resolved)  Sclera/Conjunctiva: W+Q OD; temporal and inferior subconj hemorrhage.   Cornea: Cl OD, trace SPK   Anterior Chamber: D+F OU    Iris: Flat OU  Lens: mild cataracts OU       [facility] DEPARTMENT OF OPHTHALMOLOGY  ------------------------------------------------------------------------------  Elisabeth Wynn MD PGY-2  ------------------------------------------------------------------------------    Interval History: No acute events overnight. Patient reports that swelling has improved in the left eye. Denies any pain or photophobia. Vision stable.     MEDICATIONS  (STANDING):  chlorhexidine 2% Cloths 1 Application(s) Topical <User Schedule>  enoxaparin Injectable 40 milliGRAM(s) SubCutaneous every 24 hours  erythromycin   Ointment 1 Application(s) Left EYE three times a day  escitalopram 20 milliGRAM(s) Oral daily  folic acid 1 milliGRAM(s) Oral daily  influenza   Vaccine 0.5 milliLiter(s) IntraMuscular once  losartan 50 milliGRAM(s) Oral daily  multivitamin 1 Tablet(s) Oral daily  PHENobarbital 32.4 milliGRAM(s) Oral every 12 hours  polyethylene glycol 3350 17 Gram(s) Oral daily  senna 2 Tablet(s) Oral at bedtime  thiamine 100 milliGRAM(s) Oral daily    MEDICATIONS  (PRN):  acetaminophen     Tablet .. 975 milliGRAM(s) Oral every 6 hours PRN Mild Pain (1 - 3)  oxyCODONE    IR 10 milliGRAM(s) Oral every 4 hours PRN Severe Pain (7 - 10)  oxyCODONE    IR 5 milliGRAM(s) Oral every 4 hours PRN Moderate Pain (4 - 6)      VITALS: T(C): 36.8 (12-09-23 @ 12:18)  T(F): 98.2 (12-09-23 @ 12:18), Max: 99.1 (12-09-23 @ 04:31)  HR: 58 (12-09-23 @ 12:18) (58 - 83)  BP: 118/70 (12-09-23 @ 12:18) (100/62 - 126/73)  RR:  (18 - 18)  SpO2:  (96% - 98%)  Wt(kg): --  General: AAO x 3, appropriate mood and affect      Ophthalmology Exam:  Visual acuity: 20/30+ OD, 20/40+ OS  Pupils: PERRL OU, no RAPD  Ttono: 8 OD, 10 OS  Extraocular movements (EOMs): Full OU, no pain, no diplopia    Pen Light Exam (PLE)  External: Flat OD, moderate periorbital edema OS.  Lids/Lashes/Lacrimal Ducts: Flat OD, JOSUE/LLL ecchymosis (edema nearly resolved)  Sclera/Conjunctiva: W+Q OD; temporal and inferior subconj hemorrhage.   Cornea: Cl OD, trace SPK   Anterior Chamber: D+F OU    Iris: Flat OU  Lens: mild cataracts OU

## 2023-12-09 NOTE — PROGRESS NOTE ADULT - ATTENDING COMMENTS
ATTENDING ATTESTATION  I have seen and examined this patient with the resident housestaftf. I have reviewed all labs, imaging and reports. I have participated in formulating the plan, and have read and agree with the history, ROS, exam, assessment and plan as stated above.     Plan for discharge to rehab on Monday after completing phenobarbital taper for withdrawal.    Bryanna Soto M.D., M.S.  Division of Acute Care Surgery

## 2023-12-09 NOTE — PROGRESS NOTE ADULT - SUBJECTIVE AND OBJECTIVE BOX
Surgery Progress Note    S: Patient seen and examined. No acute events overnight. Feeling overall well this AM.    O:  Physical Exam:  Gen: Laying in bed, NAD  HEENT: improving L shwetha-orbital swelling and ecchymosis  Resp: Unlabored breathing  Ext: Moves 4 extremities spontaneously    Vital Signs Last 24 Hrs  T(C): 36.6 (09 Dec 2023 08:00), Max: 37.3 (09 Dec 2023 04:31)  T(F): 97.9 (09 Dec 2023 08:00), Max: 99.1 (09 Dec 2023 04:31)  HR: 83 (09 Dec 2023 08:00) (60 - 83)  BP: 126/73 (09 Dec 2023 08:00) (100/62 - 126/73)  BP(mean): --  RR: 18 (09 Dec 2023 08:00) (18 - 18)  SpO2: 97% (09 Dec 2023 08:00) (96% - 98%)    Parameters below as of 09 Dec 2023 08:00  Patient On (Oxygen Delivery Method): room air        I&O's Detail    08 Dec 2023 07:01  -  09 Dec 2023 07:00  --------------------------------------------------------  IN:    Oral Fluid: 840 mL  Total IN: 840 mL    OUT:    Voided (mL): 1200 mL  Total OUT: 1200 mL    Total NET: -360 mL

## 2023-12-09 NOTE — PROGRESS NOTE ADULT - ASSESSMENT
63M with PMH HTN, anxiety depression, history of alcohol abuse presents for evaluation after fall with head trauma, does not recall if there was trauma to the left eye, found to have large left periorbital hematoma.    # Left eye trauma  # Periorbital hematoma, left  - 63M with reported mechanical fall 2/2 etoh intoxication, reportedly hit left eye on nightstand, found to have large periorbital hematoma OS  - VA 20/40+ OS today  - PERRL no RAPD, IOP OS 10 today   - Anterior exam (left eye) with substantially improved periorbital and lid edema  - AC deep and formed, IOP wnl, low suspicion for globe rupture  - Subconj heme and chemosis significantly improved as well  - CT max/face with no retrobulbar hematoma or proptosis  - DFE wnl, no holes, tears or detachments OU   - Continue erythromycin ointment, left eye TID (ordered) due to subconj heme and irregular corneal wetting    Discussed with Dr. Evans    Outpatient Follow-up: Patient should follow-up with his/her ophthalmologist or with [facility] Department of Ophthalmology within 1 week of after discharge at:    [address]  [place] [address]  [phone] 63M with PMH HTN, anxiety depression, history of alcohol abuse presents for evaluation after fall with head trauma, does not recall if there was trauma to the left eye, found to have large left periorbital hematoma.    # Left eye trauma  # Periorbital hematoma, left  - 63M with reported mechanical fall 2/2 etoh intoxication, reportedly hit left eye on nightstand, found to have large periorbital hematoma OS  - VA 20/40+ OS today  - PERRL no RAPD, IOP OS 10 today   - Anterior exam (left eye) with substantially improved periorbital and lid edema  - AC deep and formed, IOP wnl, low suspicion for globe rupture  - Subconj heme and chemosis significantly improved as well  - CT max/face with no retrobulbar hematoma or proptosis  - DFE wnl, no holes, tears or detachments OU   - Continue erythromycin ointment, left eye TID (ordered) due to subconj heme and irregular corneal wetting    Discussed with Dr. Evans    Outpatient Follow-up: Patient should follow-up with his/her ophthalmologist or with [facility] Department of Ophthalmology within 1 week of after discharge at:    [address]  [place] [address]  [phone]

## 2023-12-09 NOTE — PROGRESS NOTE ADULT - ASSESSMENT
63yM w/ PMHx of alcohol abuse and anx/depression who presents s/p fall w +HS onto bedside table with LOC.  Trauma assessment in ED: ABCs intact, GCS 15, AAOx3. Repeat CTH imaging has remained stable. Remains on a Phenobarbitol taper due to finish on 12/10.     injuries are identified:   - L parafalcine SDH w/o mass effect  - L periorbital STS    PLAN:  - Multimodal pain control with tylenol PO q8h ATC, tramadol q6h PRN for moderate (25mg), and severe pain (50mg)  - phenobarbital taper  - Tertiary exam performed  - No face dressing, leave open to air  - PT evaluation- Acute Inpatient Rehab once phenobarbital taper completed     Trauma ACS [id number] 63yM w/ PMHx of alcohol abuse and anx/depression who presents s/p fall w +HS onto bedside table with LOC.  Trauma assessment in ED: ABCs intact, GCS 15, AAOx3. Repeat CTH imaging has remained stable. Remains on a Phenobarbitol taper due to finish on 12/10.     injuries are identified:   - L parafalcine SDH w/o mass effect  - L periorbital STS    PLAN:  - Multimodal pain control with tylenol PO q8h ATC, tramadol q6h PRN for moderate (25mg), and severe pain (50mg)  - phenobarbital taper  - Tertiary exam performed  - No face dressing, leave open to air  - PT evaluation- Acute Inpatient Rehab once phenobarbital taper completed     Trauma ACS [id number]

## 2023-12-10 RX ORDER — LOPERAMIDE HCL 2 MG
2 TABLET ORAL DAILY
Refills: 0 | Status: DISCONTINUED | OUTPATIENT
Start: 2023-12-10 | End: 2023-12-19

## 2023-12-10 RX ADMIN — Medication 1 APPLICATION(S): at 05:37

## 2023-12-10 RX ADMIN — Medication 975 MILLIGRAM(S): at 06:07

## 2023-12-10 RX ADMIN — Medication 975 MILLIGRAM(S): at 12:37

## 2023-12-10 RX ADMIN — Medication 100 MILLIGRAM(S): at 11:27

## 2023-12-10 RX ADMIN — OXYCODONE HYDROCHLORIDE 10 MILLIGRAM(S): 5 TABLET ORAL at 12:37

## 2023-12-10 RX ADMIN — Medication 32.4 MILLIGRAM(S): at 17:05

## 2023-12-10 RX ADMIN — Medication 1 TABLET(S): at 11:27

## 2023-12-10 RX ADMIN — OXYCODONE HYDROCHLORIDE 10 MILLIGRAM(S): 5 TABLET ORAL at 11:33

## 2023-12-10 RX ADMIN — Medication 975 MILLIGRAM(S): at 18:40

## 2023-12-10 RX ADMIN — OXYCODONE HYDROCHLORIDE 10 MILLIGRAM(S): 5 TABLET ORAL at 18:40

## 2023-12-10 RX ADMIN — ESCITALOPRAM OXALATE 20 MILLIGRAM(S): 10 TABLET, FILM COATED ORAL at 11:26

## 2023-12-10 RX ADMIN — Medication 1 MILLIGRAM(S): at 11:27

## 2023-12-10 RX ADMIN — Medication 975 MILLIGRAM(S): at 11:37

## 2023-12-10 RX ADMIN — LOSARTAN POTASSIUM 50 MILLIGRAM(S): 100 TABLET, FILM COATED ORAL at 05:37

## 2023-12-10 RX ADMIN — Medication 975 MILLIGRAM(S): at 05:37

## 2023-12-10 RX ADMIN — ENOXAPARIN SODIUM 40 MILLIGRAM(S): 100 INJECTION SUBCUTANEOUS at 21:57

## 2023-12-10 RX ADMIN — Medication 1 APPLICATION(S): at 13:29

## 2023-12-10 RX ADMIN — Medication 1 APPLICATION(S): at 21:57

## 2023-12-10 RX ADMIN — Medication 32.4 MILLIGRAM(S): at 05:37

## 2023-12-11 DIAGNOSIS — R93.89 ABNORMAL FINDINGS ON DIAGNOSTIC IMAGING OF OTHER SPECIFIED BODY STRUCTURES: ICD-10-CM

## 2023-12-11 PROCEDURE — 99232 SBSQ HOSP IP/OBS MODERATE 35: CPT

## 2023-12-11 RX ORDER — ACETAMINOPHEN 500 MG
3 TABLET ORAL
Qty: 0 | Refills: 0 | DISCHARGE
Start: 2023-12-11

## 2023-12-11 RX ORDER — ONDANSETRON 8 MG/1
4 TABLET, FILM COATED ORAL EVERY 8 HOURS
Refills: 0 | Status: DISCONTINUED | OUTPATIENT
Start: 2023-12-11 | End: 2023-12-19

## 2023-12-11 RX ORDER — OXYCODONE HYDROCHLORIDE 5 MG/1
1 TABLET ORAL
Qty: 0 | Refills: 0 | DISCHARGE
Start: 2023-12-11

## 2023-12-11 RX ORDER — OXYCODONE HYDROCHLORIDE 5 MG/1
10 TABLET ORAL EVERY 4 HOURS
Refills: 0 | Status: DISCONTINUED | OUTPATIENT
Start: 2023-12-11 | End: 2023-12-18

## 2023-12-11 RX ORDER — ERYTHROMYCIN BASE 5 MG/GRAM
1 OINTMENT (GRAM) OPHTHALMIC (EYE)
Qty: 0 | Refills: 0 | DISCHARGE
Start: 2023-12-11

## 2023-12-11 RX ORDER — SENNA PLUS 8.6 MG/1
2 TABLET ORAL
Qty: 0 | Refills: 0 | DISCHARGE
Start: 2023-12-11

## 2023-12-11 RX ORDER — OXYCODONE HYDROCHLORIDE 5 MG/1
5 TABLET ORAL ONCE
Refills: 0 | Status: DISCONTINUED | OUTPATIENT
Start: 2023-12-11 | End: 2023-12-11

## 2023-12-11 RX ORDER — POLYETHYLENE GLYCOL 3350 17 G/17G
17 POWDER, FOR SOLUTION ORAL
Qty: 0 | Refills: 0 | DISCHARGE
Start: 2023-12-11

## 2023-12-11 RX ORDER — FOLIC ACID 0.8 MG
1 TABLET ORAL
Qty: 0 | Refills: 0 | DISCHARGE
Start: 2023-12-11

## 2023-12-11 RX ORDER — THIAMINE MONONITRATE (VIT B1) 100 MG
1 TABLET ORAL
Qty: 0 | Refills: 0 | DISCHARGE
Start: 2023-12-11

## 2023-12-11 RX ORDER — OXYCODONE HYDROCHLORIDE 5 MG/1
5 TABLET ORAL EVERY 4 HOURS
Refills: 0 | Status: DISCONTINUED | OUTPATIENT
Start: 2023-12-11 | End: 2023-12-11

## 2023-12-11 RX ADMIN — Medication 1 APPLICATION(S): at 21:29

## 2023-12-11 RX ADMIN — Medication 2 MILLIGRAM(S): at 02:34

## 2023-12-11 RX ADMIN — OXYCODONE HYDROCHLORIDE 5 MILLIGRAM(S): 5 TABLET ORAL at 10:00

## 2023-12-11 RX ADMIN — Medication 975 MILLIGRAM(S): at 09:00

## 2023-12-11 RX ADMIN — LOSARTAN POTASSIUM 50 MILLIGRAM(S): 100 TABLET, FILM COATED ORAL at 04:41

## 2023-12-11 RX ADMIN — OXYCODONE HYDROCHLORIDE 10 MILLIGRAM(S): 5 TABLET ORAL at 21:59

## 2023-12-11 RX ADMIN — Medication 975 MILLIGRAM(S): at 02:33

## 2023-12-11 RX ADMIN — ENOXAPARIN SODIUM 40 MILLIGRAM(S): 100 INJECTION SUBCUTANEOUS at 22:33

## 2023-12-11 RX ADMIN — Medication 975 MILLIGRAM(S): at 03:03

## 2023-12-11 RX ADMIN — OXYCODONE HYDROCHLORIDE 5 MILLIGRAM(S): 5 TABLET ORAL at 04:42

## 2023-12-11 RX ADMIN — OXYCODONE HYDROCHLORIDE 10 MILLIGRAM(S): 5 TABLET ORAL at 21:29

## 2023-12-11 RX ADMIN — OXYCODONE HYDROCHLORIDE 10 MILLIGRAM(S): 5 TABLET ORAL at 16:24

## 2023-12-11 RX ADMIN — Medication 1 APPLICATION(S): at 04:42

## 2023-12-11 RX ADMIN — Medication 1 MILLIGRAM(S): at 11:35

## 2023-12-11 RX ADMIN — Medication 975 MILLIGRAM(S): at 22:00

## 2023-12-11 RX ADMIN — OXYCODONE HYDROCHLORIDE 5 MILLIGRAM(S): 5 TABLET ORAL at 05:12

## 2023-12-11 RX ADMIN — Medication 100 MILLIGRAM(S): at 11:35

## 2023-12-11 RX ADMIN — OXYCODONE HYDROCHLORIDE 10 MILLIGRAM(S): 5 TABLET ORAL at 17:24

## 2023-12-11 RX ADMIN — Medication 1 TABLET(S): at 11:35

## 2023-12-11 RX ADMIN — OXYCODONE HYDROCHLORIDE 5 MILLIGRAM(S): 5 TABLET ORAL at 09:00

## 2023-12-11 RX ADMIN — Medication 975 MILLIGRAM(S): at 21:29

## 2023-12-11 RX ADMIN — ESCITALOPRAM OXALATE 20 MILLIGRAM(S): 10 TABLET, FILM COATED ORAL at 11:35

## 2023-12-11 RX ADMIN — Medication 975 MILLIGRAM(S): at 10:00

## 2023-12-11 RX ADMIN — Medication 1 APPLICATION(S): at 13:07

## 2023-12-11 RX ADMIN — OXYCODONE HYDROCHLORIDE 5 MILLIGRAM(S): 5 TABLET ORAL at 12:56

## 2023-12-11 RX ADMIN — OXYCODONE HYDROCHLORIDE 5 MILLIGRAM(S): 5 TABLET ORAL at 11:56

## 2023-12-11 NOTE — PROGRESS NOTE ADULT - TIME BILLING
I saw and evaluated patient and agree with above note  looks well  no signs withdraw  disposition planning

## 2023-12-11 NOTE — PROGRESS NOTE ADULT - ASSESSMENT
63yM w/ PMHx of alcohol abuse and anx/depression who presents s/p fall w +HS onto bedside table with LOC.  Trauma assessment in ED: ABCs intact, GCS 15, AAOx3. Repeat CTH imaging has remained stable. Remains on a Phenobarbitol taper due to finish on 12/10.     injuries are identified:   - L parafalcine SDH w/o mass effect  - L periorbital STS    PLAN:  - Phenobarb taper completed  - Multimodal pain control  - Tertiary exam performed  - No face dressing, leave open to air  - PT evaluation- Acute Inpatient Rehab once phenobarbital taper completed  - Regular diet  - DVT ppx    Trauma ACS [id number] 63yM w/ PMHx of alcohol abuse and anx/depression who presents s/p fall w +HS onto bedside table with LOC.  Trauma assessment in ED: ABCs intact, GCS 15, AAOx3. Repeat CTH imaging has remained stable. Remains on a Phenobarbitol taper due to finish on 12/10.     injuries are identified:   - L parafalcine SDH w/o mass effect  - L periorbital STS    PLAN:  - Phenobarb taper completed  - Multimodal pain control  - Tertiary exam performed  - No face dressing, leave open to air  - PT evaluation- Acute Inpatient Rehab once phenobarbital taper completed  - Regular diet  - DVT ppx    Trauma ACS [id number]

## 2023-12-11 NOTE — PROGRESS NOTE ADULT - SUBJECTIVE AND OBJECTIVE BOX
Surgery Progress Note    S: Patient seen and examined. No acute events overnight.      O:  Physical Exam:  Gen: Laying in bed, NAD  HEENT: L periobital ecchymosis and swelling continuing to improve  Resp: Unlabored breathing  Ext: Moves 4 extremities spontaneously    Vital Signs Last 24 Hrs  T(C): 36.3 (11 Dec 2023 08:47), Max: 36.7 (10 Dec 2023 16:50)  T(F): 97.4 (11 Dec 2023 08:47), Max: 98 (10 Dec 2023 16:50)  HR: 55 (11 Dec 2023 08:47) (53 - 59)  BP: 99/62 (11 Dec 2023 08:47) (93/61 - 134/82)  BP(mean): --  RR: 18 (11 Dec 2023 08:47) (18 - 18)  SpO2: 97% (11 Dec 2023 08:47) (95% - 97%)    Parameters below as of 11 Dec 2023 08:47  Patient On (Oxygen Delivery Method): room air        I&O's Detail    10 Dec 2023 07:01  -  11 Dec 2023 07:00  --------------------------------------------------------  IN:    Oral Fluid: 1195 mL  Total IN: 1195 mL    OUT:    Voided (mL): 240 mL  Total OUT: 240 mL    Total NET: 955 mL

## 2023-12-12 PROCEDURE — 99232 SBSQ HOSP IP/OBS MODERATE 35: CPT

## 2023-12-12 RX ADMIN — Medication 975 MILLIGRAM(S): at 05:34

## 2023-12-12 RX ADMIN — Medication 975 MILLIGRAM(S): at 14:28

## 2023-12-12 RX ADMIN — OXYCODONE HYDROCHLORIDE 10 MILLIGRAM(S): 5 TABLET ORAL at 06:05

## 2023-12-12 RX ADMIN — Medication 975 MILLIGRAM(S): at 06:05

## 2023-12-12 RX ADMIN — Medication 975 MILLIGRAM(S): at 15:28

## 2023-12-12 RX ADMIN — OXYCODONE HYDROCHLORIDE 10 MILLIGRAM(S): 5 TABLET ORAL at 05:35

## 2023-12-12 RX ADMIN — Medication 975 MILLIGRAM(S): at 23:27

## 2023-12-12 RX ADMIN — OXYCODONE HYDROCHLORIDE 10 MILLIGRAM(S): 5 TABLET ORAL at 01:50

## 2023-12-12 RX ADMIN — OXYCODONE HYDROCHLORIDE 10 MILLIGRAM(S): 5 TABLET ORAL at 23:27

## 2023-12-12 RX ADMIN — Medication 2 MILLIGRAM(S): at 05:34

## 2023-12-12 RX ADMIN — Medication 1 APPLICATION(S): at 05:35

## 2023-12-12 RX ADMIN — OXYCODONE HYDROCHLORIDE 10 MILLIGRAM(S): 5 TABLET ORAL at 15:28

## 2023-12-12 RX ADMIN — ENOXAPARIN SODIUM 40 MILLIGRAM(S): 100 INJECTION SUBCUTANEOUS at 21:55

## 2023-12-12 RX ADMIN — Medication 1 TABLET(S): at 11:59

## 2023-12-12 RX ADMIN — Medication 100 MILLIGRAM(S): at 11:59

## 2023-12-12 RX ADMIN — ESCITALOPRAM OXALATE 20 MILLIGRAM(S): 10 TABLET, FILM COATED ORAL at 11:59

## 2023-12-12 RX ADMIN — Medication 1 APPLICATION(S): at 21:55

## 2023-12-12 RX ADMIN — Medication 1 APPLICATION(S): at 14:30

## 2023-12-12 RX ADMIN — OXYCODONE HYDROCHLORIDE 10 MILLIGRAM(S): 5 TABLET ORAL at 01:19

## 2023-12-12 RX ADMIN — OXYCODONE HYDROCHLORIDE 10 MILLIGRAM(S): 5 TABLET ORAL at 14:28

## 2023-12-12 RX ADMIN — Medication 1 MILLIGRAM(S): at 12:00

## 2023-12-12 NOTE — PROGRESS NOTE ADULT - ASSESSMENT
63yM w/ PMHx of alcohol abuse and anx/depression who presents s/p fall w +HS onto bedside table with LOC.  Trauma assessment in ED: ABCs intact, GCS 15, AAOx3. Repeat CTH imaging has remained stable. Remains on a Phenobarbitol taper due to finish on 12/10.     injuries are identified:   - L parafalcine SDH w/o mass effect  - L periorbital STS    PLAN:  - Phenobarb taper completed  - Multimodal pain control  - Tertiary exam performed  - No face dressing, leave open to air  - PT evaluation- Acute Inpatient Rehab; pending auth  - Regular diet  - DVT ppx    Trauma ACS [id number] 63yM w/ PMHx of alcohol abuse and anx/depression who presents s/p fall w +HS onto bedside table with LOC.  Trauma assessment in ED: ABCs intact, GCS 15, AAOx3. Repeat CTH imaging has remained stable. Remains on a Phenobarbitol taper due to finish on 12/10.     injuries are identified:   - L parafalcine SDH w/o mass effect  - L periorbital STS    PLAN:  - Phenobarb taper completed  - Multimodal pain control  - Tertiary exam performed  - No face dressing, leave open to air  - PT evaluation- Acute Inpatient Rehab; pending auth  - Regular diet  - DVT ppx    Trauma ACS [id number]

## 2023-12-12 NOTE — CHART NOTE - NSCHARTNOTEFT_GEN_A_CORE
Reached out for peer to peer Case #[id number] peer to peer review scheduled for 12/13 9-12pm     [facility] 0643 Reached out for peer to peer Case #[id number] peer to peer review scheduled for 12/13 9-12pm     [facility] 6975

## 2023-12-12 NOTE — PROGRESS NOTE ADULT - TIME BILLING
I saw and evaluated patient and agree with above note  looks well, conversive, pleasant  at this stage focus is placement into [facility]

## 2023-12-12 NOTE — PROGRESS NOTE ADULT - SUBJECTIVE AND OBJECTIVE BOX
TRAUMA SURGERY DAILY PROGRESS NOTE:         SUBJECTIVE/ROS: Patient seen and examined at bedside.  No events overnight. States pain controlled with analgesia.   Denies nausea, vomiting, chest pain, shortness of breath.  Awaiting rehab placement.         MEDICATIONS  (STANDING):  chlorhexidine 2% Cloths 1 Application(s) Topical <User Schedule>  enoxaparin Injectable 40 milliGRAM(s) SubCutaneous every 24 hours  erythromycin   Ointment 1 Application(s) Left EYE three times a day  escitalopram 20 milliGRAM(s) Oral daily  folic acid 1 milliGRAM(s) Oral daily  influenza   Vaccine 0.5 milliLiter(s) IntraMuscular once  loperamide 2 milliGRAM(s) Oral daily  losartan 50 milliGRAM(s) Oral daily  multivitamin 1 Tablet(s) Oral daily  polyethylene glycol 3350 17 Gram(s) Oral daily  senna 2 Tablet(s) Oral at bedtime  thiamine 100 milliGRAM(s) Oral daily    MEDICATIONS  (PRN):  acetaminophen     Tablet .. 975 milliGRAM(s) Oral every 6 hours PRN Mild Pain (1 - 3)  ondansetron   Disintegrating Tablet 4 milliGRAM(s) Oral every 8 hours PRN Nausea and/or Vomiting  oxyCODONE    IR 10 milliGRAM(s) Oral every 4 hours PRN Severe Pain (7 - 10)  oxyCODONE    IR 5 milliGRAM(s) Oral every 4 hours PRN Moderate Pain (4 - 6)      OBJECTIVE:    Vital Signs Last 24 Hrs  T(C): 36.7 (12 Dec 2023 08:14), Max: 37.1 (11 Dec 2023 16:12)  T(F): 98 (12 Dec 2023 08:14), Max: 98.7 (11 Dec 2023 16:12)  HR: 55 (12 Dec 2023 08:14) (55 - 71)  BP: 104/63 (12 Dec 2023 08:14) (98/63 - 116/71)  BP(mean): --  RR: 18 (12 Dec 2023 08:14) (18 - 18)  SpO2: 97% (12 Dec 2023 08:14) (96% - 98%)    Parameters below as of 12 Dec 2023 08:14  Patient On (Oxygen Delivery Method): room air            I&O's Detail    11 Dec 2023 07:01  -  12 Dec 2023 07:00  --------------------------------------------------------  IN:    Oral Fluid: 975 mL  Total IN: 975 mL    OUT:  Total OUT: 0 mL    Total NET: 975 mL        PHYSICAL:  General: laying in bed, NAD  HEENT: L periorbital swelling improved with ecchymosis, ecchymosis on right neck/supraclavicular area  Cardiovascular: appears well perfused   Respiratory: non labored breathing

## 2023-12-13 RX ADMIN — Medication 1 TABLET(S): at 12:15

## 2023-12-13 RX ADMIN — Medication 975 MILLIGRAM(S): at 06:30

## 2023-12-13 RX ADMIN — OXYCODONE HYDROCHLORIDE 10 MILLIGRAM(S): 5 TABLET ORAL at 20:23

## 2023-12-13 RX ADMIN — OXYCODONE HYDROCHLORIDE 10 MILLIGRAM(S): 5 TABLET ORAL at 06:30

## 2023-12-13 RX ADMIN — Medication 2 MILLIGRAM(S): at 05:26

## 2023-12-13 RX ADMIN — CHLORHEXIDINE GLUCONATE 1 APPLICATION(S): 213 SOLUTION TOPICAL at 05:25

## 2023-12-13 RX ADMIN — ESCITALOPRAM OXALATE 20 MILLIGRAM(S): 10 TABLET, FILM COATED ORAL at 12:15

## 2023-12-13 RX ADMIN — OXYCODONE HYDROCHLORIDE 10 MILLIGRAM(S): 5 TABLET ORAL at 05:30

## 2023-12-13 RX ADMIN — Medication 1 MILLIGRAM(S): at 12:15

## 2023-12-13 RX ADMIN — OXYCODONE HYDROCHLORIDE 10 MILLIGRAM(S): 5 TABLET ORAL at 12:15

## 2023-12-13 RX ADMIN — Medication 975 MILLIGRAM(S): at 13:15

## 2023-12-13 RX ADMIN — OXYCODONE HYDROCHLORIDE 10 MILLIGRAM(S): 5 TABLET ORAL at 00:27

## 2023-12-13 RX ADMIN — Medication 1 APPLICATION(S): at 05:27

## 2023-12-13 RX ADMIN — Medication 975 MILLIGRAM(S): at 05:30

## 2023-12-13 RX ADMIN — OXYCODONE HYDROCHLORIDE 10 MILLIGRAM(S): 5 TABLET ORAL at 19:23

## 2023-12-13 RX ADMIN — Medication 975 MILLIGRAM(S): at 19:23

## 2023-12-13 RX ADMIN — Medication 1 APPLICATION(S): at 13:36

## 2023-12-13 RX ADMIN — Medication 975 MILLIGRAM(S): at 20:23

## 2023-12-13 RX ADMIN — Medication 100 MILLIGRAM(S): at 12:15

## 2023-12-13 RX ADMIN — Medication 1 APPLICATION(S): at 20:39

## 2023-12-13 RX ADMIN — LOSARTAN POTASSIUM 50 MILLIGRAM(S): 100 TABLET, FILM COATED ORAL at 05:25

## 2023-12-13 RX ADMIN — OXYCODONE HYDROCHLORIDE 10 MILLIGRAM(S): 5 TABLET ORAL at 13:15

## 2023-12-13 RX ADMIN — Medication 975 MILLIGRAM(S): at 12:15

## 2023-12-13 RX ADMIN — ENOXAPARIN SODIUM 40 MILLIGRAM(S): 100 INJECTION SUBCUTANEOUS at 20:39

## 2023-12-13 RX ADMIN — Medication 975 MILLIGRAM(S): at 00:27

## 2023-12-13 NOTE — PROGRESS NOTE ADULT - ASSESSMENT
63yM w/ PMHx of alcohol abuse and anx/depression who presents s/p fall w +HS onto bedside table with LOC.  Trauma assessment in ED: ABCs intact, GCS 15, AAOx3. Repeat CTH imaging has remained stable. Remains on a Phenobarbitol taper due to finish on 12/10.     injuries are identified:   - L parafalcine SDH w/o mass effect  - L periorbital STS    PLAN:  - Phenobarb taper completed  - Multimodal pain control  - Tertiary exam performed  - No face dressing, leave open to air  - PT evaluation- Acute Inpatient Rehab; pending [id number]  - Regular diet  - DVT ppx    Trauma ACS [id number]   63yM w/ PMHx of alcohol abuse and anx/depression who presents s/p fall w +HS onto bedside table with LOC.  Trauma assessment in ED: ABCs intact, GCS 15, AAOx3. Repeat CTH imaging has remained stable. Remains on a Phenobarbitol taper due to finish on 12/10.     injuries are identified:   - L parafalcine SDH w/o mass effect  - L periorbital STS    PLAN:  - Phenobarb taper completed  - Multimodal pain control  - Tertiary exam performed  - No face dressing, leave open to air  - PT evaluation- Acute Inpatient Rehab; pending [id number]  - Regular diet  - DVT ppx    Trauma ACS [id number]

## 2023-12-13 NOTE — PROGRESS NOTE ADULT - SUBJECTIVE AND OBJECTIVE BOX
SURGERY DAILY PROGRESS NOTE:       Subjective / Overnight events:  No acute overnight events.  Pain controlled.  Denies N/V.      Objective:      MEDICATIONS  (STANDING):  chlorhexidine 2% Cloths 1 Application(s) Topical <User Schedule>  enoxaparin Injectable 40 milliGRAM(s) SubCutaneous every 24 hours  erythromycin   Ointment 1 Application(s) Left EYE three times a day  escitalopram 20 milliGRAM(s) Oral daily  folic acid 1 milliGRAM(s) Oral daily  influenza   Vaccine 0.5 milliLiter(s) IntraMuscular once  loperamide 2 milliGRAM(s) Oral daily  losartan 50 milliGRAM(s) Oral daily  multivitamin 1 Tablet(s) Oral daily  polyethylene glycol 3350 17 Gram(s) Oral daily  senna 2 Tablet(s) Oral at bedtime  thiamine 100 milliGRAM(s) Oral daily    MEDICATIONS  (PRN):  acetaminophen     Tablet .. 975 milliGRAM(s) Oral every 6 hours PRN Mild Pain (1 - 3)  ondansetron   Disintegrating Tablet 4 milliGRAM(s) Oral every 8 hours PRN Nausea and/or Vomiting  oxyCODONE    IR 5 milliGRAM(s) Oral every 4 hours PRN Moderate Pain (4 - 6)  oxyCODONE    IR 10 milliGRAM(s) Oral every 4 hours PRN Severe Pain (7 - 10)      Vital Signs Last 24 Hrs  T(C): 37.2 (13 Dec 2023 08:10), Max: 37.2 (13 Dec 2023 08:10)  T(F): 98.9 (13 Dec 2023 08:10), Max: 98.9 (13 Dec 2023 08:10)  HR: 61 (13 Dec 2023 08:10) (55 - 76)  BP: 110/66 (13 Dec 2023 08:10) (102/64 - 120/62)  BP(mean): --  RR: 18 (13 Dec 2023 08:10) (18 - 18)  SpO2: 97% (13 Dec 2023 08:10) (97% - 98%)    Parameters below as of 13 Dec 2023 08:10  Patient On (Oxygen Delivery Method): room air        I&O's Detail    12 Dec 2023 07:01  -  13 Dec 2023 07:00  --------------------------------------------------------  IN:    Oral Fluid: 1080 mL  Total IN: 1080 mL    OUT:  Total OUT: 0 mL    Total NET: 1080 mL          Daily     Daily     LABS:                PHYSICAL:  General: laying in bed, NAD  HEENT: L periorbital swelling improved with ecchymosis, ecchymosis on right neck/supraclavicular area  Cardiovascular: appears well perfused   Respiratory: non labored breathing

## 2023-12-14 PROCEDURE — 99232 SBSQ HOSP IP/OBS MODERATE 35: CPT

## 2023-12-14 RX ADMIN — Medication 975 MILLIGRAM(S): at 06:33

## 2023-12-14 RX ADMIN — Medication 975 MILLIGRAM(S): at 18:30

## 2023-12-14 RX ADMIN — CHLORHEXIDINE GLUCONATE 1 APPLICATION(S): 213 SOLUTION TOPICAL at 05:23

## 2023-12-14 RX ADMIN — OXYCODONE HYDROCHLORIDE 10 MILLIGRAM(S): 5 TABLET ORAL at 18:30

## 2023-12-14 RX ADMIN — Medication 2 MILLIGRAM(S): at 05:24

## 2023-12-14 RX ADMIN — OXYCODONE HYDROCHLORIDE 10 MILLIGRAM(S): 5 TABLET ORAL at 11:02

## 2023-12-14 RX ADMIN — Medication 1 APPLICATION(S): at 05:25

## 2023-12-14 RX ADMIN — ESCITALOPRAM OXALATE 20 MILLIGRAM(S): 10 TABLET, FILM COATED ORAL at 11:01

## 2023-12-14 RX ADMIN — OXYCODONE HYDROCHLORIDE 10 MILLIGRAM(S): 5 TABLET ORAL at 22:13

## 2023-12-14 RX ADMIN — ENOXAPARIN SODIUM 40 MILLIGRAM(S): 100 INJECTION SUBCUTANEOUS at 21:43

## 2023-12-14 RX ADMIN — OXYCODONE HYDROCHLORIDE 10 MILLIGRAM(S): 5 TABLET ORAL at 21:43

## 2023-12-14 RX ADMIN — OXYCODONE HYDROCHLORIDE 10 MILLIGRAM(S): 5 TABLET ORAL at 17:32

## 2023-12-14 RX ADMIN — LOSARTAN POTASSIUM 50 MILLIGRAM(S): 100 TABLET, FILM COATED ORAL at 05:24

## 2023-12-14 RX ADMIN — OXYCODONE HYDROCHLORIDE 10 MILLIGRAM(S): 5 TABLET ORAL at 12:00

## 2023-12-14 RX ADMIN — Medication 1 APPLICATION(S): at 21:44

## 2023-12-14 RX ADMIN — Medication 975 MILLIGRAM(S): at 17:32

## 2023-12-14 RX ADMIN — Medication 1 APPLICATION(S): at 13:41

## 2023-12-14 RX ADMIN — Medication 1 MILLIGRAM(S): at 11:01

## 2023-12-14 RX ADMIN — Medication 975 MILLIGRAM(S): at 06:01

## 2023-12-14 RX ADMIN — OXYCODONE HYDROCHLORIDE 10 MILLIGRAM(S): 5 TABLET ORAL at 06:02

## 2023-12-14 RX ADMIN — OXYCODONE HYDROCHLORIDE 10 MILLIGRAM(S): 5 TABLET ORAL at 06:33

## 2023-12-14 RX ADMIN — Medication 100 MILLIGRAM(S): at 11:01

## 2023-12-14 RX ADMIN — Medication 1 TABLET(S): at 11:01

## 2023-12-14 NOTE — PROGRESS NOTE ADULT - ASSESSMENT
63yM w/ PMHx of alcohol abuse and anx/depression who presents s/p fall w +HS onto bedside table with LOC.  Trauma assessment in ED: ABCs intact, GCS 15, AAOx3. Repeat CTH imaging has remained stable. Phenobarbitol taper completed on 12/10.     injuries are identified:   - L parafalcine SDH w/o mass effect  - L periorbital STS    PLAN:  - Phenobarb taper completed  - Multimodal pain control  - Tertiary exam performed  - No face dressing, leave open to air  - PT evaluation- Acute Inpatient Rehab; pending [id number]  - Regular diet  - DVT ppx    Trauma ACS [id number]   63yM w/ PMHx of alcohol abuse and anx/depression who presents s/p fall w +HS onto bedside table with LOC.  Trauma assessment in ED: ABCs intact, GCS 15, AAOx3. Repeat CTH imaging has remained stable. Phenobarbitol taper completed on 12/10.     injuries are identified:   - L parafalcine SDH w/o mass effect  - L periorbital STS    PLAN:  - Phenobarb taper completed  - Multimodal pain control  - Tertiary exam performed  - No face dressing, leave open to air  - PT evaluation- Acute Inpatient Rehab; pending [id number]  - Regular diet  - DVT ppx    Trauma ACS p9035

## 2023-12-14 NOTE — PROGRESS NOTE ADULT - SUBJECTIVE AND OBJECTIVE BOX
Patient is a 63y old  Male who presents with a chief complaint of     Patient has been tolerating therapy - CG gait/ min A stairs  Laying in bed. Pain controlled.     MEDICATIONS  (STANDING):  chlorhexidine 2% Cloths 1 Application(s) Topical <User Schedule>  enoxaparin Injectable 40 milliGRAM(s) SubCutaneous every 24 hours  erythromycin   Ointment 1 Application(s) Left EYE three times a day  escitalopram 20 milliGRAM(s) Oral daily  folic acid 1 milliGRAM(s) Oral daily  influenza   Vaccine 0.5 milliLiter(s) IntraMuscular once  loperamide 2 milliGRAM(s) Oral daily  losartan 50 milliGRAM(s) Oral daily  multivitamin 1 Tablet(s) Oral daily  polyethylene glycol 3350 17 Gram(s) Oral daily  senna 2 Tablet(s) Oral at bedtime  thiamine 100 milliGRAM(s) Oral daily    MEDICATIONS  (PRN):  acetaminophen     Tablet .. 975 milliGRAM(s) Oral every 6 hours PRN Mild Pain (1 - 3)  ondansetron   Disintegrating Tablet 4 milliGRAM(s) Oral every 8 hours PRN Nausea and/or Vomiting  oxyCODONE    IR 10 milliGRAM(s) Oral every 4 hours PRN Severe Pain (7 - 10)  oxyCODONE    IR 5 milliGRAM(s) Oral every 4 hours PRN Moderate Pain (4 - 6)    Vital Signs Last 24 Hrs  T(C): 36.8 (14 Dec 2023 08:12), Max: 36.9 (13 Dec 2023 13:07)  T(F): 98.2 (14 Dec 2023 08:12), Max: 98.4 (13 Dec 2023 13:07)  HR: 56 (14 Dec 2023 08:12) (56 - 62)  BP: 103/65 (14 Dec 2023 08:12) (103/65 - 125/68)  BP(mean): --  RR: 18 (14 Dec 2023 08:12) (18 - 18)  SpO2: 97% (14 Dec 2023 08:12) (95% - 97%)    PHYSICAL EXAM  Constitutional - NAD, Comfortable  HEENT - + facial and bl eye eccymoses, EOMI  Neck - Supple  Chest - Eccymoses throughout chest wall, No distress, no use of accessory muscles for respiration  Cardiovascular -Well perfused  Abdomen - BS+, Soft, NTND  Extremities - No C/C/E, No calf tenderness   Neurologic Exam -                 AAO x 3  Motor non-focal UE and LEs  No clonus  Poor concentration  Psychiatric - Mood stable, Affect WNL    Impression:  62 yo with functional deficits secondary to diagnosis of TBI    Plan:  PT- ROM, Bed Mob, Transfers, Amb w AD and bracing as needed  OT- ADLs, bracing  SLP- Dysphagia eval and treat  Prec- Falls, Cardiac  DVT Prophylaxis- SCDs, Lovenox  Skin- Turn q2 h  Dispo-  Acute TBI Rehab- patient requires active and ongoing therapeutic interventions of multiple disciplines and can tolerate 3 hours of therapies x 4wks. Can actively participate and benefit from  an intensive rehabilitation program. Requires supervision by a rehabilitation physician and a coordinated interdisciplinary approach to providing rehabilitation.    Patient is a 63y old  Male who presents with a chief complaint of     Patient has been tolerating therapy - CG gait/ min A stairs  Laying in bed. Pain controlled.     MEDICATIONS  (STANDING):  chlorhexidine 2% Cloths 1 Application(s) Topical <User Schedule>  enoxaparin Injectable 40 milliGRAM(s) SubCutaneous every 24 hours  erythromycin   Ointment 1 Application(s) Left EYE three times a day  escitalopram 20 milliGRAM(s) Oral daily  folic acid 1 milliGRAM(s) Oral daily  influenza   Vaccine 0.5 milliLiter(s) IntraMuscular once  loperamide 2 milliGRAM(s) Oral daily  losartan 50 milliGRAM(s) Oral daily  multivitamin 1 Tablet(s) Oral daily  polyethylene glycol 3350 17 Gram(s) Oral daily  senna 2 Tablet(s) Oral at bedtime  thiamine 100 milliGRAM(s) Oral daily    MEDICATIONS  (PRN):  acetaminophen     Tablet .. 975 milliGRAM(s) Oral every 6 hours PRN Mild Pain (1 - 3)  ondansetron   Disintegrating Tablet 4 milliGRAM(s) Oral every 8 hours PRN Nausea and/or Vomiting  oxyCODONE    IR 10 milliGRAM(s) Oral every 4 hours PRN Severe Pain (7 - 10)  oxyCODONE    IR 5 milliGRAM(s) Oral every 4 hours PRN Moderate Pain (4 - 6)    Vital Signs Last 24 Hrs  T(C): 36.8 (14 Dec 2023 08:12), Max: 36.9 (13 Dec 2023 13:07)  T(F): 98.2 (14 Dec 2023 08:12), Max: 98.4 (13 Dec 2023 13:07)  HR: 56 (14 Dec 2023 08:12) (56 - 62)  BP: 103/65 (14 Dec 2023 08:12) (103/65 - 125/68)  BP(mean): --  RR: 18 (14 Dec 2023 08:12) (18 - 18)  SpO2: 97% (14 Dec 2023 08:12) (95% - 97%)    PHYSICAL EXAM  Constitutional - NAD, Comfortable  HEENT - + facial and bl eye eccymoses, EOMI  Neck - Supple  Chest - Eccymoses throughout chest wall, No distress, no use of accessory muscles for respiration  Cardiovascular -Well perfused  Abdomen - BS+, Soft, NTND  Extremities - No C/C/E, No calf tenderness   Neurologic Exam -                 AAO x 3  Motor non-focal UE and LEs  No clonus  Poor concentration  Psychiatric - Mood stable, Affect WNL    Impression:  64 yo with functional deficits secondary to diagnosis of TBI    Plan:  PT- ROM, Bed Mob, Transfers, Amb w AD and bracing as needed  OT- ADLs, bracing  SLP- Dysphagia eval and treat  Prec- Falls, Cardiac  DVT Prophylaxis- SCDs, Lovenox  Skin- Turn q2 h  Dispo-  Acute TBI Rehab- patient requires active and ongoing therapeutic interventions of multiple disciplines and can tolerate 3 hours of therapies x 4wks. Can actively participate and benefit from  an intensive rehabilitation program. Requires supervision by a rehabilitation physician and a coordinated interdisciplinary approach to providing rehabilitation.

## 2023-12-14 NOTE — PROGRESS NOTE ADULT - SUBJECTIVE AND OBJECTIVE BOX
Surgery Progress Note    S: Patient seen and examined. No acute events overnight.    O:  Physical Exam:  Gen: Laying in bed, NAD  HEENT: mild left periorbital ecchymosis  Resp: Unlabored breathing on room air  Ext: Moves 4 extremities spontaneously    Vital Signs Last 24 Hrs  T(C): 36.8 (14 Dec 2023 08:12), Max: 36.9 (13 Dec 2023 13:07)  T(F): 98.2 (14 Dec 2023 08:12), Max: 98.4 (13 Dec 2023 13:07)  HR: 56 (14 Dec 2023 08:12) (56 - 62)  BP: 103/65 (14 Dec 2023 08:12) (103/65 - 125/68)  BP(mean): --  RR: 18 (14 Dec 2023 08:12) (18 - 18)  SpO2: 97% (14 Dec 2023 08:12) (95% - 97%)    Parameters below as of 14 Dec 2023 08:12  Patient On (Oxygen Delivery Method): room air        I&O's Detail    13 Dec 2023 07:01  -  14 Dec 2023 07:00  --------------------------------------------------------  IN:    Oral Fluid: 1300 mL  Total IN: 1300 mL    OUT:  Total OUT: 0 mL    Total NET: 1300 mL

## 2023-12-15 PROCEDURE — 99231 SBSQ HOSP IP/OBS SF/LOW 25: CPT | Mod: GC

## 2023-12-15 RX ADMIN — Medication 1 TABLET(S): at 12:10

## 2023-12-15 RX ADMIN — OXYCODONE HYDROCHLORIDE 10 MILLIGRAM(S): 5 TABLET ORAL at 06:30

## 2023-12-15 RX ADMIN — OXYCODONE HYDROCHLORIDE 10 MILLIGRAM(S): 5 TABLET ORAL at 23:50

## 2023-12-15 RX ADMIN — Medication 1 APPLICATION(S): at 21:31

## 2023-12-15 RX ADMIN — Medication 1 APPLICATION(S): at 05:55

## 2023-12-15 RX ADMIN — Medication 100 MILLIGRAM(S): at 12:11

## 2023-12-15 RX ADMIN — Medication 975 MILLIGRAM(S): at 00:55

## 2023-12-15 RX ADMIN — Medication 1 MILLIGRAM(S): at 12:11

## 2023-12-15 RX ADMIN — OXYCODONE HYDROCHLORIDE 10 MILLIGRAM(S): 5 TABLET ORAL at 06:00

## 2023-12-15 RX ADMIN — Medication 975 MILLIGRAM(S): at 00:25

## 2023-12-15 RX ADMIN — ESCITALOPRAM OXALATE 20 MILLIGRAM(S): 10 TABLET, FILM COATED ORAL at 12:11

## 2023-12-15 RX ADMIN — OXYCODONE HYDROCHLORIDE 10 MILLIGRAM(S): 5 TABLET ORAL at 14:51

## 2023-12-15 RX ADMIN — OXYCODONE HYDROCHLORIDE 10 MILLIGRAM(S): 5 TABLET ORAL at 22:50

## 2023-12-15 RX ADMIN — OXYCODONE HYDROCHLORIDE 10 MILLIGRAM(S): 5 TABLET ORAL at 19:40

## 2023-12-15 RX ADMIN — OXYCODONE HYDROCHLORIDE 10 MILLIGRAM(S): 5 TABLET ORAL at 20:40

## 2023-12-15 RX ADMIN — OXYCODONE HYDROCHLORIDE 10 MILLIGRAM(S): 5 TABLET ORAL at 11:00

## 2023-12-15 RX ADMIN — CHLORHEXIDINE GLUCONATE 1 APPLICATION(S): 213 SOLUTION TOPICAL at 06:01

## 2023-12-15 RX ADMIN — Medication 975 MILLIGRAM(S): at 21:30

## 2023-12-15 RX ADMIN — OXYCODONE HYDROCHLORIDE 10 MILLIGRAM(S): 5 TABLET ORAL at 10:14

## 2023-12-15 RX ADMIN — Medication 1 APPLICATION(S): at 13:30

## 2023-12-15 RX ADMIN — ENOXAPARIN SODIUM 40 MILLIGRAM(S): 100 INJECTION SUBCUTANEOUS at 21:34

## 2023-12-15 RX ADMIN — OXYCODONE HYDROCHLORIDE 10 MILLIGRAM(S): 5 TABLET ORAL at 15:51

## 2023-12-15 RX ADMIN — Medication 975 MILLIGRAM(S): at 22:30

## 2023-12-15 NOTE — PROGRESS NOTE ADULT - ASSESSMENT
63yM w/ PMHx of alcohol abuse and anx/depression who presents s/p fall w +HS onto bedside table with LOC.  Trauma assessment in ED: ABCs intact, GCS 15, AAOx3. Repeat CTH imaging has remained stable. Phenobarbitol taper completed on 12/10.     injuries are identified:   - L parafalcine SDH w/o mass effect  - L periorbital STS    PLAN:  - Phenobarb taper completed  - Multimodal pain control  - Tertiary exam performed  - No face dressing, leave open to air  - PT evaluation- Acute Inpatient Rehab; however, insurance denying acute rehab; referrals sent to [facility] instead  - Regular diet  - DVT ppx    Trauma ACS [id number] 63yM w/ PMHx of alcohol abuse and anx/depression who presents s/p fall w +HS onto bedside table with LOC.  Trauma assessment in ED: ABCs intact, GCS 15, AAOx3. Repeat CTH imaging has remained stable. Phenobarbitol taper completed on 12/10.     injuries are identified:   - L parafalcine SDH w/o mass effect  - L periorbital STS    PLAN:  - Phenobarb taper completed  - Multimodal pain control  - Tertiary exam performed  - No face dressing, leave open to air  - PT evaluation- Acute Inpatient Rehab; however, insurance denying acute rehab; referrals sent to [facility] instead  - Regular diet  - DVT ppx    Trauma ACS [id number] 63yM w/ PMHx of alcohol abuse and anx/depression who presents s/p fall w +HS onto bedside table with LOC.  Trauma assessment in ED: ABCs intact, GCS 15, AAOx3. Repeat CTH imaging has remained stable. Phenobarbitol taper completed on 12/10.     injuries are identified:   - L parafalcine SDH w/o mass effect  - L periorbital STS    PLAN:  - Phenobarb taper completed  - Multimodal pain control  - Tertiary exam performed  - No face dressing, leave open to air  - PT evaluation- Acute Inpatient Rehab; however, insurance denying acute rehab; referrals sent to [facility] instead  - Regular diet  - DVT ppx  - dispo: discharge to [facility] once bed available      Trauma ACS [id number] 63yM w/ PMHx of alcohol abuse and anx/depression who presents s/p fall w +HS onto bedside table with LOC.  Trauma assessment in ED: ABCs intact, GCS 15, AAOx3. Repeat CTH imaging has remained stable. Phenobarbitol taper completed on 12/10.     injuries are identified:   - L parafalcine SDH w/o mass effect  - L periorbital STS    PLAN:  - Phenobarb taper completed  - Multimodal pain control  - Tertiary exam performed  - No face dressing, leave open to air  - PT evaluation- Acute Inpatient Rehab; however, insurance denying acute rehab; referrals sent to [facility] instead  - Regular diet  - DVT ppx  - dispo: discharge to [facility] once bed available      Trauma ACS [id number]

## 2023-12-15 NOTE — PROGRESS NOTE ADULT - SUBJECTIVE AND OBJECTIVE BOX
SUBJECTIVE:     Vital Signs Last 24 Hrs  T(C): 36.6 (15 Dec 2023 04:03), Max: 36.8 (14 Dec 2023 08:12)  T(F): 97.9 (15 Dec 2023 04:03), Max: 98.3 (14 Dec 2023 20:20)  HR: 58 (15 Dec 2023 04:03) (56 - 84)  BP: 109/67 (15 Dec 2023 04:03) (97/59 - 109/67)  BP(mean): --  RR: 18 (15 Dec 2023 04:03) (18 - 18)  SpO2: 96% (15 Dec 2023 04:03) (95% - 97%)    Parameters below as of 15 Dec 2023 04:03  Patient On (Oxygen Delivery Method): room air        I&O's Detail    13 Dec 2023 07:01  -  14 Dec 2023 07:00  --------------------------------------------------------  IN:    Oral Fluid: 1300 mL  Total IN: 1300 mL    OUT:  Total OUT: 0 mL    Total NET: 1300 mL      14 Dec 2023 07:01  -  15 Dec 2023 06:23  --------------------------------------------------------  IN:  Total IN: 0 mL    OUT:    Voided (mL): 600 mL  Total OUT: 600 mL    Total NET: -600 mL          Physical Exam:  General Appearance: Appears well, NAD  Respiratory: No acute resp distress, no accesory muscle use  Abdomen: Soft, nontender, appropriate incisional tenderness, dressings clean and dry and intact  Extremities: Grossly symmetric, SCD's in place     LABS:                RADIOLOGY & ADDITIONAL STUDIES:   SUBJECTIVE: Patient seen and examined at bedside.  No overnight events.     Vital Signs Last 24 Hrs  T(C): 36.6 (15 Dec 2023 04:03), Max: 36.8 (14 Dec 2023 08:12)  T(F): 97.9 (15 Dec 2023 04:03), Max: 98.3 (14 Dec 2023 20:20)  HR: 58 (15 Dec 2023 04:03) (56 - 84)  BP: 109/67 (15 Dec 2023 04:03) (97/59 - 109/67)  BP(mean): --  RR: 18 (15 Dec 2023 04:03) (18 - 18)  SpO2: 96% (15 Dec 2023 04:03) (95% - 97%)    Parameters below as of 15 Dec 2023 04:03  Patient On (Oxygen Delivery Method): room air        I&O's Detail    13 Dec 2023 07:01  -  14 Dec 2023 07:00  --------------------------------------------------------  IN:    Oral Fluid: 1300 mL  Total IN: 1300 mL    OUT:  Total OUT: 0 mL    Total NET: 1300 mL      14 Dec 2023 07:01  -  15 Dec 2023 06:23  --------------------------------------------------------  IN:  Total IN: 0 mL    OUT:    Voided (mL): 600 mL  Total OUT: 600 mL    Total NET: -600 mL        Physical Exam:  Gen: Laying in bed, NAD  HEENT: mild left periorbital ecchymosis  Resp: Unlabored breathing on room air  Ext: Moves 4 extremities spontaneously

## 2023-12-15 NOTE — PROGRESS NOTE ADULT - ATTENDING COMMENTS
Patient examined by me on AM rounds. Remains GCS 15, no complaints. Tolerating diet and ambulating with PT. Completed phenobarb taper for [place] withdrawal. F/up social work for [facility] placement.

## 2023-12-16 PROCEDURE — 93010 ELECTROCARDIOGRAM REPORT: CPT

## 2023-12-16 PROCEDURE — 99231 SBSQ HOSP IP/OBS SF/LOW 25: CPT

## 2023-12-16 RX ORDER — IBUPROFEN 200 MG
600 TABLET ORAL EVERY 6 HOURS
Refills: 0 | Status: DISCONTINUED | OUTPATIENT
Start: 2023-12-16 | End: 2023-12-19

## 2023-12-16 RX ADMIN — Medication 975 MILLIGRAM(S): at 20:18

## 2023-12-16 RX ADMIN — Medication 600 MILLIGRAM(S): at 23:30

## 2023-12-16 RX ADMIN — Medication 1 TABLET(S): at 13:01

## 2023-12-16 RX ADMIN — Medication 1 MILLIGRAM(S): at 13:01

## 2023-12-16 RX ADMIN — Medication 975 MILLIGRAM(S): at 06:35

## 2023-12-16 RX ADMIN — Medication 975 MILLIGRAM(S): at 19:18

## 2023-12-16 RX ADMIN — Medication 1 APPLICATION(S): at 05:42

## 2023-12-16 RX ADMIN — Medication 600 MILLIGRAM(S): at 05:35

## 2023-12-16 RX ADMIN — Medication 600 MILLIGRAM(S): at 06:35

## 2023-12-16 RX ADMIN — Medication 600 MILLIGRAM(S): at 18:10

## 2023-12-16 RX ADMIN — Medication 600 MILLIGRAM(S): at 14:01

## 2023-12-16 RX ADMIN — OXYCODONE HYDROCHLORIDE 10 MILLIGRAM(S): 5 TABLET ORAL at 08:35

## 2023-12-16 RX ADMIN — Medication 600 MILLIGRAM(S): at 13:01

## 2023-12-16 RX ADMIN — OXYCODONE HYDROCHLORIDE 10 MILLIGRAM(S): 5 TABLET ORAL at 20:18

## 2023-12-16 RX ADMIN — Medication 1 APPLICATION(S): at 21:04

## 2023-12-16 RX ADMIN — Medication 975 MILLIGRAM(S): at 05:35

## 2023-12-16 RX ADMIN — Medication 100 MILLIGRAM(S): at 13:01

## 2023-12-16 RX ADMIN — ENOXAPARIN SODIUM 40 MILLIGRAM(S): 100 INJECTION SUBCUTANEOUS at 21:02

## 2023-12-16 RX ADMIN — Medication 2 MILLIGRAM(S): at 05:35

## 2023-12-16 RX ADMIN — ESCITALOPRAM OXALATE 20 MILLIGRAM(S): 10 TABLET, FILM COATED ORAL at 13:01

## 2023-12-16 RX ADMIN — OXYCODONE HYDROCHLORIDE 10 MILLIGRAM(S): 5 TABLET ORAL at 23:31

## 2023-12-16 RX ADMIN — OXYCODONE HYDROCHLORIDE 10 MILLIGRAM(S): 5 TABLET ORAL at 09:35

## 2023-12-16 RX ADMIN — OXYCODONE HYDROCHLORIDE 10 MILLIGRAM(S): 5 TABLET ORAL at 19:18

## 2023-12-16 RX ADMIN — Medication 1 APPLICATION(S): at 13:02

## 2023-12-16 RX ADMIN — CHLORHEXIDINE GLUCONATE 1 APPLICATION(S): 213 SOLUTION TOPICAL at 05:42

## 2023-12-16 NOTE — PROVIDER CONTACT NOTE (OTHER) - ACTION/TREATMENT ORDERED:
MD Willard Blanton ordered an additional 2mg IV push dose of Ativan. No further interventions at this time.
As per MD Divya Huang, EKG performed & no further interventions necessary @ this time. Nursing care continued.
MD Willard Blanton at bedside. EKG completed, blood work drawn and sent as ordered. Pending chest x ray. No further interventions at this time.
Contact MD Willard Blanton ordered 2mg Ativan IV Push. No further Interventions at this time.

## 2023-12-16 NOTE — PROVIDER CONTACT NOTE (OTHER) - SITUATION
Pt c/o chest pain radiating down to L elbow and back
pt c/o chest pain
Pt still restless and still trying to climb OOB. CIWA score still at 19
Pt's CIWA score went from 6 to 19

## 2023-12-16 NOTE — PROVIDER CONTACT NOTE (OTHER) - ASSESSMENT
pt A&Ox4 at this time, VSS, /72, HR 63, 97% on RA. Pt c/o sharp chest pain radiating to his left shoulder. Pt denies SOB at this time.
Pt A+O x2. VSS on RA. Pt still restless and trying to climb OOB. Pt still hallucinating. CIWA score still at a 19
Pt A&Ox4. VSS. Pt c/o chest pain that radiates down to L elbow & back. Pt denies SOB, headache, nausea, dizziness. No s/s of diaphoresis.
Pt A+O x 2. Pt's VSS on RA. Pt denies chest pain and SOB. Pt having hallucinations. Pt getting increasingly restless and trying to climb OOB.

## 2023-12-16 NOTE — CHART NOTE - NSCHARTNOTESELECT_GEN_ALL_CORE
Event Note
Transfer Note
tertiary trauma exam
transfer note
Incidental Findings/Event Note
Trauma/ACS
Rapid Response

## 2023-12-16 NOTE — CHART NOTE - NSCHARTNOTEFT_GEN_A_CORE
Paged by RN that patient was complaining of chest pain described as "stabbing" and going to the left elbow. All vitals stable. RN collected EKG. Went to evaluate patient at bedside, lying comfortably upon arrival, no acute distress. Reports he is having pain in his left chest, shoulder, and elbow described as "sharp." When asked if the pain radiated, he denied radiation and said that all affected areas hurt in the same way and pain worsened with arm movement. Endorses Denies SOB, nausea, dizziness, vision changes, headache. Paged by RN that patient was complaining of chest pain described as "stabbing" and going to the left elbow. All vitals stable. RN collected EKG. Went to evaluate patient at bedside, lying comfortably in bed upon arrival, no acute distress. Reports he is having pain in his left chest, shoulder, and elbow described as "sharp." When asked if the pain radiated, he denied radiation and said that all affected areas hurt in the same way and pain worsened with arm movement. Endorses having similar episodes of left chest/shoulder pain since admission which resolves after a few hours. Denies SOB, nausea, dizziness, vision changes, headache. On physical exam patient in NAD, hemodynamically stable, nonlabored breathing on RA. Ecchymosis on anterior and left chest, pain reproducible. EKG with NSR and no ST changes. Reassured the patient that this is likely musculoskeletal and low concern for cardiac etiology at this time, provided some ice packs and to continue current pain regimen.     Trauma/ACS  [id number] Paged by RN that patient was complaining of chest pain described as "stabbing" and going to the left elbow. All vitals stable. RN collected EKG. Went to evaluate patient at bedside, lying comfortably in bed upon arrival, no acute distress. Reports he is having pain in his left chest, shoulder, and elbow described as "sharp." When asked if the pain radiated, he denied radiation and said that all affected areas hurt in the same way and pain worsened with arm movement. Endorses having similar episodes of left chest/shoulder pain since admission which resolves after a few hours. Denies SOB, nausea, dizziness, vision changes, headache. On physical exam patient in NAD, hemodynamically stable, nonlabored breathing on RA. Ecchymosis on anterior and left chest, pain reproducible. EKG with NSR and no ST changes. Reassured the patient that this is likely musculoskeletal and low concern for cardiac etiology at this time, provided some ice packs and to continue current pain regimen.     Trauma/ACS  [id number]

## 2023-12-16 NOTE — PROGRESS NOTE ADULT - SUBJECTIVE AND OBJECTIVE BOX
Surgery Progress Note    S: Patient seen and examined. No acute events overnight.     O:  Physical Exam:  Gen: Laying in bed, NAD  HEENT: Minimal left periorbital ecchymosis  Resp: Unlabored breathing  Ext: Moves 4 extremities spontaneously    Vital Signs Last 24 Hrs  T(C): 36.7 (16 Dec 2023 08:24), Max: 36.8 (15 Dec 2023 20:35)  T(F): 98 (16 Dec 2023 08:24), Max: 98.3 (15 Dec 2023 20:35)  HR: 53 (16 Dec 2023 08:24) (49 - 66)  BP: 109/70 (16 Dec 2023 08:24) (105/69 - 122/69)  BP(mean): --  RR: 18 (16 Dec 2023 08:24) (18 - 18)  SpO2: 98% (16 Dec 2023 08:24) (95% - 98%)    Parameters below as of 16 Dec 2023 08:24  Patient On (Oxygen Delivery Method): room air        I&O's Detail    15 Dec 2023 07:01  -  16 Dec 2023 07:00  --------------------------------------------------------  IN:    Oral Fluid: 850 mL  Total IN: 850 mL    OUT:  Total OUT: 0 mL    Total NET: 850 mL

## 2023-12-16 NOTE — PROGRESS NOTE ADULT - TIME BILLING
No acute events. Remains GCS 15. Tolerating diet and ambulating with PT. F/up social work for [facility] placement.

## 2023-12-16 NOTE — PROVIDER CONTACT NOTE (OTHER) - REASON
Pt's CIWA score went from 6 to 19
pt c/o chest pain
Pt c/o chest pain radiating to back and L elbow
Pt still restless and still trying to climb OOB. CIWA score still at 19

## 2023-12-16 NOTE — PROGRESS NOTE ADULT - ASSESSMENT
63yM w/ PMHx of alcohol abuse and anx/depression who presents s/p fall w +HS onto bedside table with LOC.  Trauma assessment in ED: ABCs intact, GCS 15, AAOx3. Repeat CTH imaging has remained stable. Phenobarbitol taper completed on 12/10.     injuries are identified:   - L parafalcine SDH w/o mass effect  - L periorbital STS    PLAN:  - Phenobarb taper completed  - Multimodal pain control  - Tertiary exam performed  - No face dressing, leave open to air  - Per ophtho, can continue erythromycin ointment for 1 more week until 12/22  - PT evaluation- Acute Inpatient Rehab; however, insurance denying acute rehab; referrals sent to [facility] instead  - Regular diet  - DVT ppx  - dispo: discharge to [facility] once bed available      Trauma ACS [id number] 63yM w/ PMHx of alcohol abuse and anx/depression who presents s/p fall w +HS onto bedside table with LOC.  Trauma assessment in ED: ABCs intact, GCS 15, AAOx3. Repeat CTH imaging has remained stable. Phenobarbitol taper completed on 12/10.     injuries are identified:   - L parafalcine SDH w/o mass effect  - L periorbital STS    PLAN:  - Phenobarb taper completed  - Multimodal pain control  - Tertiary exam performed  - No face dressing, leave open to air  - Per ophtho, can continue erythromycin ointment for 1 more week until 12/22  - PT evaluation- Acute Inpatient Rehab; however, insurance denying acute rehab; referrals sent to [facility] instead  - Regular diet  - DVT ppx  - dispo: discharge to [facility] once bed available      Trauma ACS [id number]

## 2023-12-17 PROCEDURE — 99231 SBSQ HOSP IP/OBS SF/LOW 25: CPT | Mod: GC

## 2023-12-17 RX ADMIN — Medication 1 APPLICATION(S): at 05:04

## 2023-12-17 RX ADMIN — Medication 100 MILLIGRAM(S): at 13:24

## 2023-12-17 RX ADMIN — OXYCODONE HYDROCHLORIDE 10 MILLIGRAM(S): 5 TABLET ORAL at 04:31

## 2023-12-17 RX ADMIN — OXYCODONE HYDROCHLORIDE 10 MILLIGRAM(S): 5 TABLET ORAL at 03:31

## 2023-12-17 RX ADMIN — Medication 600 MILLIGRAM(S): at 17:54

## 2023-12-17 RX ADMIN — OXYCODONE HYDROCHLORIDE 10 MILLIGRAM(S): 5 TABLET ORAL at 13:24

## 2023-12-17 RX ADMIN — Medication 600 MILLIGRAM(S): at 00:30

## 2023-12-17 RX ADMIN — SENNA PLUS 2 TABLET(S): 8.6 TABLET ORAL at 22:14

## 2023-12-17 RX ADMIN — CHLORHEXIDINE GLUCONATE 1 APPLICATION(S): 213 SOLUTION TOPICAL at 05:05

## 2023-12-17 RX ADMIN — Medication 600 MILLIGRAM(S): at 13:24

## 2023-12-17 RX ADMIN — Medication 1 TABLET(S): at 13:24

## 2023-12-17 RX ADMIN — OXYCODONE HYDROCHLORIDE 10 MILLIGRAM(S): 5 TABLET ORAL at 22:14

## 2023-12-17 RX ADMIN — OXYCODONE HYDROCHLORIDE 10 MILLIGRAM(S): 5 TABLET ORAL at 00:31

## 2023-12-17 RX ADMIN — Medication 975 MILLIGRAM(S): at 04:31

## 2023-12-17 RX ADMIN — Medication 600 MILLIGRAM(S): at 05:51

## 2023-12-17 RX ADMIN — Medication 600 MILLIGRAM(S): at 13:59

## 2023-12-17 RX ADMIN — Medication 975 MILLIGRAM(S): at 03:31

## 2023-12-17 RX ADMIN — OXYCODONE HYDROCHLORIDE 10 MILLIGRAM(S): 5 TABLET ORAL at 09:13

## 2023-12-17 RX ADMIN — ENOXAPARIN SODIUM 40 MILLIGRAM(S): 100 INJECTION SUBCUTANEOUS at 22:14

## 2023-12-17 RX ADMIN — OXYCODONE HYDROCHLORIDE 10 MILLIGRAM(S): 5 TABLET ORAL at 09:59

## 2023-12-17 RX ADMIN — Medication 600 MILLIGRAM(S): at 05:04

## 2023-12-17 RX ADMIN — Medication 1 MILLIGRAM(S): at 13:24

## 2023-12-17 RX ADMIN — ESCITALOPRAM OXALATE 20 MILLIGRAM(S): 10 TABLET, FILM COATED ORAL at 13:25

## 2023-12-17 RX ADMIN — OXYCODONE HYDROCHLORIDE 10 MILLIGRAM(S): 5 TABLET ORAL at 17:54

## 2023-12-17 RX ADMIN — Medication 2 MILLIGRAM(S): at 05:04

## 2023-12-17 RX ADMIN — Medication 1 APPLICATION(S): at 13:25

## 2023-12-17 RX ADMIN — OXYCODONE HYDROCHLORIDE 10 MILLIGRAM(S): 5 TABLET ORAL at 13:59

## 2023-12-17 NOTE — PROGRESS NOTE ADULT - TIME BILLING
Episode of reproducible chest pain overnight, now resolved. EKG unremarkable. Likely musculoskeletal. Otherwise doing well. DC planning to [facility].

## 2023-12-17 NOTE — PROGRESS NOTE ADULT - SUBJECTIVE AND OBJECTIVE BOX
SUBJECTIVE: See below for overnight events:    <from 12/16/2023 chart note: >  Paged by RN that patient was complaining of chest pain described as "stabbing" and going to the left elbow. All vitals stable. RN collected EKG. Went to evaluate patient at bedside, lying comfortably in bed upon arrival, no acute distress. Reports he is having pain in his left chest, shoulder, and elbow described as "sharp." When asked if the pain radiated, he denied radiation and said that all affected areas hurt in the same way and pain worsened with arm movement. Endorses having similar episodes of left chest/shoulder pain since admission which resolves after a few hours. Denies SOB, nausea, dizziness, vision changes, headache. On physical exam patient in NAD, hemodynamically stable, nonlabored breathing on RA. Ecchymosis on anterior and left chest, pain reproducible. EKG with NSR and no ST changes. Reassured the patient that this is likely musculoskeletal and low concern for cardiac etiology at this time, provided some ice packs and to continue current pain regimen.   </end of copied text>    Vital Signs Last 24 Hrs  T(C): 36.4 (17 Dec 2023 08:24), Max: 36.9 (17 Dec 2023 00:26)  T(F): 97.5 (17 Dec 2023 08:24), Max: 98.5 (17 Dec 2023 00:26)  HR: 52 (17 Dec 2023 08:24) (50 - 59)  BP: 121/73 (17 Dec 2023 08:24) (97/59 - 123/72)  BP(mean): --  RR: 18 (17 Dec 2023 08:24) (18 - 18)  SpO2: 96% (17 Dec 2023 08:24) (96% - 97%)    Parameters below as of 17 Dec 2023 08:24  Patient On (Oxygen Delivery Method): room air        I&O's Detail    16 Dec 2023 07:01  -  17 Dec 2023 07:00  --------------------------------------------------------  IN:    Oral Fluid: 1140 mL  Total IN: 1140 mL    OUT:  Total OUT: 0 mL    Total NET: 1140 mL          Physical Exam:  Physical Exam:  Gen: Laying in bed, NAD  HEENT: Minimal left periorbital ecchymosis  Resp: Unlabored breathing  Ext: Moves 4 extremities spontaneously    LABS:                RADIOLOGY & ADDITIONAL STUDIES:

## 2023-12-17 NOTE — PROGRESS NOTE ADULT - ASSESSMENT
63yM w/ PMHx of alcohol abuse and anx/depression who presents s/p fall w +HS onto bedside table with LOC.  Trauma assessment in ED: ABCs intact, GCS 15, AAOx3. Repeat CTH imaging has remained stable. Phenobarbitol taper completed on 12/10.     injuries are identified:   - L parafalcine SDH w/o mass effect  - L periorbital STS    PLAN:  - Phenobarb taper completed  - Multimodal pain control  - Tertiary exam performed  - No face dressing, leave open to air  - Per ophtho, can continue erythromycin ointment for 1 more week until 12/22  - PT evaluation- Acute Inpatient Rehab; however, insurance denying acute rehab; referrals sent to [facility] instead  - Regular diet  - DVT ppx  - dispo: discharge to [facility] once bed available      Trauma ACS [id number] 63yM w/ PMHx of alcohol abuse and anx/depression who presents s/p fall w +HS onto bedside table with LOC.  Trauma assessment in ED: ABCs intact, GCS 15, AAOx3. Repeat CTH imaging has remained stable. Phenobarbitol taper completed on 12/10.     injuries are identified:   - L parafalcine SDH w/o mass effect  - L periorbital STS    PLAN:  - Phenobarb taper completed  - Multimodal pain control  - Tertiary exam performed  - No face dressing, leave open to air  - Per ophtho, can continue erythromycin ointment for 1 more week until 12/22  - PT evaluation- Acute Inpatient Rehab; however, insurance denying acute rehab; referrals sent to [facility] instead  - Regular diet  - DVT ppx  - dispo: discharge to [facility] once bed available      Trauma ACS [id number]

## 2023-12-18 PROCEDURE — 99231 SBSQ HOSP IP/OBS SF/LOW 25: CPT

## 2023-12-18 RX ORDER — OXYCODONE HYDROCHLORIDE 5 MG/1
5 TABLET ORAL EVERY 4 HOURS
Refills: 0 | Status: DISCONTINUED | OUTPATIENT
Start: 2023-12-19 | End: 2023-12-19

## 2023-12-18 RX ADMIN — Medication 975 MILLIGRAM(S): at 05:19

## 2023-12-18 RX ADMIN — OXYCODONE HYDROCHLORIDE 10 MILLIGRAM(S): 5 TABLET ORAL at 16:15

## 2023-12-18 RX ADMIN — Medication 1 APPLICATION(S): at 13:36

## 2023-12-18 RX ADMIN — OXYCODONE HYDROCHLORIDE 10 MILLIGRAM(S): 5 TABLET ORAL at 10:23

## 2023-12-18 RX ADMIN — Medication 975 MILLIGRAM(S): at 22:36

## 2023-12-18 RX ADMIN — OXYCODONE HYDROCHLORIDE 10 MILLIGRAM(S): 5 TABLET ORAL at 20:17

## 2023-12-18 RX ADMIN — SENNA PLUS 2 TABLET(S): 8.6 TABLET ORAL at 22:02

## 2023-12-18 RX ADMIN — OXYCODONE HYDROCHLORIDE 10 MILLIGRAM(S): 5 TABLET ORAL at 20:47

## 2023-12-18 RX ADMIN — Medication 100 MILLIGRAM(S): at 11:52

## 2023-12-18 RX ADMIN — Medication 600 MILLIGRAM(S): at 11:53

## 2023-12-18 RX ADMIN — ENOXAPARIN SODIUM 40 MILLIGRAM(S): 100 INJECTION SUBCUTANEOUS at 22:04

## 2023-12-18 RX ADMIN — OXYCODONE HYDROCHLORIDE 10 MILLIGRAM(S): 5 TABLET ORAL at 11:23

## 2023-12-18 RX ADMIN — Medication 600 MILLIGRAM(S): at 05:18

## 2023-12-18 RX ADMIN — Medication 975 MILLIGRAM(S): at 06:06

## 2023-12-18 RX ADMIN — Medication 1 APPLICATION(S): at 22:06

## 2023-12-18 RX ADMIN — Medication 600 MILLIGRAM(S): at 12:53

## 2023-12-18 RX ADMIN — Medication 600 MILLIGRAM(S): at 06:06

## 2023-12-18 RX ADMIN — Medication 1 TABLET(S): at 11:53

## 2023-12-18 RX ADMIN — ESCITALOPRAM OXALATE 20 MILLIGRAM(S): 10 TABLET, FILM COATED ORAL at 11:53

## 2023-12-18 RX ADMIN — LOSARTAN POTASSIUM 50 MILLIGRAM(S): 100 TABLET, FILM COATED ORAL at 05:18

## 2023-12-18 RX ADMIN — Medication 975 MILLIGRAM(S): at 22:06

## 2023-12-18 RX ADMIN — OXYCODONE HYDROCHLORIDE 10 MILLIGRAM(S): 5 TABLET ORAL at 17:15

## 2023-12-18 RX ADMIN — Medication 1 MILLIGRAM(S): at 11:53

## 2023-12-18 NOTE — PROGRESS NOTE ADULT - NUTRITIONAL ASSESSMENT
This patient has been assessed with a concern for Malnutrition and has been determined to have a diagnosis/diagnoses of Severe protein-calorie malnutrition.    This patient is being managed with:   Diet Regular-  Entered: Dec  1 2023  9:24AM  

## 2023-12-18 NOTE — PROGRESS NOTE ADULT - ATTENDING COMMENTS
25 minutes spent on total encounter. The necessity of the time spent during the encounter on this date of service was due to:     No acute events. Remains GCS 15. Tolerating diet and ambulating with PT. F/up case mgmt for [facility] placement.

## 2023-12-18 NOTE — PROGRESS NOTE ADULT - SUBJECTIVE AND OBJECTIVE BOX
B TEAM SURGERY DAILY PROGRESS NOTE:     INTERVAL EVENTS: None    SUBJECTIVE/ROS: No acute events overnight. Patient seen and examined at bedside by surgical team. Denies N/V, fever/chills, sob/chest pain. Has been tolerating a diet, with appropriate voiding and bowel function.      OBJECTIVE:  Vital Signs Last 24 Hrs  T(C): 36.8 (18 Dec 2023 08:26), Max: 36.8 (17 Dec 2023 18:15)  T(F): 98.2 (18 Dec 2023 08:26), Max: 98.2 (17 Dec 2023 18:15)  HR: 57 (18 Dec 2023 08:26) (52 - 58)  BP: 110/69 (18 Dec 2023 08:26) (102/63 - 129/77)  BP(mean): --  RR: 18 (18 Dec 2023 08:26) (18 - 18)  SpO2: 97% (18 Dec 2023 08:26) (94% - 97%)    Parameters below as of 18 Dec 2023 08:26  Patient On (Oxygen Delivery Method): room air               I&O's Detail    17 Dec 2023 07:01  -  18 Dec 2023 07:00  --------------------------------------------------------  IN:    Oral Fluid: 240 mL  Total IN: 240 mL    OUT:  Total OUT: 0 mL    Total NET: 240 mL          IMAGING:      PHYSICAL EXAM:  Gen: Laying in bed, NAD  HEENT: Minimal left periorbital ecchymosis  Resp: Unlabored breathing  Ext: Moves 4 extremities spontaneously

## 2023-12-18 NOTE — PROGRESS NOTE ADULT - ASSESSMENT
63yM w/ PMHx of alcohol abuse and anx/depression who presents s/p fall w +HS onto bedside table with LOC.  Trauma assessment in ED: ABCs intact, GCS 15, AAOx3. Repeat CTH imaging has remained stable. Phenobarbitol taper completed on 12/10. Patient currenlty hemodynamically stable, with no acute neurologic deficits, and appropriate progress. Pending [facility].     injuries are identified:   - L parafalcine SDH w/o mass effect  - L periorbital STS    PLAN:  - Regular diet  - Phenobarb taper completed  - Multimodal pain control  - Tertiary exam performed  - No face dressing, leave open to air  - Per ophtho, can continue erythromycin ointment for 1 more week until 12/22  - PT evaluation- Acute Inpatient Rehab; however, insurance denying acute rehab; referrals sent to [facility] instead  - Regular diet  - DVT ppx  - dispo: discharge to [facility] once bed available      Trauma ACS [id number] 63yM w/ PMHx of alcohol abuse and anx/depression who presents s/p fall w +HS onto bedside table with LOC.  Trauma assessment in ED: ABCs intact, GCS 15, AAOx3. Repeat CTH imaging has remained stable. Phenobarbitol taper completed on 12/10. Patient currenlty hemodynamically stable, with no acute neurologic deficits, and appropriate progress. Pending [facility].     injuries are identified:   - L parafalcine SDH w/o mass effect  - L periorbital STS    PLAN:  - Regular diet  - Phenobarb taper completed  - Multimodal pain control  - Tertiary exam performed  - No face dressing, leave open to air  - Per ophtho, can continue erythromycin ointment for 1 more week until 12/22  - PT evaluation- Acute Inpatient Rehab; however, insurance denying acute rehab; referrals sent to [facility] instead  - Regular diet  - DVT ppx  - dispo: discharge to [facility] once bed available      Trauma ACS [id number]

## 2023-12-19 VITALS
HEART RATE: 52 BPM | SYSTOLIC BLOOD PRESSURE: 129 MMHG | OXYGEN SATURATION: 98 % | TEMPERATURE: 98 F | DIASTOLIC BLOOD PRESSURE: 69 MMHG | RESPIRATION RATE: 18 BRPM

## 2023-12-19 PROCEDURE — 84484 ASSAY OF TROPONIN QUANT: CPT

## 2023-12-19 PROCEDURE — 80048 BASIC METABOLIC PNL TOTAL CA: CPT

## 2023-12-19 PROCEDURE — 84100 ASSAY OF PHOSPHORUS: CPT

## 2023-12-19 PROCEDURE — 96376 TX/PRO/DX INJ SAME DRUG ADON: CPT

## 2023-12-19 PROCEDURE — 73552 X-RAY EXAM OF FEMUR 2/>: CPT

## 2023-12-19 PROCEDURE — 80307 DRUG TEST PRSMV CHEM ANLYZR: CPT

## 2023-12-19 PROCEDURE — 97162 PT EVAL MOD COMPLEX 30 MIN: CPT

## 2023-12-19 PROCEDURE — 99285 EMERGENCY DEPT VISIT HI MDM: CPT | Mod: 25

## 2023-12-19 PROCEDURE — 93005 ELECTROCARDIOGRAM TRACING: CPT

## 2023-12-19 PROCEDURE — 97112 NEUROMUSCULAR REEDUCATION: CPT

## 2023-12-19 PROCEDURE — 82330 ASSAY OF CALCIUM: CPT

## 2023-12-19 PROCEDURE — 72170 X-RAY EXAM OF PELVIS: CPT

## 2023-12-19 PROCEDURE — 70496 CT ANGIOGRAPHY HEAD: CPT | Mod: MA

## 2023-12-19 PROCEDURE — 85014 HEMATOCRIT: CPT

## 2023-12-19 PROCEDURE — 86923 COMPATIBILITY TEST ELECTRIC: CPT

## 2023-12-19 PROCEDURE — 86901 BLOOD TYPING SEROLOGIC RH(D): CPT

## 2023-12-19 PROCEDURE — 96374 THER/PROPH/DIAG INJ IV PUSH: CPT

## 2023-12-19 PROCEDURE — 97116 GAIT TRAINING THERAPY: CPT

## 2023-12-19 PROCEDURE — 84132 ASSAY OF SERUM POTASSIUM: CPT

## 2023-12-19 PROCEDURE — 70470 CT HEAD/BRAIN W/O & W/DYE: CPT

## 2023-12-19 PROCEDURE — 76377 3D RENDER W/INTRP POSTPROCES: CPT

## 2023-12-19 PROCEDURE — 70450 CT HEAD/BRAIN W/O DYE: CPT

## 2023-12-19 PROCEDURE — 83605 ASSAY OF LACTIC ACID: CPT

## 2023-12-19 PROCEDURE — 70498 CT ANGIOGRAPHY NECK: CPT | Mod: MA

## 2023-12-19 PROCEDURE — 74177 CT ABD & PELVIS W/CONTRAST: CPT | Mod: MA

## 2023-12-19 PROCEDURE — 86900 BLOOD TYPING SEROLOGIC ABO: CPT

## 2023-12-19 PROCEDURE — 96375 TX/PRO/DX INJ NEW DRUG ADDON: CPT

## 2023-12-19 PROCEDURE — P9016: CPT

## 2023-12-19 PROCEDURE — 72125 CT NECK SPINE W/O DYE: CPT | Mod: MA

## 2023-12-19 PROCEDURE — 83735 ASSAY OF MAGNESIUM: CPT

## 2023-12-19 PROCEDURE — 70486 CT MAXILLOFACIAL W/O DYE: CPT | Mod: MA

## 2023-12-19 PROCEDURE — 82962 GLUCOSE BLOOD TEST: CPT

## 2023-12-19 PROCEDURE — 85610 PROTHROMBIN TIME: CPT

## 2023-12-19 PROCEDURE — 85018 HEMOGLOBIN: CPT

## 2023-12-19 PROCEDURE — 81003 URINALYSIS AUTO W/O SCOPE: CPT

## 2023-12-19 PROCEDURE — 97110 THERAPEUTIC EXERCISES: CPT

## 2023-12-19 PROCEDURE — 36415 COLL VENOUS BLD VENIPUNCTURE: CPT

## 2023-12-19 PROCEDURE — 83690 ASSAY OF LIPASE: CPT

## 2023-12-19 PROCEDURE — 36430 TRANSFUSION BLD/BLD COMPNT: CPT

## 2023-12-19 PROCEDURE — 85025 COMPLETE CBC W/AUTO DIFF WBC: CPT

## 2023-12-19 PROCEDURE — 99231 SBSQ HOSP IP/OBS SF/LOW 25: CPT

## 2023-12-19 PROCEDURE — 82550 ASSAY OF CK (CPK): CPT

## 2023-12-19 PROCEDURE — 85027 COMPLETE CBC AUTOMATED: CPT

## 2023-12-19 PROCEDURE — 84295 ASSAY OF SERUM SODIUM: CPT

## 2023-12-19 PROCEDURE — 82947 ASSAY GLUCOSE BLOOD QUANT: CPT

## 2023-12-19 PROCEDURE — 71045 X-RAY EXAM CHEST 1 VIEW: CPT

## 2023-12-19 PROCEDURE — 97129 THER IVNTJ 1ST 15 MIN: CPT

## 2023-12-19 PROCEDURE — 73564 X-RAY EXAM KNEE 4 OR MORE: CPT

## 2023-12-19 PROCEDURE — 82272 OCCULT BLD FECES 1-3 TESTS: CPT

## 2023-12-19 PROCEDURE — 82435 ASSAY OF BLOOD CHLORIDE: CPT

## 2023-12-19 PROCEDURE — 97166 OT EVAL MOD COMPLEX 45 MIN: CPT

## 2023-12-19 PROCEDURE — 82553 CREATINE MB FRACTION: CPT

## 2023-12-19 PROCEDURE — 80053 COMPREHEN METABOLIC PANEL: CPT

## 2023-12-19 PROCEDURE — 82803 BLOOD GASES ANY COMBINATION: CPT

## 2023-12-19 PROCEDURE — 85730 THROMBOPLASTIN TIME PARTIAL: CPT

## 2023-12-19 PROCEDURE — 97130 THER IVNTJ EA ADDL 15 MIN: CPT

## 2023-12-19 PROCEDURE — 97530 THERAPEUTIC ACTIVITIES: CPT

## 2023-12-19 PROCEDURE — 71260 CT THORAX DX C+: CPT | Mod: MA

## 2023-12-19 PROCEDURE — 86850 RBC ANTIBODY SCREEN: CPT

## 2023-12-19 RX ADMIN — Medication 600 MILLIGRAM(S): at 12:21

## 2023-12-19 RX ADMIN — Medication 600 MILLIGRAM(S): at 11:21

## 2023-12-19 RX ADMIN — Medication 600 MILLIGRAM(S): at 00:05

## 2023-12-19 RX ADMIN — OXYCODONE HYDROCHLORIDE 5 MILLIGRAM(S): 5 TABLET ORAL at 10:35

## 2023-12-19 RX ADMIN — ESCITALOPRAM OXALATE 20 MILLIGRAM(S): 10 TABLET, FILM COATED ORAL at 11:20

## 2023-12-19 RX ADMIN — Medication 1 MILLIGRAM(S): at 11:20

## 2023-12-19 RX ADMIN — Medication 1 APPLICATION(S): at 05:06

## 2023-12-19 RX ADMIN — Medication 100 MILLIGRAM(S): at 11:21

## 2023-12-19 RX ADMIN — Medication 600 MILLIGRAM(S): at 05:36

## 2023-12-19 RX ADMIN — Medication 600 MILLIGRAM(S): at 05:06

## 2023-12-19 RX ADMIN — Medication 600 MILLIGRAM(S): at 00:35

## 2023-12-19 RX ADMIN — LOSARTAN POTASSIUM 50 MILLIGRAM(S): 100 TABLET, FILM COATED ORAL at 05:06

## 2023-12-19 RX ADMIN — OXYCODONE HYDROCHLORIDE 5 MILLIGRAM(S): 5 TABLET ORAL at 09:35

## 2023-12-19 RX ADMIN — Medication 1 TABLET(S): at 11:21

## 2023-12-19 NOTE — PROGRESS NOTE ADULT - PROVIDER SPECIALTY LIST ADULT
Ophthalmology
SICU
Surgery
Trauma Surgery
Rehab Medicine
SICU
Surgery
Trauma Surgery
Trauma Surgery
Ophthalmology
SICU
Trauma Surgery
Surgery
Surgery
Trauma Surgery
Surgery
Surgery
SICU
Surgery

## 2023-12-19 NOTE — PROGRESS NOTE ADULT - ASSESSMENT
63yM w/ PMHx of alcohol abuse and anx/depression who presents s/p fall w +HS onto bedside table with LOC.  Trauma assessment in ED: ABCs intact, GCS 15, AAOx3. Repeat CTH imaging has remained stable. Phenobarbitol taper completed on 12/10. Patient currenlty hemodynamically stable, with no acute neurologic deficits, and appropriate progress. Pending [facility].     injuries are identified:   - L parafalcine SDH w/o mass effect  - L periorbital STS    PLAN:  - Regular diet  - Phenobarb taper completed  - Multimodal pain control  - Tertiary exam performed  - No face dressing, leave open to air  - Per ophtho, can continue erythromycin ointment for 1 more week until 12/22  - PT evaluation- Acute Inpatient Rehab; however, insurance denying acute rehab; referrals sent to [facility] instead  - Regular diet  - DVT ppx  - dispo: discharge to [facility] once bed available/auth obtained      Trauma ACS [id number] 63yM w/ PMHx of alcohol abuse and anx/depression who presents s/p fall w +HS onto bedside table with LOC.  Trauma assessment in ED: ABCs intact, GCS 15, AAOx3. Repeat CTH imaging has remained stable. Phenobarbitol taper completed on 12/10. Patient currenlty hemodynamically stable, with no acute neurologic deficits, and appropriate progress. Pending [facility].     injuries are identified:   - L parafalcine SDH w/o mass effect  - L periorbital STS    PLAN:  - Regular diet  - Phenobarb taper completed  - Multimodal pain control  - Tertiary exam performed  - No face dressing, leave open to air  - Per ophtho, can continue erythromycin ointment for 1 more week until 12/22  - PT evaluation- Acute Inpatient Rehab; however, insurance denying acute rehab; referrals sent to [facility] instead  - Regular diet  - DVT ppx  - dispo: discharge to [facility] once bed available/auth obtained      Trauma ACS [id number]

## 2023-12-19 NOTE — PROGRESS NOTE ADULT - SUBJECTIVE AND OBJECTIVE BOX
TRAUMA SURGERY DAILY PROGRESS NOTE:         SUBJECTIVE/ROS: Patient seen and examined at bedside.  No events overnight. States pain controlled with analgesia.   Denies nausea, vomiting, chest pain, shortness of breath.         MEDICATIONS  (STANDING):  chlorhexidine 2% Cloths 1 Application(s) Topical <User Schedule>  enoxaparin Injectable 40 milliGRAM(s) SubCutaneous every 24 hours  erythromycin   Ointment 1 Application(s) Left EYE three times a day  escitalopram 20 milliGRAM(s) Oral daily  folic acid 1 milliGRAM(s) Oral daily  ibuprofen  Tablet. 600 milliGRAM(s) Oral every 6 hours  influenza   Vaccine 0.5 milliLiter(s) IntraMuscular once  loperamide 2 milliGRAM(s) Oral daily  losartan 50 milliGRAM(s) Oral daily  multivitamin 1 Tablet(s) Oral daily  polyethylene glycol 3350 17 Gram(s) Oral daily  senna 2 Tablet(s) Oral at bedtime  thiamine 100 milliGRAM(s) Oral daily    MEDICATIONS  (PRN):  acetaminophen     Tablet .. 975 milliGRAM(s) Oral every 6 hours PRN Mild Pain (1 - 3)  ondansetron   Disintegrating Tablet 4 milliGRAM(s) Oral every 8 hours PRN Nausea and/or Vomiting  oxyCODONE    IR 5 milliGRAM(s) Oral every 4 hours PRN Moderate Pain (4 - 6)      OBJECTIVE:    Vital Signs Last 24 Hrs  T(C): 36.5 (19 Dec 2023 04:25), Max: 37 (18 Dec 2023 16:27)  T(F): 97.7 (19 Dec 2023 04:25), Max: 98.6 (18 Dec 2023 16:27)  HR: 49 (19 Dec 2023 04:25) (49 - 68)  BP: 116/70 (19 Dec 2023 04:25) (104/64 - 116/70)  BP(mean): --  RR: 18 (19 Dec 2023 04:25) (18 - 18)  SpO2: 95% (19 Dec 2023 04:25) (95% - 97%)    Parameters below as of 19 Dec 2023 04:25  Patient On (Oxygen Delivery Method): room air            I&O's Detail    18 Dec 2023 07:01  -  19 Dec 2023 07:00  --------------------------------------------------------  IN:    Oral Fluid: 1100 mL  Total IN: 1100 mL    OUT:  Total OUT: 0 mL    Total NET: 1100 mL          PHYSICAL EXAM:  General: laying in bed, NAD  Respiratory: non labored breathing   HEENT: Minimal left periorbital ecchymosis

## 2024-01-02 ENCOUNTER — APPOINTMENT (OUTPATIENT)
Dept: OPHTHALMOLOGY | Facility: CLINIC | Age: 64
End: 2024-01-02

## 2024-01-10 DIAGNOSIS — S06.5XAA TRAUMATIC SUBDURAL HEMORRHAGE WITH LOSS OF CONSCIOUSNESS STATUS UNKNOWN, INITIAL ENCOUNTER: ICD-10-CM

## 2024-01-19 ENCOUNTER — APPOINTMENT (OUTPATIENT)
Dept: NEUROSURGERY | Facility: CLINIC | Age: 64
End: 2024-01-19

## 2024-02-23 ENCOUNTER — EMERGENCY (EMERGENCY)
Facility: HOSPITAL | Age: 64
LOS: 1 days | Discharge: HOME | End: 2024-02-23
Attending: EMERGENCY MEDICINE
Payer: COMMERCIAL

## 2024-02-23 VITALS
DIASTOLIC BLOOD PRESSURE: 82 MMHG | HEART RATE: 69 BPM | TEMPERATURE: 98 F | RESPIRATION RATE: 20 BRPM | SYSTOLIC BLOOD PRESSURE: 129 MMHG | OXYGEN SATURATION: 100 %

## 2024-02-23 VITALS
SYSTOLIC BLOOD PRESSURE: 108 MMHG | DIASTOLIC BLOOD PRESSURE: 72 MMHG | OXYGEN SATURATION: 99 % | HEART RATE: 65 BPM | RESPIRATION RATE: 18 BRPM

## 2024-02-23 DIAGNOSIS — Z98.890 OTHER SPECIFIED POSTPROCEDURAL STATES: Chronic | ICD-10-CM

## 2024-02-23 LAB
ALBUMIN SERPL ELPH-MCNC: 3 G/DL — LOW (ref 3.3–5.2)
ALP SERPL-CCNC: 194 U/L — HIGH (ref 40–120)
ALT FLD-CCNC: 43 U/L — HIGH
ANION GAP SERPL CALC-SCNC: 17 MMOL/L — SIGNIFICANT CHANGE UP (ref 5–17)
AST SERPL-CCNC: 133 U/L — HIGH
BILIRUB SERPL-MCNC: 1 MG/DL — SIGNIFICANT CHANGE UP (ref 0.4–2)
BUN SERPL-MCNC: 9.5 MG/DL — SIGNIFICANT CHANGE UP (ref 8–20)
CALCIUM SERPL-MCNC: 8.3 MG/DL — LOW (ref 8.4–10.5)
CHLORIDE SERPL-SCNC: 101 MMOL/L — SIGNIFICANT CHANGE UP (ref 96–108)
CO2 SERPL-SCNC: 19 MMOL/L — LOW (ref 22–29)
CREAT SERPL-MCNC: 0.73 MG/DL — SIGNIFICANT CHANGE UP (ref 0.5–1.3)
EGFR: 102 ML/MIN/1.73M2 — SIGNIFICANT CHANGE UP
ETHANOL SERPL-MCNC: 261 MG/DL — HIGH (ref 0–9)
GLUCOSE SERPL-MCNC: 76 MG/DL — SIGNIFICANT CHANGE UP (ref 70–99)
HCT VFR BLD CALC: 32.7 % — LOW (ref 39–50)
HGB BLD-MCNC: 10.9 G/DL — LOW (ref 13–17)
MCHC RBC-ENTMCNC: 29.5 PG — SIGNIFICANT CHANGE UP (ref 27–34)
MCHC RBC-ENTMCNC: 33.3 GM/DL — SIGNIFICANT CHANGE UP (ref 32–36)
MCV RBC AUTO: 88.4 FL — SIGNIFICANT CHANGE UP (ref 80–100)
PLATELET # BLD AUTO: 272 K/UL — SIGNIFICANT CHANGE UP (ref 150–400)
POTASSIUM SERPL-MCNC: 3.8 MMOL/L — SIGNIFICANT CHANGE UP (ref 3.5–5.3)
POTASSIUM SERPL-SCNC: 3.8 MMOL/L — SIGNIFICANT CHANGE UP (ref 3.5–5.3)
PROT SERPL-MCNC: 7.1 G/DL — SIGNIFICANT CHANGE UP (ref 6.6–8.7)
RBC # BLD: 3.7 M/UL — LOW (ref 4.2–5.8)
RBC # FLD: 16.9 % — HIGH (ref 10.3–14.5)
SODIUM SERPL-SCNC: 137 MMOL/L — SIGNIFICANT CHANGE UP (ref 135–145)
WBC # BLD: 4.84 K/UL — SIGNIFICANT CHANGE UP (ref 3.8–10.5)
WBC # FLD AUTO: 4.84 K/UL — SIGNIFICANT CHANGE UP (ref 3.8–10.5)

## 2024-02-23 PROCEDURE — 96374 THER/PROPH/DIAG INJ IV PUSH: CPT

## 2024-02-23 PROCEDURE — 80307 DRUG TEST PRSMV CHEM ANLYZR: CPT

## 2024-02-23 PROCEDURE — 85027 COMPLETE CBC AUTOMATED: CPT

## 2024-02-23 PROCEDURE — 80053 COMPREHEN METABOLIC PANEL: CPT

## 2024-02-23 PROCEDURE — 96361 HYDRATE IV INFUSION ADD-ON: CPT

## 2024-02-23 PROCEDURE — 96376 TX/PRO/DX INJ SAME DRUG ADON: CPT

## 2024-02-23 PROCEDURE — 70450 CT HEAD/BRAIN W/O DYE: CPT | Mod: 26,MC

## 2024-02-23 PROCEDURE — 70450 CT HEAD/BRAIN W/O DYE: CPT | Mod: MC

## 2024-02-23 PROCEDURE — 36415 COLL VENOUS BLD VENIPUNCTURE: CPT

## 2024-02-23 PROCEDURE — 99285 EMERGENCY DEPT VISIT HI MDM: CPT

## 2024-02-23 PROCEDURE — 99285 EMERGENCY DEPT VISIT HI MDM: CPT | Mod: 25

## 2024-02-23 PROCEDURE — 96375 TX/PRO/DX INJ NEW DRUG ADDON: CPT

## 2024-02-23 RX ORDER — MIDAZOLAM HYDROCHLORIDE 1 MG/ML
2 INJECTION, SOLUTION INTRAMUSCULAR; INTRAVENOUS ONCE
Refills: 0 | Status: DISCONTINUED | OUTPATIENT
Start: 2024-02-23 | End: 2024-02-23

## 2024-02-23 RX ORDER — HALOPERIDOL DECANOATE 100 MG/ML
5 INJECTION INTRAMUSCULAR ONCE
Refills: 0 | Status: COMPLETED | OUTPATIENT
Start: 2024-02-23 | End: 2024-02-23

## 2024-02-23 RX ORDER — SODIUM CHLORIDE 9 MG/ML
1000 INJECTION INTRAMUSCULAR; INTRAVENOUS; SUBCUTANEOUS ONCE
Refills: 0 | Status: COMPLETED | OUTPATIENT
Start: 2024-02-23 | End: 2024-02-23

## 2024-02-23 RX ORDER — MIDAZOLAM HYDROCHLORIDE 1 MG/ML
4 INJECTION, SOLUTION INTRAMUSCULAR; INTRAVENOUS ONCE
Refills: 0 | Status: DISCONTINUED | OUTPATIENT
Start: 2024-02-23 | End: 2024-02-23

## 2024-02-23 RX ADMIN — SODIUM CHLORIDE 1000 MILLILITER(S): 9 INJECTION INTRAMUSCULAR; INTRAVENOUS; SUBCUTANEOUS at 10:32

## 2024-02-23 RX ADMIN — MIDAZOLAM HYDROCHLORIDE 2 MILLIGRAM(S): 1 INJECTION, SOLUTION INTRAMUSCULAR; INTRAVENOUS at 12:15

## 2024-02-23 RX ADMIN — Medication 2 MILLIGRAM(S): at 12:17

## 2024-02-23 RX ADMIN — MIDAZOLAM HYDROCHLORIDE 4 MILLIGRAM(S): 1 INJECTION, SOLUTION INTRAMUSCULAR; INTRAVENOUS at 11:35

## 2024-02-23 RX ADMIN — SODIUM CHLORIDE 1000 MILLILITER(S): 9 INJECTION INTRAMUSCULAR; INTRAVENOUS; SUBCUTANEOUS at 11:23

## 2024-02-23 RX ADMIN — MIDAZOLAM HYDROCHLORIDE 4 MILLIGRAM(S): 1 INJECTION, SOLUTION INTRAMUSCULAR; INTRAVENOUS at 11:52

## 2024-02-23 NOTE — ED PROVIDER NOTE - CLINICAL SUMMARY MEDICAL DECISION MAKING FREE TEXT BOX
63 y/o male presented to court drunk/ drank  vodka all morning vodka  staggered into court  and was kicked out.  EMS brought the patient in for evaluation for unresponsiveness .  no seizure activity , walked to stretcher recent head trauma  VSS   Patient is in no acute distress sleeping comfortably.  Old laceration on the left upper forehead healing dry and clean with some swelling.  Neck is supple.  Moving all extremities.  Discussed case with his brother brother states that patient was warned not to drink prior to going to court but despite warnings drink all morning.  And when he showed up to court he was kicked out.   pt is uncooperative sedated 63 y/o male presented to court drunk/ drank  vodka all morning vodka  staggered into court  and was kicked out.  EMS brought the patient in for evaluation for unresponsiveness .  no seizure activity , walked to stretcher recent head trauma  VSS   Patient is in no acute distress sleeping comfortably.  Old laceration on the left upper forehead healing dry and clean with some swelling.  Neck is supple.  Moving all extremities.  Discussed case with his brother brother states that patient was warned not to drink prior to going to court but despite warnings drink all morning.  And when he showed up to court he was kicked out.   pt is uncooperative sedated  @ 1537 , pt awake alert and oriented , steady gait will d/c home

## 2024-02-23 NOTE — ED ADULT TRIAGE NOTE - CHIEF COMPLAINT QUOTE
patient with altered mental status, came in from court. ambulatory on scene but only responding to sternal rub in ED.  Dr. Calixto called to bedside for eval.

## 2024-02-23 NOTE — ED PROVIDER NOTE - PATIENT PORTAL LINK FT
You can access the FollowMyHealth Patient Portal offered by [facility] by registering at the following website: [email or web address]. By joining [facility]’s FollowMyHealth portal, you will also be able to view your health information using other applications (apps) compatible with our system.

## 2024-02-23 NOTE — ED ADULT NURSE REASSESSMENT NOTE - NS ED NURSE REASSESS COMMENT FT1
Pt laying in bed, sleeping. RR even & unlabored. Pt in NAD @ this time. Pt awaiting results. Safety maintained.

## 2024-02-23 NOTE — ED ADULT NURSE REASSESSMENT NOTE - NS ED NURSE REASSESS COMMENT FT1
Pt continuing to climb out of bed and unable to be redirected. Pt agitated. ED security called to the bedside along w/ Dr. Calixto. Additional verbal order given to give 4mg Versed IV.

## 2024-02-23 NOTE — ED ADULT NURSE REASSESSMENT NOTE - NS ED NURSE REASSESS COMMENT FT1
Pt continually attempting to climb out of bed and ambulate. Pt unsteady and unable to be redirected. Dr. Calixto made aware. Per Dr. Calixto would like to pt to get head CT and requested this RN to call CT. Per CT, pt is next in line after current pt.

## 2024-02-23 NOTE — ED ADULT NURSE REASSESSMENT NOTE - NS ED NURSE REASSESS COMMENT FT1
Pt continues to attempt to climb out of bed and ambulate. Pt becoming agitated and demanding his clothes. Pt requesting to leave. Pt unable to be redirected back to bed. ED security called and Dr. Calixto to bedside. Verbal order given for 4mg Versed IV.

## 2024-02-23 NOTE — ED ADULT NURSE REASSESSMENT NOTE - NS ED NURSE REASSESS COMMENT FT1
pt sitting up in bed, scooting self to end of bed. pt unable to be redirected. Pt agitated. Dr. Calixto made aware and verbal order to give 2mg Ativan IV and 2mg Versed IV given.

## 2024-02-23 NOTE — ED PROVIDER NOTE - OBJECTIVE STATEMENT
63 y/o male presented to court drunk/ drank  vodka all morning vodka  staggered into court  and was kicked out.  EMS brought the patient in for evaluation for unresponsiveness .  no seizure activity , walked to stretcher recent head trauma

## 2024-02-23 NOTE — ED ADULT NURSE NOTE - OBJECTIVE STATEMENT
Pt to ED by EMS from court w/ reports of AMS. Pt laying in bed, resting. Pt responsive to verbal stimuli. pt A&Ox3 in NAD @ this time. RR even & unlabored. Pt states he was going to court and was "stopped because I had a hole in my head." Pt unwilling to elaborate further. When this RN attempted to inquire about the staples in pt's head, pt states "I cut it and it doesn't matter how." Pt to ED by EMS from court w/ reports of AMS. Pt laying in bed, resting. Pt responsive to verbal stimuli. pt A&Ox3 in NAD @ this time. RR even & unlabored. Pt states he was going to court and was "stopped because I had a hole in my head." Pt unwilling to elaborate further. When this RN attempted to inquire about the staples in pt's head, pt states "I cut it and it doesn't matter how." Pt in yellow gown. Belonging secured.

## 2024-02-23 NOTE — ED ADULT NURSE NOTE - NSFALLRISKINTERV_ED_ALL_ED

## 2024-11-06 NOTE — OCCUPATIONAL THERAPY INITIAL EVALUATION ADULT - VISUAL ASSESSMENT: VISUAL NEGLECT
November 6, 2024     Patient: Adonis Craven   YOB: 2009   Date of Visit: 11/6/2024       To Whom it May Concern:    Adonis Craven was seen in my clinic on 11/6/2024 at 4:30 pm.     Please excuse Adonis for his absence from school on the date listed above to be able to make his appointment. Allow patient access to elevator and a friend to help with school supplies. Patient is non weight bearing with use of crutches, allow extra time between passing periods. Restricted from returning to PE until further notice.     Sincerely,     Stanley Kevin DPM    Medical information is confidential and cannot be disclosed without the written consent of the patient or his representative.      
not observed

## 2025-05-06 NOTE — ED PROCEDURE NOTE - CPROC ED POST PROC CARE GUIDE1
Provider E-Visit time total (minutes):  Less than 5 minutes.       
Verbal/written post procedure instructions were given to patient/caregiver.

## 2025-06-23 NOTE — PHYSICAL THERAPY INITIAL EVALUATION ADULT - MANUAL MUSCLE TESTING RESULTS, REHAB EVAL
Hide Additional Notes?: No BUE/BLE 3/5 throughout/grossly assessed due to Detail Level: Zone Additional Note: Nevi benign in nature. Additional Note: Reassured angiomas benign in nature.